# Patient Record
Sex: FEMALE | Race: WHITE | NOT HISPANIC OR LATINO | Employment: FULL TIME | URBAN - METROPOLITAN AREA
[De-identification: names, ages, dates, MRNs, and addresses within clinical notes are randomized per-mention and may not be internally consistent; named-entity substitution may affect disease eponyms.]

---

## 2017-07-25 ENCOUNTER — ALLSCRIPTS OFFICE VISIT (OUTPATIENT)
Dept: OTHER | Facility: OTHER | Age: 27
End: 2017-07-25

## 2017-11-02 ENCOUNTER — GENERIC CONVERSION - ENCOUNTER (OUTPATIENT)
Dept: OTHER | Facility: OTHER | Age: 27
End: 2017-11-02

## 2017-12-18 ENCOUNTER — GENERIC CONVERSION - ENCOUNTER (OUTPATIENT)
Dept: OTHER | Facility: OTHER | Age: 27
End: 2017-12-18

## 2018-01-06 LAB
CFTR MUT ANL BLD/T: NORMAL
CFTR MUT ANL BLD/T: NORMAL
EXTERNAL ABO GROUPING: NORMAL
EXTERNAL AFP: NORMAL
EXTERNAL CHLAMYDIA SCREEN: NORMAL
EXTERNAL GONORRHEA SCREEN: NORMAL
EXTERNAL HEPATITIS B SURFACE ANTIGEN: NORMAL
EXTERNAL HIV-1 ANTIBODY: NORMAL
EXTERNAL RH FACTOR: POSITIVE
EXTERNAL RUBELLA IGG QUANTITATION: NORMAL
EXTERNAL SYPHILIS RPR SCREEN: NORMAL

## 2018-01-13 VITALS
RESPIRATION RATE: 16 BRPM | BODY MASS INDEX: 28.46 KG/M2 | DIASTOLIC BLOOD PRESSURE: 78 MMHG | SYSTOLIC BLOOD PRESSURE: 112 MMHG | WEIGHT: 170.8 LBS | HEART RATE: 78 BPM | HEIGHT: 65 IN | TEMPERATURE: 99.1 F

## 2018-01-15 NOTE — MISCELLANEOUS
Message   Recorded as Task   Date: 10/11/2016 01:22 PM, Created By: Franchesca Brand   Task Name: Go to Result   Assigned To: Lucio Gomez   Regarding Patient: Richard Chang, Status: Active   Comment:    Franchesca Brand - 11 Oct 2016 1:22 PM     TASK CREATED  her labs are all normal, actually not consistent with PCO, would observe her cycles until her husbands next semen analysis is done   Pt informed  Active Problems    1  Amenorrhea (626 0) (N91 2)   2  History of self breast exam   3  Hyperlipidemia (272 4) (E78 5)   4  Menstruation, irregular (626 4) (N92 6)   5  Need for prophylactic vaccination and inoculation against influenza (V04 81) (Z23)   6  Nervousness (799 21) (R45 0)   7  History of Oral contraceptive prescribed (V25 01) (Z30 011)    Current Meds   1  Womens One Daily TABS; TAKE 1 TABLET DAILY; Therapy: (Recorded:28Apr2015) to Recorded    Allergies    1  No Known Drug Allergies    2  No Known Environmental Allergies   3   No Known Food Allergies    Signatures   Electronically signed by : Mike Wong, ; Oct 11 2016  2:29PM EST                       (Author)

## 2018-01-16 ENCOUNTER — ALLSCRIPTS OFFICE VISIT (OUTPATIENT)
Dept: OTHER | Facility: OTHER | Age: 28
End: 2018-01-16

## 2018-01-17 NOTE — MISCELLANEOUS
Message   Date: 23 Aug 2016 1:11 PM EST, Recorded By: Britany Castro For: Sandy Grammes: [place]cal[place]   Phone: [phone] (Home), [phone] (Work)   Reason: Medical Complaint   NP-- pt is having very irregular cycles    45 days in between    she went to her PCP and had neg preg tests    pt will come in for an appt           Active Problems    1  Amenorrhea (626 0) (N91 2)   2  Encounter for routine gynecological examination (V72 31) (Z01 419)   3  Hyperlipidemia (272 4) (E78 5)   4  Need for prophylactic vaccination and inoculation against influenza (V04 81) (Z23)   5  Nervousness (799 21) (R45 0)   6  Oral contraceptive prescribed (V25 01) (Z30 011)    Current Meds   1  Amoxicillin 875 MG Oral Tablet; TAKE 1 TABLET EVERY 12 HOURS DAILY; Therapy: 34ECP1838 to (Evaluate:19Jun2016)  Requested for: 19WEO5755; Last   Rx:09Jun2016 Ordered   2  Fluconazole 150 MG Oral Tablet (Diflucan); one pill stat then repeat in 3 days; Therapy: 21VDP2973 to (Last Rx:13Jun2016)  Requested for: 13Jun2016 Ordered   3  Womens One Daily TABS; TAKE 1 TABLET DAILY; Therapy: (Recorded:28Apr2015) to Recorded    Allergies    1  No Known Drug Allergies    2  No Known Environmental Allergies   3   No Known Food Allergies    Signatures   Electronically signed by : Mojgan Grier, ; Aug 23 2016  1:13PM EST                       (Author)

## 2018-01-18 NOTE — MISCELLANEOUS
Message   Recorded as Task   Date: 11/17/2016 10:57 AM, Created By: Robina Spaulding   Task Name: Care Coordination   Assigned To: Patricia Villalobos   Regarding Patient: David Marie, Status: Active   CommentJeffertorrie Spine - 17 Nov 2016 10:57 AM     TASK CREATED  Pt has been working with you about conceiving    her cycle has been 46 days the last 2 months    Whats the next step? Ferol Carlton Ferol Carlton Also I an sending you her husbands semen analysis    I have the one from April and from June  Emilie Irvin - 18 Nov 2016 11:07 AM     TASK REPLIED TO: Previously Assigned To Patricia Villalobos  I just reviewed both of the semen analyses  They are recommended seeing the infertility dr because his count is good but the % of normal sperm is low, that combined with her long cycles probably warrant a visit with KATTY  Not sure if she wanted to do this  Lamar Turk - 21 Nov 2016 11:42 AM     TASK REPLIED TO: Previously Assigned To Lamar Turk  referred to [place]    Active Problems    1  Amenorrhea (626 0) (N91 2)   2  History of self breast exam   3  Hyperlipidemia (272 4) (E78 5)   4  Menstruation, irregular (626 4) (N92 6)   5  Need for prophylactic vaccination and inoculation against influenza (V04 81) (Z23)   6  Nervousness (799 21) (R45 0)   7  History of Oral contraceptive prescribed (V25 01) (Z30 011)    Current Meds   1  Womens One Daily TABS; TAKE 1 TABLET DAILY; Therapy: (Recorded:28Apr2015) to Recorded    Allergies    1  No Known Drug Allergies    2  No Known Environmental Allergies   3   No Known Food Allergies    Signatures   Electronically signed by : Willie Valdez, ; Nov 21 2016 11:42AM EST                       (Author)

## 2018-01-20 LAB
ADEQUACY: (HISTORICAL): NORMAL
CHLAMYDIA DFA, NAA OR PCR (HISTORICAL): NEGATIVE
CLINICIAN PROVIDIED ICD 9 OR 10 (HISTORICAL): NORMAL
COMMENT (HISTORICAL): NORMAL
DIAGNOSIS (HISTORICAL): NORMAL
GC, DNA PROB (HISTORICAL): NEGATIVE
PERFORMED BY (HISTORICAL): NORMAL
QC REVIEWED BY (HISTORICAL): NORMAL
REFLEX (HISTORICAL): NORMAL
TEST INFORMATION (HISTORICAL): NORMAL

## 2018-01-22 VITALS — BODY MASS INDEX: 28.29 KG/M2 | HEIGHT: 65 IN | WEIGHT: 169.8 LBS

## 2018-01-23 NOTE — MISCELLANEOUS
Message   Date: 18 Dec 2017 8:49 AM EST, Recorded By: Jacqueline Breen For: Denisha Pires: Lemuel Thorne, [place]   Phone: [phone] (Home), [phone] (Work)   Reason: Other   New OB--pt is with [place] and she needs an apt for the the week of the Dec 15 when she will be 10 weeks    she will already have her U/S and bloodwork and those records will be released here    pt scheduled her appts           Active Problems    1  Amenorrhea (626 0) (N91 2)   2  History of self breast exam   3  Hyperlipidemia (272 4) (E78 5)   4  Impacted cerumen of right ear (380 4) (H61 21)   5  Menstruation, irregular (626 4) (N92 6)   6  Need for prophylactic vaccination and inoculation against influenza (V04 81) (Z23)   7  Need for prophylactic vaccination and inoculation against varicella (V05 4) (Z23)   8  Nervousness (799 21) (R45 0)   9  History of Oral contraceptive prescribed (V25 01) (Z30 011)   10  PCOS (polycystic ovarian syndrome) (256 4) (E28 2)    Current Meds   1  Co Q 10 CAPS; Therapy: (Recorded:05Mvc2917) to Recorded   2  Fluconazole 150 MG Oral Tablet (Diflucan); TAKE 1 TABLET ONCE AND REPEAT IN 1   WEEK as needed; Therapy: 16EHI1522 to (Last Rx:92Ahj5874)  Requested for: 61Glc4201 Ordered   3  MetFORMIN HCl - 500 MG Oral Tablet; TAKE 1 TABLET 3 TIMES DAILY WITH MEALS; Therapy: (Recorded:37Bxr8917) to Recorded   4  Neomycin-Polymyxin-HC 3 [id number] Otic Suspension; INSTILL 4 DROPS IN   AFFECTED EAR(S) 3 TIMES DAILY for 1 week; Therapy: 71EXH1695 to (Last Rx:02Jne7690)  Requested for: 38Iht1949 Ordered   5  Prenatal Vitamin TABS; Therapy: (Recorded:68Vih0983) to Recorded   6  Vitamin D 2000 UNIT Oral Capsule; Therapy: (Recorded:48Fif2206) to Recorded   7  Womens One Daily TABS; TAKE 1 TABLET DAILY; Therapy: (Recorded:28Apr2015) to Recorded    Allergies    1  No Known Drug Allergies    2  No Known Environmental Allergies   3   No Known Food Allergies    Signatures   Electronically signed by : Shannan Yeh, ; Dec 18 2017  8:53AM EST                       (Author)

## 2018-01-24 VITALS
WEIGHT: 166.38 LBS | HEART RATE: 76 BPM | DIASTOLIC BLOOD PRESSURE: 80 MMHG | SYSTOLIC BLOOD PRESSURE: 120 MMHG | BODY MASS INDEX: 27.72 KG/M2 | HEIGHT: 65 IN

## 2018-01-25 ENCOUNTER — ROUTINE PRENATAL (OUTPATIENT)
Dept: PERINATAL CARE | Facility: CLINIC | Age: 28
End: 2018-01-25
Payer: COMMERCIAL

## 2018-01-25 VITALS
DIASTOLIC BLOOD PRESSURE: 69 MMHG | SYSTOLIC BLOOD PRESSURE: 115 MMHG | HEART RATE: 81 BPM | WEIGHT: 171.4 LBS | BODY MASS INDEX: 28.56 KG/M2 | HEIGHT: 65 IN

## 2018-01-25 DIAGNOSIS — O09.811 PREGNANCY RESULTING FROM IN VITRO FERTILIZATION IN FIRST TRIMESTER: ICD-10-CM

## 2018-01-25 DIAGNOSIS — Z36.82 ENCOUNTER FOR NUCHAL TRANSLUCENCY TESTING: ICD-10-CM

## 2018-01-25 DIAGNOSIS — Z3A.12 12 WEEKS GESTATION OF PREGNANCY: Primary | ICD-10-CM

## 2018-01-25 PROBLEM — Z34.90 PREGNANCY: Status: ACTIVE | Noted: 2018-01-25

## 2018-01-25 PROCEDURE — 76801 OB US < 14 WKS SINGLE FETUS: CPT | Performed by: OBSTETRICS & GYNECOLOGY

## 2018-01-25 PROCEDURE — 99241 PR OFFICE CONSULTATION NEW/ESTAB PATIENT 15 MIN: CPT | Performed by: OBSTETRICS & GYNECOLOGY

## 2018-01-25 PROCEDURE — 76813 OB US NUCHAL MEAS 1 GEST: CPT | Performed by: OBSTETRICS & GYNECOLOGY

## 2018-01-25 RX ORDER — PNV NO.95/FERROUS FUM/FOLIC AC 28MG-0.8MG
1 TABLET ORAL DAILY
COMMUNITY
End: 2019-08-28 | Stop reason: ALTCHOICE

## 2018-01-25 NOTE — PROGRESS NOTES
Maternal-Fetal Medicine:  Ms Ivis Rao was seen today in the [id number] [place] [address] today for nuchal translucency ultrasound  Please see ultrasound report under "OB Procedures" tab in EPIC   Thank you for the referral and please don't hesitate to contact our office with any concerns or questions  Pregnancy resulting from in vitro fertilization in first trimester  Recommend fetal echocardiogram at 22-24 weeks    Encounter for nuchal translucency testing  Normal nuchal translucency screening today  Please note, in addition to the time spent discussing the results of the ultrasound, I spent approximately 15 minutes of face-to-face time with the patient, greater than 50% of which was spent in counseling and the coordination of care for this patient      Sincerely,    Ryan Arango MD  Attending Physician, Maternal-Fetal Medicine

## 2018-02-01 ENCOUNTER — TELEPHONE (OUTPATIENT)
Dept: PERINATAL CARE | Facility: CLINIC | Age: 28
End: 2018-02-01

## 2018-02-06 ENCOUNTER — TELEPHONE (OUTPATIENT)
Dept: PERINATAL CARE | Facility: CLINIC | Age: 28
End: 2018-02-06

## 2018-02-13 ENCOUNTER — ROUTINE PRENATAL (OUTPATIENT)
Dept: OBGYN CLINIC | Facility: CLINIC | Age: 28
End: 2018-02-13

## 2018-02-13 VITALS
HEART RATE: 90 BPM | WEIGHT: 173 LBS | DIASTOLIC BLOOD PRESSURE: 62 MMHG | SYSTOLIC BLOOD PRESSURE: 114 MMHG | HEIGHT: 65 IN | OXYGEN SATURATION: 99 % | BODY MASS INDEX: 28.82 KG/M2

## 2018-02-13 DIAGNOSIS — Z3A.14 14 WEEKS GESTATION OF PREGNANCY: ICD-10-CM

## 2018-02-13 DIAGNOSIS — Z34.90 PRENATAL CARE, ANTEPARTUM: Primary | ICD-10-CM

## 2018-02-13 DIAGNOSIS — O09.811 PREGNANCY RESULTING FROM IN VITRO FERTILIZATION IN FIRST TRIMESTER: ICD-10-CM

## 2018-02-13 DIAGNOSIS — Z36.82 ENCOUNTER FOR NUCHAL TRANSLUCENCY TESTING: ICD-10-CM

## 2018-02-13 PROCEDURE — PNV: Performed by: OBSTETRICS & GYNECOLOGY

## 2018-02-13 NOTE — PROGRESS NOTES
Patient is doing well  She denies any complaints  Her nausea has much improved  We reviewed old records trying to find her prenatal labs  Letter from Dr Piedad Newsome demonstrating normal labs for pregnancy was found in the chart  Urine culture was not done and it was ordered today  She will follow up in 4 weeks time for prenatal visit  She has laboratory sheet for the 2nd part of the sequential study  She will follow up with [facility] for ultrasound at 20 weeks  [facility] also recommended fetal echocardiogram given history of IVF pregnancy

## 2018-02-14 PROBLEM — H61.21 IMPACTED CERUMEN OF RIGHT EAR: Status: RESOLVED | Noted: 2017-07-25 | Resolved: 2018-02-14

## 2018-02-14 PROBLEM — Z31.9 INFERTILITY MANAGEMENT: Status: ACTIVE | Noted: 2018-02-14

## 2018-02-14 PROBLEM — O09.811 PREGNANCY RESULTING FROM IN VITRO FERTILIZATION IN FIRST TRIMESTER: Status: RESOLVED | Noted: 2018-01-25 | Resolved: 2018-02-14

## 2018-02-14 PROBLEM — E28.2 PCOS (POLYCYSTIC OVARIAN SYNDROME): Status: ACTIVE | Noted: 2017-07-25

## 2018-02-14 PROBLEM — H61.21 IMPACTED CERUMEN OF RIGHT EAR: Status: ACTIVE | Noted: 2017-07-25

## 2018-02-14 PROBLEM — Z92.29 HISTORY OF INFLUENZA VACCINATION: Status: ACTIVE | Noted: 2018-02-14

## 2018-02-15 ENCOUNTER — OFFICE VISIT (OUTPATIENT)
Dept: OBGYN CLINIC | Facility: CLINIC | Age: 28
End: 2018-02-15
Payer: COMMERCIAL

## 2018-02-15 ENCOUNTER — TELEPHONE (OUTPATIENT)
Dept: OBGYN CLINIC | Facility: CLINIC | Age: 28
End: 2018-02-15

## 2018-02-15 VITALS
SYSTOLIC BLOOD PRESSURE: 106 MMHG | BODY MASS INDEX: 28.76 KG/M2 | DIASTOLIC BLOOD PRESSURE: 62 MMHG | WEIGHT: 172.6 LBS | HEIGHT: 65 IN

## 2018-02-15 DIAGNOSIS — Z92.29 HISTORY OF INFLUENZA VACCINATION: ICD-10-CM

## 2018-02-15 DIAGNOSIS — Z36.82 ENCOUNTER FOR NUCHAL TRANSLUCENCY TESTING: ICD-10-CM

## 2018-02-15 DIAGNOSIS — N89.8 VAGINAL DISCHARGE: Primary | ICD-10-CM

## 2018-02-15 DIAGNOSIS — O26.859 SPOTTING IN PREGNANCY: Primary | ICD-10-CM

## 2018-02-15 LAB — SL AMB POCT WET MOUNT: 4

## 2018-02-15 PROCEDURE — PNV: Performed by: OBSTETRICS & GYNECOLOGY

## 2018-02-15 PROCEDURE — 87210 SMEAR WET MOUNT SALINE/INK: CPT | Performed by: OBSTETRICS & GYNECOLOGY

## 2018-02-15 NOTE — TELEPHONE ENCOUNTER
Patient called, 15 weeks OB, c/o spotting with wiping, every time,  x 2 days, slight cramping, not post coital   Wants appointment  OK per [facility], appointment given in QT today  Sent for [facility], CBC, Type and screen

## 2018-02-15 NOTE — PROGRESS NOTES
Patient seen today in follow-up  She had some cramping starting the evening 2 days ago  Yesterday, she had some brown discharge which continued until today  Exam today was notable for scant amount of white/tan discharge with no active bleeding  No cervical or vaginal lesions were noted  Wet prep was negative for any infection  Her cervix is closed and long  Threatened AB precautions were reviewed in detail  Patient is Rh positive on blood type  She will call with any heavy bleeding or severe cramps  Otherwise, she will follow up in 3-4 weeks for prenatal visit as scheduled

## 2018-02-15 NOTE — PATIENT INSTRUCTIONS
Follow-up 3-4 weeks for prenatal or as needed  Threatened AB precautions reviewed  Threatened Miscarriage   WHAT YOU NEED TO KNOW:   What is a threatened miscarriage? A threatened miscarriage occurs when you have vaginal bleeding within the first 20 weeks of pregnancy  It means that a miscarriage may happen  A threatened miscarriage may also be called a threatened   What causes bleeding or spotting during pregnancy? The cause of your bleeding or spotting may not be known  The following are possible causes of vaginal bleeding during pregnancy:  · Polyps, fibroids, or cysts in the uterus    · Sexual intercourse    · Infection    · Where or how the placenta is attached to your uterus (womb)    · A problem with your fetus (unborn baby)    · Drug or alcohol use    · Ectopic pregnancy (the fetus is growing outside of the uterus)  What are the signs and symptoms of a threatened miscarriage? · Vaginal spotting or bleeding    · Pain or cramping in your abdomen or lower back  How is a threatened miscarriage diagnosed? Tell your healthcare provider when your bleeding started  You may need any of the following:  · Blood tests  may show infection, check your level of pregnancy hormone, or give information about your overall health  · A pelvic exam  checks the size of your uterus  A pelvic exam also checks your cervix for dilation (opening)  · A pelvic ultrasound  shows pictures of the fetus and finds his heartbeat  A pelvic ultrasound also looks at your reproductive organs and monitors the amount of bleeding  How is a threatened miscarriage managed? The following may help you manage your symptoms and decrease your risk for a miscarriage:  · Do not put anything in your vagina  Do not have sex, douche, or use tampons  These actions may increase your risk for infection and miscarriage  · Rest as directed  Do not exercise or do strenuous activities   These activities may cause  labor or miscarriage  Ask your healthcare provider what activities are okay to do  When should I seek immediate care? · You feel weak or faint  · Your pain or cramping in your abdomen or back gets worse  · You have vaginal bleeding that soaks 1 or more pads in an hour  · You pass material that looks like tissue or large clots  When should I contact my healthcare provider? · You have a fever  · You have trouble urinating, burning when you urinate, or feel a need to urinate often  · You have new or worsening vaginal bleeding  · You have vaginal pain or itching, or vaginal discharge that is yellow, green, or foul-smelling  · You have questions or concerns about your condition or care  CARE AGREEMENT:   You have the right to help plan your care  Learn about your health condition and how it may be treated  Discuss treatment options with your caregivers to decide what care you want to receive  You always have the right to refuse treatment  The above information is an  only  It is not intended as medical advice for individual conditions or treatments  Talk to your doctor, nurse or pharmacist before following any medical regimen to see if it is safe and effective for you  © 2017 [address] [employer] Information is for End User's use only and may not be sold, redistributed or otherwise used for commercial purposes  All illustrations and images included in CareNotes® are the copyrighted property of A D A M [employer]  or Bernabe Sanderson    Follow-up 3 weeks prenatal

## 2018-02-16 LAB
ABO GROUP BLD: NORMAL
BLD GP AB SCN SERPL QL: NEGATIVE
ERYTHROCYTE [DISTWIDTH] IN BLOOD BY AUTOMATED COUNT: 13.8 % (ref 12.3–15.4)
HCG INTACT+B SERPL-ACNC: NORMAL MIU/ML
HCT VFR BLD AUTO: 37.5 % (ref 34–46.6)
HGB BLD-MCNC: 12.8 G/DL (ref 11.1–15.9)
MCH RBC QN AUTO: 32.1 PG (ref 26.6–33)
MCHC RBC AUTO-ENTMCNC: 34.1 G/DL (ref 31.5–35.7)
MCV RBC AUTO: 94 FL (ref 79–97)
PLATELET # BLD AUTO: 234 X10E3/UL (ref 150–379)
RBC # BLD AUTO: 3.99 X10E6/UL (ref 3.77–5.28)
RH BLD: POSITIVE
WBC # BLD AUTO: 9.8 X10E3/UL (ref 3.4–10.8)

## 2018-02-17 LAB
BACTERIA UR CULT: NORMAL
Lab: NO GROWTH

## 2018-02-19 ENCOUNTER — TELEPHONE (OUTPATIENT)
Dept: OBGYN CLINIC | Facility: CLINIC | Age: 28
End: 2018-02-19

## 2018-02-19 NOTE — TELEPHONE ENCOUNTER
----- Message from Kimber Carrillo MD sent at 2/16/2018  4:40 PM EST -----  Labs normal, Rh positive confirmed  Please inform pt

## 2018-02-27 ENCOUNTER — TRANSCRIBE ORDERS (OUTPATIENT)
Dept: LAB | Facility: HOSPITAL | Age: 28
End: 2018-02-27

## 2018-02-27 ENCOUNTER — APPOINTMENT (OUTPATIENT)
Dept: LAB | Facility: HOSPITAL | Age: 28
End: 2018-02-27
Attending: OBSTETRICS & GYNECOLOGY
Payer: COMMERCIAL

## 2018-02-27 DIAGNOSIS — Z34.90 PREGNANCY, UNSPECIFIED GESTATIONAL AGE: Primary | ICD-10-CM

## 2018-02-27 PROCEDURE — 36415 COLL VENOUS BLD VENIPUNCTURE: CPT

## 2018-02-28 ENCOUNTER — TELEPHONE (OUTPATIENT)
Dept: PERINATAL CARE | Facility: CLINIC | Age: 28
End: 2018-02-28

## 2018-02-28 LAB — SCAN RESULT: NORMAL

## 2018-02-28 NOTE — PROGRESS NOTES
I have been notified of these results but no result can be seen  Please follow-up and forward results as per office protocol

## 2018-03-13 ENCOUNTER — ROUTINE PRENATAL (OUTPATIENT)
Dept: OBGYN CLINIC | Facility: CLINIC | Age: 28
End: 2018-03-13

## 2018-03-13 VITALS
SYSTOLIC BLOOD PRESSURE: 102 MMHG | DIASTOLIC BLOOD PRESSURE: 70 MMHG | HEIGHT: 65 IN | WEIGHT: 180 LBS | HEART RATE: 69 BPM | BODY MASS INDEX: 29.99 KG/M2

## 2018-03-13 DIAGNOSIS — O09.812 HIGH RISK PREGNANCY DUE TO ASSISTED REPRODUCTIVE TECHNOLOGY IN SECOND TRIMESTER: ICD-10-CM

## 2018-03-13 DIAGNOSIS — Z92.29 HISTORY OF INFLUENZA VACCINATION: ICD-10-CM

## 2018-03-13 DIAGNOSIS — Z3A.18 18 WEEKS GESTATION OF PREGNANCY: Primary | ICD-10-CM

## 2018-03-13 PROBLEM — Z36.82 ENCOUNTER FOR NUCHAL TRANSLUCENCY TESTING: Status: RESOLVED | Noted: 2018-01-25 | Resolved: 2018-03-13

## 2018-03-13 PROCEDURE — PNV: Performed by: OBSTETRICS & GYNECOLOGY

## 2018-03-13 NOTE — PATIENT INSTRUCTIONS
Pregnancy at 23 to 22 [address]:   What changes are happening in my body? Now that you are in your second trimester, you have more energy  You may also be feeling hungrier than usual  You may be gaining about ½ to 1 pound a week, and your pregnancy is beginning to show  You may need to start wearing maternity clothes  As your baby gets larger, you may have other symptoms  These may include body aches or stretch marks on your abdomen, breasts, thighs, or buttocks  How do I care for myself at this stage of my pregnancy? · Eat a variety of healthy foods  Healthy foods include fruits, vegetables, whole-grain breads, low-fat dairy foods, beans, lean meats, and fish  Drink liquids as directed  Ask how much liquid to drink each day and which liquids are best for you  Limit caffeine to less than 200 milligrams each day  Limit your intake of fish to 2 servings each week  Choose fish low in mercury such as canned light tuna, shrimp, salmon, cod, or tilapia  Do not  eat fish high in mercury such as swordfish, tilefish, angel mackerel, and shark  · Take prenatal vitamins as directed  Your need for certain vitamins and minerals, such as folic acid, increases during pregnancy  Prenatal vitamins provide some of the extra vitamins and minerals you need  Prenatal vitamins may also help to decrease the risk of certain birth defects  · Talk to your healthcare provider about exercise  Moderate exercise can help you stay fit  Your healthcare provider will help you plan an exercise program that is safe for you during pregnancy  · Do not smoke  If you smoke, it is never too late to quit  Smoking increases your risk of a miscarriage and other health problems during your pregnancy  Smoking can cause your baby to be born too early or weigh less at birth  Ask your healthcare provider for information if you need help quitting  · Do not drink alcohol    Alcohol passes from your body to your baby through the placenta  It can affect your baby's brain development and cause fetal alcohol syndrome (FAS)  FAS is a group of conditions that causes mental, behavior, and growth problems  · Talk to your healthcare provider before you take any medicines  Many medicines may harm your baby if you take them when you are pregnant  Do not take any medicines, vitamins, herbs, or supplements without first talking to your healthcare provider  Never use illegal or street drugs (such as marijuana or cocaine) while you are pregnant  What are some safety tips during pregnancy? · Avoid hot tubs and saunas  Do not use a hot tub or sauna while you are pregnant, especially during your first trimester  Hot tubs and saunas may raise your baby's temperature and increase the risk of birth defects  · Avoid toxoplasmosis  This is an infection caused by eating raw meat or being around infected cat feces  It can cause birth defects, miscarriages, and other problems  Wash your hands after you touch raw meat  Make sure any meat is well-cooked before you eat it  Avoid raw eggs and unpasteurized milk  Use gloves or ask someone else to clean your cat's litter box while you are pregnant  What changes are happening with my baby? By 22 weeks, your baby is about 8 inches long from the top of the head to the rump (baby's bottom)  Your baby also weighs about 1 pound  Your baby is becoming much more active  You may be able to feel the baby move inside you now  The first movements may not be that noticeable  They may feel like a fluttering sensation  As time goes on, your baby's movements will become stronger and more noticeable  What do I need to know about prenatal care? During the first 28 weeks of your pregnancy, you will see your healthcare provider once a month  Your healthcare provider will check your blood pressure and weight  You may also need the following:  · A urine test  may also be done to check for sugar and protein   These can be signs of gestational diabetes or infection  Protein in your urine may also be a sign of preeclampsia  Preeclampsia is a condition that can develop during week 20 or later of your pregnancy  It causes high blood pressure, and it can cause problems with your kidneys and other organs  · Fundal height  is a measurement of your uterus to check your baby's growth  This number is usually the same as the number of weeks that you have been pregnant  · A fetal ultrasound  shows pictures of your baby inside your uterus  It shows your baby's development  The movement and position of your baby can also be seen  Your healthcare provider may be able to tell you what your baby's gender is during the ultrasound  · Your baby's heart rate  will be checked  When should I seek immediate care? · You develop a severe headache that does not go away  · You have new or increased vision changes, such as blurred or spotted vision  · You have new or increased swelling in your face or hands  · You have vaginal spotting or bleeding  · Your water broke or you feel warm water gushing or trickling from your vagina  When should I contact my healthcare provider? · You have abdominal cramps, pressure, or tightening  · You have a change in vaginal discharge  · You cannot keep food or drinks down, and you are losing weight  · You have chills or a fever  · You have vaginal itching, burning, or pain  · You have yellow, green, white, or foul-smelling vaginal discharge  · You have pain or burning when you urinate, less urine than usual, or pink or bloody urine  · You have questions or concerns about your condition or care  CARE AGREEMENT:   You have the right to help plan your care  Learn about your health condition and how it may be treated  Discuss treatment options with your caregivers to decide what care you want to receive  You always have the right to refuse treatment   The above information is an  only  It is not intended as medical advice for individual conditions or treatments  Talk to your doctor, nurse or pharmacist before following any medical regimen to see if it is safe and effective for you  © 2017 [facility] Information is for End User's use only and may not be sold, redistributed or otherwise used for commercial purposes  All illustrations and images included in CareNotes® are the copyrighted property of A D A M , Inc  or Bernabe Sanderson

## 2018-03-22 ENCOUNTER — ROUTINE PRENATAL (OUTPATIENT)
Dept: PERINATAL CARE | Facility: CLINIC | Age: 28
End: 2018-03-22
Payer: COMMERCIAL

## 2018-03-22 VITALS
BODY MASS INDEX: 29.99 KG/M2 | HEART RATE: 79 BPM | SYSTOLIC BLOOD PRESSURE: 131 MMHG | DIASTOLIC BLOOD PRESSURE: 67 MMHG | WEIGHT: 180 LBS | HEIGHT: 65 IN

## 2018-03-22 DIAGNOSIS — O09.812 PREGNANCY RESULTING FROM IN VITRO FERTILIZATION IN SECOND TRIMESTER: Primary | ICD-10-CM

## 2018-03-22 DIAGNOSIS — Z3A.20 20 WEEKS GESTATION OF PREGNANCY: ICD-10-CM

## 2018-03-22 DIAGNOSIS — Z36.86 ENCOUNTER FOR ANTENATAL SCREENING FOR CERVICAL LENGTH: ICD-10-CM

## 2018-03-22 PROCEDURE — 99212 OFFICE O/P EST SF 10 MIN: CPT | Performed by: OBSTETRICS & GYNECOLOGY

## 2018-03-22 PROCEDURE — 76811 OB US DETAILED SNGL FETUS: CPT | Performed by: OBSTETRICS & GYNECOLOGY

## 2018-03-22 PROCEDURE — 76817 TRANSVAGINAL US OBSTETRIC: CPT | Performed by: OBSTETRICS & GYNECOLOGY

## 2018-03-22 NOTE — LETTER
March 23, 2018     Shawn Gamboa MD  [address] [facility]    Patient: Rehana Hardin   YOB: 1990   Date of Visit: 3/22/2018       Dear Dr Niecy Lujan: Thank you for referring Halina Apley to me for evaluation  Below are my notes for this consultation  If you have questions, please do not hesitate to call me  I look forward to following your patient along with you  Sincerely,        Rosario Alberto MD        CC: No Recipients  Rosario Alberto MD  3/22/2018  7:25 PM  Sign at close encounter  Please refer to the [facility] ultrasound report in Ob Procedures for additional information regarding the visit to the [address] [place] [address]

## 2018-03-22 NOTE — PROGRESS NOTES
Please refer to the [facility] ultrasound report in Ob Procedures for additional information regarding the visit to the [address] [place] [address] today

## 2018-04-13 ENCOUNTER — ROUTINE PRENATAL (OUTPATIENT)
Dept: OBGYN CLINIC | Facility: CLINIC | Age: 28
End: 2018-04-13

## 2018-04-13 VITALS
BODY MASS INDEX: 31.02 KG/M2 | HEART RATE: 86 BPM | SYSTOLIC BLOOD PRESSURE: 124 MMHG | WEIGHT: 186.2 LBS | DIASTOLIC BLOOD PRESSURE: 58 MMHG | HEIGHT: 65 IN

## 2018-04-13 DIAGNOSIS — Z34.93 THIRD TRIMESTER PREGNANCY: Primary | ICD-10-CM

## 2018-04-13 DIAGNOSIS — Z92.29 HISTORY OF INFLUENZA VACCINATION: ICD-10-CM

## 2018-04-13 DIAGNOSIS — O09.812 HIGH RISK PREGNANCY DUE TO ASSISTED REPRODUCTIVE TECHNOLOGY IN SECOND TRIMESTER: ICD-10-CM

## 2018-04-13 PROCEDURE — PNV: Performed by: OBSTETRICS & GYNECOLOGY

## 2018-04-13 NOTE — PROGRESS NOTES
Patient has no complaints doing well today   Positive fetal movement more appreciated today than last visit  Fetal heart tones are present     ultrasound from 3/22  Reviewed atony appeared normal suboptimal views of the cardiac   The right outflow and aortic arch patient will have fetal echo for this along with being an IVF baby   cervix long closed no funneling  Sequential screening was normal     patient given information and requested do GCT CBC and RPR     all questions were answered follow-up monthly

## 2018-04-13 NOTE — PATIENT INSTRUCTIONS
Pregnancy at 23 to 26 [facility] [address]:   What changes are happening in my body? You are now close to or at the beginning of the third trimester  The third trimester starts at 24 weeks and ends with delivery  As your baby gets larger, you may develop certain symptoms  These may include pain in your back or down the sides of your abdomen  You may also have stretch marks on your abdomen, breasts, thighs, or buttocks  You may also have constipation  How do I care for myself at this stage of my pregnancy? · Eat a variety of healthy foods  Healthy foods include fruits, vegetables, whole-grain breads, low-fat dairy foods, beans, lean meats, and fish  Drink liquids as directed  Ask how much liquid to drink each day and which liquids are best for you  Limit caffeine to less than 200 milligrams each day  Limit your intake of fish to 2 servings each week  Choose fish low in mercury such as canned light tuna, shrimp, salmon, cod, or tilapia  Do not  eat fish high in mercury such as swordfish, tilefish, angel mackerel, and shark  · Manage back pain  Do not stand for long periods of time or lift heavy items  Use good posture while you stand, squat, or bend  Wear low-heeled shoes with good support  Rest may also help to relieve back pain  Ask your healthcare provider about exercises you can do to strengthen your back muscles  · Take prenatal vitamins as directed  Your need for certain vitamins and minerals, such as folic acid, increases during pregnancy  Prenatal vitamins provide some of the extra vitamins and minerals you need  Prenatal vitamins may also help to decrease the risk of certain birth defects  · Talk to your healthcare provider about exercise  Moderate exercise can help you stay fit  Your healthcare provider will help you plan an exercise program that is safe for you during pregnancy  · Do not smoke  If you smoke, it is never too late to quit   Smoking increases your risk of a miscarriage and other health problems during your pregnancy  Smoking can cause your baby to be born too early or weigh less at birth  Ask your healthcare provider for information if you need help quitting  · Do not drink alcohol  Alcohol passes from your body to your baby through the placenta  It can affect your baby's brain development and cause fetal alcohol syndrome (FAS)  FAS is a group of conditions that causes mental, behavior, and growth problems  · Talk to your healthcare provider before you take any medicines  Many medicines may harm your baby if you take them when you are pregnant  Do not take any medicines, vitamins, herbs, or supplements without first talking to your healthcare provider  Never use illegal or street drugs (such as marijuana or cocaine) while you are pregnant  What are some safety tips during pregnancy? · Avoid hot tubs and saunas  Do not use a hot tub or sauna while you are pregnant, especially during your first trimester  Hot tubs and saunas may raise your baby's temperature and increase the risk of birth defects  · Avoid toxoplasmosis  This is an infection caused by eating raw meat or being around infected cat feces  It can cause birth defects, miscarriages, and other problems  Wash your hands after you touch raw meat  Make sure any meat is well-cooked before you eat it  Avoid raw eggs and unpasteurized milk  Use gloves or ask someone else to clean your cat's litter box while you are pregnant  What changes are happening with my baby? By 26 weeks, your baby will weigh about 2 pounds  Your baby will be about 10 inches long from the top of the head to the rump (baby's bottom)  Your baby's movements are much stronger now  Your baby's eyes are almost completely formed and can partially open  Your baby also sleeps and wakes up  What do I need to know about prenatal care? Your healthcare provider will check your blood pressure and weight   You may also need the following:  · A urine test  may also be done to check for sugar and protein  These can be signs of gestational diabetes or infection  Protein in your urine may also be a sign of preeclampsia  Preeclampsia is a condition that can develop during week 20 or later of your pregnancy  It causes high blood pressure, and it can cause problems with your kidneys and other organs  · Fundal height  is a measurement of your uterus to check your baby's growth  This number is usually the same as the number of weeks that you have been pregnant  · Your baby's heart rate  will be checked  When should I seek immediate care? · You develop a severe headache that does not go away  · You have new or increased vision changes, such as blurred or spotted vision  · You have new or increased swelling in your face or hands  · You have vaginal spotting or bleeding  · Your water broke or you feel warm water gushing or trickling from your vagina  When should I contact my healthcare provider? · You have abdominal cramps, pressure, or tightening  · You have a change in vaginal discharge  · You have light bleeding  · You have chills or a fever  · You have vaginal itching, burning, or pain  · You have yellow, green, white, or foul-smelling vaginal discharge  · You have pain or burning when you urinate, less urine than usual, or pink or bloody urine  · You have questions or concerns about your condition or care  CARE AGREEMENT:   You have the right to help plan your care  Learn about your health condition and how it may be treated  Discuss treatment options with your caregivers to decide what care you want to receive  You always have the right to refuse treatment  The above information is an  only  It is not intended as medical advice for individual conditions or treatments   Talk to your doctor, nurse or pharmacist before following any medical regimen to see if it is safe and effective for you   © 2017 [facility] Information is for End User's use only and may not be sold, redistributed or otherwise used for commercial purposes  All illustrations and images included in CareNotes® are the copyrighted property of [employer]  or [employer]

## 2018-05-11 LAB
BASOPHILS # BLD AUTO: 32 CELLS/UL (ref 0–200)
BASOPHILS NFR BLD AUTO: 0.3 %
EOSINOPHIL # BLD AUTO: 74 CELLS/UL (ref 15–500)
EOSINOPHIL NFR BLD AUTO: 0.7 %
ERYTHROCYTE [DISTWIDTH] IN BLOOD BY AUTOMATED COUNT: 12.1 % (ref 11–15)
GLUCOSE 1H P 50 G GLC PO SERPL-MCNC: 101 MG/DL
HCT VFR BLD AUTO: 35.1 % (ref 35–45)
HGB BLD-MCNC: 12 G/DL (ref 11.7–15.5)
LYMPHOCYTES # BLD AUTO: 2184 CELLS/UL (ref 850–3900)
LYMPHOCYTES NFR BLD AUTO: 20.6 %
MCH RBC QN AUTO: 33.5 PG (ref 27–33)
MCHC RBC AUTO-ENTMCNC: 34.2 G/DL (ref 32–36)
MCV RBC AUTO: 98 FL (ref 80–100)
MONOCYTES # BLD AUTO: 859 CELLS/UL (ref 200–950)
MONOCYTES NFR BLD AUTO: 8.1 %
NEUTROPHILS # BLD AUTO: 7452 CELLS/UL (ref 1500–7800)
NEUTROPHILS NFR BLD AUTO: 70.3 %
PLATELET # BLD AUTO: 173 THOUSAND/UL (ref 140–400)
PMV BLD REES-ECKER: 10.7 FL (ref 7.5–12.5)
RBC # BLD AUTO: 3.58 MILLION/UL (ref 3.8–5.1)
RPR SER QL: NORMAL
WBC # BLD AUTO: 10.6 THOUSAND/UL (ref 3.8–10.8)

## 2018-05-17 ENCOUNTER — ROUTINE PRENATAL (OUTPATIENT)
Dept: OBGYN CLINIC | Facility: CLINIC | Age: 28
End: 2018-05-17
Payer: COMMERCIAL

## 2018-05-17 VITALS
WEIGHT: 188.4 LBS | BODY MASS INDEX: 31.39 KG/M2 | DIASTOLIC BLOOD PRESSURE: 62 MMHG | HEIGHT: 65 IN | SYSTOLIC BLOOD PRESSURE: 118 MMHG | HEART RATE: 68 BPM

## 2018-05-17 DIAGNOSIS — Z3A.28 28 WEEKS GESTATION OF PREGNANCY: ICD-10-CM

## 2018-05-17 DIAGNOSIS — Z23 NEED FOR DIPHTHERIA-TETANUS-PERTUSSIS (TDAP) VACCINE: Primary | ICD-10-CM

## 2018-05-17 PROCEDURE — 90715 TDAP VACCINE 7 YRS/> IM: CPT

## 2018-05-17 PROCEDURE — 90471 IMMUNIZATION ADMIN: CPT

## 2018-05-17 PROCEDURE — PNV: Performed by: OBSTETRICS & GYNECOLOGY

## 2018-05-17 NOTE — PATIENT INSTRUCTIONS
Pregnancy at 32 to 30 [address]:   What changes are happening in my body? You may notice new symptoms such as shortness of breath, heartburn, or swelling of your ankles and feet  You may also have trouble sleeping or contractions  How do I care for myself at this stage of my pregnancy? · Eat a variety of healthy foods  Healthy foods include fruits, vegetables, whole-grain breads, low-fat dairy foods, beans, lean meats, and fish  Drink liquids as directed  Ask how much liquid to drink each day and which liquids are best for you  Limit caffeine to less than 200 milligrams each day  Limit your intake of fish to 2 servings each week  Choose fish low in mercury such as canned light tuna, shrimp, salmon, cod, or tilapia  Do not  eat fish high in mercury such as swordfish, tilefish, angel mackerel, and shark  · Manage heartburn  by eating 4 or 5 small meals each day instead of large meals  Avoid spicy food  · Manage swelling  by lying down and putting your feet up  · Take prenatal vitamins as directed  Your need for certain vitamins and minerals, such as folic acid, increases during pregnancy  Prenatal vitamins provide some of the extra vitamins and minerals you need  Prenatal vitamins may also help to decrease the risk of certain birth defects  · Talk to your healthcare provider about exercise  Moderate exercise can help you stay fit  Your healthcare provider will help you plan an exercise program that is safe for you during pregnancy  · Do not smoke  If you smoke, it is never too late to quit  Smoking increases your risk of a miscarriage and other health problems during your pregnancy  Smoking can cause your baby to be born too early or weigh less at birth  Ask your healthcare provider for information if you need help quitting  · Do not drink alcohol  Alcohol passes from your body to your baby through the placenta   It can affect your baby's brain development and cause fetal alcohol syndrome (FAS)  FAS is a group of conditions that causes mental, behavior, and growth problems  · Talk to your healthcare provider before you take any medicines  Many medicines may harm your baby if you take them when you are pregnant  Do not take any medicines, vitamins, herbs, or supplements without first talking to your healthcare provider  Never use illegal or street drugs (such as marijuana or cocaine) while you are pregnant  What are some safety tips during pregnancy? · Avoid hot tubs and saunas  Do not use a hot tub or sauna while you are pregnant, especially during your first trimester  Hot tubs and saunas may raise your baby's temperature and increase the risk of birth defects  · Avoid toxoplasmosis  This is an infection caused by eating raw meat or being around infected cat feces  It can cause birth defects, miscarriages, and other problems  Wash your hands after you touch raw meat  Make sure any meat is well-cooked before you eat it  Avoid raw eggs and unpasteurized milk  Use gloves or ask someone else to clean your cat's litter box while you are pregnant  What changes are happening with my baby? By 30 weeks, your baby may weigh more than 3 pounds  Your baby may be about 11 inches long from the top of the head to the rump (baby's bottom)  Your baby's eyes open and close now  Your baby's kicks and movements are more forceful at this time  What do I need to know about prenatal care? Your healthcare provider will check your blood pressure and weight  You may also need the following:  · Blood tests  may be done to check for anemia or blood type  · A urine test  may also be done to check for sugar and protein  These can be signs of gestational diabetes or infection  Protein in your urine may also be a sign of preeclampsia  Preeclampsia is a condition that can develop during week 20 or later of your pregnancy   It causes high blood pressure, and it can cause problems with your kidneys and other organs  · A Tdap vaccine and flu vaccine  may be recommended by your healthcare provider  · A gestational diabetes screen  will be done using an oral glucose tolerance test (OGTT)  An OGTT starts with a blood sugar level check after you have not eaten for 8 hours  You are then given a glucose drink  Your blood sugar level is checked after 1 hour, 2 hours, and sometimes 3 hours  Healthcare providers look at how much your blood sugar level increases from the first check  · Fundal height  is a measurement of your uterus to check your baby's growth  This number is usually the same as the number of weeks that you have been pregnant  Your healthcare provider may also check your baby's position  · Your baby's heart rate  will be checked  When should I seek immediate care? · You develop a severe headache that does not go away  · You have new or increased vision changes, such as blurred or spotted vision  · You have new or increased swelling in your face or hands  · You have vaginal spotting or bleeding  · Your water broke or you feel warm water gushing or trickling from your vagina  When should I contact my healthcare provider? · You have more than 5 contractions in 1 hour  · You notice any changes in your baby's movements  · You have abdominal cramps, pressure, or tightening  · You have a change in vaginal discharge  · You have chills or a fever  · You have vaginal itching, burning, or pain  · You have yellow, green, white, or foul-smelling vaginal discharge  · You have pain or burning when you urinate, less urine than usual, or pink or bloody urine  · You have questions or concerns about your condition or care  CARE AGREEMENT:   You have the right to help plan your care  Learn about your health condition and how it may be treated  Discuss treatment options with your caregivers to decide what care you want to receive   You always have the right to refuse treatment  The above information is an  only  It is not intended as medical advice for individual conditions or treatments  Talk to your doctor, nurse or pharmacist before following any medical regimen to see if it is safe and effective for you  © 2017 [address] [employer] Information is for End User's use only and may not be sold, redistributed or otherwise used for commercial purposes  All illustrations and images included in CareNotes® are the copyrighted property of [employer]  or Bernabe Sanderson

## 2018-05-17 NOTE — PROGRESS NOTES
Patient is doing well  Good fetal movement noted  Fetal movement and  labor precautions were reviewed  1   Labs-Glucola, CBC, and RPR were negative/normal  2  Suboptimal views of the cardiac system-patient did not have fetal echocardiogram done due to cost greater than $2000  Her level 2 ultrasound was okay except for suboptimal views of the right outflow and aortic arch  She is aware of the potential risk of suboptimal diagnosis given IVF pregnancy, but she was counseled by M that no abnormalities were noted on 20 week scan and the overall risk is quite low  3   Other-Tdap was given today  She will follow-up in 3-4 weeks for prenatal or as with needed

## 2018-06-07 ENCOUNTER — ROUTINE PRENATAL (OUTPATIENT)
Dept: OBGYN CLINIC | Facility: CLINIC | Age: 28
End: 2018-06-07

## 2018-06-07 VITALS
HEART RATE: 96 BPM | SYSTOLIC BLOOD PRESSURE: 122 MMHG | DIASTOLIC BLOOD PRESSURE: 64 MMHG | HEIGHT: 65 IN | WEIGHT: 192.8 LBS | BODY MASS INDEX: 32.12 KG/M2

## 2018-06-07 DIAGNOSIS — Z3A.31 31 WEEKS GESTATION OF PREGNANCY: Primary | ICD-10-CM

## 2018-06-07 PROBLEM — Z92.29 HISTORY OF TETANUS, DIPHTHERIA, AND ACELLULAR PERTUSSIS BOOSTER VACCINATION (TDAP): Status: ACTIVE | Noted: 2018-06-07

## 2018-06-07 PROCEDURE — PNV: Performed by: OBSTETRICS & GYNECOLOGY

## 2018-06-07 NOTE — PATIENT INSTRUCTIONS
Pregnancy at [address]:   What changes are happening in my body? You may continue to have symptoms such as shortness of breath, heartburn, contractions, or swelling of your ankles and feet  You may be gaining about 1 pound a week now  How do I care for myself at this stage of my pregnancy? · Eat a variety of healthy foods  Healthy foods include fruits, vegetables, whole-grain breads, low-fat dairy foods, beans, lean meats, and fish  Drink liquids as directed  Ask how much liquid to drink each day and which liquids are best for you  Limit caffeine to less than 200 milligrams each day  Limit your intake of fish to 2 servings each week  Choose fish low in mercury such as canned light tuna, shrimp, salmon, cod, or tilapia  Do not  eat fish high in mercury such as swordfish, tilefish, angel mackerel, and shark  · Manage heartburn  by eating 4 or 5 small meals each day instead of large meals  Avoid spicy food  · Manage swelling  by lying down and putting your feet up  · Take prenatal vitamins as directed  Your need for certain vitamins and minerals, such as folic acid, increases during pregnancy  Prenatal vitamins provide some of the extra vitamins and minerals you need  Prenatal vitamins may also help to decrease the risk of certain birth defects  · Talk to your healthcare provider about exercise  Moderate exercise can help you stay fit  Your healthcare provider will help you plan an exercise program that is safe for you during pregnancy  · Do not smoke  If you smoke, it is never too late to quit  Smoking increases your risk of a miscarriage and other health problems during your pregnancy  Smoking can cause your baby to be born too early or weigh less at birth  Ask your healthcare provider for information if you need help quitting  · Do not drink alcohol  Alcohol passes from your body to your baby through the placenta   It can affect your baby's brain development and cause fetal alcohol syndrome (FAS)  FAS is a group of conditions that causes mental, behavior, and growth problems  · Talk to your healthcare provider before you take any medicines  Many medicines may harm your baby if you take them when you are pregnant  Do not take any medicines, vitamins, herbs, or supplements without first talking to your healthcare provider  Never use illegal or street drugs (such as marijuana or cocaine) while you are pregnant  What are some safety tips during pregnancy? · Avoid hot tubs and saunas  Do not use a hot tub or sauna while you are pregnant, especially during your first trimester  Hot tubs and saunas may raise your baby's temperature and increase the risk of birth defects  · Avoid toxoplasmosis  This is an infection caused by eating raw meat or being around infected cat feces  It can cause birth defects, miscarriages, and other problems  Wash your hands after you touch raw meat  Make sure any meat is well-cooked before you eat it  Avoid raw eggs and unpasteurized milk  Use gloves or ask someone else to clean your cat's litter box while you are pregnant  What changes are happening with my baby? By 34 weeks, your baby may weigh more than 5 pounds  Your baby will be about 12 ½ inches long from the top of the head to the rump (baby's bottom)  Your baby is gaining about ½ pound a week  Your baby's eyes open and close now  Your baby's kicks and movements are more forceful at this time  What do I need to know about prenatal care? Your healthcare provider will check your blood pressure and weight  You may also need the following:  · A urine test  may also be done to check for sugar and protein  These can be signs of gestational diabetes or infection  Protein in your urine may also be a sign of preeclampsia  Preeclampsia is a condition that can develop during week 20 or later of your pregnancy   It causes high blood pressure, and it can cause problems with your kidneys and other organs  · A Tdap vaccine  may be recommended by your healthcare provider  · Fundal height  is a measurement of your uterus to check your baby's growth  This number is usually the same as the number of weeks that you have been pregnant  Your healthcare provider may also check your baby's position  · Your baby's heart rate  will be checked  When should I seek immediate care? · You develop a severe headache that does not go away  · You have new or increased vision changes, such as blurred or spotted vision  · You have new or increased swelling in your face or hands  · You have vaginal spotting or bleeding  · Your water broke or you feel warm water gushing or trickling from your vagina  When should I contact my healthcare provider? · You have more than 5 contractions in 1 hour  · You notice any changes in your baby's movements  · You have abdominal cramps, pressure, or tightening  · You have a change in vaginal discharge  · You have chills or a fever  · You have vaginal itching, burning, or pain  · You have yellow, green, white, or foul-smelling vaginal discharge  · You have pain or burning when you urinate, less urine than usual, or pink or bloody urine  · You have questions or concerns about your condition or care  CARE AGREEMENT:   You have the right to help plan your care  Learn about your health condition and how it may be treated  Discuss treatment options with your caregivers to decide what care you want to receive  You always have the right to refuse treatment  The above information is an  only  It is not intended as medical advice for individual conditions or treatments  Talk to your doctor, nurse or pharmacist before following any medical regimen to see if it is safe and effective for you    © 2017 [employer] Information is for End User's use only and may not be sold, redistributed or otherwise used for commercial purposes  All illustrations and images included in CareNotes® are the copyrighted property of [employer]  or Bernabe Sanderson

## 2018-06-07 NOTE — PROGRESS NOTES
Patient is doing well  Good fetal movement noted  Fetal movement and  labor precautions were reviewed  Patient has no questions or concerns  Again, she declines fetal echocardiogram due to cost concerns  She will follow up in 2 weeks time for prenatal or as needed

## 2018-06-19 ENCOUNTER — ROUTINE PRENATAL (OUTPATIENT)
Dept: OBGYN CLINIC | Facility: CLINIC | Age: 28
End: 2018-06-19

## 2018-06-19 ENCOUNTER — TELEPHONE (OUTPATIENT)
Dept: OBGYN CLINIC | Facility: CLINIC | Age: 28
End: 2018-06-19

## 2018-06-19 VITALS
HEART RATE: 72 BPM | WEIGHT: 195.4 LBS | BODY MASS INDEX: 32.55 KG/M2 | SYSTOLIC BLOOD PRESSURE: 120 MMHG | HEIGHT: 65 IN | DIASTOLIC BLOOD PRESSURE: 70 MMHG

## 2018-06-19 DIAGNOSIS — Z3A.32 32 WEEKS GESTATION OF PREGNANCY: Primary | ICD-10-CM

## 2018-06-19 PROCEDURE — PNV: Performed by: OBSTETRICS & GYNECOLOGY

## 2018-06-19 NOTE — PATIENT INSTRUCTIONS
Pregnancy at [address]:   What changes are happening in my body? You may continue to have symptoms such as shortness of breath, heartburn, contractions, or swelling of your ankles and feet  You may be gaining about 1 pound a week now  How do I care for myself at this stage of my pregnancy? · Eat a variety of healthy foods  Healthy foods include fruits, vegetables, whole-grain breads, low-fat dairy foods, beans, lean meats, and fish  Drink liquids as directed  Ask how much liquid to drink each day and which liquids are best for you  Limit caffeine to less than 200 milligrams each day  Limit your intake of fish to 2 servings each week  Choose fish low in mercury such as canned light tuna, shrimp, salmon, cod, or tilapia  Do not  eat fish high in mercury such as swordfish, tilefish, angel mackerel, and shark  · Manage heartburn  by eating 4 or 5 small meals each day instead of large meals  Avoid spicy food  · Manage swelling  by lying down and putting your feet up  · Take prenatal vitamins as directed  Your need for certain vitamins and minerals, such as folic acid, increases during pregnancy  Prenatal vitamins provide some of the extra vitamins and minerals you need  Prenatal vitamins may also help to decrease the risk of certain birth defects  · Talk to your healthcare provider about exercise  Moderate exercise can help you stay fit  Your healthcare provider will help you plan an exercise program that is safe for you during pregnancy  · Do not smoke  If you smoke, it is never too late to quit  Smoking increases your risk of a miscarriage and other health problems during your pregnancy  Smoking can cause your baby to be born too early or weigh less at birth  Ask your healthcare provider for information if you need help quitting  · Do not drink alcohol  Alcohol passes from your body to your baby through the placenta   It can affect your baby's brain development and cause fetal alcohol syndrome (FAS)  FAS is a group of conditions that causes mental, behavior, and growth problems  · Talk to your healthcare provider before you take any medicines  Many medicines may harm your baby if you take them when you are pregnant  Do not take any medicines, vitamins, herbs, or supplements without first talking to your healthcare provider  Never use illegal or street drugs (such as marijuana or cocaine) while you are pregnant  What are some safety tips during pregnancy? · Avoid hot tubs and saunas  Do not use a hot tub or sauna while you are pregnant, especially during your first trimester  Hot tubs and saunas may raise your baby's temperature and increase the risk of birth defects  · Avoid toxoplasmosis  This is an infection caused by eating raw meat or being around infected cat feces  It can cause birth defects, miscarriages, and other problems  Wash your hands after you touch raw meat  Make sure any meat is well-cooked before you eat it  Avoid raw eggs and unpasteurized milk  Use gloves or ask someone else to clean your cat's litter box while you are pregnant  What changes are happening with my baby? By 34 weeks, your baby may weigh more than 5 pounds  Your baby will be about 12 ½ inches long from the top of the head to the rump (baby's bottom)  Your baby is gaining about ½ pound a week  Your baby's eyes open and close now  Your baby's kicks and movements are more forceful at this time  What do I need to know about prenatal care? Your healthcare provider will check your blood pressure and weight  You may also need the following:  · A urine test  may also be done to check for sugar and protein  These can be signs of gestational diabetes or infection  Protein in your urine may also be a sign of preeclampsia  Preeclampsia is a condition that can develop during week 20 or later of your pregnancy   It causes high blood pressure, and it can cause problems with your kidneys and other organs  · A Tdap vaccine  may be recommended by your healthcare provider  · Fundal height  is a measurement of your uterus to check your baby's growth  This number is usually the same as the number of weeks that you have been pregnant  Your healthcare provider may also check your baby's position  · Your baby's heart rate  will be checked  When should I seek immediate care? · You develop a severe headache that does not go away  · You have new or increased vision changes, such as blurred or spotted vision  · You have new or increased swelling in your face or hands  · You have vaginal spotting or bleeding  · Your water broke or you feel warm water gushing or trickling from your vagina  When should I contact my healthcare provider? · You have more than 5 contractions in 1 hour  · You notice any changes in your baby's movements  · You have abdominal cramps, pressure, or tightening  · You have a change in vaginal discharge  · You have chills or a fever  · You have vaginal itching, burning, or pain  · You have yellow, green, white, or foul-smelling vaginal discharge  · You have pain or burning when you urinate, less urine than usual, or pink or bloody urine  · You have questions or concerns about your condition or care  CARE AGREEMENT:   You have the right to help plan your care  Learn about your health condition and how it may be treated  Discuss treatment options with your caregivers to decide what care you want to receive  You always have the right to refuse treatment  The above information is an  only  It is not intended as medical advice for individual conditions or treatments  Talk to your doctor, nurse or pharmacist before following any medical regimen to see if it is safe and effective for you    © 2017 [facility] [place] Information is for End User's use only and may not be sold, redistributed or otherwise used for commercial purposes  All illustrations and images included in CareNotes® are the copyrighted property of [employer] M [employer]  or [employer]aderson

## 2018-06-19 NOTE — PROGRESS NOTES
Patient is doing well  Good fetal movement noted  Fetal movement and  labor precautions were reviewed  She inquired about a booklet that could have been given out at 28 weeks that was mentioned to her upon her pre birth visit at the hospital   We will check into that and get her a copy as soon as possible  Otherwise, she will follow up in 2 weeks for prenatal visit, possible GBS if greater than 35 weeks at that visit

## 2018-07-05 ENCOUNTER — ROUTINE PRENATAL (OUTPATIENT)
Dept: OBGYN CLINIC | Facility: CLINIC | Age: 28
End: 2018-07-05

## 2018-07-05 VITALS
WEIGHT: 196.4 LBS | BODY MASS INDEX: 32.72 KG/M2 | HEART RATE: 72 BPM | HEIGHT: 65 IN | DIASTOLIC BLOOD PRESSURE: 70 MMHG | SYSTOLIC BLOOD PRESSURE: 122 MMHG

## 2018-07-05 DIAGNOSIS — Z3A.35 35 WEEKS GESTATION OF PREGNANCY: ICD-10-CM

## 2018-07-05 DIAGNOSIS — O09.812 HIGH RISK PREGNANCY DUE TO ASSISTED REPRODUCTIVE TECHNOLOGY IN SECOND TRIMESTER: ICD-10-CM

## 2018-07-05 DIAGNOSIS — Z92.29 HISTORY OF INFLUENZA VACCINATION: ICD-10-CM

## 2018-07-05 DIAGNOSIS — Z34.93 PRENATAL CARE IN THIRD TRIMESTER: Primary | ICD-10-CM

## 2018-07-05 PROCEDURE — PNV: Performed by: OBSTETRICS & GYNECOLOGY

## 2018-07-05 PROCEDURE — 87653 STREP B DNA AMP PROBE: CPT | Performed by: OBSTETRICS & GYNECOLOGY

## 2018-07-05 NOTE — PROGRESS NOTES
Patient is doing well  Good fetal movement noted  Fetal movement and  labor precautions were reviewed  1   35+ weeks-GBS done today  Treatment based on findings  2   Other-patient never did receive [facility] booklet  She does have appointment at [facility] office next week and she will receive it then  She will follow-up in 1 week time for prenatal visit or as needed

## 2018-07-05 NOTE — PATIENT INSTRUCTIONS
Pregnancy at [address] to [address]:   What changes are happening in my body? You are considered full term at the beginning of 37 weeks  Your breathing may be easier if your baby has moved down into a head-down position  You may need to urinate more often because the baby may be pressing on your bladder  You may also feel more discomfort and get tired easily  How do I care for myself at this stage of my pregnancy? · Eat a variety of healthy foods  Healthy foods include fruits, vegetables, whole-grain breads, low-fat dairy foods, beans, lean meats, and fish  Drink liquids as directed  Ask how much liquid to drink each day and which liquids are best for you  Limit caffeine to less than 200 milligrams each day  Limit your intake of fish to 2 servings each week  Choose fish low in mercury such as canned light tuna, shrimp, salmon, cod, or tilapia  Do not  eat fish high in mercury such as swordfish, tilefish, angel mackerel, and shark  · Take prenatal vitamins as directed  Your need for certain vitamins and minerals, such as folic acid, increases during pregnancy  Prenatal vitamins provide some of the extra vitamins and minerals you need  Prenatal vitamins may also help to decrease the risk of certain birth defects  · Rest as needed  Put your feet up if you have swelling in your ankles and feet  · Do not smoke  If you smoke, it is never too late to quit  Smoking increases your risk of a miscarriage and other health problems during your pregnancy  Smoking can cause your baby to be born too early or weigh less at birth  Ask your healthcare provider for information if you need help quitting  · Do not drink alcohol  Alcohol passes from your body to your baby through the placenta  It can affect your baby's brain development and cause fetal alcohol syndrome (FAS)  FAS is a group of conditions that causes mental, behavior, and growth problems       · Talk to your healthcare provider before you take any medicines  Many medicines may harm your baby if you take them when you are pregnant  Do not take any medicines, vitamins, herbs, or supplements without first talking to your healthcare provider  Never use illegal or street drugs (such as marijuana or cocaine) while you are pregnant  · Talk to your healthcare provider before you travel  You may not be able to travel in an airplane after 36 weeks  He may also recommend that you avoid long road trips  What are some safety tips during pregnancy? · Avoid hot tubs and saunas  Do not use a hot tub or sauna while you are pregnant, especially during your first trimester  Hot tubs and saunas may raise your baby's temperature and increase the risk of birth defects  · Avoid toxoplasmosis  This is an infection caused by eating raw meat or being around infected cat feces  It can cause birth defects, miscarriages, and other problems  Wash your hands after you touch raw meat  Make sure any meat is well-cooked before you eat it  Avoid raw eggs and unpasteurized milk  Use gloves or ask someone else to clean your cat's litter box while you are pregnant  · Ask your healthcare provider about travel  The most comfortable time to travel is during the second trimester  Ask your healthcare provider if you can travel after 36 weeks  You may not be able to travel in an airplane after 36 weeks  He may also recommend that you avoid long road trips  What changes are happening with my baby? By 38 weeks, your baby may weigh between 6 and 9 pounds  Your baby may be about 14 inches long from the top of the head to the rump (baby's bottom)  Your baby hears well enough to know your voice  As your baby gets larger, you may feel fewer kicks and more stretching and rolling  Your baby may move into a head-down position  Your baby will also rest lower in your abdomen  What do I need to know about prenatal care?   Your healthcare provider will check your blood pressure and weight  You may also need the following:  · A urine test  may also be done to check for sugar and protein  These can be signs of gestational diabetes or infection  Protein in your urine may also be a sign of preeclampsia  Preeclampsia is a condition that can develop during week 20 or later of your pregnancy  It causes high blood pressure, and it can cause problems with your kidneys and other organs  · A blood test  may be done to check for anemia (low iron level)  · A Tdap vaccine  may be recommended by your healthcare provider  · A group B strep test  is a test that is done to check for group B strep infection  Group B strep is a type of bacteria that may be found in the vagina or rectum  It can be passed to your baby during delivery if you have it  Your healthcare provider will take swab your vagina or rectum and send the sample to the lab for tests  · Fundal height  is a measurement of your uterus to check your baby's growth  This number is usually the same as the number of weeks that you have been pregnant  Your healthcare provider may also check your baby's position  · Your baby's heart rate  will be checked  When should I seek immediate care? · You develop a severe headache that does not go away  · You have new or increased vision changes, such as blurred or spotted vision  · You have new or increased swelling in your face or hands  · You have vaginal spotting or bleeding  · Your water broke or you feel warm water gushing or trickling from your vagina  When should I contact my healthcare provider? · You have more than 5 contractions in 1 hour  · You notice any changes in your baby's movements  · You have abdominal cramps, pressure, or tightening  · You have a change in vaginal discharge  · You have chills or a fever  · You have vaginal itching, burning, or pain  · You have yellow, green, white, or foul-smelling vaginal discharge      · You have pain or burning when you urinate, less urine than usual, or pink or bloody urine  · You have questions or concerns about your condition or care  CARE AGREEMENT:   You have the right to help plan your care  Learn about your health condition and how it may be treated  Discuss treatment options with your caregivers to decide what care you want to receive  You always have the right to refuse treatment  The above information is an  only  It is not intended as medical advice for individual conditions or treatments  Talk to your doctor, nurse or pharmacist before following any medical regimen to see if it is safe and effective for you  © 2017 [address] Information is for End User's use only and may not be sold, redistributed or otherwise used for commercial purposes  All illustrations and images included in CareNotes® are the copyrighted property of [employer]  or Bernabe Sanderson

## 2018-07-09 LAB — GP B STREP DNA SPEC QL NAA+PROBE: NORMAL

## 2018-07-11 ENCOUNTER — ROUTINE PRENATAL (OUTPATIENT)
Dept: OBGYN CLINIC | Facility: CLINIC | Age: 28
End: 2018-07-11

## 2018-07-11 VITALS
BODY MASS INDEX: 32.98 KG/M2 | WEIGHT: 198.2 LBS | HEART RATE: 76 BPM | SYSTOLIC BLOOD PRESSURE: 130 MMHG | DIASTOLIC BLOOD PRESSURE: 70 MMHG

## 2018-07-11 DIAGNOSIS — Z3A.36 36 WEEKS GESTATION OF PREGNANCY: Primary | ICD-10-CM

## 2018-07-11 PROCEDURE — PNV: Performed by: OBSTETRICS & GYNECOLOGY

## 2018-07-11 NOTE — PROGRESS NOTES
Pt doing well  No complaints  + fetal movement    GBS was negative no treatment in labor needed    Follow up in 1 weeks  precautions given

## 2018-07-11 NOTE — PATIENT INSTRUCTIONS
Pregnancy at [address]:   What changes are happening in my body? You are considered full term at the beginning of 37 weeks  Your breathing may be easier if your baby has moved down into a head-down position  You may need to urinate more often because the baby may be pressing on your bladder  You may also feel more discomfort and get tired easily  How do I care for myself at this stage of my pregnancy? · Eat a variety of healthy foods  Healthy foods include fruits, vegetables, whole-grain breads, low-fat dairy foods, beans, lean meats, and fish  Drink liquids as directed  Ask how much liquid to drink each day and which liquids are best for you  Limit caffeine to less than 200 milligrams each day  Limit your intake of fish to 2 servings each week  Choose fish low in mercury such as canned light tuna, shrimp, salmon, cod, or tilapia  Do not  eat fish high in mercury such as swordfish, tilefish, angel mackerel, and shark  · Take prenatal vitamins as directed  Your need for certain vitamins and minerals, such as folic acid, increases during pregnancy  Prenatal vitamins provide some of the extra vitamins and minerals you need  Prenatal vitamins may also help to decrease the risk of certain birth defects  · Rest as needed  Put your feet up if you have swelling in your ankles and feet  · Do not smoke  If you smoke, it is never too late to quit  Smoking increases your risk of a miscarriage and other health problems during your pregnancy  Smoking can cause your baby to be born too early or weigh less at birth  Ask your healthcare provider for information if you need help quitting  · Do not drink alcohol  Alcohol passes from your body to your baby through the placenta  It can affect your baby's brain development and cause fetal alcohol syndrome (FAS)  FAS is a group of conditions that causes mental, behavior, and growth problems       · Talk to your healthcare provider before you take any medicines  Many medicines may harm your baby if you take them when you are pregnant  Do not take any medicines, vitamins, herbs, or supplements without first talking to your healthcare provider  Never use illegal or street drugs (such as marijuana or cocaine) while you are pregnant  · Talk to your healthcare provider before you travel  You may not be able to travel in an airplane after 36 weeks  He may also recommend that you avoid long road trips  What are some safety tips during pregnancy? · Avoid hot tubs and saunas  Do not use a hot tub or sauna while you are pregnant, especially during your first trimester  Hot tubs and saunas may raise your baby's temperature and increase the risk of birth defects  · Avoid toxoplasmosis  This is an infection caused by eating raw meat or being around infected cat feces  It can cause birth defects, miscarriages, and other problems  Wash your hands after you touch raw meat  Make sure any meat is well-cooked before you eat it  Avoid raw eggs and unpasteurized milk  Use gloves or ask someone else to clean your cat's litter box while you are pregnant  · Ask your healthcare provider about travel  The most comfortable time to travel is during the second trimester  Ask your healthcare provider if you can travel after 36 weeks  You may not be able to travel in an airplane after 36 weeks  He may also recommend that you avoid long road trips  What changes are happening with my baby? By 38 weeks, your baby may weigh between 6 and 9 pounds  Your baby may be about 14 inches long from the top of the head to the rump (baby's bottom)  Your baby hears well enough to know your voice  As your baby gets larger, you may feel fewer kicks and more stretching and rolling  Your baby may move into a head-down position  Your baby will also rest lower in your abdomen  What do I need to know about prenatal care?   Your healthcare provider will check your blood pressure and weight  You may also need the following:  · A urine test  may also be done to check for sugar and protein  These can be signs of gestational diabetes or infection  Protein in your urine may also be a sign of preeclampsia  Preeclampsia is a condition that can develop during week 20 or later of your pregnancy  It causes high blood pressure, and it can cause problems with your kidneys and other organs  · A blood test  may be done to check for anemia (low iron level)  · A Tdap vaccine  may be recommended by your healthcare provider  · A group B strep test  is a test that is done to check for group B strep infection  Group B strep is a type of bacteria that may be found in the vagina or rectum  It can be passed to your baby during delivery if you have it  Your healthcare provider will take swab your vagina or rectum and send the sample to the lab for tests  · Fundal height  is a measurement of your uterus to check your baby's growth  This number is usually the same as the number of weeks that you have been pregnant  Your healthcare provider may also check your baby's position  · Your baby's heart rate  will be checked  When should I seek immediate care? · You develop a severe headache that does not go away  · You have new or increased vision changes, such as blurred or spotted vision  · You have new or increased swelling in your face or hands  · You have vaginal spotting or bleeding  · Your water broke or you feel warm water gushing or trickling from your vagina  When should I contact my healthcare provider? · You have more than 5 contractions in 1 hour  · You notice any changes in your baby's movements  · You have abdominal cramps, pressure, or tightening  · You have a change in vaginal discharge  · You have chills or a fever  · You have vaginal itching, burning, or pain  · You have yellow, green, white, or foul-smelling vaginal discharge      · You have pain or burning when you urinate, less urine than usual, or pink or bloody urine  · You have questions or concerns about your condition or care  CARE AGREEMENT:   You have the right to help plan your care  Learn about your health condition and how it may be treated  Discuss treatment options with your caregivers to decide what care you want to receive  You always have the right to refuse treatment  The above information is an  only  It is not intended as medical advice for individual conditions or treatments  Talk to your doctor, nurse or pharmacist before following any medical regimen to see if it is safe and effective for you  © 2017 [address] Information is for End User's use only and may not be sold, redistributed or otherwise used for commercial purposes  All illustrations and images included in CareNotes® are the copyrighted property of [employer]  or Bernabe Sanderson

## 2018-07-19 ENCOUNTER — ROUTINE PRENATAL (OUTPATIENT)
Dept: OBGYN CLINIC | Facility: CLINIC | Age: 28
End: 2018-07-19

## 2018-07-19 VITALS
WEIGHT: 200 LBS | HEIGHT: 65 IN | HEART RATE: 73 BPM | DIASTOLIC BLOOD PRESSURE: 80 MMHG | BODY MASS INDEX: 33.32 KG/M2 | SYSTOLIC BLOOD PRESSURE: 120 MMHG

## 2018-07-19 DIAGNOSIS — Z3A.37 37 WEEKS GESTATION OF PREGNANCY: ICD-10-CM

## 2018-07-19 DIAGNOSIS — O09.812 HIGH RISK PREGNANCY DUE TO ASSISTED REPRODUCTIVE TECHNOLOGY IN SECOND TRIMESTER: ICD-10-CM

## 2018-07-19 DIAGNOSIS — Z92.29 HISTORY OF INFLUENZA VACCINATION: ICD-10-CM

## 2018-07-19 PROCEDURE — PNV: Performed by: OBSTETRICS & GYNECOLOGY

## 2018-07-19 NOTE — PROGRESS NOTES
Patient is doing well  Good fetal movement noted  Fetal movement and labor precautions were reviewed  1   37+ week pregnancy-patient doing well  She is aware of negative GBS status  She does note frequent urination, likely related to fetal engagement  She will follow up in 1 week for prenatal or as needed

## 2018-07-19 NOTE — PATIENT INSTRUCTIONS
Pregnancy at [address] to [address]:   What changes are happening in my body? You are considered full term at the beginning of 37 weeks  Your breathing may be easier if your baby has moved down into a head-down position  You may need to urinate more often because the baby may be pressing on your bladder  You may also feel more discomfort and get tired easily  How do I care for myself at this stage of my pregnancy? · Eat a variety of healthy foods  Healthy foods include fruits, vegetables, whole-grain breads, low-fat dairy foods, beans, lean meats, and fish  Drink liquids as directed  Ask how much liquid to drink each day and which liquids are best for you  Limit caffeine to less than 200 milligrams each day  Limit your intake of fish to 2 servings each week  Choose fish low in mercury such as canned light tuna, shrimp, salmon, cod, or tilapia  Do not  eat fish high in mercury such as swordfish, tilefish, angel mackerel, and shark  · Take prenatal vitamins as directed  Your need for certain vitamins and minerals, such as folic acid, increases during pregnancy  Prenatal vitamins provide some of the extra vitamins and minerals you need  Prenatal vitamins may also help to decrease the risk of certain birth defects  · Rest as needed  Put your feet up if you have swelling in your ankles and feet  · Do not smoke  If you smoke, it is never too late to quit  Smoking increases your risk of a miscarriage and other health problems during your pregnancy  Smoking can cause your baby to be born too early or weigh less at birth  Ask your healthcare provider for information if you need help quitting  · Do not drink alcohol  Alcohol passes from your body to your baby through the placenta  It can affect your baby's brain development and cause fetal alcohol syndrome (FAS)  FAS is a group of conditions that causes mental, behavior, and growth problems       · Talk to your healthcare provider before you take any medicines  Many medicines may harm your baby if you take them when you are pregnant  Do not take any medicines, vitamins, herbs, or supplements without first talking to your healthcare provider  Never use illegal or street drugs (such as marijuana or cocaine) while you are pregnant  · Talk to your healthcare provider before you travel  You may not be able to travel in an airplane after 36 weeks  He may also recommend that you avoid long road trips  What are some safety tips during pregnancy? · Avoid hot tubs and saunas  Do not use a hot tub or sauna while you are pregnant, especially during your first trimester  Hot tubs and saunas may raise your baby's temperature and increase the risk of birth defects  · Avoid toxoplasmosis  This is an infection caused by eating raw meat or being around infected cat feces  It can cause birth defects, miscarriages, and other problems  Wash your hands after you touch raw meat  Make sure any meat is well-cooked before you eat it  Avoid raw eggs and unpasteurized milk  Use gloves or ask someone else to clean your cat's litter box while you are pregnant  · Ask your healthcare provider about travel  The most comfortable time to travel is during the second trimester  Ask your healthcare provider if you can travel after 36 weeks  You may not be able to travel in an airplane after 36 weeks  He may also recommend that you avoid long road trips  What changes are happening with my baby? By 38 weeks, your baby may weigh between 6 and 9 pounds  Your baby may be about 14 inches long from the top of the head to the rump (baby's bottom)  Your baby hears well enough to know your voice  As your baby gets larger, you may feel fewer kicks and more stretching and rolling  Your baby may move into a head-down position  Your baby will also rest lower in your abdomen  What do I need to know about prenatal care?   Your healthcare provider will check your blood pressure and weight  You may also need the following:  · A urine test  may also be done to check for sugar and protein  These can be signs of gestational diabetes or infection  Protein in your urine may also be a sign of preeclampsia  Preeclampsia is a condition that can develop during week 20 or later of your pregnancy  It causes high blood pressure, and it can cause problems with your kidneys and other organs  · A blood test  may be done to check for anemia (low iron level)  · A Tdap vaccine  may be recommended by your healthcare provider  · A group B strep test  is a test that is done to check for group B strep infection  Group B strep is a type of bacteria that may be found in the vagina or rectum  It can be passed to your baby during delivery if you have it  Your healthcare provider will take swab your vagina or rectum and send the sample to the lab for tests  · Fundal height  is a measurement of your uterus to check your baby's growth  This number is usually the same as the number of weeks that you have been pregnant  Your healthcare provider may also check your baby's position  · Your baby's heart rate  will be checked  When should I seek immediate care? · You develop a severe headache that does not go away  · You have new or increased vision changes, such as blurred or spotted vision  · You have new or increased swelling in your face or hands  · You have vaginal spotting or bleeding  · Your water broke or you feel warm water gushing or trickling from your vagina  When should I contact my healthcare provider? · You have more than 5 contractions in 1 hour  · You notice any changes in your baby's movements  · You have abdominal cramps, pressure, or tightening  · You have a change in vaginal discharge  · You have chills or a fever  · You have vaginal itching, burning, or pain  · You have yellow, green, white, or foul-smelling vaginal discharge      · You have pain or burning when you urinate, less urine than usual, or pink or bloody urine  · You have questions or concerns about your condition or care  CARE AGREEMENT:   You have the right to help plan your care  Learn about your health condition and how it may be treated  Discuss treatment options with your caregivers to decide what care you want to receive  You always have the right to refuse treatment  The above information is an  only  It is not intended as medical advice for individual conditions or treatments  Talk to your doctor, nurse or pharmacist before following any medical regimen to see if it is safe and effective for you  © 2017 [address] [employer] Information is for End User's use only and may not be sold, redistributed or otherwise used for commercial purposes  All illustrations and images included in CareNotes® are the copyrighted property of [employer] M [employer]  or Bernabe Sanderson

## 2018-07-26 ENCOUNTER — ROUTINE PRENATAL (OUTPATIENT)
Dept: OBGYN CLINIC | Facility: CLINIC | Age: 28
End: 2018-07-26

## 2018-07-26 VITALS
HEIGHT: 65 IN | SYSTOLIC BLOOD PRESSURE: 112 MMHG | HEART RATE: 78 BPM | WEIGHT: 201.2 LBS | BODY MASS INDEX: 33.52 KG/M2 | DIASTOLIC BLOOD PRESSURE: 74 MMHG

## 2018-07-26 DIAGNOSIS — Z3A.38 38 WEEKS GESTATION OF PREGNANCY: Primary | ICD-10-CM

## 2018-07-26 DIAGNOSIS — Z92.29 HISTORY OF INFLUENZA VACCINATION: ICD-10-CM

## 2018-07-26 DIAGNOSIS — O09.813 HIGH RISK PREGNANCY DUE TO ASSISTED REPRODUCTIVE TECHNOLOGY IN THIRD TRIMESTER: ICD-10-CM

## 2018-07-26 PROCEDURE — PNV: Performed by: OBSTETRICS & GYNECOLOGY

## 2018-07-26 NOTE — PROGRESS NOTES
Patient doing well no complaints   positive fetal movement   patient is having occasional contractions but nothing   review of labs shows that she is GBS negative     precautions given to the patient and she will follow up in 1 week

## 2018-08-02 ENCOUNTER — ROUTINE PRENATAL (OUTPATIENT)
Dept: OBGYN CLINIC | Facility: CLINIC | Age: 28
End: 2018-08-02

## 2018-08-02 VITALS
DIASTOLIC BLOOD PRESSURE: 82 MMHG | HEIGHT: 65 IN | WEIGHT: 203.6 LBS | SYSTOLIC BLOOD PRESSURE: 124 MMHG | BODY MASS INDEX: 33.92 KG/M2 | HEART RATE: 72 BPM

## 2018-08-02 DIAGNOSIS — Z3A.39 39 WEEKS GESTATION OF PREGNANCY: ICD-10-CM

## 2018-08-02 DIAGNOSIS — Z92.29 HISTORY OF INFLUENZA VACCINATION: ICD-10-CM

## 2018-08-02 DIAGNOSIS — O09.812 HIGH RISK PREGNANCY DUE TO ASSISTED REPRODUCTIVE TECHNOLOGY IN SECOND TRIMESTER: ICD-10-CM

## 2018-08-02 PROCEDURE — PNV: Performed by: OBSTETRICS & GYNECOLOGY

## 2018-08-02 NOTE — PROGRESS NOTES
Patient is doing well  Good fetal movement noted  Fetal movement and labor precautions were reviewed  Plan-follow up in 1 week time for prenatal, NST, and EDE if undelivered  Plan induction by 41 weeks if undelivered

## 2018-08-02 NOTE — PATIENT INSTRUCTIONS
Pregnancy at 44 to 40 Weeks   [address] [id number] :   What changes are happening in my body? You are now getting close to your due date  Your due date is just an estimate of when your baby will be born  Your baby may be born before or after your due date  Your breathing may be easier if your baby has moved down into a head-down position  You may need to urinate more often because the baby may be pressing on your bladder  You may also feel more discomfort and tire easily  You may also be having trouble sleeping  How do I care for myself at this stage of my pregnancy? · Eat a variety of healthy foods  Healthy foods include fruits, vegetables, whole-grain breads, low-fat dairy foods, beans, lean meats, and fish  Drink liquids as directed  Ask how much liquid to drink each day and which liquids are best for you  Limit caffeine to less than 200 milligrams each day  Limit your intake of fish to 2 servings each week  Choose fish low in mercury such as canned light tuna, shrimp, crab, salmon, cod, or tilapia  Do not  eat fish high in mercury such as swordfish, tilefish, angel mackerel, and shark  · Take prenatal vitamins as directed  Your need for certain vitamins and minerals, such as folic acid, increases during pregnancy  Prenatal vitamins provide some of the extra vitamins and minerals you need  Prenatal vitamins may also help to decrease the risk of certain birth defects  · Rest as needed  Put your feet up if you have swelling in your ankles and feet  · Do not smoke  If you smoke, it is never too late to quit  Smoking increases your risk of a miscarriage and other health problems during your pregnancy  Smoking can cause your baby to be born too early or weigh less at birth  Ask your healthcare provider for information if you need help quitting  · Do not drink alcohol  Alcohol passes from your body to your baby through the placenta   It can affect your baby's brain development and cause fetal alcohol syndrome (FAS)  FAS is a group of conditions that causes mental, behavior, and growth problems  · Talk to your healthcare provider before you take any medicines  Many medicines may harm your baby if you take them when you are pregnant  Do not take any medicines, vitamins, herbs, or supplements without first talking to your healthcare provider  Never use illegal or street drugs (such as marijuana or cocaine) while you are pregnant  · Talk to your healthcare provider before you travel  You may not be able to travel in an airplane after 36 weeks  He may also recommend that you avoid long road trips  What are some safety tips during pregnancy? · Avoid hot tubs and saunas  Do not use a hot tub or sauna while you are pregnant, especially during your first trimester  Hot tubs and saunas may raise your baby's temperature and increase the risk of birth defects  · Avoid toxoplasmosis  This is an infection caused by eating raw meat or being around infected cat feces  It can cause birth defects, miscarriages, and other problems  Wash your hands after you touch raw meat  Make sure any meat is well-cooked before you eat it  Avoid raw eggs and unpasteurized milk  Use gloves or ask someone else to clean your cat's litter box while you are pregnant  · Ask your healthcare provider about travel  The most comfortable time to travel is during the second trimester  Ask your healthcare provider if you can travel after 36 weeks  You may not be able to travel in an airplane after 36 weeks  He may also recommend that you avoid long road trips  What changes are happening with my baby? Your baby is ready to be born  At birth, your baby may weigh about 6 to 9 pounds and be about 19 to 21 inches long  Your baby may be in a head-down position  Your baby will also rest lower in your abdomen  What do I need to know about prenatal care? Your healthcare provider will check your blood pressure and weight   You may also need the following:  · A urine test  may also be done to check for sugar and protein  These can be signs of gestational diabetes or infection  Protein in your urine may also be a sign of preeclampsia  Preeclampsia is a condition that can develop during week 20 or later of your pregnancy  It causes high blood pressure, and it can cause problems with your kidneys and other organs  · Your baby's heart rate  will be checked  When should I seek immediate care? · You develop a severe headache that does not go away  · You have new or increased vision changes, such as blurred or spotted vision  · You have new or increased swelling in your face or hands  · You have vaginal spotting or bleeding  · Your water broke or you feel warm water gushing or trickling from your vagina  When should I contact my healthcare provider? · You have more than 5 contractions in 1 hour  · You notice any changes in your baby's movements  · You have abdominal cramps, pressure, or tightening  · You have a change in vaginal discharge  · You have chills or a fever  · You have vaginal itching, burning, or pain  · You have yellow, green, white, or foul-smelling vaginal discharge  · You have pain or burning when you urinate, less urine than usual, or pink or bloody urine  · You have questions or concerns about your condition or care  CARE AGREEMENT:   You have the right to help plan your care  Learn about your health condition and how it may be treated  Discuss treatment options with your caregivers to decide what care you want to receive  You always have the right to refuse treatment  The above information is an  only  It is not intended as medical advice for individual conditions or treatments  Talk to your doctor, nurse or pharmacist before following any medical regimen to see if it is safe and effective for you    © 2017 [employer] Information is for End User's use only and may not be sold, redistributed or otherwise used for commercial purposes  All illustrations and images included in CareNotes® are the copyrighted property of [employer]  or Bernabe Sanderson

## 2018-08-08 ENCOUNTER — HOSPITAL ENCOUNTER (INPATIENT)
Facility: HOSPITAL | Age: 28
LOS: 4 days | Discharge: HOME | End: 2018-08-12
Attending: OBSTETRICS & GYNECOLOGY | Admitting: OBSTETRICS & GYNECOLOGY
Payer: COMMERCIAL

## 2018-08-08 ENCOUNTER — ROUTINE PRENATAL (OUTPATIENT)
Dept: OBGYN CLINIC | Facility: CLINIC | Age: 28
End: 2018-08-08
Payer: COMMERCIAL

## 2018-08-08 VITALS
WEIGHT: 203.4 LBS | SYSTOLIC BLOOD PRESSURE: 120 MMHG | DIASTOLIC BLOOD PRESSURE: 85 MMHG | BODY MASS INDEX: 33.89 KG/M2 | HEART RATE: 83 BPM | HEIGHT: 65 IN

## 2018-08-08 DIAGNOSIS — Z3A.40 40 WEEKS GESTATION OF PREGNANCY: Primary | ICD-10-CM

## 2018-08-08 DIAGNOSIS — O48.0 POST TERM PREGNANCY OVER 40 WEEKS: ICD-10-CM

## 2018-08-08 DIAGNOSIS — Z98.891 STATUS POST PRIMARY LOW TRANSVERSE CESAREAN SECTION: Primary | ICD-10-CM

## 2018-08-08 PROBLEM — E28.2 PCOS (POLYCYSTIC OVARIAN SYNDROME): Status: RESOLVED | Noted: 2017-07-25 | Resolved: 2018-08-08

## 2018-08-08 PROBLEM — Z31.9 INFERTILITY MANAGEMENT: Status: RESOLVED | Noted: 2018-02-14 | Resolved: 2018-08-08

## 2018-08-08 PROBLEM — O09.812 PREGNANCY RESULTING FROM IN VITRO FERTILIZATION IN SECOND TRIMESTER: Status: RESOLVED | Noted: 2018-03-22 | Resolved: 2018-08-08

## 2018-08-08 PROBLEM — Z92.29 HISTORY OF INFLUENZA VACCINATION: Status: RESOLVED | Noted: 2018-02-14 | Resolved: 2018-08-08

## 2018-08-08 PROBLEM — Z92.29 HISTORY OF TETANUS, DIPHTHERIA, AND ACELLULAR PERTUSSIS BOOSTER VACCINATION (TDAP): Status: RESOLVED | Noted: 2018-06-07 | Resolved: 2018-08-08

## 2018-08-08 PROBLEM — O09.812 HIGH RISK PREGNANCY DUE TO ASSISTED REPRODUCTIVE TECHNOLOGY IN SECOND TRIMESTER: Status: RESOLVED | Noted: 2018-03-13 | Resolved: 2018-08-08

## 2018-08-08 LAB
ABO GROUP BLD: NORMAL
BASOPHILS # BLD MANUAL: 0 THOUSAND/UL (ref 0–0.1)
BASOPHILS NFR MAR MANUAL: 0 % (ref 0–1)
BLD GP AB SCN SERPL QL: NEGATIVE
EOSINOPHIL # BLD MANUAL: 0 THOUSAND/UL (ref 0–0.4)
EOSINOPHIL NFR BLD MANUAL: 0 % (ref 0–6)
ERYTHROCYTE [DISTWIDTH] IN BLOOD BY AUTOMATED COUNT: 13.5 % (ref 11.6–15.1)
HCT VFR BLD AUTO: 38 % (ref 34.8–46.1)
HGB BLD-MCNC: 12.9 G/DL (ref 11.5–15.4)
LG PLATELETS BLD QL SMEAR: PRESENT
LYMPHOCYTES # BLD AUTO: 2.25 THOUSAND/UL (ref 0.6–4.47)
LYMPHOCYTES # BLD AUTO: 20 % (ref 14–44)
MCH RBC QN AUTO: 32.9 PG (ref 26.8–34.3)
MCHC RBC AUTO-ENTMCNC: 33.9 G/DL (ref 31.4–37.4)
MCV RBC AUTO: 97 FL (ref 82–98)
MONOCYTES # BLD AUTO: 0.79 THOUSAND/UL (ref 0–1.22)
MONOCYTES NFR BLD: 7 % (ref 4–12)
NEUTROPHILS # BLD MANUAL: 8.21 THOUSAND/UL (ref 1.85–7.62)
NEUTS BAND NFR BLD MANUAL: 4 % (ref 0–8)
NEUTS SEG NFR BLD AUTO: 69 % (ref 43–75)
NRBC BLD AUTO-RTO: 0 /100 WBCS
PLATELET # BLD AUTO: 166 THOUSANDS/UL (ref 149–390)
PLATELET BLD QL SMEAR: ADEQUATE
PMV BLD AUTO: 11.3 FL (ref 8.9–12.7)
RBC # BLD AUTO: 3.92 MILLION/UL (ref 3.81–5.12)
RBC MORPH BLD: NORMAL
RH BLD: POSITIVE
SPECIMEN EXPIRATION DATE: NORMAL
TOTAL CELLS COUNTED SPEC: 100
WBC # BLD AUTO: 11.25 THOUSAND/UL (ref 4.31–10.16)

## 2018-08-08 PROCEDURE — PNV: Performed by: OBSTETRICS & GYNECOLOGY

## 2018-08-08 PROCEDURE — 86901 BLOOD TYPING SEROLOGIC RH(D): CPT | Performed by: OBSTETRICS & GYNECOLOGY

## 2018-08-08 PROCEDURE — 85027 COMPLETE CBC AUTOMATED: CPT | Performed by: OBSTETRICS & GYNECOLOGY

## 2018-08-08 PROCEDURE — 59025 FETAL NON-STRESS TEST: CPT | Performed by: OBSTETRICS & GYNECOLOGY

## 2018-08-08 PROCEDURE — 76815 OB US LIMITED FETUS(S): CPT | Performed by: OBSTETRICS & GYNECOLOGY

## 2018-08-08 PROCEDURE — 86850 RBC ANTIBODY SCREEN: CPT | Performed by: OBSTETRICS & GYNECOLOGY

## 2018-08-08 PROCEDURE — 86592 SYPHILIS TEST NON-TREP QUAL: CPT | Performed by: OBSTETRICS & GYNECOLOGY

## 2018-08-08 PROCEDURE — 86900 BLOOD TYPING SEROLOGIC ABO: CPT | Performed by: OBSTETRICS & GYNECOLOGY

## 2018-08-08 PROCEDURE — 85007 BL SMEAR W/DIFF WBC COUNT: CPT | Performed by: OBSTETRICS & GYNECOLOGY

## 2018-08-08 RX ORDER — OXYTOCIN/RINGER'S LACTATE 30/500 ML
2 PLASTIC BAG, INJECTION (ML) INTRAVENOUS CONTINUOUS
Status: DISCONTINUED | OUTPATIENT
Start: 2018-08-08 | End: 2018-08-08

## 2018-08-08 RX ORDER — SODIUM CHLORIDE, SODIUM LACTATE, POTASSIUM CHLORIDE, CALCIUM CHLORIDE 600; 310; 30; 20 MG/100ML; MG/100ML; MG/100ML; MG/100ML
125 INJECTION, SOLUTION INTRAVENOUS CONTINUOUS
Status: DISCONTINUED | OUTPATIENT
Start: 2018-08-08 | End: 2018-08-09

## 2018-08-08 RX ORDER — OXYTOCIN/RINGER'S LACTATE 30/500 ML
1-30 PLASTIC BAG, INJECTION (ML) INTRAVENOUS
Status: DISCONTINUED | OUTPATIENT
Start: 2018-08-08 | End: 2018-08-09

## 2018-08-08 RX ADMIN — SODIUM CHLORIDE, SODIUM LACTATE, POTASSIUM CHLORIDE, AND CALCIUM CHLORIDE 125 ML/HR: .6; .31; .03; .02 INJECTION, SOLUTION INTRAVENOUS at 09:51

## 2018-08-08 RX ADMIN — Medication 4 MILLI-UNITS/MIN: at 21:22

## 2018-08-08 RX ADMIN — Medication 2 MILLI-UNITS/MIN: at 15:10

## 2018-08-08 RX ADMIN — SODIUM CHLORIDE, SODIUM LACTATE, POTASSIUM CHLORIDE, AND CALCIUM CHLORIDE 125 ML/HR: .6; .31; .03; .02 INJECTION, SOLUTION INTRAVENOUS at 18:05

## 2018-08-08 NOTE — PROGRESS NOTES
FHR tracing category 1  Discussed with pt IOL and consent signed  To start low dose oxytocin for cervical ripening

## 2018-08-08 NOTE — OB LABOR/OXYTOCIN SAFETY PROGRESS
Oxytocin Safety Progress Check Note - Dot Isaac 28 y o  female MRN: [id number]    Unit/Bed#: L&D 323-01 Encounter: [id number]    Obstetric History       T0      L0     SAB0   TAB0   Ectopic0   Multiple0   Live Births0      Gestational Age: 40w0d  Dose (trice-units/min) Oxytocin: 2 trice-units/min  Contraction Frequency (minutes): irregular  Contraction Quality: Mild  Tachysystole: No   Dilation: 1        Effacement (%): 50  Station: -1  Baseline Rate: 125 bpm  Fetal Heart Rate: 120 BPM  FHR Category: Category I          Notes/comments:            Mechanical Cervical ripening was performed using Galaviz Balloon  Procedure:  Graves speculum was used to visualize the cervix  The cervix was swabbed with betadine  The 16F 30 cc balloon Galaviz was advanced through the cervix to the internal os under direct visualization  The balloon was inflated with 60 ml of NSS  There was no bleeding or SROM  The catheter was placed under traction with a 1L IV bag and taped to mother's leg        Eitan Camacho MD 2018 5:48 PM

## 2018-08-08 NOTE — OB LABOR/OXYTOCIN SAFETY PROGRESS
Oxytocin Safety Progress Check Note - Makenzie Miramontes 28 y o  female MRN: [id number]    Unit/Bed#: L&D 323-01 Encounter: [id number]    Obstetric History       T0      L0     SAB0   TAB0   Ectopic0   Multiple0   Live Births0      Gestational Age: 37w0d  Dose (trice-units/min) Oxytocin: 2 trice-units/min  Contraction Frequency (minutes): Irregular;irritability  Contraction Quality: Not applicable  Tachysystole: No   Dilation: 1        Effacement (%): 50  Station: -2  Baseline Rate: 140 bpm     FHR Category: Category I          Notes/comments:   Patient tolerating low dose pitocin for cervical ripening   /mod variability/accels, no decels       Darlene Dunham MD 2018 4:40 PM

## 2018-08-08 NOTE — PROGRESS NOTES
Patient eating prior to low dose pitocin    FHT category 1   Baseline 120/mod variability/accels, no Aliosn Shah MD

## 2018-08-08 NOTE — PROGRESS NOTES
Patient is here for NST and EDE for postdates  Patient does endorse decreased fetal movement  She denies contractions or leakage of fluid or vaginal bleeding  On the nonstress test, there was note of a late deceleration for about 2 and 0 5 minutes from baseline of 125 down to 100  There are accelerations present and good variability is noted  EDE is 14 63  I asked patient to proceed to Labor and delivery for induction of labor and delivery today  If persistent category 2 tracing in the latent phase of labor, she will need delivery via  section

## 2018-08-08 NOTE — H&P
H&P Exam - Obstetrics   Claribel Claire 28 y o  female MRN: [id number]  Unit/Bed#: L&D 323-01 Encounter: [id number]    Assessment/Plan     Assessment:   36 weeks gestational age   nonreassuring antepartum fetal testing    Plan:   induction of labor if fetus remains category 1 tracing   discussed with patient possibility of need for  delivery if persistent category 2 tracing in  latent labor  Routine admission labs    History of Present Illness   Chief Complaint: Induction of labor    HPI:  Claribel Claire is a 28 y o   female with an BILL of 2018, by Last Menstrual Period at 40w0d weeks gestation who is being admitted for Induction  Her current obstetrical history is significant for h/o infertility  Contractions: None  Leakage of fluid: None  Bleeding: None  Fetal movement: decreased   Patient was seen in the office today for nonstress test and EDE and was noted to have a late deceleration  She was sent to Labor and delivery for extended monitoring and  Delivery  Pregnancy complications: none  Review of Systems   Constitutional: Negative  HENT: Negative  Eyes: Negative  Respiratory: Negative  Cardiovascular: Negative  Gastrointestinal: Negative  Endocrine: Negative  Genitourinary:        As noted in HPI   Musculoskeletal: Negative  Skin: Negative  Allergic/Immunologic: Negative  Neurological: Negative  Hematological: Negative  Psychiatric/Behavioral: Negative          Historical Information   OB History    Para Term  AB Living   1 0 0 0 0 0   SAB TAB Ectopic Multiple Live Births                  # Outcome Date GA Lbr Eduardo/2nd Weight Sex Delivery Anes PTL Lv   1 Current                 Baby complications/comments: none  Past Medical History:   Diagnosis Date    Amenorrhea     last assessed: 2016    Female infertility     seen by [facility] - seen for 1 year, 5 cycles IUI acheived pregnancy via IVF    Fibromyalgia     Irregular menstrual cycle     last assessed: 09/26/2016    Osteoarthritis of right acromioclavicular joint     last assessment: 01/28/2013    Separation of right acromioclavicular joint     Last assessed: 01/25/2013; this is more consistent with ostiolysis of the distal clavicle vs  acromioclavicular joint arthritis, not acute shoulder seperation     Past Surgical History:   Procedure Laterality Date    DENTAL SURGERY      wisdom teeth    SHOULDER SURGERY       Social History   History   Alcohol Use    0 6 oz/week    1 Cans of beer per week     History   Drug Use No     History   Smoking Status    Never Smoker   Smokeless Tobacco    Never Used     Family History: non-contributory    Meds/Allergies   all medications and allergies reviewed  No Known Allergies    Objective   Vitals: Temperature 97 9 °F (36 6 °C), temperature source Oral, resp  rate 16, last menstrual period 11/01/2017, currently breastfeeding  There is no height or weight on file to calculate BMI  Invasive Devices          No matching active lines, drains, or airways          Physical Exam   Constitutional: She is oriented to person, place, and time  She appears well-developed and well-nourished  HENT:   Head: Normocephalic and atraumatic  Neck: Normal range of motion  No thyromegaly present  Cardiovascular: Normal rate, regular rhythm and normal heart sounds  Pulmonary/Chest: Effort normal and breath sounds normal  Right breast exhibits no mass, no nipple discharge and no tenderness  Left breast exhibits no mass, no nipple discharge, no skin change and no tenderness  Abdominal: Soft  She exhibits no distension and no mass  There is no tenderness  There is no rebound and no guarding  Genitourinary: Vagina normal  No breast swelling, tenderness or discharge  There is no rash or lesion on the right labia  There is no rash or lesion on the left labia  Cervix exhibits no motion tenderness and no discharge   Right adnexum displays no mass, no tenderness and no fullness  Left adnexum displays no mass, no tenderness and no fullness  No erythema or bleeding in the vagina  No vaginal discharge found  Musculoskeletal: She exhibits no edema or tenderness  Lymphadenopathy:     She has no cervical adenopathy  Neurological: She is alert and oriented to person, place, and time  Skin: Skin is warm and dry  Psychiatric: She has a normal mood and affect   Her behavior is normal        Cervix is 1/50/2 moderate and mid   estimated fetal weight 8 lb     NST is reactive fetal heart rate category 1 currently with no decelerations and moderate variability and accelerations present    Prenatal Labs:   Blood Type:   Lab Results   Component Value Date/Time    ABO Grouping O 02/15/2018 12:57 PM    ABO Grouping O 01/06/2018     , D (Rh type):   Lab Results   Component Value Date/Time    Rh Type Positive 02/15/2018 12:57 PM     , Antibody Screen:   Lab Results   Component Value Date/Time    Antibody Screen Negative 02/15/2018 12:57 PM    , HCT/HGB:   Lab Results   Component Value Date/Time    Hematocrit 35 1 05/10/2018 11:03 AM    Hematocrit 42 3 04/11/2016 09:27 AM    Hemoglobin 12 0 05/10/2018 11:03 AM    Hemoglobin 13 6 04/11/2016 09:27 AM      , MCV:   Lab Results   Component Value Date/Time    MCV 98 0 05/10/2018 11:03 AM    MCV 95 0 04/11/2016 09:27 AM      , Platelets:   Lab Results   Component Value Date/Time    Platelets 604 55/38/1630 09:27 AM    Platelet Count 145 40/78/8228 11:03 AM      , 1 hour Glucola:   Lab Results   Component Value Date/Time    Glucose 101 05/10/2018 11:03 AM   , Rubella: No results found for: [id number]     , VDRL/RPR:   Lab Results   Component Value Date/Time    RPR NON-REACTIVE 05/10/2018 11:03 AM      , Hep B:   Lab Results   Component Value Date/Time    External Hepatitis B Surface Ag neg 01/06/2018     , HIV:   Lab Results   Component Value Date/Time    External HIV-1 Antibody neg 01/06/2018     , Chlamydia:   Lab Results   Component Value Date/Time    External Chlamydia Screen neg 01/06/2018     , Gonorrhea:   Lab Results   Component Value Date/Time    External Gonorrhea Screen neg 01/06/2018     , Group B Strep:    Lab Results   Component Value Date/Time    Strep Grp B PCR Negative for Beta Hemolytic Strep Grp B by PCR 07/05/2018 03:50 PM          Imaging, EKG, Pathology, and Other Studies: I have personally reviewed pertinent reports

## 2018-08-09 ENCOUNTER — ANESTHESIA EVENT (INPATIENT)
Dept: LABOR AND DELIVERY | Facility: HOSPITAL | Age: 28
End: 2018-08-09
Payer: COMMERCIAL

## 2018-08-09 ENCOUNTER — ANESTHESIA (INPATIENT)
Dept: LABOR AND DELIVERY | Facility: HOSPITAL | Age: 28
End: 2018-08-09
Payer: COMMERCIAL

## 2018-08-09 PROBLEM — Z98.891 STATUS POST PRIMARY LOW TRANSVERSE CESAREAN SECTION: Status: ACTIVE | Noted: 2018-08-09

## 2018-08-09 LAB
BASE EXCESS BLDCOA CALC-SCNC: -4 MMOL/L (ref 3–11)
BASE EXCESS BLDCOV CALC-SCNC: -3.1 MMOL/L (ref 1–9)
HCO3 BLDCOA-SCNC: 24.1 MMOL/L (ref 17.3–27.3)
HCO3 BLDCOV-SCNC: 21.5 MMOL/L (ref 12.2–28.6)
O2 CT VFR BLDCOA CALC: 6.3 ML/DL
OXYHGB MFR BLDCOA: 30.4 %
OXYHGB MFR BLDCOV: 70.8 %
PCO2 BLDCOA: 56.2 MM[HG] (ref 30–60)
PCO2 BLDCOV: 37.1 MM HG (ref 27–43)
PH BLDCOA: 7.25 [PH] (ref 7.23–7.43)
PH BLDCOV: 7.38 [PH] (ref 7.19–7.49)
PO2 BLDCOA: 17.4 MM HG (ref 5–25)
PO2 BLDCOV: 31.6 MM HG (ref 15–45)
RPR SER QL: NORMAL
SAO2 % BLDCOV: 13.9 ML/DL

## 2018-08-09 PROCEDURE — 10907ZC DRAINAGE OF AMNIOTIC FLUID, THERAPEUTIC FROM PRODUCTS OF CONCEPTION, VIA NATURAL OR ARTIFICIAL OPENING: ICD-10-PCS | Performed by: OBSTETRICS & GYNECOLOGY

## 2018-08-09 PROCEDURE — 59510 CESAREAN DELIVERY: CPT | Performed by: OBSTETRICS & GYNECOLOGY

## 2018-08-09 PROCEDURE — 94762 N-INVAS EAR/PLS OXIMTRY CONT: CPT

## 2018-08-09 PROCEDURE — 82805 BLOOD GASES W/O2 SATURATION: CPT | Performed by: OBSTETRICS & GYNECOLOGY

## 2018-08-09 RX ORDER — ONDANSETRON 2 MG/ML
4 INJECTION INTRAMUSCULAR; INTRAVENOUS EVERY 8 HOURS PRN
Status: DISCONTINUED | OUTPATIENT
Start: 2018-08-09 | End: 2018-08-12 | Stop reason: HOSPADM

## 2018-08-09 RX ORDER — KETOROLAC TROMETHAMINE 30 MG/ML
30 INJECTION, SOLUTION INTRAMUSCULAR; INTRAVENOUS EVERY 6 HOURS PRN
Status: DISPENSED | OUTPATIENT
Start: 2018-08-09 | End: 2018-08-10

## 2018-08-09 RX ORDER — KETOROLAC TROMETHAMINE 30 MG/ML
INJECTION, SOLUTION INTRAMUSCULAR; INTRAVENOUS AS NEEDED
Status: DISCONTINUED | OUTPATIENT
Start: 2018-08-09 | End: 2018-08-09 | Stop reason: SURG

## 2018-08-09 RX ORDER — NALOXONE HYDROCHLORIDE 0.4 MG/ML
0.1 INJECTION, SOLUTION INTRAMUSCULAR; INTRAVENOUS; SUBCUTANEOUS
Status: ACTIVE | OUTPATIENT
Start: 2018-08-09 | End: 2018-08-10

## 2018-08-09 RX ORDER — SIMETHICONE 80 MG
80 TABLET,CHEWABLE ORAL 4 TIMES DAILY PRN
Status: DISCONTINUED | OUTPATIENT
Start: 2018-08-09 | End: 2018-08-12 | Stop reason: HOSPADM

## 2018-08-09 RX ORDER — ROPIVACAINE HYDROCHLORIDE 2 MG/ML
INJECTION, SOLUTION EPIDURAL; INFILTRATION; PERINEURAL AS NEEDED
Status: DISCONTINUED | OUTPATIENT
Start: 2018-08-09 | End: 2018-08-09 | Stop reason: SURG

## 2018-08-09 RX ORDER — OXYCODONE HYDROCHLORIDE AND ACETAMINOPHEN 5; 325 MG/1; MG/1
1 TABLET ORAL EVERY 4 HOURS PRN
Status: DISCONTINUED | OUTPATIENT
Start: 2018-08-10 | End: 2018-08-12 | Stop reason: HOSPADM

## 2018-08-09 RX ORDER — MAGNESIUM HYDROXIDE/ALUMINUM HYDROXICE/SIMETHICONE 120; 1200; 1200 MG/30ML; MG/30ML; MG/30ML
15 SUSPENSION ORAL EVERY 6 HOURS PRN
Status: DISCONTINUED | OUTPATIENT
Start: 2018-08-09 | End: 2018-08-12 | Stop reason: HOSPADM

## 2018-08-09 RX ORDER — METOCLOPRAMIDE HYDROCHLORIDE 5 MG/ML
5 INJECTION INTRAMUSCULAR; INTRAVENOUS EVERY 6 HOURS PRN
Status: ACTIVE | OUTPATIENT
Start: 2018-08-09 | End: 2018-08-10

## 2018-08-09 RX ORDER — KETOROLAC TROMETHAMINE 30 MG/ML
15 INJECTION, SOLUTION INTRAMUSCULAR; INTRAVENOUS EVERY 6 HOURS SCHEDULED
Status: DISCONTINUED | OUTPATIENT
Start: 2018-08-09 | End: 2018-08-10

## 2018-08-09 RX ORDER — DIPHENHYDRAMINE HCL 25 MG
25 TABLET ORAL EVERY 6 HOURS PRN
Status: DISCONTINUED | OUTPATIENT
Start: 2018-08-09 | End: 2018-08-12 | Stop reason: HOSPADM

## 2018-08-09 RX ORDER — ROPIVACAINE HYDROCHLORIDE 2 MG/ML
INJECTION, SOLUTION EPIDURAL; INFILTRATION; PERINEURAL
Status: COMPLETED
Start: 2018-08-09 | End: 2018-08-09

## 2018-08-09 RX ORDER — FENTANYL CITRATE/PF 50 MCG/ML
50 SYRINGE (ML) INJECTION
Status: DISCONTINUED | OUTPATIENT
Start: 2018-08-09 | End: 2018-08-12 | Stop reason: HOSPADM

## 2018-08-09 RX ORDER — OXYCODONE HYDROCHLORIDE AND ACETAMINOPHEN 5; 325 MG/1; MG/1
2 TABLET ORAL EVERY 4 HOURS PRN
Status: DISCONTINUED | OUTPATIENT
Start: 2018-08-10 | End: 2018-08-12 | Stop reason: HOSPADM

## 2018-08-09 RX ORDER — DEXAMETHASONE SODIUM PHOSPHATE 10 MG/ML
8 INJECTION, SOLUTION INTRAMUSCULAR; INTRAVENOUS ONCE AS NEEDED
Status: ACTIVE | OUTPATIENT
Start: 2018-08-09 | End: 2018-08-10

## 2018-08-09 RX ORDER — ONDANSETRON 2 MG/ML
4 INJECTION INTRAMUSCULAR; INTRAVENOUS EVERY 4 HOURS PRN
Status: ACTIVE | OUTPATIENT
Start: 2018-08-09 | End: 2018-08-10

## 2018-08-09 RX ORDER — IBUPROFEN 600 MG/1
600 TABLET ORAL EVERY 4 HOURS PRN
Status: DISCONTINUED | OUTPATIENT
Start: 2018-08-09 | End: 2018-08-12 | Stop reason: HOSPADM

## 2018-08-09 RX ORDER — CALCIUM CARBONATE 200(500)MG
1000 TABLET,CHEWABLE ORAL DAILY PRN
Status: DISCONTINUED | OUTPATIENT
Start: 2018-08-09 | End: 2018-08-12 | Stop reason: HOSPADM

## 2018-08-09 RX ORDER — DIPHENHYDRAMINE HYDROCHLORIDE 50 MG/ML
25 INJECTION INTRAMUSCULAR; INTRAVENOUS EVERY 6 HOURS PRN
Status: ACTIVE | OUTPATIENT
Start: 2018-08-09 | End: 2018-08-10

## 2018-08-09 RX ORDER — DOCUSATE SODIUM 100 MG/1
100 CAPSULE, LIQUID FILLED ORAL 2 TIMES DAILY
Status: DISCONTINUED | OUTPATIENT
Start: 2018-08-09 | End: 2018-08-12 | Stop reason: HOSPADM

## 2018-08-09 RX ORDER — LIDOCAINE HYDROCHLORIDE AND EPINEPHRINE 20; 5 MG/ML; UG/ML
INJECTION, SOLUTION EPIDURAL; INFILTRATION; INTRACAUDAL; PERINEURAL AS NEEDED
Status: DISCONTINUED | OUTPATIENT
Start: 2018-08-09 | End: 2018-08-09 | Stop reason: SURG

## 2018-08-09 RX ORDER — NALBUPHINE HCL 10 MG/ML
5 AMPUL (ML) INJECTION
Status: ACTIVE | OUTPATIENT
Start: 2018-08-09 | End: 2018-08-10

## 2018-08-09 RX ORDER — MORPHINE SULFATE 1 MG/ML
INJECTION, SOLUTION EPIDURAL; INTRATHECAL; INTRAVENOUS AS NEEDED
Status: DISCONTINUED | OUTPATIENT
Start: 2018-08-09 | End: 2018-08-09 | Stop reason: SURG

## 2018-08-09 RX ORDER — OXYCODONE HYDROCHLORIDE AND ACETAMINOPHEN 5; 325 MG/1; MG/1
1 TABLET ORAL EVERY 4 HOURS PRN
Status: DISPENSED | OUTPATIENT
Start: 2018-08-09 | End: 2018-08-10

## 2018-08-09 RX ORDER — ONDANSETRON 2 MG/ML
INJECTION INTRAMUSCULAR; INTRAVENOUS AS NEEDED
Status: DISCONTINUED | OUTPATIENT
Start: 2018-08-09 | End: 2018-08-09 | Stop reason: SURG

## 2018-08-09 RX ORDER — CEFAZOLIN SODIUM 1 G/3ML
INJECTION, POWDER, FOR SOLUTION INTRAMUSCULAR; INTRAVENOUS AS NEEDED
Status: DISCONTINUED | OUTPATIENT
Start: 2018-08-09 | End: 2018-08-09 | Stop reason: SURG

## 2018-08-09 RX ORDER — MORPHINE SULFATE 0.5 MG/ML
INJECTION, SOLUTION EPIDURAL; INTRATHECAL; INTRAVENOUS
Status: DISPENSED
Start: 2018-08-09 | End: 2018-08-10

## 2018-08-09 RX ORDER — OXYTOCIN/RINGER'S LACTATE 30/500 ML
62.5 PLASTIC BAG, INJECTION (ML) INTRAVENOUS CONTINUOUS
Status: DISPENSED | OUTPATIENT
Start: 2018-08-09 | End: 2018-08-10

## 2018-08-09 RX ORDER — ACETAMINOPHEN 325 MG/1
650 TABLET ORAL EVERY 4 HOURS PRN
Status: DISCONTINUED | OUTPATIENT
Start: 2018-08-09 | End: 2018-08-12 | Stop reason: HOSPADM

## 2018-08-09 RX ORDER — ONDANSETRON 2 MG/ML
4 INJECTION INTRAMUSCULAR; INTRAVENOUS ONCE AS NEEDED
Status: DISCONTINUED | OUTPATIENT
Start: 2018-08-09 | End: 2018-08-12 | Stop reason: HOSPADM

## 2018-08-09 RX ADMIN — SODIUM CHLORIDE, SODIUM LACTATE, POTASSIUM CHLORIDE, AND CALCIUM CHLORIDE 125 ML/HR: .6; .31; .03; .02 INJECTION, SOLUTION INTRAVENOUS at 20:19

## 2018-08-09 RX ADMIN — ROPIVACAINE HYDROCHLORIDE: 2 INJECTION, SOLUTION EPIDURAL; INFILTRATION at 12:11

## 2018-08-09 RX ADMIN — ONDANSETRON HYDROCHLORIDE 4 MG: 2 INJECTION, SOLUTION INTRAVENOUS at 18:30

## 2018-08-09 RX ADMIN — KETOROLAC TROMETHAMINE 30 MG: 30 INJECTION, SOLUTION INTRAMUSCULAR at 19:18

## 2018-08-09 RX ADMIN — HYDROMORPHONE HYDROCHLORIDE 0.5 MG: 1 INJECTION, SOLUTION INTRAMUSCULAR; INTRAVENOUS; SUBCUTANEOUS at 20:35

## 2018-08-09 RX ADMIN — AZITHROMYCIN 500 MG: 500 INJECTION, POWDER, LYOPHILIZED, FOR SOLUTION INTRAVENOUS at 18:30

## 2018-08-09 RX ADMIN — ROPIVACAINE HYDROCHLORIDE 5 ML: 2 INJECTION, SOLUTION EPIDURAL; INFILTRATION at 12:09

## 2018-08-09 RX ADMIN — SODIUM CHLORIDE, SODIUM LACTATE, POTASSIUM CHLORIDE, AND CALCIUM CHLORIDE 125 ML/HR: .6; .31; .03; .02 INJECTION, SOLUTION INTRAVENOUS at 11:24

## 2018-08-09 RX ADMIN — ROPIVACAINE HYDROCHLORIDE 5 ML: 2 INJECTION, SOLUTION EPIDURAL; INFILTRATION at 12:06

## 2018-08-09 RX ADMIN — OXYCODONE HYDROCHLORIDE AND ACETAMINOPHEN 1 TABLET: 5; 325 TABLET ORAL at 22:42

## 2018-08-09 RX ADMIN — CEFAZOLIN 2000 MG: 1 INJECTION, POWDER, FOR SOLUTION INTRAVENOUS at 18:30

## 2018-08-09 RX ADMIN — MORPHINE SULFATE 4 MG: 1 INJECTION, SOLUTION EPIDURAL; INTRATHECAL; INTRAVENOUS at 19:05

## 2018-08-09 RX ADMIN — Medication 250 MILLI-UNITS/MIN: at 18:56

## 2018-08-09 RX ADMIN — Medication 250 MILLI-UNITS/MIN: at 19:22

## 2018-08-09 RX ADMIN — LIDOCAINE HYDROCHLORIDE AND EPINEPHRINE 20 ML: 20; 5 INJECTION, SOLUTION EPIDURAL; INFILTRATION; INTRACAUDAL; PERINEURAL at 18:25

## 2018-08-09 NOTE — ANESTHESIA PREPROCEDURE EVALUATION
Review of Systems/Medical History  Patient summary reviewed  Chart reviewed      Cardiovascular  Negative cardio ROS    Pulmonary  Negative pulmonary ROS        GI/Hepatic  Negative GI/hepatic ROS          Negative  ROS        Endo/Other  Negative endo/other ROS      GYN  Negative gynecology ROS          Hematology  Negative hematology ROS      Musculoskeletal    Comment: Fibromyalgia        Neurology  Negative neurology ROS      Psychology   Negative psychology ROS              Physical Exam    Airway    Mallampati score: II  TM Distance: >3 FB  Neck ROM: full     Dental       Cardiovascular  Comment: Negative ROS, Rhythm: regular, Rate: normal, Cardiovascular exam normal    Pulmonary  Pulmonary exam normal Breath sounds clear to auscultation,     Other Findings        Anesthesia Plan  ASA Score- 2     Anesthesia Type- epidural with ASA Monitors  Additional Monitors:   Airway Plan:         Plan Factors-    Induction-     Postoperative Plan-     Informed Consent- Anesthetic plan and risks discussed with patient

## 2018-08-09 NOTE — OB LABOR/OXYTOCIN SAFETY PROGRESS
Oxytocin Safety Progress Check Note - Makenzie Miramontes 28 y o  female MRN: [id number]    Unit/Bed#: L&D 323-01 Encounter: [id number]    Obstetric History       T0      L0     SAB0   TAB0   Ectopic0   Multiple0   Live Births0      Gestational Age: 40w1d  Dose (trice-units/min) Oxytocin: 26 trice-units/min  Contraction Frequency (minutes): 2-4  Contraction Quality: Moderate  Tachysystole: No   Dilation: 4-5        Effacement (%): 70  Station: -2  Baseline Rate: 130 bpm  Fetal Heart Rate: 115 BPM  FHR Category: Category I     Oxytocin Safety Progress Check: Safety check completed    Notes/comments:         Patient continues without any cervical change  She has been  4-5 cm since 6:10 a m  She has been on oxytocin with ruptured membranes since  6:56 a m  Jose Rodriguez Discussed with patient diagnosis of failed induction of labor if she remains unchanged  Around 6:00 p m  Jose Rodriguez Discussed with patient to another cervical  Exam around this time and if she remains unchanged, that she will need to be delivered via  delivery for failed induction  L and D staff, Dr Cassie Renee aware  We will continue oxytocin for now  Fetal heart rate tracing is reassuring      Daren Carlos MD 2018 4:38 PM

## 2018-08-09 NOTE — PROGRESS NOTES
Patient still in latent phase of labor  Patient still under 6 cm in dilation  Discussed with patient that failed induction of labor is diagnosed if she has been on oxytocin for at least 12-18 hours after membrane rupture and remains in the latent phase of labor  We will examine patient again around 6:00 p m   And if cervix is still unchanged, we discussed the need for delivery by primary  section for failed induction of labor

## 2018-08-09 NOTE — OB LABOR/OXYTOCIN SAFETY PROGRESS
Oxytocin Safety Progress Check Note - Erby Been 28 y o  female MRN: [id number]    Unit/Bed#: L&D 323-01 Encounter: [id number]    Obstetric History       T0      L0     SAB0   TAB0   Ectopic0   Multiple0   Live Births0      Gestational Age: 40w1d  Dose (trice-units/min) Oxytocin: 24 trice-units/min  Contraction Frequency (minutes): 2-4  Contraction Quality: Moderate  Tachysystole: No   Dilation: 5        Effacement (%): 70  Station: -2  Baseline Rate: 130 bpm  Fetal Heart Rate: 120 BPM  FHR Category: Category I     Oxytocin Safety Progress Check: Safety check completed    Notes/comments:   Patient is resting comfortably with epidural   FHT has been category 1 and reactive  SVE has made minimal change  MVUs are 150s  Will recheck in two hours or earlier as needed    D/W Dr Emelia Boateng MD 2018 12:59 PM

## 2018-08-09 NOTE — PROGRESS NOTES
Patient now with note of FHR decelerations, category 2 tracing and remote from delivery  Discontinued oxytocin and FHR deceleration resolved  Will move forward with  delivery, non-urgent since now FHR Category 1

## 2018-08-09 NOTE — ANESTHESIA PROCEDURE NOTES
Epidural Block    Patient location during procedure: OB  Start time: 8/9/2018 12:06 PM  Staffing  Anesthesiologist: Lou Forte  Performed: anesthesiologist   Preanesthetic Checklist  Completed: patient identified, surgical consent, pre-op evaluation, timeout performed, IV checked, risks and benefits discussed and monitors and equipment checked  Epidural  Patient position: sitting  Prep: Betadine  Patient monitoring: frequent blood pressure checks  Approach: midline  Location: lumbar (1-5)  Injection technique: IVETH saline  Needle  Needle type: Tuohy   Needle gauge: 18 G  Catheter type: side hole  Catheter size: 20 G  Catheter at skin depth: 9 5 cm  Test dose: negative and lidocaine 1 5% with epinephrine 1-to-200,000negative aspiration for CSF, negative aspiration for heme and no paresthesia on injection  patient tolerated the procedure well with no immediate complications

## 2018-08-09 NOTE — OB LABOR/OXYTOCIN SAFETY PROGRESS
Oxytocin Safety Progress Check Note - Enrike Carbajal 28 y o  female MRN: [id number]    Unit/Bed#: L&D 323-01 Encounter: [id number]    Obstetric History       T0      L0     SAB0   TAB0   Ectopic0   Multiple0   Live Births0      Gestational Age: 40w1d  Dose (trice-units/min) Oxytocin: 24 trice-units/min  Contraction Frequency (minutes): 2-4  Contraction Quality: Moderate  Tachysystole: No   Dilation: 4-5        Effacement (%): 70  Station: -2  Baseline Rate: 130 bpm  Fetal Heart Rate: 120 BPM  FHR Category: Category I     Oxytocin Safety Progress Check: Safety check completed    Notes/comments:              Patient comfortable still, declines epidural  IUPC inserted    Continue to titrate pitocin as necessary      Michelet Wilson MD 2018 9:45 AM

## 2018-08-09 NOTE — OB LABOR/OXYTOCIN SAFETY PROGRESS
Oxytocin Safety Progress Check Note - Makenzie Miramontes 28 y o  female MRN: [id number]    Unit/Bed#: L&D 323-01 Encounter: [id number]    Obstetric History       T0      L0     SAB0   TAB0   Ectopic0   Multiple0   Live Births0      Gestational Age: 37w0d  Dose (trice-units/min) Oxytocin: 8 trice-units/min  Contraction Frequency (minutes): irregular  Contraction Quality: Mild  Tachysystole: No   Dilation:  (defer )        Effacement (%): 50  Station: -1  Baseline Rate: 130 bpm  FHR Category: Category I     Oxytocin Safety Progress Check: Safety check completed    Notes/comments:   Patient feeling more uncomfortable with irregular contractions but does not want anything for pain management at this time  Cervical check deffered    FHT cat I  Continue pitocin titration per protocol  Will reassess in 2 hours or sooner if uncomfortable    D/w Dr Margarie Kanner, MD 2018 11:18 PM

## 2018-08-09 NOTE — OB LABOR/OXYTOCIN SAFETY PROGRESS
Oxytocin Safety Progress Check Note - Jesse Mater 28 y o  female MRN: [id number]    Unit/Bed#: L&D 323-01 Encounter: [id number]    Obstetric History       T0      L0     SAB0   TAB0   Ectopic0   Multiple0   Live Births0      Gestational Age: 40w1d  Dose (trice-units/min) Oxytocin: 22 trice-units/min  Contraction Frequency (minutes): 2-5  Contraction Quality: Moderate  Tachysystole: No   Dilation: 4-5        Effacement (%): 50  Station: -2  Baseline Rate: 125 bpm  Fetal Heart Rate: 120 BPM  FHR Category: Category I     Oxytocin Safety Progress Check: Safety check completed    Notes/comments:              Patient still comfortable, feeling contractions  Amniotomy performed, clear fluid  Continue oxytocin      Mariama Santos MD 2018 6:57 AM

## 2018-08-09 NOTE — OB LABOR/OXYTOCIN SAFETY PROGRESS
Oxytocin Safety Progress Check Note - Kirk Lujan 28 y o  female MRN: [id number]    Unit/Bed#: L&D 323-01 Encounter: [id number]    Obstetric History       T0      L0     SAB0   TAB0   Ectopic0   Multiple0   Live Births0      Gestational Age: 37w0d  Dose (trice-units/min) Oxytocin: 2 trice-units/min  Contraction Frequency (minutes): irregular  Contraction Quality: Mild  Tachysystole: No   Dilation: 4        Effacement (%): 50  Station: -1  Baseline Rate: 125 bpm  FHR Category: Category I          Notes/comments:   Galaviz bulb fell out approximately   Good cervical change on /-  Fetal heart tracing remained category 1  Will begin Pitocin titration 2 x 2 milliunits per minute Q 30 minutes  Will reassess in 2 hours or sooner if uncomfortable and consider AROM when more effaced      D/w Dr Susan Grossman MD 2018 8:46 PM

## 2018-08-09 NOTE — OB LABOR/OXYTOCIN SAFETY PROGRESS
Oxytocin Safety Progress Check Note - Leeanna Pierson 28 y o  female MRN: [id number]    Unit/Bed#: L&D 323-01 Encounter: [id number]    Obstetric History       T0      L0     SAB0   TAB0   Ectopic0   Multiple0   Live Births0      Gestational Age: 40w1d  Dose (trice-units/min) Oxytocin: 20 trice-units/min  Contraction Frequency (minutes): 4-5  Contraction Quality: Mild  Tachysystole: No   Dilation: 4-5        Effacement (%): 50  Station: -2  Baseline Rate: 130 bpm  FHR Category: Category II     Oxytocin Safety Progress Check: Safety check completed    Notes/comments:   Patient comfortable not feeling her contractions  Minimal cervical change now 4-5/50/-2  FHT cat II for an unspecified deceleration noted when patient was being placed on monitor while lying flat on her back- this improved with positional change  Will continue pitocin titration and recommend AROM with more effacement      To be discussed with Dr Karl Rich MD 2018 6:15 AM

## 2018-08-09 NOTE — OB LABOR/OXYTOCIN SAFETY PROGRESS
Oxytocin Safety Progress Check Note - Deniz Lance 28 y o  female MRN: [id number]    Unit/Bed#: L&D 323-01 Encounter: [id number]    Obstetric History       T0      L0     SAB0   TAB0   Ectopic0   Multiple0   Live Births0      Gestational Age: 40w1d  Dose (trice-units/min) Oxytocin: 18 trice-units/min  Contraction Frequency (minutes): 2-3 (currently not being picked up well on monitor)  Contraction Quality: Mild  Tachysystole: No   Dilation:  (defer)        Effacement (%): 50  Station: -1  Baseline Rate: 120 bpm  Fetal Heart Rate: 120 BPM  FHR Category: Category II     Oxytocin Safety Progress Check: Safety check completed    Notes/comments:   Patient resting comfortably per nursing  FHT cat II secondary to a periodic unspecified deceleration with return to baseline spontaneously  Contractions not being picked up well on tocometer at this time as patient sleeping on side    Will defer cervical check for now and check at 6 AM     D/w Dr Boone Saenz MD 2018 4:17 AM

## 2018-08-10 LAB
ERYTHROCYTE [DISTWIDTH] IN BLOOD BY AUTOMATED COUNT: 13.5 % (ref 11.6–15.1)
HCT VFR BLD AUTO: 27.8 % (ref 34.8–46.1)
HGB BLD-MCNC: 9.4 G/DL (ref 11.5–15.4)
MCH RBC QN AUTO: 32.9 PG (ref 26.8–34.3)
MCHC RBC AUTO-ENTMCNC: 33.8 G/DL (ref 31.4–37.4)
MCV RBC AUTO: 97 FL (ref 82–98)
PLATELET # BLD AUTO: 116 THOUSANDS/UL (ref 149–390)
PMV BLD AUTO: 11 FL (ref 8.9–12.7)
RBC # BLD AUTO: 2.86 MILLION/UL (ref 3.81–5.12)
WBC # BLD AUTO: 13.53 THOUSAND/UL (ref 4.31–10.16)

## 2018-08-10 PROCEDURE — 99024 POSTOP FOLLOW-UP VISIT: CPT | Performed by: OBSTETRICS & GYNECOLOGY

## 2018-08-10 PROCEDURE — 85027 COMPLETE CBC AUTOMATED: CPT | Performed by: STUDENT IN AN ORGANIZED HEALTH CARE EDUCATION/TRAINING PROGRAM

## 2018-08-10 RX ORDER — FERROUS SULFATE 325(65) MG
325 TABLET ORAL
Status: DISCONTINUED | OUTPATIENT
Start: 2018-08-10 | End: 2018-08-12 | Stop reason: HOSPADM

## 2018-08-10 RX ADMIN — IBUPROFEN 600 MG: 600 TABLET ORAL at 22:18

## 2018-08-10 RX ADMIN — ENOXAPARIN SODIUM 40 MG: 40 INJECTION SUBCUTANEOUS at 09:06

## 2018-08-10 RX ADMIN — Medication 1 TABLET: at 09:06

## 2018-08-10 RX ADMIN — DOCUSATE SODIUM 100 MG: 100 CAPSULE, LIQUID FILLED ORAL at 09:06

## 2018-08-10 RX ADMIN — OXYCODONE HYDROCHLORIDE AND ACETAMINOPHEN 1 TABLET: 5; 325 TABLET ORAL at 15:25

## 2018-08-10 RX ADMIN — FERROUS SULFATE TAB 325 MG (65 MG ELEMENTAL FE) 325 MG: 325 (65 FE) TAB at 09:18

## 2018-08-10 RX ADMIN — KETOROLAC TROMETHAMINE 30 MG: 30 INJECTION, SOLUTION INTRAMUSCULAR at 09:09

## 2018-08-10 RX ADMIN — Medication 62.5 MILLI-UNITS/MIN: at 00:30

## 2018-08-10 RX ADMIN — DOCUSATE SODIUM 100 MG: 100 CAPSULE, LIQUID FILLED ORAL at 22:15

## 2018-08-10 RX ADMIN — KETOROLAC TROMETHAMINE 30 MG: 30 INJECTION, SOLUTION INTRAMUSCULAR at 18:49

## 2018-08-10 RX ADMIN — IBUPROFEN 600 MG: 600 TABLET ORAL at 22:15

## 2018-08-10 RX ADMIN — KETOROLAC TROMETHAMINE 30 MG: 30 INJECTION, SOLUTION INTRAMUSCULAR at 01:55

## 2018-08-10 RX ADMIN — OXYCODONE HYDROCHLORIDE AND ACETAMINOPHEN 1 TABLET: 5; 325 TABLET ORAL at 04:37

## 2018-08-10 NOTE — PROGRESS NOTES
Progress Note - OB/GYN   John Razo 28 y o  female MRN: [id number]  Unit/Bed#: L&D 309-01 Encounter: [id number]    Assessment:  Post operativeDay #1 s/p1LTCS, stable, baby in room    Plan:  1) S/p 1LTCS   Lovenox to start this AM  2) Continue routine post partum care   Encourage ambulation   Encourage breastfeeding   Anticipate discharge Saturday     Subjective/Objective   Chief Complaint:     Post delivery  Patient is doing well  Lochia WNL  Pain well controlled  Postoperative hemoglobin 9 4  Patient is asymptomatic  Subjective:     Pain: yes, cramping, improved with meds  Tolerating PO: yes  Voiding: Galaviz removed this AM  Flatus: no  BM: no  Ambulating: yes  Breastfeeding:  yes  Chest pain: no  Shortness of breath: no  Leg pain: no  Lochia: minimal    Objective:     Vitals: /68 (BP Location: Right arm)   Pulse 62   Temp 98 2 °F (36 8 °C) (Oral)   Resp 18   Ht 5' 5" (1 651 m)   Wt 92 1 kg (203 lb)   LMP 11/01/2017   SpO2 98%   Breastfeeding? Yes   BMI 33 78 kg/m²       Intake/Output Summary (Last 24 hours) at 08/10/18 0732  Last data filed at 08/10/18 3785   Gross per 24 hour   Intake          1756 25 ml   Output             3025 ml   Net         -1268 75 ml       Lab Results   Component Value Date    WBC 13 53 (H) 08/10/2018    HGB 9 4 (L) 08/10/2018    HCT 27 8 (L) 08/10/2018    MCV 97 08/10/2018     (L) 08/10/2018       Physical Exam:     Gen: AAOx3, NAD  CV: RRR  Lungs: CTA b/l  Abd: Soft, non-tender, non-distended, no rebound or guarding  Incision dry and intact  Blood stained telfa covering the incision, however no drainage or active bleeding appreciated fromt he incision  Edges well approximated with steri-strips in place, no signs of infection  Uterine fundus firm and non-tender, at the umbilicus     Ext: Non tender    Ailin Yuan MD  8/10/2018  7:32 AM

## 2018-08-10 NOTE — LACTATION NOTE
This note was copied from a baby's chart  Met with mother  Provided mother with Ready, Set, Baby booklet  Discussed Skin to Skin contact an benefits to mom and baby  Talked about the delay of the first bath until baby has adjusted  Spoke about the benefits of rooming in  Feeding on cue and what that means for recognizing infant's hunger  Avoidance of pacifiers for the first month discussed  Talked about exclusive breastfeeding for the first 6 months  Positioning and latch reviewed as well as showing images of other feeding positions  Discussed the properties of a good latch in any position  Reviewed hand/manual expression  Discussed s/s that baby is getting enough milk and some s/s that breastfeeding dyad may need further help  Gave information on common concerns, what to expect the first few weeks after delivery, preparing for other caregivers, and how partners can help  Resources for support also provided  Went over feeding log since birth for the first week  Emphasized 8 or more (12) feedings in a 24 hour period, what to expect for the number of diapers per day of life and the progression of properties of the  stooling pattern  Discussed s/s that breastfeeding is going well after day 4 and when to get help from a pediatrician or lactation support person after day 4  Booklet included Breast Pumping Instructions, When You Go Back to Work or School, and Breastfeeding Resources for after discharge including access to the number for the [employer]  Mother verbalized breastfeeding is going well  Enc to call for assistance as needed,phone # given

## 2018-08-10 NOTE — OP NOTE
OPERATIVE REPORT  PATIENT NAME: Megan Chau    :  1990  MRN: [id number]  Pt Location: [facility] L&[facility] OR ROOM 01    SURGERY DATE: 2018    Surgeon(s) and Role:     * Prosper Mcmanus MD - Primary     * Tiffanie Mejía MD - Assisting    Preop Diagnosis:  1  Pregnancy at 40 weeks gestation  2  Single IUP  3  IOL for NRFHT  4  IVF pregnancy  5  PCOS  6  Obesity    Procedure(s) (LRB):   SECTION () (N/A)    Specimen(s):  ID Type Source Tests Collected by Time Destination   A :  Tissue (Placenta on Hold) OB Only Placenta PLACENTA IN STORAGE Prosper Mcmanus MD 2018 1857        Estimated Blood Loss:   500 mL    Drains:  Urethral Catheter Latex (Active)   Site Assessment Clean;Skin intact; Patent 2018  8:25 PM   Collection Container Standard drainage bag 2018  8:25 PM   Securement Method Securing device (Describe) 2018  8:25 PM   Output (mL) 1800 mL 2018  6:01 PM   Number of days: 0     Anesthesia Type:   Epidural    Operative Indications:  Persistent cat II FHT in setting of arrest of dilitation    Operative Findings:  1  Viable male  delivered at 1855, Weight **lb **oz, APGARS ** and ** at 1 minute and 5 minutes respectively  2  Arterial blood gas pH 7 250, BE -4 0; Venous blood gas 7 381, BE -3 1  3  Intact placenta with 3 vessel centrally inserted cord delivered at 1857  4  Clear amniotic fluid  5  Small portion of subinvolution on posterior fundus at placental insertion site  6  Ovaries appear with small powder burn most likely secondary to egg retrieval during IVF  7  Grossly normal appearing fallopian tubes    Complications:   None    Procedure and Technique:  The patient was taken to the operating room where correct patient procedure were confirmed  Epidural anesthesia was adequately established an 2gm Ancef  And 500 mg of azithromycin IV were given for preoperative prophylaxis  The patient was then placed in a supine position with a left lateral tilt  Galaviz catheter was placed in sterile fashion  Fetal heart tones were noted to be normal  The patient was then prepped vaginally with Betadineand abdominally with chlorhexidine and draped in the usual sterile fashion for a  Pfannenstiel skin incision  An incision was made in the skin with a surgical scalpel  Bovie electrocautery was used to reach the level of the rectus fascia  The fascia was incised transversely at the midline and the fascial incision was extended bilaterally using Bovie electrocautery  The superior edge of the fascial incision was grasped with Kocher clamps, tented up and the underlying rectus muscles were dissected off bluntly and sharply using  bovie electrocautery  The rectus muscles were then divided at the midline  The peritoneum was identified, tented up at its upper margin taking care to avoid the bladder, and then entered with blunt dissection  The peritoneal incision and rectus muscle were extended bilaterally bluntly with gentle traction  A bladder flap was formed  The bladder blade was inserted  Then, a transverse incision was made in the lower uterine segment using a new surgical blade  The uterine incision was extended cephalad and caudal using blunt dissection  The amniotic sac was entered bluntly and the amniotic fluid was noted to be Clear  The surgeon's hand was placed into the uterine cavity  The fetus was noted to be in cephalic presentation, and the presenting part was grasped and delivered through the uterine incision with the assistance of fundal pressure  A nuchal cord was identified and was easily reduced  The infant's oral and nasal passages were bulb suctioned  After delivery the cord was doubly clamped and cut  The infant was then passed off the table to the awaiting  staff  Venous and arterial blood gas, cord blood, and portion of cord was obtained for analysis and routine blood testing  The placenta was then delivered  Via manual extraction  The placenta was noted to be intact with a centrally inserted three-vessel cord  Oxytocin was administered by IV infusion to enhance uterine contraction  Two moist lap sponges were placed in the paracolic gutters  The uterus was exteriorized and cleared of all clots and debris by blunt curretage with a moist lap sponge  A sub involution was noted at the posterior fundus near the placental insertion site which noted to contract well with Pitocin  The uterine incision was reapproximated using a  1-0 Vicryl in a running locked fashion  A second vertical imbricating stitch with  1-0 Vicryl was applied  The uterine incision was examined and noted to have bleeding on the left corner of the incision  A figure-of-eight stitch was placed using 2 0 plain and hemostasis was obtained  The posterior cul-de-sac was   Irrigated and suctioned clear of all clots  The uterus was replaced into the abdomen and the pericolic gutters were cleared of all clots  The uterine incision was once again reexamined and noted to be hemostatic  The lap sponges were removed from the paracolic gutters  The peritoneum was reapproximated using 2 0 plain suture in a running nonlocked fashion  The fascia was re approximated using  1 0 Vicrylin a running nonlocked fashion  The subcutaneous tissue was irrigated and cleared of all clots and debris  Good hemostasis  was achieved with Bovie electrocautery  The skin incision was closed with 4-0 monocryl  Good hemostasis was noted  Steri-Strips with overlying Telfa and Tegaderm were placed sterilely placed on top of the skin incision as a surgical dressing  All needle, sponge, and instrument counts were noted to be correct x 2 at the end of the procedure  The patient was then cleansed and transferred to the recovery room  Overall, the patient tolerated the procedure well and is currently in stable condition in the PACU with her           Dr Scott Sanchez was present for the entire procedure        Patient Disposition:  PACU    SIGNATURE: Tam Mason MD  DATE: August 9, 2018  TIME: 9:05 PM

## 2018-08-10 NOTE — DISCHARGE INSTRUCTIONS
Discharge instructions: See after visit summary for complete information  Do not place anything (no partner, tampons or douche) in your vagina for 6 weeks  You may walk for exercise for the first 6 weeks then gradually return to your usual activities     Please do not drive for 1 week if you have no stitches and for 2 weeks if you have stitches or underwent a  delivery     You may take baths or shower per your preference     Please look at your breasts in the mirror daily and call for redness or tenderness or increased warmth     If you have had a  please look at your incision daily as well and call us for increasing redness or steady drainage from the incision     Please call us for temperature > 100 4*F or 38* C, worsening pain or a foul discharge   Section   WHAT YOU SHOULD KNOW:   A  delivery, or , is abdominal surgery to deliver your baby  There are many reasons you may need a   · A  may be scheduled before labor if you had a  with your last baby  It may be scheduled if your baby is not positioned normally, or you are pregnant with more than 1 baby  · Your caregiver may perform an emergency  during labor to prevent life-threatening complications for you or your baby  A  may be done if your cervix does not dilate after several hours of active labor  · Other reasons for a  include maternal infections and problems with the placenta  AFTER YOU LEAVE:   Medicines:   · Prescription pain medicine  may be given  Ask how to take this medicine safely  · Acetaminophen  decreases pain and fever  It is available without a doctor's order  Ask how much to take and how often to take it  Follow directions  Acetaminophen can cause liver damage if not taken correctly  · NSAIDs  help decrease swelling and pain or fever  This medicine is available with or without a doctor's order   NSAIDs can cause stomach bleeding or kidney problems in certain people  If you take blood thinner medicine, always ask your obstetrician if NSAIDs are safe for you  Always read the medicine label and follow directions  · Take your medicine as directed  Contact your obstetrician (OB) if you think your medicine is not helping or if you have side effects  Tell him if you are allergic to any medicine  Keep a list of the medicines, vitamins, and herbs you take  Include the amounts, and when and why you take them  Bring the list or the pill bottles to follow-up visits  Carry your medicine list with you in case of an emergency  Follow up with your OB as directed: You may need to return to have your stitches or staples removed  Write down your questions so you remember to ask them during your visits  Wound care:  Carefully wash your wound with soap and water every day  Keep your wound clean and dry  Wear loose, comfortable clothes that do not rub against your wound  Ask your OB about bathing and showering  Drink plenty of liquids: You can lower your risk for a blood clot if you drink plenty of liquids  Ask how much liquid to drink each day and which liquids are best for you  Limit activity until you have fully recovered from surgery:   · Ask when it is safe for you to drive, walk up stairs, lift heavy objects, and have sex  · Ask when it is okay to exercise, and what types of exercise to do  Start slowly and do more as you get stronger  Contact your OB if:   · You have heavy vaginal bleeding that fills 1 or more sanitary pads in 1 hour  · You have a fever  · Your incision is swollen, red, or draining pus  · You have questions or concerns about yourself or your baby  Seek care immediately or call 911 if:   · Blood soaks through your bandage  · Your stitches come apart  · You feel lightheaded, short of breath, and have chest pain  · You cough up blood  · Your arm or leg feels warm, tender, and painful   It may look swollen and red  © 2014 [address] is for End User's use only and may not be sold, redistributed or otherwise used for commercial purposes  All illustrations and images included in CareNotes® are the copyrighted property of [employer]  or Bernabe Sanderson  The above information is an  only  It is not intended as medical advice for individual conditions or treatments  Talk to your doctor, nurse or pharmacist before following any medical regimen to see if it is safe and effective for you

## 2018-08-10 NOTE — DISCHARGE SUMMARY
Discharge Summary - Kirk Lujan 28 y o  female MRN: [id number]    Unit/Bed#: L&D 390-94 Encounter: [id number]    Admission Date: 2018     Discharge Date: 2018    Delivering Attending: Genny Jules MD  Discharge Attending:Dr Keaton Barajas    Admitting Diagnosis:   1  Pregnancy at 40 weeks gestation  2  Single IUP  3  IOL for NRFHT  4  IVF pregnancy  5  PCOS  6  Obesity    Discharge Diagnosis:   Same, delivered    Procedures: Primary low transverse  section    Complications: none apparent    Hospital Course:     Kirk Lujan is a 28 y o   at 40w1d wks who was initially admitted for  Induction of labor for nonreassuring fetal heart tracing noted in office  Patient was cervically ripened with kelsey bulb and low-dose Pitocin and induction proceeded with Pitocin titration  Minimal cervical change noted following kelsey bulb expulsion  Given this and persisting category 2 fetal heart tracing decision was made to move forward with  section  She delivered a viable male  Laurel Resendiz) on 18 at [address] [facility]  Weight 8lbs 0oz via primary  section, low transverse incision  Apgars were 8 (1 min) and 9 (5 min)   was transferred to  nursery  Patient tolerated the procedure well and was transferred to recovery in stable condition  Patient's post- operative/ partum course was uncomplicated  Pre-operative Hg was 12 9 and 9 1 post-operatively  Condition at discharge: stable     On day of discharge pain was well controlled, patient was tolerating PO with appropriate bowel function  She was discharged on postpartum day #3 with standard post-operative/ partum instructions to follow up with her physician in 3-6 weeks for a postpartum appointment and to call with any signs of infection or bleeding  Discharge instructions: See after visit summary for complete information  Do not place anything (no partner, tampons or douche) in your vagina for 6 weeks    You may walk for exercise for the first 6 weeks then gradually return to your usual activities     Please do not drive for 1 week if you have no stitches and for 2 weeks if you have stitches or underwent a  delivery     You may take baths or shower per your preference     Please look at your breasts in the mirror daily and call for redness or tenderness or increased warmth     If you have had a  please look at your incision daily as well and call us for increasing redness or steady drainage from the incision     Please call us for temperature > 100 4*F or 38* C, worsening pain or a foul discharge  Provisions for Follow-Up Care:  See after visit summary for information related to follow-up care and any pertinent home health orders  Disposition: See After Visit Summary for discharge disposition information  Planned Readmission: No    Discharge Medications:   Prenatal vitamin daily for 6 months or the duration of nursing whichever is longer    Percocet 1 tablet every 4 hours as needed for moderate- severe pain  Motrin 600 mg orally every 6 hours as needed for pain  Tylenol (over the counter) per bottle directions as needed for pain  Hydrocortisone cream 1% (over the counter) applied 1-2x daily to hemorrhoids as needed  Witch hazel pads for hemorrhoidal discomfort as needed

## 2018-08-10 NOTE — ANESTHESIA POSTPROCEDURE EVALUATION
Post-Op Assessment Note      CV Status:  Stable    Mental Status:  Alert and awake    Hydration Status:  Euvolemic    PONV Controlled:  Controlled    Airway Patency:  Patent    Post Op Vitals Reviewed: Yes        Post-op block assessment: catheter intact and no complications        BP      Temp      Pulse     Resp      SpO2

## 2018-08-10 NOTE — PLAN OF CARE
Problem: Knowledge Deficit  Goal: Patient/family/caregiver demonstrates understanding of disease process, treatment plan, medications, and discharge instructions  Complete learning assessment and assess knowledge base    Interventions:  - Provide teaching at level of understanding  - Provide teaching via preferred learning methods   Outcome: Progressing      Problem: POSTPARTUM  Goal: Experiences normal postpartum course  INTERVENTIONS:  - Monitor maternal vital signs  - Assess uterine involution and lochia   Outcome: Progressing    Goal: Appropriate maternal -  bonding  INTERVENTIONS:  - Identify family support  - Assess for appropriate maternal/infant bonding   -Encourage maternal/infant bonding opportunities  - Referral to  or  as needed   Outcome: Progressing    Goal: Establishment of infant feeding pattern  INTERVENTIONS:  - Assess breast/bottle feeding  - Refer to lactation as needed   Outcome: Progressing    Goal: Incision(s), wounds(s) or drain site(s) healing without S/S of infection  INTERVENTIONS  - Assess and document risk factors for skin impairment   - Assess and document dressing, incision, wound bed, drain sites and surrounding tissue  - Initiate Nutrition services consult and/or wound management as needed   Outcome: Progressing      Problem: PAIN - ADULT  Goal: Verbalizes/displays adequate comfort level or baseline comfort level  Interventions:  - Encourage patient to monitor pain and request assistance  - Assess pain using appropriate pain scale  - Administer analgesics based on type and severity of pain and evaluate response  - Implement non-pharmacological measures as appropriate and evaluate response  - Consider cultural and social influences on pain and pain management  - Notify physician/advanced practitioner if interventions unsuccessful or patient reports new pain   Outcome: Progressing      Problem: INFECTION - ADULT  Goal: Absence or prevention of progression during hospitalization  INTERVENTIONS:  - Assess and monitor for signs and symptoms of infection  - Monitor lab/diagnostic results  - Monitor all insertion sites, i e  indwelling lines, tubes, and drains  - Monitor endotracheal (as able) and nasal secretions for changes in amount and color  - [place] appropriate cooling/warming therapies per order  - Administer medications as ordered  - Instruct and encourage patient and family to use good hand hygiene technique  - Identify and instruct in appropriate isolation precautions for identified infection/condition   Outcome: Progressing      Problem: SAFETY ADULT  Goal: Patient will remain free of falls  INTERVENTIONS:  - Assess patient frequently for physical needs  -  Identify cognitive and physical deficits and behaviors that affect risk of falls    -  [place] fall precautions as indicated by assessment   - Educate patient/family on patient safety including physical limitations  - Instruct patient to call for assistance with activity based on assessment  - Modify environment to reduce risk of injury  - Consider OT/PT consult to assist with strengthening/mobility   Outcome: Progressing    Goal: Maintain or return to baseline ADL function  INTERVENTIONS:  -  Assess patient's ability to carry out ADLs; assess patient's baseline for ADL function and identify physical deficits which impact ability to perform ADLs (bathing, care of mouth/teeth, toileting, grooming, dressing, etc )  - Assess/evaluate cause of self-care deficits   - Assess range of motion  - Assess patient's mobility; develop plan if impaired  - Assess patient's need for assistive devices and provide as appropriate  - Encourage maximum independence but intervene and supervise when necessary  ¯ Involve family in performance of ADLs  ¯ Assess for home care needs following discharge   ¯ Request OT consult to assist with ADL evaluation and planning for discharge  ¯ Provide patient education as appropriate Outcome: Progressing    Goal: Maintain or return mobility status to optimal level  INTERVENTIONS:  - Assess patient's baseline mobility status (ambulation, transfers, stairs, etc )    - Identify cognitive and physical deficits and behaviors that affect mobility  - Identify mobility aids required to assist with transfers and/or ambulation (gait belt, sit-to-stand, lift, walker, cane, etc )  - [place] fall precautions as indicated by assessment  - Record patient progress and toleration of activity level on Mobility SBAR; progress patient to next Phase/Stage  - Instruct patient to call for assistance with activity based on assessment  - Request Rehabilitation consult to assist with strengthening/weightbearing, etc    Outcome: Progressing      Problem: DISCHARGE PLANNING  Goal: Discharge to home or other facility with appropriate resources  INTERVENTIONS:  - Identify barriers to discharge w/patient and caregiver  - Arrange for needed discharge resources and transportation as appropriate  - Identify discharge learning needs (meds, wound care, etc )  - Arrange for interpretive services to assist at discharge as needed  - Refer to Case Management Department for coordinating discharge planning if the patient needs post-hospital services based on physician/advanced practitioner order or complex needs related to functional status, cognitive ability, or social support system   Outcome: Progressing

## 2018-08-10 NOTE — PROGRESS NOTES
Post-op Note - OB/GYN   Leeanna Pierson 28 y o  female MRN: [id number]  Unit/Bed#: L&D 309-01 Encounter: [id number]    Assessment:  28 y o  [employer] POD#0  Status post primary low transverse  section for persisting category 2 fetal heart tracing  Will continue to monitor incision as blood noted on dressing has been marked  Vital signs stable  Adequate urine output at 0 54  Cc/ kg/ hour  Patient doing well postoperatively  Plan:   Continue routine postoperative care   Pain control PRN   Follow-up a m  CBC   Galaviz to be removed in a m  followed by voiding trial    Encourage incentive spirometry   Encourage ambulation   Encourage breast feeding    Subjective/Objective     Pain: no  Tolerating PO: yes  Voiding: Galaviz in place  Flatus: no  BM: no  Ambulating: no  Chest pain: no  Shortness of breath: no  Leg pain: no    Objective:     Vitals: Blood pressure 108/64, pulse 70, temperature 98 2 °F (36 8 °C), temperature source Oral, resp  rate 16, height 5' 5" (1 651 m), weight 92 1 kg (203 lb), last menstrual period 2017, SpO2 97 %, currently breastfeeding  Physical Exam:     Physical Exam   Constitutional: She appears well-developed and well-nourished  No distress  Cardiovascular: Normal rate, regular rhythm and normal heart sounds  Exam reveals no gallop and no friction rub  No murmur heard  Pulmonary/Chest: Effort normal and breath sounds normal  No respiratory distress  She has no wheezes  She has no rales  Abdominal: Soft  She exhibits no distension  There is no rebound and no guarding  Appropriate tenderness to palpation  Uterus firm a 2cm below umbilicus  Dressing intact- blood noted on dressing and marked  Will continue to monitor   Genitourinary:   Genitourinary Comments: Scant vaginal bleeding noted  Galaviz in place     Musculoskeletal: She exhibits no edema or tenderness  SCDs on   Neurological: She is alert  Skin: Skin is warm and dry  She is not diaphoretic     Psychiatric: She has a normal mood and affect  Her behavior is normal    Vitals reviewed  Lab, Imaging and other studies: I have personally reviewed pertinent reports        Lab Results   Component Value Date    WBC 11 25 (H) 08/08/2018    HGB 12 9 08/08/2018    HCT 38 0 08/08/2018    MCV 97 08/08/2018     08/08/2018     Tracey Keenan MD  08/10/18

## 2018-08-11 LAB
BASOPHILS # BLD AUTO: 0.01 THOUSANDS/ΜL (ref 0–0.1)
BASOPHILS NFR BLD AUTO: 0 % (ref 0–1)
EOSINOPHIL # BLD AUTO: 0.06 THOUSAND/ΜL (ref 0–0.61)
EOSINOPHIL NFR BLD AUTO: 1 % (ref 0–6)
ERYTHROCYTE [DISTWIDTH] IN BLOOD BY AUTOMATED COUNT: 13.9 % (ref 11.6–15.1)
HCT VFR BLD AUTO: 28 % (ref 34.8–46.1)
HGB BLD-MCNC: 9.1 G/DL (ref 11.5–15.4)
LYMPHOCYTES # BLD AUTO: 2.19 THOUSANDS/ΜL (ref 0.6–4.47)
LYMPHOCYTES NFR BLD AUTO: 22 % (ref 14–44)
MCH RBC QN AUTO: 32.2 PG (ref 26.8–34.3)
MCHC RBC AUTO-ENTMCNC: 32.5 G/DL (ref 31.4–37.4)
MCV RBC AUTO: 99 FL (ref 82–98)
MONOCYTES # BLD AUTO: 0.77 THOUSAND/ΜL (ref 0.17–1.22)
MONOCYTES NFR BLD AUTO: 8 % (ref 4–12)
NEUTROPHILS # BLD AUTO: 6.82 THOUSANDS/ΜL (ref 1.85–7.62)
NEUTS SEG NFR BLD AUTO: 69 % (ref 43–75)
NRBC BLD AUTO-RTO: 0 /100 WBCS
PLATELET # BLD AUTO: 144 THOUSANDS/UL (ref 149–390)
PMV BLD AUTO: 10.7 FL (ref 8.9–12.7)
RBC # BLD AUTO: 2.83 MILLION/UL (ref 3.81–5.12)
WBC # BLD AUTO: 9.85 THOUSAND/UL (ref 4.31–10.16)

## 2018-08-11 PROCEDURE — 85025 COMPLETE CBC W/AUTO DIFF WBC: CPT | Performed by: OBSTETRICS & GYNECOLOGY

## 2018-08-11 PROCEDURE — 99024 POSTOP FOLLOW-UP VISIT: CPT | Performed by: OBSTETRICS & GYNECOLOGY

## 2018-08-11 RX ADMIN — IBUPROFEN 600 MG: 600 TABLET ORAL at 20:07

## 2018-08-11 RX ADMIN — OXYCODONE HYDROCHLORIDE AND ACETAMINOPHEN 1 TABLET: 5; 325 TABLET ORAL at 13:55

## 2018-08-11 RX ADMIN — DOCUSATE SODIUM 100 MG: 100 CAPSULE, LIQUID FILLED ORAL at 17:54

## 2018-08-11 RX ADMIN — IBUPROFEN 600 MG: 600 TABLET ORAL at 09:02

## 2018-08-11 RX ADMIN — OXYCODONE HYDROCHLORIDE AND ACETAMINOPHEN 1 TABLET: 5; 325 TABLET ORAL at 05:34

## 2018-08-11 RX ADMIN — OXYCODONE HYDROCHLORIDE AND ACETAMINOPHEN 1 TABLET: 5; 325 TABLET ORAL at 17:55

## 2018-08-11 RX ADMIN — FERROUS SULFATE TAB 325 MG (65 MG ELEMENTAL FE) 325 MG: 325 (65 FE) TAB at 09:02

## 2018-08-11 RX ADMIN — ENOXAPARIN SODIUM 40 MG: 40 INJECTION SUBCUTANEOUS at 09:02

## 2018-08-11 RX ADMIN — OXYCODONE HYDROCHLORIDE AND ACETAMINOPHEN 1 TABLET: 5; 325 TABLET ORAL at 09:35

## 2018-08-11 RX ADMIN — IBUPROFEN 600 MG: 600 TABLET ORAL at 15:14

## 2018-08-11 RX ADMIN — DOCUSATE SODIUM 100 MG: 100 CAPSULE, LIQUID FILLED ORAL at 09:02

## 2018-08-11 RX ADMIN — Medication 1 TABLET: at 09:02

## 2018-08-11 RX ADMIN — OXYCODONE HYDROCHLORIDE AND ACETAMINOPHEN 1 TABLET: 5; 325 TABLET ORAL at 22:52

## 2018-08-11 NOTE — PLAN OF CARE
DISCHARGE PLANNING     Discharge to home or other facility with appropriate resources Progressing        INFECTION - ADULT     Absence or prevention of progression during hospitalization Progressing        Knowledge Deficit     Patient/family/caregiver demonstrates understanding of disease process, treatment plan, medications, and discharge instructions Progressing        PAIN - ADULT     Verbalizes/displays adequate comfort level or baseline comfort level Progressing        POSTPARTUM     Experiences normal postpartum course Progressing     Appropriate maternal -  bonding Progressing     Establishment of infant feeding pattern Progressing     Incision healing without S/S of infection Progressing        SAFETY ADULT     Patient will remain free of falls Progressing     Maintain or return to baseline ADL function Progressing     Maintain or return mobility status to optimal level Progressing

## 2018-08-11 NOTE — PLAN OF CARE
Problem: Knowledge Deficit  Goal: Patient/family/caregiver demonstrates understanding of disease process, treatment plan, medications, and discharge instructions  Complete learning assessment and assess knowledge base    Interventions:  - Provide teaching at level of understanding  - Provide teaching via preferred learning methods   Outcome: Progressing      Problem: POSTPARTUM  Goal: Experiences normal postpartum course  INTERVENTIONS:  - Monitor maternal vital signs  - Assess uterine involution and lochia   Outcome: Progressing    Goal: Appropriate maternal -  bonding  INTERVENTIONS:  - Identify family support  - Assess for appropriate maternal/infant bonding   -Encourage maternal/infant bonding opportunities  - Referral to  or  as needed   Outcome: Progressing    Goal: Establishment of infant feeding pattern  INTERVENTIONS:  - Assess breast/bottle feeding  - Refer to lactation as needed   Outcome: Progressing    Goal: Incision(s), wounds(s) or drain site(s) healing without S/S of infection  INTERVENTIONS  - Assess and document risk factors for skin impairment   - Assess and document dressing, incision, wound bed, drain sites and surrounding tissue  - Initiate Nutrition services consult and/or wound management as needed   Outcome: Progressing      Problem: PAIN - ADULT  Goal: Verbalizes/displays adequate comfort level or baseline comfort level  Interventions:  - Encourage patient to monitor pain and request assistance  - Assess pain using appropriate pain scale  - Administer analgesics based on type and severity of pain and evaluate response  - Implement non-pharmacological measures as appropriate and evaluate response  - Consider cultural and social influences on pain and pain management  - Notify physician/advanced practitioner if interventions unsuccessful or patient reports new pain   Outcome: Progressing      Problem: INFECTION - ADULT  Goal: Absence or prevention of progression during hospitalization  INTERVENTIONS:  - Assess and monitor for signs and symptoms of infection  - Monitor lab/diagnostic results  - Monitor all insertion sites, i e  indwelling lines, tubes, and drains  - Monitor endotracheal (as able) and nasal secretions for changes in amount and color  - [place] appropriate cooling/warming therapies per order  - Administer medications as ordered  - Instruct and encourage patient and family to use good hand hygiene technique  - Identify and instruct in appropriate isolation precautions for identified infection/condition   Outcome: Progressing      Problem: SAFETY ADULT  Goal: Patient will remain free of falls  INTERVENTIONS:  - Assess patient frequently for physical needs  -  Identify cognitive and physical deficits and behaviors that affect risk of falls    -  [place] fall precautions as indicated by assessment   - Educate patient/family on patient safety including physical limitations  - Instruct patient to call for assistance with activity based on assessment  - Modify environment to reduce risk of injury  - Consider OT/PT consult to assist with strengthening/mobility   Outcome: Progressing    Goal: Maintain or return to baseline ADL function  INTERVENTIONS:  -  Assess patient's ability to carry out ADLs; assess patient's baseline for ADL function and identify physical deficits which impact ability to perform ADLs (bathing, care of mouth/teeth, toileting, grooming, dressing, etc )  - Assess/evaluate cause of self-care deficits   - Assess range of motion  - Assess patient's mobility; develop plan if impaired  - Assess patient's need for assistive devices and provide as appropriate  - Encourage maximum independence but intervene and supervise when necessary  ¯ Involve family in performance of ADLs  ¯ Assess for home care needs following discharge   ¯ Request OT consult to assist with ADL evaluation and planning for discharge  ¯ Provide patient education as appropriate Outcome: Progressing    Goal: Maintain or return mobility status to optimal level  INTERVENTIONS:  - Assess patient's baseline mobility status (ambulation, transfers, stairs, etc )    - Identify cognitive and physical deficits and behaviors that affect mobility  - Identify mobility aids required to assist with transfers and/or ambulation (gait belt, sit-to-stand, lift, walker, cane, etc )  - [place] fall precautions as indicated by assessment  - Record patient progress and toleration of activity level on Mobility SBAR; progress patient to next Phase/Stage  - Instruct patient to call for assistance with activity based on assessment  - Request Rehabilitation consult to assist with strengthening/weightbearing, etc    Outcome: Progressing      Problem: DISCHARGE PLANNING  Goal: Discharge to home or other facility with appropriate resources  INTERVENTIONS:  - Identify barriers to discharge w/patient and caregiver  - Arrange for needed discharge resources and transportation as appropriate  - Identify discharge learning needs (meds, wound care, etc )  - Arrange for interpretive services to assist at discharge as needed  - Refer to Case Management Department for coordinating discharge planning if the patient needs post-hospital services based on physician/advanced practitioner order or complex needs related to functional status, cognitive ability, or social support system   Outcome: Progressing

## 2018-08-11 NOTE — PROGRESS NOTES
Postpartum Progress Note - OB/GYN   Jessica Bird 28 y o  female MRN: [id number]  Unit/Bed#: L&D 309-01 Encounter: [id number]    Postpartum Day: POD#2    Procedure: Primary low transverse  section    Subjective ROS  Patient tearful at time of evaluation this morning, reports incisional pain when she got up to the use the bathroom this morning as well as "feeling tired"  We discussed that she is due for pain medication and it will be given now by RN  I encouraged rest, and offered to take her baby to the nursery when she desires so that she is able to rest, patient agreeable       Pain: yes, incisional as mentioned above  Tolerating Oral Intake: yes   Voiding: yes  Flatus: yes  Bowel Movement: no  Ambulating: yes  Breastfeeding: yes  Chest Pain: no  Shortness of Breath: no  Leg Pain/Discomfort: no  Lochia: minimal    Vitals: /72 (BP Location: Right arm)   Pulse 78   Temp 98 °F (36 7 °C) (Oral)   Resp 16   Ht 5' 5" (1 651 m)   Wt 92 1 kg (203 lb)   LMP 2017       Intake/Output Summary (Last 24 hours) at 18 0549  Last data filed at 08/10/18 1820   Gross per 24 hour   Intake                0 ml   Output             2675 ml   Net            -2675 ml       Physical Exam  General: appears well, NAD, alert and oriented x 3, tearful   Cardiovascular: Regular, Rate and Rhythm, no murmur, gallop or rub   Lungs: Clear to Auscultation Bilaterally, no wheezing, rhonchi or rales   Abdomen: Soft, non-distended, non-tender, no rebound or guarding  Fundus: Firm & Non-Tender   Fundal Location:   1cm below the umbilicus  Incision: dressing clean/dry/intact, Steri strips blood stained but dry, no active bleeding  Lower Extremities: Symmetric, nontender    Labs:   Admission on 2018   Component Date Value    WBC 2018 11 25*    RBC 2018 3 92     Hemoglobin 2018 12 9     Hematocrit 2018 38 0     MCV 2018 97     MCH 2018 32 9     MCHC 2018 33 9     RDW 2018 13 5     MPV 2018 11 3     Platelets  166     nRBC 2018 0     RPR 2018 Non-Reactive     ABO Grouping 2018 O     Rh Factor 2018 Positive     Antibody Screen 2018 Negative     Specimen Expiration Date 2018 [id number]     Segmented % 2018 69     Bands % 2018 4     Lymphocytes % 2018 20     Monocytes % 2018 7     Eosinophils % 2018 0     Basophils % 2018 0     Absolute Neutrophils 2018 8 21*    Lymphocytes Absolute 2018 2 25     Monocytes Absolute 2018 0 79     Eosinophils Absolute 2018 0 00     Basophils Absolute 2018 0 00     Total Counted 2018 100     RBC Morphology 2018 Normal     Platelet Estimate  Adequate     Large Platelet  Present     pH, Cord Art 2018 7 250     pCO2, Cord Art 2018 56 2     pO2, Cord Art 2018 17 4     HCO3, Cord Art 2018 24 1     Base Exc, Cord Art 2018 -4 0*    O2 Content, Cord Art 2018 6 3     O2 Hgb, Arterial Cord 2018 30 4     pH, Cord Liu 2018 7  381     pCO2, Cord Liu 2018 37 1     pO2, Cord Liu 2018 31 6     HCO3, Cord Liu 2018 21 5    [facility] Exc, Cord Liu 2018 -3 1*    O2 Cont, Cord Liu 2018 13 9     O2 HGB,VENOUS CORD 2018 70 8     WBC 08/10/2018 13 53*    RBC 08/10/2018 2 86*    Hemoglobin 08/10/2018 9 4*    Hematocrit 08/10/2018 27 8*    MCV 08/10/2018 97     MCH 08/10/2018 32 9     MCHC 08/10/2018 33 8     RDW 08/10/2018 13 5     Platelets  116*    MPV 08/10/2018 11 0        Assessment / Plan:  28 y o   s/p 1LTCS for persistent category II FHT in the setting of arrest of dilation, POD#2, doing well postoperatively      #1 Postpartum / Postoperative Care  Continue routine care  Pain medication as needed  Encourage breastfeeding  Encourage ambulation  Encourage PO intake    #2 Anemia  Hb 12 9 --> 9 4, asymptomatic  Follow up AM CBC    #3 DVT ppx  Lovenox for DVT ppx while inpatient    #4 Disposition  Anticipate discharge POD#3    Yordy William MD  8/11/2018  5:55 AM

## 2018-08-12 VITALS
TEMPERATURE: 98.2 F | SYSTOLIC BLOOD PRESSURE: 107 MMHG | OXYGEN SATURATION: 99 % | DIASTOLIC BLOOD PRESSURE: 62 MMHG | HEIGHT: 65 IN | WEIGHT: 203 LBS | BODY MASS INDEX: 33.82 KG/M2 | RESPIRATION RATE: 18 BRPM | HEART RATE: 78 BPM

## 2018-08-12 PROCEDURE — 99024 POSTOP FOLLOW-UP VISIT: CPT | Performed by: OBSTETRICS & GYNECOLOGY

## 2018-08-12 RX ORDER — FERROUS SULFATE 325(65) MG
325 TABLET ORAL
Qty: 60 TABLET | Refills: 0 | Status: SHIPPED | OUTPATIENT
Start: 2018-08-12 | End: 2019-08-16 | Stop reason: ALTCHOICE

## 2018-08-12 RX ORDER — OXYCODONE HYDROCHLORIDE AND ACETAMINOPHEN 5; 325 MG/1; MG/1
1 TABLET ORAL EVERY 4 HOURS PRN
Qty: 20 TABLET | Refills: 0 | Status: SHIPPED | OUTPATIENT
Start: 2018-08-12 | End: 2018-08-22

## 2018-08-12 RX ORDER — DOCUSATE SODIUM 100 MG/1
100 CAPSULE, LIQUID FILLED ORAL 2 TIMES DAILY
Qty: 60 CAPSULE | Refills: 0 | Status: SHIPPED | OUTPATIENT
Start: 2018-08-12 | End: 2018-09-24

## 2018-08-12 RX ORDER — IBUPROFEN 200 MG
600 TABLET ORAL EVERY 6 HOURS PRN
Status: SHIPPED | OUTPATIENT
Start: 2018-08-12 | End: 2018-08-12

## 2018-08-12 RX ORDER — IBUPROFEN 600 MG/1
600 TABLET ORAL EVERY 6 HOURS PRN
Qty: 30 TABLET | Refills: 3 | Status: SHIPPED | OUTPATIENT
Start: 2018-08-12 | End: 2018-09-24

## 2018-08-12 RX ORDER — DOCUSATE SODIUM 100 MG/1
100 CAPSULE, LIQUID FILLED ORAL 2 TIMES DAILY
Qty: 10 CAPSULE | Refills: 0 | Status: SHIPPED | OUTPATIENT
Start: 2018-08-12 | End: 2018-08-12

## 2018-08-12 RX ORDER — OXYCODONE HYDROCHLORIDE AND ACETAMINOPHEN 5; 325 MG/1; MG/1
1 TABLET ORAL EVERY 4 HOURS PRN
Refills: 0 | Status: SHIPPED | OUTPATIENT
Start: 2018-08-12 | End: 2018-08-12

## 2018-08-12 RX ORDER — FERROUS SULFATE 325(65) MG
325 TABLET ORAL
Refills: 0 | Status: SHIPPED | OUTPATIENT
Start: 2018-08-12 | End: 2018-08-12

## 2018-08-12 RX ORDER — IBUPROFEN 200 MG
600 TABLET ORAL EVERY 6 HOURS PRN
Qty: 30 TABLET | Refills: 0 | Status: SHIPPED | OUTPATIENT
Start: 2018-08-12 | End: 2018-08-12

## 2018-08-12 RX ORDER — ACETAMINOPHEN 325 MG/1
TABLET ORAL
Qty: 30 TABLET | Refills: 0 | Status: SHIPPED | OUTPATIENT
Start: 2018-08-12 | End: 2018-08-12 | Stop reason: HOSPADM

## 2018-08-12 RX ADMIN — FERROUS SULFATE TAB 325 MG (65 MG ELEMENTAL FE) 325 MG: 325 (65 FE) TAB at 08:13

## 2018-08-12 RX ADMIN — OXYCODONE HYDROCHLORIDE AND ACETAMINOPHEN 1 TABLET: 5; 325 TABLET ORAL at 04:11

## 2018-08-12 RX ADMIN — DOCUSATE SODIUM 100 MG: 100 CAPSULE, LIQUID FILLED ORAL at 08:13

## 2018-08-12 RX ADMIN — ENOXAPARIN SODIUM 40 MG: 40 INJECTION SUBCUTANEOUS at 08:13

## 2018-08-12 RX ADMIN — IBUPROFEN 600 MG: 600 TABLET ORAL at 06:53

## 2018-08-12 RX ADMIN — Medication 1 TABLET: at 08:13

## 2018-08-12 NOTE — PROGRESS NOTES
Postpartum Progress Note - OB/GYN   Donna Shaver 28 y o  female MRN: [id number]  Unit/Bed#: L&D 309-01 Encounter: [id number]    Postpartum Day: POD # 3    Procedure: Primary low transverse  section    Subjective ROS  Patient doing well this morning without complaints  Baby in nursery, patient resting comfortably      Pain: mild incisional, improved with medications  Tolerating Oral Intake: yes   Voiding: yes  Flatus: yes  Bowel Movement: yes  Ambulating: yes  Breastfeeding: yes  Chest Pain: no  Shortness of Breath: no  Leg Pain/Discomfort: no  Lochia: minimal    Vitals: /65 (BP Location: Left arm)   Pulse 76   Temp 97 9 °F (36 6 °C) (Oral)   Resp 18   Ht 5' 5" (1 651 m)   Wt 92 1 kg (203 lb)   LMP 2017     No intake or output data in the 24 hours ending 18 0714    Physical Exam  Gen: NAD, AAOx3, Pleasant & Cooperative  Cv: RRR, No M/R/G  Resp: CTAB, No W/R/R  Abdomen:Soft, appropriately tender, no rebound/guarding  Uterus firm at the umbilicus  Incision clean/dry/intact w/ Steri strips  Ext: Symmetric, non-tender      Labs:   Admission on 2018   Component Date Value    WBC 2018 11 25*    RBC 2018 3 92     Hemoglobin 2018 12 9     Hematocrit 2018 38 0     MCV 2018 97     MCH 2018 32 9     MCHC 2018 33 9     RDW 2018 13 5     MPV 2018 11 3     Platelets 09/10/8056 166     nRBC 2018 0     RPR 2018 Non-Reactive     ABO Grouping 2018 O     Rh Factor 2018 Positive     Antibody Screen 2018 Negative     Specimen Expiration Date 2018 [id number]     Segmented % 2018 69     Bands % 2018 4     Lymphocytes % 2018 20     Monocytes % 2018 7     Eosinophils % 2018 0     Basophils % 2018 0     Absolute Neutrophils 2018 8 21*    Lymphocytes Absolute 2018 2 25     Monocytes Absolute 2018 0 79     Eosinophils Absolute 2018 0 00     Basophils Absolute 2018 0 00     Total Counted 2018 100     RBC Morphology 2018 Normal     Platelet Estimate  Adequate     Large Platelet  Present     pH, Cord Art 2018 7 250     pCO2, Cord Art 2018 56 2     pO2, Cord Art 2018 17 4     HCO3, Cord Art 2018 24 1     Base Exc, Cord Art 2018 -4 0*    O2 Content, Cord Art 2018 6 3     O2 Hgb, Arterial Cord 2018 30 4     pH, Cord Liu 2018 7  381     pCO2, Cord Liu 2018 37 1     pO2, Cord Liu 2018 31 6     HCO3, Cord Liu 2018 21 5     Base Exc, Cord Liu 2018 -3 1*    O2 Cont, Cord Liu 2018 13 9     O2 HGB,VENOUS CORD 2018 70 8     WBC 08/10/2018 13 53*    RBC 08/10/2018 2 86*    Hemoglobin 08/10/2018 9 4*    Hematocrit 08/10/2018 27 8*    MCV 08/10/2018 97     MCH 08/10/2018 32 9     MCHC 08/10/2018 33 8     RDW 08/10/2018 13 5     Platelets  116*    MPV 08/10/2018 11 0     WBC 2018 9 85     RBC 2018 2 83*    Hemoglobin 2018 9 1*    Hematocrit 2018 28 0*    MCV 2018 99*    MCH 2018 32 2     MCHC 2018 32 5     RDW 2018 13 9     MPV 2018 10 7     Platelets  144*    nRBC 2018 0     Neutrophils Relative 2018 69     Lymphocytes Relative 2018 22     Monocytes Relative 2018 8     Eosinophils Relative 2018 1     Basophils Relative 2018 0     Neutrophils Absolute 2018 6 82     Lymphocytes Absolute 2018 2 19     Monocytes Absolute 2018 0 77     Eosinophils Absolute 2018 0 06     Basophils Absolute 2018 0 01        Assessment / Plan:  28 y o   s/p 1LTCS, POD#3, doing well postoperatively      #1 Postpartum / Postoperative Care  Continue routine care  Pain medication as needed  Encourage breastfeeding  Encourage ambulation  Encourage PO intake    #2 Anemia  Hb 12 9 --> 9 4 -->9 1, asymptomatic    #3 DVT ppx  Lovenox for DVT ppx while inpatient    #4 Disposition  Anticipate discharge today    Ivanna Del Castillo MD  8/12/2018  7:12 AM

## 2018-08-13 ENCOUNTER — TRANSITIONAL CARE MANAGEMENT (OUTPATIENT)
Dept: FAMILY MEDICINE CLINIC | Facility: CLINIC | Age: 28
End: 2018-08-13

## 2018-08-13 ENCOUNTER — DOCUMENTATION (OUTPATIENT)
Dept: FAMILY MEDICINE CLINIC | Facility: CLINIC | Age: 28
End: 2018-08-13

## 2018-08-14 ENCOUNTER — TELEPHONE (OUTPATIENT)
Dept: OBGYN CLINIC | Facility: CLINIC | Age: 28
End: 2018-08-14

## 2018-08-15 ENCOUNTER — OFFICE VISIT (OUTPATIENT)
Dept: POSTPARTUM | Facility: CLINIC | Age: 28
End: 2018-08-15
Payer: COMMERCIAL

## 2018-08-15 VITALS — SYSTOLIC BLOOD PRESSURE: 120 MMHG | DIASTOLIC BLOOD PRESSURE: 80 MMHG

## 2018-08-15 DIAGNOSIS — Z71.89 ENCOUNTER FOR BREAST FEEDING COUNSELING: Primary | ICD-10-CM

## 2018-08-15 DIAGNOSIS — O92.13 CRACKED NIPPLE ASSOCIATED WITH LACTATION: ICD-10-CM

## 2018-08-15 PROCEDURE — 99404 PREV MED CNSL INDIV APPRX 60: CPT | Performed by: PEDIATRICS

## 2018-08-15 NOTE — PROGRESS NOTES
INITIAL BREAST FEEDING EVALUATION    Informant/Relationship: Je Lopez    Discussion of General Lactation Issues: Je Chelan has been struggling with breastfeeding since the beginning  Malvin Guerra has lost 15% of his birth weight  He is not content after feedings at the breast   Supplementation with formula or expressed milk began yesterday  Infant is 10days old today   History:  Fertility Problem:yes - this pregnancy acheived with IVF  Five rounds of IUI prior to IVF  Breast changes:yes - Breasts got nova    Areola got larger and darker  : no  due to FTP  Full term:yes - 40 1/7 weeks   labor:no  First nursing/attempt < 1 hour after birth:no  Skin to skin following delivery:yes - very briefly in the OR  Breast changes after delivery:yes - milk came in on DOL #4-5  Rooming in (infant in room with mother with exception of procedures, eg  Circumcision: yes - only left for very brief periods  Blood sugar issues:no  NICU stay:No  Jaundice:no  Phototherapy:no  Supplement given: (list supplement and method used as well as reason(s):no    Past Medical History:   Diagnosis Date    Amenorrhea     last assessed: 2016    Female infertility     seen by [facility] - seen for 1 year, 5 cycles IUI acheived pregnancy via IVF    Fibromyalgia     Irregular menstrual cycle     last assessed: 2016    Osteoarthritis of right acromioclavicular joint     last assessment: 2013    Separation of right acromioclavicular joint     Last assessed: 2013; this is more consistent with ostiolysis of the distal clavicle vs  acromioclavicular joint arthritis, not acute shoulder seperation         Current Outpatient Prescriptions:     docusate sodium (COLACE) 100 mg capsule, Take 1 capsule (100 mg total) by mouth 2 (two) times a day, Disp: 60 capsule, Rfl: 0    ferrous sulfate 325 (65 Fe) mg tablet, Take 1 tablet (325 mg total) by mouth daily with breakfast, Disp: 60 tablet, Rfl: 0    ibuprofen (MOTRIN) 600 mg tablet, Take 1 tablet (600 mg total) by mouth every 6 (six) hours as needed for moderate pain, Disp: 30 tablet, Rfl: 3    oxyCODONE-acetaminophen (PERCOCET) 5-325 mg per tablet, Take 1 tablet by mouth every 4 (four) hours as needed for moderate pain for up to 10 days Max Daily Amount: 6 tablets, Disp: 20 tablet, Rfl: 0    Prenatal Vit-Fe Fumarate-FA (PRENATAL VITAMIN) 27-0 8 MG TABS, Take 1 tablet by mouth daily , Disp: , Rfl:     No Known Allergies    History   Drug Use No       Social History Never a smoker    Interval Breastfeeding History:    Frequency of breast feeding: No attempts in almost two days  Does mother feel breastfeeding is effective: No  Does infant appear satisfied after nursing:No  Stooling pattern normal: No   No stool in 2 days  Urinating frequently:Yes  Using shield or shells: No    Alternative/Artificial Feedings:   Bottle: Yes, for every feeding currently  Cup: No  Syringe/Finger: No           Formula Type: Similac Advance                      Amount: 1-2 ounces            Breast Milk:                      Amount: 1-2 ounces            Frequency Q 3-4 Hr between feedings  Elimination Problems: Yes      Equipment:  Nipple Shield             Type: none             Size: n/a             Frequency of Use: n/a  Pump            Type: Ameda Finesse            Frequency of Use: 4 times a day  Expressing about 2-3 ounces each session  Shells            Type: none            Frequency of use: n/a    Equipment Problems: no    Mom:  Breast: Medium sized symmetrical breasts  Full but not engorged  Nipple Assessment in General: Normal: elongated/eraser, no discoloration  L nipple with a crack at the base from 12-3 o'clock  Mother's Awareness of Feeding Cues                 Recognizes:  Yes                  Verbalizes: Yes  Support System: FOB, extended family  History of Breastfeeding: none  Changes/Stressors/Violence: Concerns about Woodrow's weight  Concerns/Goals: Silvio Rivera would prefer to EBF and have Woodrow gain weight well  Problems with Mom: Pain with pumping/    Physical Exam   Constitutional: She is oriented to person, place, and time  She appears well-developed and well-nourished  HENT:   Head: Normocephalic and atraumatic  Neck: Normal range of motion  Cardiovascular: Normal rate, regular rhythm, normal heart sounds and intact distal pulses  Pulmonary/Chest: Effort normal and breath sounds normal    Musculoskeletal: Normal range of motion  She exhibits edema  Neurological: She is alert and oriented to person, place, and time  Skin: Skin is warm and dry  Psychiatric: She has a normal mood and affect  Her behavior is normal  Judgment and thought content normal        Infant:  Behaviors: Alert  Color: Pink  Birth weight: 3630gram  Current weight: 3335gram    Problems with infant: Won't latch to nurse  Weight loss      General Appearance:  Alert, active, no distress                             Head:  Normocephalic, AFOF, sutures opposed                             Eyes:  Conjunctiva clear, no drainage                              Ears:  Normally placed, no anomolies                             Nose:   no drainage or erythema                           Mouth:  No lesions  Tongue extends to lower gum line  Body of tongue lateralizes but not tip  Suck easily broken when finger withdrawn from his mouth  Peristalsis originates posterior to tip  Neck:  Supple, symmetrical, trachea midline                 Respiratory:  No grunting, flaring, retractions, breath sounds clear and equal            Cardiovascular:  Regular rate and rhythm  No murmur  Adequate perfusion/capillary refill  Femoral pulse present                    Abdomen:   Soft, non-tender, no masses, bowel sounds present, no HSM             Genitourinary:  Normal male, testes descended, no discharge, swelling, or pain, anus patent  Circumcision healing well  Spine:   No abnormalities noted        Musculoskeletal:  Full range of motion          Skin/Hair/Nails:   Skin warm, dry, and intact, no rashes or abnormal dyspigmentation or lesions                Neurologic:   No abnormal movement, tone appropriate for gestational age     Latch:  Efficiency:              Lips Flanged: Yes              Depth of latch: excellent              Audible Swallow: Yes              Visible Milk: Yes              Wide Open/ Asymmetrical: Yes              Suck Swallow Cycle: Breathing: unlabored, Coordinated: yes  Nipple Assessment after latch: Normal: elongated/eraser, no discoloration and no damage noted  Latch Problems: None  After discussion and demonstration of the elements of positioning and latch, Woodrow latch well to the first breast and nursed well  His suckling improved as the feeding progressed  Popeye Srinivasan was then independently able to latch him for feeding on the second breast   Drew Sawyer nursed well again until he was content and came off the breast himself  Position:  Infant's Ergonomics/Body               Body Alignment: Yes               Head Supported: Yes               Close to Mom's body/ Lifted/ Supported: Yes               Mom's Ergonomics/Body: Yes                           Supported: Yes                           Sitting Back: Yes                           Brings Baby to her breast: Yes  Positioning Problems: None      Handouts:   Latch check list    Education:  Reviewed Latch: Demonstrated how to gently compress the breast and align the baby so that his nose is just above the nipple with his lower lip and chin touching the breast to encourage the deepest, widest, off-center latch  Reviewed Positioning for Dyad: Demonstrated correct alignment of baby's ear, shoulder and hip and "belly to belly" position with mom  Discussed importance of mother's comfort and full support as well    Reviewed Frequency/Supply & Demand: Discussed importance of frequently and effectively emptying breasts by nursing or pumping in order to establish a healthy milk supply  Reviewed Infant:Cues and varied States of Awareness  Reviewed Infant Elimination: Discussed that frequent wet and soiled diapers will indicate that your baby is eating well  Reviewed Alternative/Artificial Feedings: Discussed and demonstrated paced bottle feeding  Reviewed Mom/Breast care: Encouraged moist wound healing for sore nipples    Plan for breastfeeding    Reassurance and support given  Reviewed early signs of hunger, including tensing of hands and shoulders - no need to wait for open eyes  Crying is a later hunger cue  If Erich Owen is crying, soothe him prior to attempting to latch  Reviewed normal sucking patterns: transition from stimulation to nutritive to release or non-nutritive  Watch for sustained periods of active suckling followed by swallowing  Reviewed normal nursing pattern: infant should nurse for at least 5 minutes or until releases on own  Discussed difference in sensation of non-nutritive v nutritive sucking  Expresses breastmilk for comfort only  If Erich Owen does not nurse, pump to empty your breasts  If Erich Owen is nursing well but your breasts still feel uncomfortably full, pump just enough until you feel better  Supplementation recommended (document method-education if necessary)  Expressed breast milk via paced bottle feeding as needed  If Erich Owen is nursing well and content after feeds, no need to supplement  Continue to monitor wets and stools closely  When feeding at the breast,  Aim your nipple to your baby's nose and when his mouth opens wide, move him onto the breast, making sure his chin touches your breast first   Compress your breast to make it narrow in the same direction as your baby's mouth before latching  This helps him get a good mouthful of breast, not just nipple  If the latch hurts, stop and try again      To help your nipples heal, in addition to paying close attention to latch, apply protective ointment after feeding or pumping and cover with an occlusive dressing like wax paper  Do this until your nipples have completely healed  Follow up next week and call with any questions or concerns  I have spent 75 minutes with Patient and family today in which greater than 50% of this time was spent in counseling/coordination of care regarding Patient and family education

## 2018-08-15 NOTE — PATIENT INSTRUCTIONS
Plan for breastfeeding    Reassurance and support given  Reviewed early signs of hunger, including tensing of hands and shoulders - no need to wait for open eyes  Crying is a later hunger cue  If Keneth Paget is crying, soothe him prior to attempting to latch  Reviewed normal sucking patterns: transition from stimulation to nutritive to release or non-nutritive  Watch for sustained periods of active suckling followed by swallowing  Reviewed normal nursing pattern: infant should nurse for at least 5 minutes or until releases on own  Discussed difference in sensation of non-nutritive v nutritive sucking  Expresses breastmilk for comfort only  If Keneth Paget does not nurse, pump to empty your breasts  If Keneth Paget is nursing well but your breasts still feel uncomfortably full, pump just enough until you feel better  Supplementation recommended (document method-education if necessary)  Expressed breast milk via paced bottle feeding as needed  If Keneth Paget is nursing well and content after feeds, no need to supplement  Continue to monitor wets and stools closely  When feeding at the breast,  Aim your nipple to your baby's nose and when his mouth opens wide, move him onto the breast, making sure his chin touches your breast first   Compress your breast to make it narrow in the same direction as your baby's mouth before latching  This helps him get a good mouthful of breast, not just nipple  If the latch hurts, stop and try again  To help your nipples heal, in addition to paying close attention to latch, apply protective ointment after feeding or pumping and cover with an occlusive dressing like wax paper  Do this until your nipples have completely healed  Follow up next week and call with any questions or concerns

## 2018-08-22 ENCOUNTER — OFFICE VISIT (OUTPATIENT)
Dept: POSTPARTUM | Facility: CLINIC | Age: 28
End: 2018-08-22
Payer: COMMERCIAL

## 2018-08-22 DIAGNOSIS — Z71.89 ENCOUNTER FOR BREAST FEEDING COUNSELING: Primary | ICD-10-CM

## 2018-08-22 PROCEDURE — 99404 PREV MED CNSL INDIV APPRX 60: CPT | Performed by: PEDIATRICS

## 2018-08-22 NOTE — PROGRESS NOTES
BREAST FEEDING FOLLOW UP VISIT    Informant/Relationship: Gonzales Perez and her     Discussion of General Lactation Issues: Rafael Abernathy will now latch and nurse from both breasts  However he is often still fussy after feedings and wants to eat hourly at times  Infant is 15days old today  Interval Breastfeeding History:    Frequency of breast feeding: Every 2-4 hours on demand and occasionally hourly  Does mother feel breastfeeding is effective: If no, explain: baby fusses after feedings at times  Does infant appear satisfied after nursing:If no, explain: not always  Stooling pattern normal:Yes  Urinating frequently:Yes  Using shield or shells:No    Alternative/Artificial Feedings:   Bottle: Yes, occasionally  Cup: No  Syringe/Finger: No           Formula Type: Similac Advance                      Amount: 1 ounce today            Breast Milk:                      Amount: none            Frequency Q 2-4 Hr between feedings  Elimination Problems: No      Equipment:  Nipple Shield             Type: none             Size: n/a             Frequency of Use: n/a  Pump            Type: Ameda Finesse and Medela Pump in Style            Frequency of Use: occasionally  Shells            Type: none            Frequency of use: n/a    Equipment Problems: yes Unable to express much milk      Mom:  Breast: Medium sized symmetrical breasts  Full but not engorged  Nipple Assessment in General: Normal: elongated/eraser, no discoloration and no damage noted  Mother's Awareness of Feeding Cues                 Recognizes: Yes                  Verbalizes: Yes  Support System: FOB, extended family  History of Breastfeeding: none  Changes/Stressors/Violence: Concerns about Woodrow's weight  Concerns/Goals: Gonzales Perez would like to breast feed long term    Problems with Mom: None    Physical Exam   Constitutional: She is oriented to person, place, and time  She appears well-developed and well-nourished  HENT:   Head: Normocephalic  Neck: Normal range of motion  Pulmonary/Chest: Effort normal    Musculoskeletal: Normal range of motion  Neurological: She is alert and oriented to person, place, and time  Skin: Skin is warm and dry  Psychiatric: She has a normal mood and affect  Her behavior is normal  Judgment and thought content normal        Infant:  Behaviors: Alert  Color: Pink  Birth weight: 3630gram  Current weight: 3585gram    Problems with infant: Slow weight gain  General Appearance:  Alert, active, no distress                             Head:  Normocephalic, AFOF, sutures opposed                             Eyes:  Conjunctiva clear, no drainage                              Ears:  Normally placed, no anomolies                             Nose:  no drainage or erythema                           Mouth:  No lesions  Tongue extends to lower gum line  Body of tongue lateralizes but not tip  Suck easily broken when finger withdrawn from his mouth  Peristalsis originates posterior to tip  Neck:  Supple, symmetrical, trachea midline                 Respiratory:  No grunting, flaring, retractions, breath sounds clear and equal            Cardiovascular:  Regular rate and rhythm  No murmur  Adequate perfusion/capillary refill  Femoral pulse present                    Abdomen:   Soft, non-tender, no masses, bowel sounds present, no HSM             Genitourinary:  Normal male, testes descended, no discharge, swelling, or pain, anus patent                          Spine:   No abnormalities noted        Musculoskeletal:  Full range of motion          Skin/Hair/Nails:   Skin warm, dry, and intact, no rashes or abnormal dyspigmentation or lesions                Neurologic:   No abnormal movement, tone appropriate for gestational age    [place] Latch:  Efficiency:               Lips Flanged: Yes              Depth of latch: fair,  Improved with repositioning              Audible Swallow:  Yes              Visible Milk: Yes              Wide Open/ Asymmetrical: Yes              Suck Swallow Cycle: Breathing: unlabored, Coordinated: yes  Nipple Assessment after latch: Normal: elongated/eraser, no discoloration and no damage noted  Latch Problems: Initial latch was less than optimal due to positioning  After repositioning Woodrow latched fairly well  During the feeding his suckling often resembled biting motions more than suckling  He did transfer 90grams of milk during the feeding  Position:  Infant's Ergonomics/Body               Body Alignment: Yes               Head Supported: Yes               Close to Mom's body/ Lifted/ Supported: Yes               Mom's Ergonomics/Body: Yes                           Supported: Yes                           Sitting Back: Yes                           Brings Baby to her breast: Yes  Positioning Problems: Initially Carlos Owens had one hand on Woodrow's head and his nose was pushed into her breast   Once that hand was removed and gentle pressure placed on his shoulders, he was able to nurse comfortably  Handouts:   Hands on pumping and Hand expression    Education:  Reviewed Positioning for Dyad: Reminded Carlos Owens to keep her hand on Woodrow's shoulders during feeding, not on his head  Reviewed Equipment: Discussed use and features of the Medela Pump in Style and the elements of hands on pumping  Plan for breastfeeding    Reassurance and support given  Continue to offer the breast on demand paying close attention to position and latch  Call if you have recurring nipple pain or Illene Pilling is not growing as well as his doctor would like  Remember that babies will cluster feed at times and this is normal   They will also be fussy at times and just need to be held or reassured  Continue to practice with your pump to get comfortable with it's use and features  If you are struggling, call and schedule a pumping session  Please call with any additional questions or concerns         I have spent 45 minutes with Patient and family today in which greater than 50% of this time was spent in counseling/coordination of care regarding Patient and family education

## 2018-08-22 NOTE — PATIENT INSTRUCTIONS
Plan for breastfeeding    Reassurance and support given  Continue to offer the breast on demand paying close attention to position and latch  Call if you have recurring nipple pain or [place] Fregoso is not growing as well as his doctor would like  Remember that babies will cluster feed at times and this is normal   They will also be fussy at times and just need to be held or reassured  Continue to practice with your pump to get comfortable with it's use and features  If you are struggling, call and schedule a pumping session  Please call with any additional questions or concerns

## 2018-08-23 ENCOUNTER — ROUTINE PRENATAL (OUTPATIENT)
Dept: OBGYN CLINIC | Facility: CLINIC | Age: 28
End: 2018-08-23
Payer: COMMERCIAL

## 2018-08-23 VITALS
DIASTOLIC BLOOD PRESSURE: 78 MMHG | SYSTOLIC BLOOD PRESSURE: 102 MMHG | HEIGHT: 65 IN | BODY MASS INDEX: 30.17 KG/M2 | WEIGHT: 181.1 LBS

## 2018-08-23 DIAGNOSIS — Z98.891 STATUS POST PRIMARY LOW TRANSVERSE CESAREAN SECTION: Primary | ICD-10-CM

## 2018-08-23 PROCEDURE — 99212 OFFICE O/P EST SF 10 MIN: CPT | Performed by: OBSTETRICS & GYNECOLOGY

## 2018-08-23 NOTE — PATIENT INSTRUCTIONS
WHAT YOU NEED TO KNOW:   What do I need to know about a ? A , or  section, is abdominal surgery to deliver your baby  How do I prepare for a ? Your healthcare provider will talk to you about how to prepare for surgery  He may tell you not to eat or drink anything after midnight on the day of your surgery  He will tell you what medicines to take or not take on the day of your surgery  You may be given an antibiotic through your IV to help prevent a bacterial infection  What will happen during a ? You will usually be given spinal anesthesia to numb you from the surgery area down  You may still feel pressure or pushing during the , but you should not feel any pain  Your healthcare provider will usually make an incision across your lower abdomen  He will gently pull your baby out  Your incision will be closed with stitches or staples and covered with a bandage  What will happen after a ? You will be taken to a room to rest for about an hour after you deliver  You will have a Galaviz catheter to drain your urine and an IV  Do not get out of bed until your healthcare provider says it is okay  What are the risks of a ? You may bleed more than expected or develop an infection  Your bladder or intestines may be injured during the procedure  You may get a blood clot in your leg  This may become life-threatening  CARE AGREEMENT:   You have the right to help plan your care  Learn about your health condition and how it may be treated  Discuss treatment options with your caregivers to decide what care you want to receive  You always have the right to refuse treatment  The above information is an  only  It is not intended as medical advice for individual conditions or treatments  Talk to your doctor, nurse or pharmacist before following any medical regimen to see if it is safe and effective for you    © 2017 [employer] [address] [place] [address] is for End User's use only and may not be sold, redistributed or otherwise used for commercial purposes  All illustrations and images included in CareNotes® are the copyrighted property of A D A M , Inc  or Bernabe Sanderson

## 2018-08-23 NOTE — PROGRESS NOTES
Wound check     Pt seen and doing well  concerned about feeding and not getting enough  Discussed switching to formula if that is needed      Pt is anxious about feeding     Wound clean and dry   Follow up in 1 week

## 2018-09-03 NOTE — PROGRESS NOTES
I have reviewed the notes, assessments, and/or procedures performed by Kaur Brooks RN, IBCLC, I concur with her/his documentation of Ziyad Barksdale

## 2018-09-05 LAB — PLACENTA IN STORAGE: NORMAL

## 2018-09-05 NOTE — PROGRESS NOTES
I have reviewed the notes, assessments, and/or procedures performed by Orman Simmonds, RN, IBCLC, I concur with her/his documentation of Bolivar Duran

## 2018-09-20 ENCOUNTER — CLINICAL SUPPORT (OUTPATIENT)
Dept: FAMILY MEDICINE CLINIC | Facility: CLINIC | Age: 28
End: 2018-09-20
Payer: COMMERCIAL

## 2018-09-20 DIAGNOSIS — Z23 NEED FOR PROPHYLACTIC VACCINATION AND INOCULATION AGAINST INFLUENZA: Primary | ICD-10-CM

## 2018-09-20 PROCEDURE — 90682 RIV4 VACC RECOMBINANT DNA IM: CPT

## 2018-09-20 PROCEDURE — 90471 IMMUNIZATION ADMIN: CPT

## 2018-09-24 ENCOUNTER — POSTPARTUM VISIT (OUTPATIENT)
Dept: OBGYN CLINIC | Facility: CLINIC | Age: 28
End: 2018-09-24
Payer: COMMERCIAL

## 2018-09-24 VITALS
DIASTOLIC BLOOD PRESSURE: 74 MMHG | HEIGHT: 65 IN | SYSTOLIC BLOOD PRESSURE: 128 MMHG | BODY MASS INDEX: 30.89 KG/M2 | WEIGHT: 185.4 LBS

## 2018-09-24 DIAGNOSIS — F41.9 ANXIETY: ICD-10-CM

## 2018-09-24 LAB — SL AMB POCT HGB: 12

## 2018-09-24 PROCEDURE — 85018 HEMOGLOBIN: CPT | Performed by: OBSTETRICS & GYNECOLOGY

## 2018-09-24 PROCEDURE — 99213 OFFICE O/P EST LOW 20 MIN: CPT | Performed by: OBSTETRICS & GYNECOLOGY

## 2018-09-24 RX ORDER — SERTRALINE HYDROCHLORIDE 25 MG/1
25 TABLET, FILM COATED ORAL DAILY
Qty: 30 TABLET | Refills: 1 | Status: SHIPPED | OUTPATIENT
Start: 2018-09-24 | End: 2018-11-27 | Stop reason: SDUPTHER

## 2018-09-25 NOTE — PATIENT INSTRUCTIONS
Anxiety   WHAT YOU NEED TO KNOW:   What do I need to know about anxiety? Anxiety is a condition that causes you to feel extremely worried or nervous  The feelings are so strong that they can cause problems with your daily activities or sleep  Anxiety may be triggered by something you fear, or it may happen without a cause  Family or work stress, smoking, caffeine, and alcohol can increase your risk for anxiety  Certain medicines or health conditions can also increase your risk  Anxiety can become a long-term condition if it is not managed or treated  What other common signs and symptoms may occur with anxiety? · Fatigue or muscle tightness     · Shaking, restlessness, or irritability     · Problems focusing     · Trouble sleeping     · Feeling jumpy, easily startled, or dizzy     · Rapid heartbeat or shortness of breath  What do I need to tell my healthcare provider about my anxiety? Tell your healthcare provider when your symptoms began and what triggers them  Tell your provider if anxiety affects your daily activities  Your provider will also ask about your medical history and if you have family members with a similar condition  Tell your provider about your past and present alcohol, nicotine, or drug use  What can I do to manage anxiety? You may get medicines to help you feel calm and relaxed, and to decrease your symptoms  Medicines are usually given together with therapy or other treatments  The following can help you manage anxiety:  · Talk to someone about your anxiety  Your healthcare provider may suggest counseling  Cognitive behavioral therapy can help you understand and change how you react to events that trigger your symptoms  You might feel more comfortable talking with a friend or family member about your anxiety  Choose someone you know will be supportive and encouraging  · Find ways to relax  Activities such as exercise, meditation, or listening to music can help you relax   Spend time with friends, or do things you enjoy  · Practice deep breathing  Deep breathing can help you relax when you feel anxious  Focus on taking slow, deep breaths several times a day, or during an anxiety attack  Breathe in through your nose and out through your mouth  · Create a regular sleep routine  Regular sleep can help you feel calmer during the day  Go to sleep and wake up at the same times every day  Do not watch television or use the computer right before bed  Your room should be comfortable, dark, and quiet  · Eat a variety of healthy foods  Healthy foods include fruits, vegetables, low-fat dairy products, lean meats, fish, whole-grain breads, and cooked beans  Healthy foods can help you feel less anxious and have more energy  · Exercise regularly  Exercise can increase your energy level  Exercise may also lift your mood and help you sleep better  Your healthcare provider can help you create an exercise plan  · Do not smoke  Nicotine and other chemicals in cigarettes and cigars can increase anxiety  Ask your healthcare provider for information if you currently smoke and need help to quit  E-cigarettes or smokeless tobacco still contain nicotine  Talk to your healthcare provider before you use these products  · Do not have caffeine  Caffeine can make your symptoms worse  Do not have foods or drinks that are meant to increase your energy level  · Limit or do not drink alcohol  Ask your healthcare provider if alcohol is safe for you  You may not be able to drink alcohol if you take certain anxiety or depression medicines  Limit alcohol to 1 drink per day if you are a woman  Limit alcohol to 2 drinks per day if you are a man  A drink of alcohol is 12 ounces of beer, 5 ounces of wine, or 1½ ounces of liquor  · Do not use drugs  Drugs can make your anxiety worse  It can also make anxiety hard to manage  Talk to your healthcare provider if you use drugs and want help to quit    Call 911 if:   · You have chest pain, tightness, or heaviness that may spread to your shoulders, arms, jaw, neck, or back  · You feel like hurting yourself or someone else  When should I contact my healthcare provider? · Your symptoms get worse or do not get better with treatment  · Your anxiety keeps you from doing your regular daily activities  · You have new symptoms since your last visit  · You have questions or concerns about your condition or care  CARE AGREEMENT:   You have the right to help plan your care  Learn about your health condition and how it may be treated  Discuss treatment options with your caregivers to decide what care you want to receive  You always have the right to refuse treatment  The above information is an  only  It is not intended as medical advice for individual conditions or treatments  Talk to your doctor, nurse or pharmacist before following any medical regimen to see if it is safe and effective for you  © 2017 [address] [employer] Information is for End User's use only and may not be sold, redistributed or otherwise used for commercial purposes  All illustrations and images included in CareNotes® are the copyrighted property of A D A M , Inc  or Bernabe Sanderson

## 2018-10-17 DIAGNOSIS — E28.2 PCOS (POLYCYSTIC OVARIAN SYNDROME): Primary | ICD-10-CM

## 2018-11-27 ENCOUNTER — TELEPHONE (OUTPATIENT)
Dept: OBGYN CLINIC | Facility: CLINIC | Age: 28
End: 2018-11-27

## 2018-11-27 DIAGNOSIS — F41.9 ANXIETY: ICD-10-CM

## 2018-11-27 RX ORDER — SERTRALINE HYDROCHLORIDE 25 MG/1
25 TABLET, FILM COATED ORAL DAILY
Qty: 90 TABLET | Refills: 3 | Status: SHIPPED | OUTPATIENT
Start: 2018-11-27 | End: 2019-08-28 | Stop reason: ALTCHOICE

## 2018-11-27 NOTE — TELEPHONE ENCOUNTER
Patient called c/o increasing anxiety after trying to wean off Zoloft 25 mg that she started taking post partum in September  She would like to go back to daily use and wants a refill sent to [facility] pharmacy

## 2019-02-10 DIAGNOSIS — E28.2 PCOS (POLYCYSTIC OVARIAN SYNDROME): ICD-10-CM

## 2019-05-06 ENCOUNTER — TELEPHONE (OUTPATIENT)
Dept: FAMILY MEDICINE CLINIC | Facility: CLINIC | Age: 29
End: 2019-05-06

## 2019-05-06 DIAGNOSIS — Z01.84 IMMUNITY STATUS TESTING: Primary | ICD-10-CM

## 2019-05-10 ENCOUNTER — TELEPHONE (OUTPATIENT)
Dept: FAMILY MEDICINE CLINIC | Facility: CLINIC | Age: 29
End: 2019-05-10

## 2019-05-10 LAB
MEV IGG SER IA-ACNC: 171 AU/ML
MUV IGG SER IA-ACNC: 258 AU/ML
RUBV IGG SERPL IA-ACNC: 2.72 INDEX

## 2019-08-16 ENCOUNTER — OFFICE VISIT (OUTPATIENT)
Dept: FAMILY MEDICINE CLINIC | Facility: CLINIC | Age: 29
End: 2019-08-16
Payer: COMMERCIAL

## 2019-08-16 VITALS
WEIGHT: 161 LBS | RESPIRATION RATE: 12 BRPM | HEIGHT: 65 IN | SYSTOLIC BLOOD PRESSURE: 120 MMHG | DIASTOLIC BLOOD PRESSURE: 74 MMHG | TEMPERATURE: 97.6 F | BODY MASS INDEX: 26.82 KG/M2 | HEART RATE: 80 BPM

## 2019-08-16 DIAGNOSIS — J02.9 SORE THROAT: Primary | ICD-10-CM

## 2019-08-16 LAB — S PYO AG THROAT QL: NEGATIVE

## 2019-08-16 PROCEDURE — 87880 STREP A ASSAY W/OPTIC: CPT | Performed by: NURSE PRACTITIONER

## 2019-08-16 PROCEDURE — 99213 OFFICE O/P EST LOW 20 MIN: CPT | Performed by: NURSE PRACTITIONER

## 2019-08-16 NOTE — PROGRESS NOTES
Assessment/Plan:    1  Sore throat  The rapid strep was neg / please use warm salt water gargles and rest  Call if you are not feeling better and we will call if the throat culture is positive  - Throat culture  - POCT rapid strepA        Subjective:      Patient ID: Lorri Lemus is a 33 y o  female  Here today with complaint of a sore throat that started yesterday   Had a fever on and off  Pain radiated to her ears /   Fever to 100 7  No other respiratory symptoms/ is taking advil       BMI Counseling: Body mass index is 26 79 kg/m²  The BMI is above normal  Nutrition recommendations include reducing portion sizes, decreasing overall calorie intake and 3-5 servings of fruits/vegetables daily  Exercise recommendations include moderate aerobic physical activity for 150 minutes/week  OBJECTIVE  Past Medical History:   Diagnosis Date    Amenorrhea     last assessed: 08/22/2016    Female infertility     seen by [facility] - seen for 1 year, 5 cycles IUI acheived pregnancy via IVF    Fibromyalgia     Irregular menstrual cycle     last assessed: 09/26/2016    Osteoarthritis of right acromioclavicular joint     last assessment: 01/28/2013    Separation of right acromioclavicular joint     Last assessed: 01/25/2013; this is more consistent with ostiolysis of the distal clavicle vs  acromioclavicular joint arthritis, not acute shoulder seperation     temporarily  Wt Readings from Last 3 Encounters:   08/16/19 73 kg (161 lb)   09/24/18 84 1 kg (185 lb 6 4 oz)   08/23/18 82 1 kg (181 lb 1 6 oz)     BP Readings from Last 3 Encounters:   08/16/19 120/74   09/24/18 128/74   08/23/18 102/78     Pulse Readings from Last 3 Encounters:   08/16/19 80   08/12/18 78   08/08/18 83     BMI Readings from Last 3 Encounters:   08/16/19 26 79 kg/m²   09/24/18 30 85 kg/m²   08/23/18 30 14 kg/m²        Physical Exam   Constitutional: She appears well-developed and well-nourished     HENT:   Tm's clear turbs boggy with serous discharge   pharynx mild erythema  Neck: Normal range of motion  Neck supple  Cardiovascular: Normal rate  Pulmonary/Chest: Effort normal and breath sounds normal    Psychiatric: She has a normal mood and affect   Her behavior is normal  Judgment and thought content normal

## 2019-08-28 ENCOUNTER — OFFICE VISIT (OUTPATIENT)
Dept: FAMILY MEDICINE CLINIC | Facility: CLINIC | Age: 29
End: 2019-08-28
Payer: COMMERCIAL

## 2019-08-28 VITALS
SYSTOLIC BLOOD PRESSURE: 120 MMHG | RESPIRATION RATE: 16 BRPM | DIASTOLIC BLOOD PRESSURE: 80 MMHG | BODY MASS INDEX: 27.39 KG/M2 | TEMPERATURE: 97.7 F | HEIGHT: 64 IN | HEART RATE: 74 BPM | WEIGHT: 160.4 LBS

## 2019-08-28 DIAGNOSIS — R09.81 SINUS CONGESTION: ICD-10-CM

## 2019-08-28 DIAGNOSIS — R05.9 COUGH: Primary | ICD-10-CM

## 2019-08-28 LAB
EXTERNAL CHLAMYDIA RESULT: NOT DETECTED
EXTERNAL HIV SCREEN: NORMAL
HCV AB SER-ACNC: NORMAL
N GONORRHOEA RRNA SPEC QL PROBE: NOT DETECTED

## 2019-08-28 PROCEDURE — 99213 OFFICE O/P EST LOW 20 MIN: CPT | Performed by: FAMILY MEDICINE

## 2019-08-28 PROCEDURE — 3008F BODY MASS INDEX DOCD: CPT | Performed by: FAMILY MEDICINE

## 2019-08-28 PROCEDURE — 1036F TOBACCO NON-USER: CPT | Performed by: FAMILY MEDICINE

## 2019-08-28 RX ORDER — PREDNISONE 10 MG/1
TABLET ORAL
Qty: 20 TABLET | Refills: 0 | Status: SHIPPED | OUTPATIENT
Start: 2019-08-28 | End: 2020-04-07

## 2019-08-28 RX ORDER — PROMETHAZINE HYDROCHLORIDE AND CODEINE PHOSPHATE 6.25; 1 MG/5ML; MG/5ML
5 SYRUP ORAL
Qty: 120 ML | Refills: 0 | Status: SHIPPED | OUTPATIENT
Start: 2019-08-28 | End: 2020-04-07

## 2019-08-28 RX ORDER — DIPHENOXYLATE HYDROCHLORIDE AND ATROPINE SULFATE 2.5; .025 MG/1; MG/1
1 TABLET ORAL DAILY
COMMUNITY
End: 2020-07-01 | Stop reason: ALTCHOICE

## 2019-08-28 RX ORDER — DESOGESTREL AND ETHINYL ESTRADIOL 0.15-0.03
1 KIT ORAL DAILY
COMMUNITY
End: 2020-06-29

## 2019-08-28 NOTE — PROGRESS NOTES
Assessment/Plan:    1  Cough/ Sinus congestion  - start prednisone taper, take Mucinex 1 tablet twice a day and take Phenergan/ Codeine cough syrup at bedtime, also advised to use saline nasal spray twice a day  - Peak flow  - predniSONE 10 mg tablet; Take 4 tablets daily with food for 2 days, 3 tablets daily for 2 days, 2 tablets daily for 2 days, 1 tablet daily for 2 days  Dispense: 20 tablet; Refill: 0  - promethazine-codeine (PHENERGAN WITH CODEINE) 6 25-10 mg/5 mL syrup; Take 5 mL by mouth daily at bedtime as needed for cough  Dispense: 120 mL; Refill: 0    Follow up if symptoms persist or worsen  Possible side effects of new medications were reviewed with the patient today  The treatment plan was reviewed with the patient  The patient understands and agrees with the treatment plan        Subjective:   Chief Complaint   Patient presents with    Cough     Times 12 days       Patient ID: Tam Mccray is a 33 y o  female who presents today with complaining of cough and sinus congestion for about 2 weeks, she initially had a low grade fever and sore throat and was seen in the office on 08/16/19, her throat culture was obtained and was negative, her throat is feeling better now, but she has been coughing a lot, especially when she is lying down at night  Patient is also having tightness in her chest with coughing spells or with activity  No fever or night sweats, no pleuritic chest pain, no purulent mucus production           The following portions of the patient's history were reviewed and updated as appropriate: allergies, current medications, past family history, past medical history, past social history, past surgical history and problem list     Past Medical History:   Diagnosis Date    Amenorrhea     last assessed: 08/22/2016    Female infertility     seen by [facility] - seen for 1 year, 5 cycles IUI acheived pregnancy via IVF    Fibromyalgia     Irregular menstrual cycle     last assessed: 2016    Osteoarthritis of right acromioclavicular joint     last assessment: 2013    Separation of right acromioclavicular joint     Last assessed: 2013; this is more consistent with ostiolysis of the distal clavicle vs  acromioclavicular joint arthritis, not acute shoulder seperation     Past Surgical History:   Procedure Laterality Date    DENTAL SURGERY      wisdom teeth    SD  DELIVERY ONLY N/A 2018    Procedure:  SECTION (); Surgeon: Kayy Gloria MD;  [facility]: [facility];  Service: Obstetrics    SHOULDER SURGERY       Family History   Problem Relation Age of Onset    Fibromyalgia Mother     Hyperlipidemia Father     Hypertension Father     Diabetes Maternal Grandmother     Diabetes Paternal Grandfather     Cancer Maternal Grandfather      Social History     Socioeconomic History    Marital status: /Civil Union     Spouse name: Not on file    Number of children: Not on file    Years of education: Not on file    Highest education level: Not on file   Occupational History    Not on file   Social Needs    Financial resource strain: Not on file    Food insecurity:     Worry: Not on file     Inability: Not on file    Transportation needs:     Medical: Not on file     Non-medical: Not on file   Tobacco Use    Smoking status: Never Smoker    Smokeless tobacco: Never Used   Substance and Sexual Activity    Alcohol use:  Yes     Alcohol/week: 1 0 standard drinks     Types: 1 Cans of beer per week    Drug use: No    Sexual activity: Not Currently     Partners: Male   Lifestyle    Physical activity:     Days per week: Not on file     Minutes per session: Not on file    Stress: Not on file   Relationships    Social connections:     Talks on phone: Not on file     Gets together: Not on file     Attends [employer] service: Not on file     Active member of club or organization: Not on file     Attends meetings of clubs or organizations: Not on file     Relationship status: Not on file    Intimate partner violence:     Fear of current or ex partner: Not on file     Emotionally abused: Not on file     Physically abused: Not on file     Forced sexual activity: Not on file   Other Topics Concern    Not on file   Social History Narrative    History of  0    Caffeine use-1 cup of coffee/day    Has smoke detectors     Denied history of sun protection    Uses safety equipment- protective head gear       Current Outpatient Medications:     desogestrel-ethinyl estradiol (APRI) 0 15-30 MG-MCG per tablet, Take 1 tablet by mouth daily, Disp: , Rfl:     metFORMIN (GLUCOPHAGE) 500 mg tablet, TAKE ONE TABLET BY MOUTH THREE TIMES A DAY, Disp: 90 tablet, Rfl: 2    multivitamin (THERAGRAN) TABS, Take 1 tablet by mouth daily, Disp: , Rfl:     predniSONE 10 mg tablet, Take 4 tablets daily with food for 2 days, 3 tablets daily for 2 days, 2 tablets daily for 2 days, 1 tablet daily for 2 days, Disp: 20 tablet, Rfl: 0    promethazine-codeine (PHENERGAN WITH CODEINE) 6 25-10 mg/5 mL syrup, Take 5 mL by mouth daily at bedtime as needed for cough, Disp: 120 mL, Rfl: 0    Review of Systems   Constitutional: Positive for fatigue  Negative for chills and fever  HENT: Positive for congestion, postnasal drip and sinus pressure  Negative for ear pain, sore throat, trouble swallowing and voice change  Respiratory: Positive for cough and chest tightness  Negative for wheezing  Cardiovascular: Negative for chest pain and palpitations  Gastrointestinal: Negative for abdominal pain, diarrhea, nausea and vomiting  Neurological: Negative for dizziness and headaches  Hematological: Negative for adenopathy           Objective:    Vitals:    19 1433   BP: 120/80   BP Location: Left arm   Patient Position: Sitting   Cuff Size: Standard   Pulse: 74   Resp: 16   Temp: 97 7 °F (36 5 °C)   Weight: 72 8 kg (160 lb 6 4 oz)   Height: 5' 4 25" (1 632 m)        Physical Exam   Constitutional: She is oriented to person, place, and time  She appears well-developed and well-nourished  No distress  HENT:   Head: Normocephalic and atraumatic  Right Ear: Tympanic membrane and ear canal normal    Left Ear: Tympanic membrane and ear canal normal    Nose: Mucosal edema present  Mouth/Throat: No oropharyngeal exudate or posterior oropharyngeal erythema  Neck: Neck supple  Cardiovascular: Normal rate, regular rhythm and normal heart sounds  No murmur heard  Pulmonary/Chest: Effort normal and breath sounds normal  No respiratory distress  She has no wheezes  She has no rhonchi  She has no rales  Lymphadenopathy:     She has no cervical adenopathy  Neurological: She is alert and oriented to person, place, and time         Office Visit on 08/28/2019   Component Date Value Ref Range Status    OTHER 08/28/2019    Final    [phone]

## 2019-09-15 DIAGNOSIS — E28.2 PCOS (POLYCYSTIC OVARIAN SYNDROME): ICD-10-CM

## 2019-09-17 DIAGNOSIS — E28.2 PCOS (POLYCYSTIC OVARIAN SYNDROME): ICD-10-CM

## 2020-01-30 DIAGNOSIS — F41.9 ANXIETY: Primary | ICD-10-CM

## 2020-01-30 RX ORDER — ALPRAZOLAM 0.5 MG/1
0.5 TABLET ORAL
Qty: 1 TABLET | Refills: 0 | Status: SHIPPED | OUTPATIENT
Start: 2020-01-30 | End: 2020-04-07

## 2020-03-19 DIAGNOSIS — Z36.9 ANTENATAL SCREENING ENCOUNTER: ICD-10-CM

## 2020-03-19 DIAGNOSIS — O30.031 MONOCHORIONIC DIAMNIOTIC TWIN GESTATION IN FIRST TRIMESTER: Primary | ICD-10-CM

## 2020-03-31 ENCOUNTER — INITIAL PRENATAL (OUTPATIENT)
Dept: OBGYN CLINIC | Facility: CLINIC | Age: 30
End: 2020-03-31
Payer: COMMERCIAL

## 2020-03-31 ENCOUNTER — ULTRASOUND (OUTPATIENT)
Dept: OBGYN CLINIC | Facility: CLINIC | Age: 30
End: 2020-03-31
Payer: COMMERCIAL

## 2020-03-31 VITALS
SYSTOLIC BLOOD PRESSURE: 128 MMHG | HEIGHT: 64 IN | BODY MASS INDEX: 30.46 KG/M2 | DIASTOLIC BLOOD PRESSURE: 78 MMHG | WEIGHT: 178.4 LBS

## 2020-03-31 DIAGNOSIS — Z34.00 INITIAL OBSTETRIC VISIT, ANTEPARTUM: ICD-10-CM

## 2020-03-31 DIAGNOSIS — Z34.81 PRENATAL CARE, SUBSEQUENT PREGNANCY, FIRST TRIMESTER: Primary | ICD-10-CM

## 2020-03-31 DIAGNOSIS — Z36.9 ANTENATAL SCREENING ENCOUNTER: ICD-10-CM

## 2020-03-31 DIAGNOSIS — Z11.51 SCREENING FOR HPV (HUMAN PAPILLOMAVIRUS): ICD-10-CM

## 2020-03-31 DIAGNOSIS — Z11.3 SCREENING FOR STDS (SEXUALLY TRANSMITTED DISEASES): Primary | ICD-10-CM

## 2020-03-31 DIAGNOSIS — Z36.9 ANTENATAL SCREENING ENCOUNTER: Primary | ICD-10-CM

## 2020-03-31 DIAGNOSIS — Z12.4 SCREENING FOR CERVICAL CANCER: ICD-10-CM

## 2020-03-31 PROBLEM — Z3A.40 40 WEEKS GESTATION OF PREGNANCY: Status: RESOLVED | Noted: 2018-05-17 | Resolved: 2020-03-31

## 2020-03-31 PROBLEM — Z31.83 IN VITRO FERTILIZATION: Status: ACTIVE | Noted: 2020-03-31

## 2020-03-31 PROBLEM — O30.039 MONOCHORIONIC DIAMNIOTIC TWIN GESTATION: Status: ACTIVE | Noted: 2020-03-31

## 2020-03-31 LAB
BASOPHILS # BLD AUTO: 0.02 THOUSANDS/ΜL (ref 0–0.1)
BASOPHILS NFR BLD AUTO: 0 % (ref 0–1)
BILIRUB UR QL STRIP: NEGATIVE
CLARITY UR: NORMAL
COLOR UR: YELLOW
EOSINOPHIL # BLD AUTO: 0.08 THOUSAND/ΜL (ref 0–0.61)
EOSINOPHIL NFR BLD AUTO: 1 % (ref 0–6)
ERYTHROCYTE [DISTWIDTH] IN BLOOD BY AUTOMATED COUNT: 14.1 % (ref 11.6–15.1)
EST. AVERAGE GLUCOSE BLD GHB EST-MCNC: 97 MG/DL
GLUCOSE UR STRIP-MCNC: NEGATIVE MG/DL
HBA1C MFR BLD: 5 %
HBV SURFACE AG SER QL: NORMAL
HCT VFR BLD AUTO: 35.7 % (ref 34.8–46.1)
HGB BLD-MCNC: 12 G/DL (ref 11.5–15.4)
HGB UR QL STRIP.AUTO: NEGATIVE
IMM GRANULOCYTES # BLD AUTO: 0.04 THOUSAND/UL (ref 0–0.2)
IMM GRANULOCYTES NFR BLD AUTO: 0 % (ref 0–2)
KETONES UR STRIP-MCNC: NEGATIVE MG/DL
LEUKOCYTE ESTERASE UR QL STRIP: NEGATIVE
LYMPHOCYTES # BLD AUTO: 2.46 THOUSANDS/ΜL (ref 0.6–4.47)
LYMPHOCYTES NFR BLD AUTO: 27 % (ref 14–44)
MCH RBC QN AUTO: 31 PG (ref 26.8–34.3)
MCHC RBC AUTO-ENTMCNC: 33.6 G/DL (ref 31.4–37.4)
MCV RBC AUTO: 92 FL (ref 82–98)
MONOCYTES # BLD AUTO: 0.73 THOUSAND/ΜL (ref 0.17–1.22)
MONOCYTES NFR BLD AUTO: 8 % (ref 4–12)
NEUTROPHILS # BLD AUTO: 5.91 THOUSANDS/ΜL (ref 1.85–7.62)
NEUTS SEG NFR BLD AUTO: 64 % (ref 43–75)
NITRITE UR QL STRIP: NEGATIVE
NRBC BLD AUTO-RTO: 0 /100 WBCS
PH UR STRIP.AUTO: 6.5 [PH]
PLATELET # BLD AUTO: 234 THOUSANDS/UL (ref 149–390)
PMV BLD AUTO: 10.8 FL (ref 8.9–12.7)
PROT UR STRIP-MCNC: NEGATIVE MG/DL
RBC # BLD AUTO: 3.87 MILLION/UL (ref 3.81–5.12)
RUBV IGG SERPL IA-ACNC: 61.9 IU/ML
SL AMB  POCT GLUCOSE, UA: NORMAL
SL AMB POCT URINE PROTEIN: NORMAL
SP GR UR STRIP.AUTO: 1.01 (ref 1–1.03)
UROBILINOGEN UR QL STRIP.AUTO: 0.2 E.U./DL
WBC # BLD AUTO: 9.24 THOUSAND/UL (ref 4.31–10.16)

## 2020-03-31 PROCEDURE — 87624 HPV HI-RISK TYP POOLED RSLT: CPT | Performed by: OBSTETRICS & GYNECOLOGY

## 2020-03-31 PROCEDURE — 81002 URINALYSIS NONAUTO W/O SCOPE: CPT | Performed by: OBSTETRICS & GYNECOLOGY

## 2020-03-31 PROCEDURE — PNV: Performed by: OBSTETRICS & GYNECOLOGY

## 2020-03-31 PROCEDURE — 87086 URINE CULTURE/COLONY COUNT: CPT | Performed by: OBSTETRICS & GYNECOLOGY

## 2020-03-31 PROCEDURE — 76817 TRANSVAGINAL US OBSTETRIC: CPT | Performed by: OBSTETRICS & GYNECOLOGY

## 2020-03-31 PROCEDURE — 81003 URINALYSIS AUTO W/O SCOPE: CPT | Performed by: OBSTETRICS & GYNECOLOGY

## 2020-03-31 PROCEDURE — 80081 OBSTETRIC PANEL INC HIV TSTG: CPT | Performed by: OBSTETRICS & GYNECOLOGY

## 2020-03-31 PROCEDURE — 83036 HEMOGLOBIN GLYCOSYLATED A1C: CPT | Performed by: OBSTETRICS & GYNECOLOGY

## 2020-03-31 PROCEDURE — 87491 CHLMYD TRACH DNA AMP PROBE: CPT | Performed by: OBSTETRICS & GYNECOLOGY

## 2020-03-31 PROCEDURE — G0145 SCR C/V CYTO,THINLAYER,RESCR: HCPCS | Performed by: OBSTETRICS & GYNECOLOGY

## 2020-03-31 PROCEDURE — 36415 COLL VENOUS BLD VENIPUNCTURE: CPT | Performed by: OBSTETRICS & GYNECOLOGY

## 2020-03-31 PROCEDURE — OBC: Performed by: OBSTETRICS & GYNECOLOGY

## 2020-03-31 PROCEDURE — 87591 N.GONORRHOEAE DNA AMP PROB: CPT | Performed by: OBSTETRICS & GYNECOLOGY

## 2020-03-31 RX ORDER — FOLIC ACID 1 MG/1
1000 TABLET ORAL DAILY
COMMUNITY
Start: 2020-02-24 | End: 2020-09-19 | Stop reason: HOSPADM

## 2020-03-31 NOTE — PATIENT INSTRUCTIONS
Pregnancy at [address]:   What changes are happening in my body? You are now at the end of your first trimester and entering your second trimester  Morning sickness usually goes away by this time  You may have other symptoms such as fatigue, frequent urination, and headaches  You may have gained between 2 to 4 pounds by now  How do I care for myself at this stage of my pregnancy? · Get plenty of rest   You may feel more tired than normal  You may need to take naps or go to bed earlier  · Manage nausea and vomiting  Avoid fatty and spicy foods  Eat small meals throughout the day instead of large meals  Isabela may help to decrease nausea  Ask your healthcare provider about other ways of decreasing nausea and vomiting  · Eat a variety of healthy foods  Healthy foods include fruits, vegetables, whole-grain breads, low-fat dairy foods, beans, lean meats, and fish  Drink liquids as directed  Ask how much liquid to drink each day and which liquids are best for you  Limit caffeine to less than 200 milligrams each day  Limit your intake of fish to 2 servings each week  Choose fish low in mercury such as canned light tuna, shrimp, salmon, cod, or tilapia  Do not  eat fish high in mercury such as swordfish, tilefish, angel mackerel, and shark  · Take prenatal vitamins as directed  Your need for certain vitamins and minerals, such as folic acid, increases during pregnancy  Prenatal vitamins provide some of the extra vitamins and minerals you need  Prenatal vitamins may also help to decrease the risk of certain birth defects  · Do not smoke  If you smoke, it is never too late to quit  Smoking increases your risk of a miscarriage and other health problems during your pregnancy  Smoking can cause your baby to be born too early or weigh less at birth  Ask your healthcare provider for information if you need help quitting  · Do not drink alcohol    Alcohol passes from your body to your baby through the placenta  It can affect your baby's brain development and cause fetal alcohol syndrome (FAS)  FAS is a group of conditions that causes mental, behavior, and growth problems  · Talk to your healthcare provider before you take any medicines  Many medicines may harm your baby if you take them when you are pregnant  Do not take any medicines, vitamins, herbs, or supplements without first talking to your healthcare provider  Never use illegal or street drugs (such as marijuana or cocaine) while you are pregnant  What are some safety tips during pregnancy? · Avoid hot tubs and saunas  Do not use a hot tub or sauna while you are pregnant, especially during your first trimester  Hot tubs and saunas may raise your baby's temperature and increase the risk of birth defects  · Avoid toxoplasmosis  This is an infection caused by eating raw meat or being around infected cat feces  It can cause birth defects, miscarriages, and other problems  Wash your hands after you touch raw meat  Make sure any meat is well-cooked before you eat it  Avoid raw eggs and unpasteurized milk  Use gloves or ask someone else to clean your cat's litter box while you are pregnant  What changes are happening with my baby? Your baby has fully formed fingernails and toenails  Your baby's heartbeat can now be heard  Ask your healthcare provider if you can listen to your baby's heartbeat  By week 14, your baby is over 4 inches long from the top of the head to the rump (baby's bottom)  Your baby weighs over 3 ounces  What do I need to know about prenatal care? During the first 28 weeks of your pregnancy, you will see your healthcare provider once a month  Prenatal care can help prevent problems during pregnancy and childbirth  Your healthcare provider will check your blood pressure and weight  You may also need any of the following:  · A urine test  may also be done to check for sugar and protein   These can be signs of gestational diabetes or infection  · Genetic disorders screening tests  may be offered to you  This screening test checks your baby's risk of genetic disorders such as Down syndrome  The screening test includes a blood test and ultrasound  · Your baby's heart rate  will be checked  When should I seek immediate care? · You have pain or cramping in your abdomen or low back  · You have heavy vaginal bleeding or clotting  · You pass material that looks like tissue or large clots  Collect the material and bring it with you  When should I contact my healthcare provider? · You cannot keep food or drinks down, and you are losing weight  · You have light bleeding  · You have chills or a fever  · You have vaginal itching, burning, or pain  · You have yellow, green, white, or foul-smelling vaginal discharge  · You have pain or burning when you urinate, less urine than usual, or pink or bloody urine  · You have questions or concerns about your condition or care  CARE AGREEMENT:   You have the right to help plan your care  Learn about your health condition and how it may be treated  Discuss treatment options with your caregivers to decide what care you want to receive  You always have the right to refuse treatment  The above information is an  only  It is not intended as medical advice for individual conditions or treatments  Talk to your doctor, nurse or pharmacist before following any medical regimen to see if it is safe and effective for you  © 2017 [address] [employer] Information is for End User's use only and may not be sold, redistributed or otherwise used for commercial purposes  All illustrations and images included in CareNotes® are the copyrighted property of [employer]  or Bernabe Sanderson

## 2020-03-31 NOTE — PROGRESS NOTES
Ultrasound performed for viability  Mono/di twin pregnancy resulted from single embryo transfer  MFM packet given  MFM appointment scheduled

## 2020-03-31 NOTE — PROGRESS NOTES
OB INTAKE INTERVIEW  * Pt presents for OB intake  * Accompanied by: Self  *  *Pt had embryo transfer on 2020   11weeks 2days  *Estimated date of delivery: 10/18/2020   * confirmed by embryonic transfer    *Signs/Symptoms of Pregnancy   *No constipation    *No headaches   *No cramping/spotting    *No PICA cravings   *Diabetes  If any of the following answers are  yes, please order 1 hour GTT, 50g   *No hx of GDM    *No BMI >35    *No first degree relative with type 2 diabetes    *Yes hx of PCOS   *No current metformin use    *No prior hx of LGA/macrosomia    *No AMA with other risk factors   *Hypertension- If any of the following answers are yes, please order preeclampsia labs including 24 hour urine protein   *No Hx of chronic HTN    *No Hx of gestational HTN   *No hx of preeclampsia, eclampsia, or HELLP syndrome   *Infection Screening   *No Does the pt have a hx of MRSA? *if yes- follow MRSA protocol and obtain a nasal swab for MRSA culture   *No history of herpes?    *Immunizations:   *Yes influenza vaccine given 2019   *Yes discussed Tdap vaccine  *Previous Delivery Complications:   *No  delivery   *Yes Previous     *Interview education   *Handouts given:    *Baby and Me phone christina guide    *Baby and Me support center    *Baby and Me Christina    *New Babies and [address] sign up instructions    *Lab Locations    *[facility]    *Ajay Carm[facility] Childbirth and Parenting Classes    *Pre-Birth Registration form completed    *Schedule for prenatal visits    *Schedule for ultrasounds    *Pregnancy Warning Signs reviewed    *Safe Medications During Pregnancy    *[facility]    *Advanced Pregnancy Guidelines    *Perineal Massage        *[facility]   *Yes discussed genetic testing- pt interested    *Yes appointment at [facility] made        Patient has been informed of basic prenatal advice such as avoiding alcohol, drugs, and smoking  She should remain hydrated and take daily prenatal vitamins  Patient should avoid caffeine, raw sprouts, high mercury fish, undercooked fish, raw eggs, organ meat, unwashed produce, and unpasteurized cheeses, milk, and fruit juice  She has been informed about toxoplasmosis and to avoid cat feces and undercooked meats  *I have these concerns about this prenatal patient: previous   *Details that I feel the provider should be aware of: Mono/di twins    PN1 visit scheduled  The patient was oriented to our practice and all questions were answered      Interviewed by:  Malachi Estimable RDMS

## 2020-04-01 LAB
ABO GROUP BLD: NORMAL
BACTERIA UR CULT: NORMAL
BLD GP AB SCN SERPL QL: NEGATIVE
HIV 1+2 AB+HIV1 P24 AG SERPL QL IA: NORMAL
HPV HR 12 DNA CVX QL NAA+PROBE: NEGATIVE
HPV16 DNA CVX QL NAA+PROBE: NEGATIVE
HPV18 DNA CVX QL NAA+PROBE: NEGATIVE
RH BLD: POSITIVE
RPR SER QL: NORMAL
SPECIMEN EXPIRATION DATE: NORMAL

## 2020-04-02 LAB
LAB AP GYN PRIMARY INTERPRETATION: NORMAL
Lab: NORMAL

## 2020-04-03 LAB
C TRACH DNA SPEC QL NAA+PROBE: NEGATIVE
N GONORRHOEA DNA SPEC QL NAA+PROBE: NEGATIVE

## 2020-04-06 ENCOUNTER — TELEPHONE (OUTPATIENT)
Dept: PERINATAL CARE | Facility: CLINIC | Age: 30
End: 2020-04-06

## 2020-04-07 ENCOUNTER — ROUTINE PRENATAL (OUTPATIENT)
Dept: PERINATAL CARE | Facility: OTHER | Age: 30
End: 2020-04-07
Payer: COMMERCIAL

## 2020-04-07 VITALS
HEIGHT: 64 IN | HEART RATE: 92 BPM | SYSTOLIC BLOOD PRESSURE: 121 MMHG | DIASTOLIC BLOOD PRESSURE: 79 MMHG | WEIGHT: 179.2 LBS | BODY MASS INDEX: 30.59 KG/M2

## 2020-04-07 DIAGNOSIS — Z3A.12 12 WEEKS GESTATION OF PREGNANCY: ICD-10-CM

## 2020-04-07 DIAGNOSIS — Z36.82 ENCOUNTER FOR NUCHAL TRANSLUCENCY TESTING: ICD-10-CM

## 2020-04-07 DIAGNOSIS — O30.031 MONOCHORIONIC DIAMNIOTIC TWIN GESTATION IN FIRST TRIMESTER: Primary | ICD-10-CM

## 2020-04-07 DIAGNOSIS — O09.811 PREGNANCY RESULTING FROM IN VITRO FERTILIZATION IN FIRST TRIMESTER: ICD-10-CM

## 2020-04-07 PROCEDURE — 76802 OB US < 14 WKS ADDL FETUS: CPT | Performed by: OBSTETRICS & GYNECOLOGY

## 2020-04-07 PROCEDURE — 76813 OB US NUCHAL MEAS 1 GEST: CPT | Performed by: OBSTETRICS & GYNECOLOGY

## 2020-04-07 PROCEDURE — 76814 OB US NUCHAL MEAS ADD-ON: CPT | Performed by: OBSTETRICS & GYNECOLOGY

## 2020-04-07 PROCEDURE — 76801 OB US < 14 WKS SINGLE FETUS: CPT | Performed by: OBSTETRICS & GYNECOLOGY

## 2020-04-07 PROCEDURE — 99242 OFF/OP CONSLTJ NEW/EST SF 20: CPT | Performed by: OBSTETRICS & GYNECOLOGY

## 2020-04-07 RX ORDER — FERROUS SULFATE TAB EC 324 MG (65 MG FE EQUIVALENT) 324 (65 FE) MG
324 TABLET DELAYED RESPONSE ORAL
Qty: 60 TABLET | Refills: 3 | Status: SHIPPED | OUTPATIENT
Start: 2020-04-07 | End: 2020-09-19 | Stop reason: HOSPADM

## 2020-04-07 RX ORDER — UREA 10 %
1 LOTION (ML) TOPICAL 2 TIMES DAILY
Qty: 180 TABLET | Refills: 2 | Status: SHIPPED | OUTPATIENT
Start: 2020-04-07 | End: 2020-04-15 | Stop reason: SDUPTHER

## 2020-04-07 RX ORDER — ASPIRIN 81 MG/1
162 TABLET ORAL DAILY
Qty: 180 TABLET | Refills: 3 | Status: SHIPPED | OUTPATIENT
Start: 2020-04-07 | End: 2020-09-19 | Stop reason: HOSPADM

## 2020-04-11 LAB
# FETUSES US: 2
AGE: NORMAL
B-HCG ADJ MOM SERPL: 3.09
B-HCG SERPL-ACNC: 228.1 IU/ML
COLLECT DATE: NORMAL
CURRENT SMOKER: NORMAL
DONATED EGG PATIENT QL: NO
FET CRL US.MEAS: 65 MM
FET CRL US.MEAS: 67 MM
FET NUCHAL FOLD MOM THICKNESS US.MEAS: 0.95
FET NUCHAL FOLD THICKNESS US.MEAS: 1.4 MM
FET NUCHAL FOLD THICKNESS US.MEAS: 1.4 MM
FET TS 21 RISK FROM MAT AGE: NORMAL
GA CLIN EST: 12.7 WK
GA METHOD: NORMAL
HX OF NTD NARR: NO
HX OF TRISOMY 21 NARR: NO
IDDM PATIENT QL: NO
INTEGRATED SCN PATIENT-IMP: NORMAL
PAPP-A MOM SERPL: 3.2
PAPP-A SERPL-MCNC: 2409.7 NG/ML
PHYSICIAN NPI: NORMAL
SL AMB NASAL BONE: PRESENT
SL AMB NTQR LOCATION ID#: NORMAL
SL AMB NTQR READING PHYS ID#: NORMAL
SL AMB REFERRING PHYSICIAN NAME: NORMAL
SL AMB REFERRING PHYSICIAN PHONE: NORMAL
SL AMB REPEAT SPECIMEN: NO
SL AMB TWIN B NASAL BONE: PRESENT
SONOGRAPHER NAME: NORMAL
SONOGRAPHER: NORMAL
SONOGRAPHER: NORMAL
TS 18 RISK FETUS: NORMAL
TS 21 RISK FETUS: NORMAL
US DATE: NORMAL
US FETUSES STUDY [IMP]: NORMAL

## 2020-04-15 ENCOUNTER — TELEPHONE (OUTPATIENT)
Dept: PERINATAL CARE | Facility: CLINIC | Age: 30
End: 2020-04-15

## 2020-04-15 DIAGNOSIS — O30.031 MONOCHORIONIC DIAMNIOTIC TWIN GESTATION IN FIRST TRIMESTER: ICD-10-CM

## 2020-04-20 RX ORDER — UREA 10 %
1 LOTION (ML) TOPICAL 2 TIMES DAILY
Qty: 180 TABLET | Refills: 0 | Status: SHIPPED | OUTPATIENT
Start: 2020-04-20 | End: 2020-11-04

## 2020-05-05 LAB
# FETUSES US: 2
AFP ADJ MOM SERPL: 2.22
AFP SERPL-MCNC: 66.6 NG/ML
B-HCG ADJ MOM SERPL: 3.29
B-HCG SERPL-ACNC: 95.7 IU/ML
COLLECT DATE: NORMAL
CURRENT SMOKER: NORMAL
FET CRL US.MEAS: 65 MM
FET NUCHAL FOLD MOM THICKNESS US.MEAS: 0.95
FET NUCHAL FOLD THICKNESS US.MEAS: 1.4 MM
FET TS 21 RISK FROM MAT AGE: NORMAL
GA CLIN EST: 16.3 WK
HX OF NTD NARR: NO
IDDM PATIENT QL: NO
INHIBIN A ADJ MOM SERPL: 1.04
INHIBIN A SERPL-MCNC: 161 PG/ML
INTEGRATED SCN PATIENT-IMP: NORMAL
NEURAL TUBE DEFECT RISK FETUS: NORMAL %
PAPP-A MOM SERPL: 3.23
PAPP-A SERPL-MCNC: 2409.7 NG/ML
PHYSICIAN NPI: NORMAL
SL AMB NASAL BONE: PRESENT
SL AMB REFERRING PHYSICIAN NAME: NORMAL
SL AMB REFERRING PHYSICIAN PHONE: NORMAL
SL AMB REPEAT SPECIMEN: NO
SL AMB SPECIMEN # FROM PART 1: NORMAL
SL AMB TWIN B NASAL BONE: PRESENT
TS 18 RISK FETUS: NORMAL
TS 21 RISK FETUS: NORMAL
U ESTRIOL ADJ MOM SERPL: 2.15
U ESTRIOL SERPL-MCNC: 1.67 NG/ML
US DATE: NORMAL

## 2020-05-08 ENCOUNTER — TELEPHONE (OUTPATIENT)
Dept: PERINATAL CARE | Facility: CLINIC | Age: 30
End: 2020-05-08

## 2020-05-11 ENCOUNTER — TELEMEDICINE (OUTPATIENT)
Dept: OBGYN CLINIC | Facility: CLINIC | Age: 30
End: 2020-05-11

## 2020-05-11 ENCOUNTER — ROUTINE PRENATAL (OUTPATIENT)
Dept: PERINATAL CARE | Facility: OTHER | Age: 30
End: 2020-05-11
Payer: COMMERCIAL

## 2020-05-11 VITALS
HEIGHT: 64 IN | DIASTOLIC BLOOD PRESSURE: 71 MMHG | SYSTOLIC BLOOD PRESSURE: 110 MMHG | HEART RATE: 103 BPM | BODY MASS INDEX: 31.96 KG/M2 | TEMPERATURE: 98.9 F | WEIGHT: 187.2 LBS

## 2020-05-11 DIAGNOSIS — Z3A.17 17 WEEKS GESTATION OF PREGNANCY: ICD-10-CM

## 2020-05-11 DIAGNOSIS — O30.032 MONOCHORIONIC DIAMNIOTIC TWIN GESTATION IN SECOND TRIMESTER: Primary | ICD-10-CM

## 2020-05-11 PROCEDURE — 99212 OFFICE O/P EST SF 10 MIN: CPT | Performed by: OBSTETRICS & GYNECOLOGY

## 2020-05-11 PROCEDURE — 76816 OB US FOLLOW-UP PER FETUS: CPT | Performed by: OBSTETRICS & GYNECOLOGY

## 2020-05-11 PROCEDURE — PNV: Performed by: OBSTETRICS & GYNECOLOGY

## 2020-05-11 PROCEDURE — 1036F TOBACCO NON-USER: CPT | Performed by: OBSTETRICS & GYNECOLOGY

## 2020-05-22 ENCOUNTER — TELEPHONE (OUTPATIENT)
Dept: PERINATAL CARE | Facility: CLINIC | Age: 30
End: 2020-05-22

## 2020-05-26 ENCOUNTER — ULTRASOUND (OUTPATIENT)
Dept: PERINATAL CARE | Facility: OTHER | Age: 30
End: 2020-05-26
Payer: COMMERCIAL

## 2020-05-26 VITALS
BODY MASS INDEX: 32.71 KG/M2 | DIASTOLIC BLOOD PRESSURE: 83 MMHG | HEART RATE: 96 BPM | SYSTOLIC BLOOD PRESSURE: 124 MMHG | TEMPERATURE: 98.6 F | HEIGHT: 64 IN | WEIGHT: 191.6 LBS

## 2020-05-26 DIAGNOSIS — Z36.86 ENCOUNTER FOR ANTENATAL SCREENING FOR CERVICAL LENGTH: ICD-10-CM

## 2020-05-26 DIAGNOSIS — O30.032 MONOCHORIONIC DIAMNIOTIC TWIN GESTATION IN SECOND TRIMESTER: Primary | ICD-10-CM

## 2020-05-26 DIAGNOSIS — O34.211 MATERNAL CARE DUE TO LOW TRANSVERSE UTERINE SCAR FROM PREVIOUS CESAREAN DELIVERY: ICD-10-CM

## 2020-05-26 DIAGNOSIS — O09.812 PREGNANCY RESULTING FROM IN VITRO FERTILIZATION IN SECOND TRIMESTER: ICD-10-CM

## 2020-05-26 DIAGNOSIS — Z3A.19 19 WEEKS GESTATION OF PREGNANCY: ICD-10-CM

## 2020-05-26 PROBLEM — Z98.891 STATUS POST PRIMARY LOW TRANSVERSE CESAREAN SECTION: Status: RESOLVED | Noted: 2018-08-09 | Resolved: 2020-05-26

## 2020-05-26 PROBLEM — Z31.83 IN VITRO FERTILIZATION: Status: RESOLVED | Noted: 2020-03-31 | Resolved: 2020-05-26

## 2020-05-26 PROCEDURE — 76817 TRANSVAGINAL US OBSTETRIC: CPT | Performed by: OBSTETRICS & GYNECOLOGY

## 2020-05-26 PROCEDURE — 76821 MIDDLE CEREBRAL ARTERY ECHO: CPT | Performed by: OBSTETRICS & GYNECOLOGY

## 2020-05-26 PROCEDURE — 76812 OB US DETAILED ADDL FETUS: CPT | Performed by: OBSTETRICS & GYNECOLOGY

## 2020-05-26 PROCEDURE — 76811 OB US DETAILED SNGL FETUS: CPT | Performed by: OBSTETRICS & GYNECOLOGY

## 2020-05-26 PROCEDURE — 1036F TOBACCO NON-USER: CPT | Performed by: OBSTETRICS & GYNECOLOGY

## 2020-05-26 PROCEDURE — 99212 OFFICE O/P EST SF 10 MIN: CPT | Performed by: OBSTETRICS & GYNECOLOGY

## 2020-06-01 ENCOUNTER — TELEPHONE (OUTPATIENT)
Dept: PERINATAL CARE | Facility: OTHER | Age: 30
End: 2020-06-01

## 2020-06-01 ENCOUNTER — DOCUMENTATION (OUTPATIENT)
Dept: OBGYN CLINIC | Facility: CLINIC | Age: 30
End: 2020-06-01

## 2020-06-01 NOTE — TELEPHONE ENCOUNTER
Left patient a message that her MFM appointment had to be rescheduled  The new time, date and location were provided  The patient has been instructed to please call us back at [phone] with any questions or concerns

## 2020-06-03 ENCOUNTER — ROUTINE PRENATAL (OUTPATIENT)
Dept: OBGYN CLINIC | Facility: CLINIC | Age: 30
End: 2020-06-03
Payer: COMMERCIAL

## 2020-06-03 VITALS
HEIGHT: 64 IN | BODY MASS INDEX: 32.95 KG/M2 | WEIGHT: 193 LBS | DIASTOLIC BLOOD PRESSURE: 64 MMHG | SYSTOLIC BLOOD PRESSURE: 118 MMHG

## 2020-06-03 DIAGNOSIS — Z3A.20 20 WEEKS GESTATION OF PREGNANCY: Primary | ICD-10-CM

## 2020-06-03 DIAGNOSIS — Z3A.19 19 WEEKS GESTATION OF PREGNANCY: ICD-10-CM

## 2020-06-03 DIAGNOSIS — O09.812 PREGNANCY RESULTING FROM IN VITRO FERTILIZATION IN SECOND TRIMESTER: ICD-10-CM

## 2020-06-03 LAB
SL AMB  POCT GLUCOSE, UA: NEGATIVE
SL AMB POCT URINE PROTEIN: NEGATIVE

## 2020-06-03 PROCEDURE — PNV: Performed by: OBSTETRICS & GYNECOLOGY

## 2020-06-03 PROCEDURE — 81002 URINALYSIS NONAUTO W/O SCOPE: CPT | Performed by: OBSTETRICS & GYNECOLOGY

## 2020-06-08 ENCOUNTER — TELEPHONE (OUTPATIENT)
Dept: OBGYN CLINIC | Facility: CLINIC | Age: 30
End: 2020-06-08

## 2020-06-11 ENCOUNTER — TELEPHONE (OUTPATIENT)
Dept: PERINATAL CARE | Facility: CLINIC | Age: 30
End: 2020-06-11

## 2020-06-12 ENCOUNTER — ROUTINE PRENATAL (OUTPATIENT)
Dept: PERINATAL CARE | Facility: CLINIC | Age: 30
End: 2020-06-12
Payer: COMMERCIAL

## 2020-06-12 VITALS
DIASTOLIC BLOOD PRESSURE: 56 MMHG | TEMPERATURE: 97.9 F | HEIGHT: 65 IN | BODY MASS INDEX: 32.36 KG/M2 | WEIGHT: 194.2 LBS | HEART RATE: 94 BPM | SYSTOLIC BLOOD PRESSURE: 104 MMHG

## 2020-06-12 DIAGNOSIS — O30.032 MONOCHORIONIC DIAMNIOTIC TWIN GESTATION IN SECOND TRIMESTER: Primary | ICD-10-CM

## 2020-06-12 DIAGNOSIS — Z3A.21 21 WEEKS GESTATION OF PREGNANCY: ICD-10-CM

## 2020-06-12 PROCEDURE — 76816 OB US FOLLOW-UP PER FETUS: CPT | Performed by: OBSTETRICS & GYNECOLOGY

## 2020-06-12 PROCEDURE — 93325 DOPPLER ECHO COLOR FLOW MAPG: CPT | Performed by: OBSTETRICS & GYNECOLOGY

## 2020-06-12 PROCEDURE — 99212 OFFICE O/P EST SF 10 MIN: CPT | Performed by: OBSTETRICS & GYNECOLOGY

## 2020-06-12 PROCEDURE — 76821 MIDDLE CEREBRAL ARTERY ECHO: CPT | Performed by: OBSTETRICS & GYNECOLOGY

## 2020-06-12 PROCEDURE — 1036F TOBACCO NON-USER: CPT | Performed by: OBSTETRICS & GYNECOLOGY

## 2020-06-12 PROCEDURE — 76825 ECHO EXAM OF FETAL HEART: CPT | Performed by: OBSTETRICS & GYNECOLOGY

## 2020-06-12 PROCEDURE — 76827 ECHO EXAM OF FETAL HEART: CPT | Performed by: OBSTETRICS & GYNECOLOGY

## 2020-06-29 ENCOUNTER — ROUTINE PRENATAL (OUTPATIENT)
Dept: OBGYN CLINIC | Facility: CLINIC | Age: 30
End: 2020-06-29

## 2020-06-29 VITALS
SYSTOLIC BLOOD PRESSURE: 122 MMHG | WEIGHT: 195.2 LBS | BODY MASS INDEX: 32.52 KG/M2 | DIASTOLIC BLOOD PRESSURE: 70 MMHG | HEIGHT: 65 IN

## 2020-06-29 DIAGNOSIS — O30.032 MONOCHORIONIC DIAMNIOTIC TWIN GESTATION IN SECOND TRIMESTER: Primary | ICD-10-CM

## 2020-06-29 PROCEDURE — PNV: Performed by: OBSTETRICS & GYNECOLOGY

## 2020-06-30 ENCOUNTER — TELEPHONE (OUTPATIENT)
Dept: PERINATAL CARE | Facility: CLINIC | Age: 30
End: 2020-06-30

## 2020-07-01 ENCOUNTER — TELEPHONE (OUTPATIENT)
Dept: PERINATAL CARE | Facility: CLINIC | Age: 30
End: 2020-07-01

## 2020-07-01 ENCOUNTER — ROUTINE PRENATAL (OUTPATIENT)
Dept: PERINATAL CARE | Facility: CLINIC | Age: 30
End: 2020-07-01
Payer: COMMERCIAL

## 2020-07-01 VITALS
HEART RATE: 90 BPM | WEIGHT: 199 LBS | SYSTOLIC BLOOD PRESSURE: 112 MMHG | TEMPERATURE: 98.4 F | BODY MASS INDEX: 33.15 KG/M2 | DIASTOLIC BLOOD PRESSURE: 62 MMHG | HEIGHT: 65 IN

## 2020-07-01 DIAGNOSIS — O30.032 MONOCHORIONIC DIAMNIOTIC TWIN GESTATION IN SECOND TRIMESTER: ICD-10-CM

## 2020-07-01 DIAGNOSIS — Z36.89 ENCOUNTER FOR ULTRASOUND TO ASSESS FETAL GROWTH: Primary | ICD-10-CM

## 2020-07-01 DIAGNOSIS — Z3A.24 24 WEEKS GESTATION OF PREGNANCY: ICD-10-CM

## 2020-07-01 PROCEDURE — 1036F TOBACCO NON-USER: CPT | Performed by: OBSTETRICS & GYNECOLOGY

## 2020-07-01 PROCEDURE — 76821 MIDDLE CEREBRAL ARTERY ECHO: CPT | Performed by: OBSTETRICS & GYNECOLOGY

## 2020-07-01 PROCEDURE — 76816 OB US FOLLOW-UP PER FETUS: CPT | Performed by: OBSTETRICS & GYNECOLOGY

## 2020-07-01 PROCEDURE — 99212 OFFICE O/P EST SF 10 MIN: CPT | Performed by: OBSTETRICS & GYNECOLOGY

## 2020-07-01 NOTE — TELEPHONE ENCOUNTER
Spoke to Idamay to cancel the 7/10/2020 ultrasound to 7/15/2020 as it was too close to her scan today 7/1/2020  She verbalized understanding of time date and location at the [facility] office

## 2020-07-01 NOTE — PROGRESS NOTES
Toño Perez: Ms Patito Brennan was seen today at 24w3d for TTTS check and growth ultrasound  See ultrasound report under "OB Procedures" tab    Please don't hesitate to contact our office with any concerns or questions   -George Isaac MD

## 2020-07-13 ENCOUNTER — ROUTINE PRENATAL (OUTPATIENT)
Dept: OBGYN CLINIC | Facility: MEDICAL CENTER | Age: 30
End: 2020-07-13

## 2020-07-13 VITALS — SYSTOLIC BLOOD PRESSURE: 112 MMHG | WEIGHT: 202.3 LBS | DIASTOLIC BLOOD PRESSURE: 60 MMHG | BODY MASS INDEX: 33.66 KG/M2

## 2020-07-13 DIAGNOSIS — Z3A.26 26 WEEKS GESTATION OF PREGNANCY: Primary | ICD-10-CM

## 2020-07-13 PROCEDURE — PNV: Performed by: OBSTETRICS & GYNECOLOGY

## 2020-07-14 ENCOUNTER — TELEPHONE (OUTPATIENT)
Dept: PERINATAL CARE | Facility: CLINIC | Age: 30
End: 2020-07-14

## 2020-07-14 LAB
BASOPHILS # BLD AUTO: 50 CELLS/UL (ref 0–200)
BASOPHILS NFR BLD AUTO: 0.5 %
EOSINOPHIL # BLD AUTO: 99 CELLS/UL (ref 15–500)
EOSINOPHIL NFR BLD AUTO: 1 %
ERYTHROCYTE [DISTWIDTH] IN BLOOD BY AUTOMATED COUNT: 13.1 % (ref 11–15)
GLUCOSE 1H P 50 G GLC PO SERPL-MCNC: 142 MG/DL
HCT VFR BLD AUTO: 30.5 % (ref 35–45)
HGB BLD-MCNC: 10.6 G/DL (ref 11.7–15.5)
LYMPHOCYTES # BLD AUTO: 2336 CELLS/UL (ref 850–3900)
LYMPHOCYTES NFR BLD AUTO: 23.6 %
MCH RBC QN AUTO: 34.5 PG (ref 27–33)
MCHC RBC AUTO-ENTMCNC: 34.8 G/DL (ref 32–36)
MCV RBC AUTO: 99.3 FL (ref 80–100)
MONOCYTES # BLD AUTO: 673 CELLS/UL (ref 200–950)
MONOCYTES NFR BLD AUTO: 6.8 %
NEUTROPHILS # BLD AUTO: 6742 CELLS/UL (ref 1500–7800)
NEUTROPHILS NFR BLD AUTO: 68.1 %
PLATELET # BLD AUTO: 190 THOUSAND/UL (ref 140–400)
PMV BLD REES-ECKER: 10.5 FL (ref 7.5–12.5)
RBC # BLD AUTO: 3.07 MILLION/UL (ref 3.8–5.1)
SERVICE CMNT-IMP: ABNORMAL
WBC # BLD AUTO: 9.9 THOUSAND/UL (ref 3.8–10.8)

## 2020-07-14 NOTE — TELEPHONE ENCOUNTER
----    NO COVID SCREEN 7/14  --  Unable to reach pt by phone, voicemail full    ----    Attempted to reach patient by phone and left voicemail to confirm appointment for MFM ultrasound  1 support person ( must be over the age of 15) may accompany you for your appointment  If you or your support person have traveled outside the state in the past 2 weeks, please call our office today #[phone]  You and your support person must wear a mask ,covering nose and mouth,during your entire visit  You and your support person will be screened upon arrival   IF not feeling well- cough, fever, shortness of breath or any flu like symptoms contact your primary care physician or [facility]  Please call our office prior to entering the building  Check in and rooming questions will be done via phone  Inside office # provided:  [facility] line: [phone]  [facility] line:  [phone]  Yana Nguyễn line:  [address] line:  [phone]  Sharon Galarza line:  [phone]  [place] line:  [phone]    [facility] does not allow cell phone use, recording device or streaming during ultrasound     Any questions with these instructions please call Maternal Fetal Medicine nurse line today @ # [phone]

## 2020-07-14 NOTE — PROGRESS NOTES
Pt is doing well  She has no complaints  Feels the baby move  Is getting regular sonograms with MFM for mono/di twins      Pt for a repeat c section at 36 weeks if not indicated earlier

## 2020-07-15 ENCOUNTER — ULTRASOUND (OUTPATIENT)
Dept: PERINATAL CARE | Facility: CLINIC | Age: 30
End: 2020-07-15
Payer: COMMERCIAL

## 2020-07-15 VITALS
HEIGHT: 65 IN | TEMPERATURE: 99 F | HEART RATE: 92 BPM | BODY MASS INDEX: 33.59 KG/M2 | WEIGHT: 201.6 LBS | SYSTOLIC BLOOD PRESSURE: 114 MMHG | DIASTOLIC BLOOD PRESSURE: 68 MMHG

## 2020-07-15 DIAGNOSIS — Z3A.26 26 WEEKS GESTATION OF PREGNANCY: ICD-10-CM

## 2020-07-15 DIAGNOSIS — R73.09 ELEVATED GLUCOSE TOLERANCE TEST: Primary | ICD-10-CM

## 2020-07-15 DIAGNOSIS — O30.032 MONOCHORIONIC DIAMNIOTIC TWIN GESTATION IN SECOND TRIMESTER: Primary | ICD-10-CM

## 2020-07-15 PROCEDURE — 99212 OFFICE O/P EST SF 10 MIN: CPT | Performed by: OBSTETRICS & GYNECOLOGY

## 2020-07-15 PROCEDURE — 76816 OB US FOLLOW-UP PER FETUS: CPT | Performed by: OBSTETRICS & GYNECOLOGY

## 2020-07-15 PROCEDURE — 1036F TOBACCO NON-USER: CPT | Performed by: OBSTETRICS & GYNECOLOGY

## 2020-07-15 PROCEDURE — 76821 MIDDLE CEREBRAL ARTERY ECHO: CPT | Performed by: OBSTETRICS & GYNECOLOGY

## 2020-07-15 NOTE — PROGRESS NOTES
Toño Perez: Ms Pyle Age was seen today at 26w3d for ultrasound for TTTS surveillance  See ultrasound report under "OB Procedures" tab  Please don't hesitate to contact our office with any concerns or questions    Miguel Sánchez MD

## 2020-07-18 LAB
GLUCOSE P FAST SERPL-MCNC: 84 MG/DL (ref 65–99)
GLUCOSE SP1 P CHAL SERPL-MCNC: 148 MG/DL
GLUCOSE SP2 P CHAL SERPL-MCNC: 131 MG/DL
GLUCOSE SP3 P CHAL SERPL-MCNC: 70 MG/DL
SPECIMEN DRAWN SERPL: NORMAL

## 2020-07-21 ENCOUNTER — DOCUMENTATION (OUTPATIENT)
Dept: OBGYN CLINIC | Facility: MEDICAL CENTER | Age: 30
End: 2020-07-21

## 2020-07-23 ENCOUNTER — TELEPHONE (OUTPATIENT)
Dept: OBGYN CLINIC | Facility: MEDICAL CENTER | Age: 30
End: 2020-07-23

## 2020-07-23 NOTE — TELEPHONE ENCOUNTER
----- Message from Sonny Peaches sent at 7/23/2020  2:09 PM EDT -----  Spoke with Perla Murrell at L&D   Pt is scheduled for 9/22/20 at 0730 with Dr Cruz Castro   ----- Message -----  From: Arianne Owusu MD  Sent: 7/13/2020   5:50 PM EDT  To: Sonny Servin    Please schedule her for repeat c section and BTL on 9/22 for me please  Mono di twins     Thanks rio

## 2020-07-28 ENCOUNTER — TELEPHONE (OUTPATIENT)
Dept: PERINATAL CARE | Facility: CLINIC | Age: 30
End: 2020-07-28

## 2020-07-28 NOTE — TELEPHONE ENCOUNTER
Spoke with patient and confirmed appointment with MFM  1 support person ( must be over age of 15) may accompany patient  Will you and your support person be able to wear a mask ,without a valve , during entire appointment? yes   To minimize your exposure in our waiting area,check in and rooming questions will be done via phone  When you arrive in the parking lot please call the following inside line # prior to entering office:    Eulalia Vazquez [phone] line: [facility] line:  [address] line: [phone]  [place] line:  [phone]  [place] line: [phone]    Have you or your support person traveled outside the state in the last 2 weeks?no   If yes, what state did you travel to? no     Do you or your support person have:  Fever or flu- like symptoms?no  Symptoms of upper respiratory infection like runny nose, sore throat or cough? no  Do you have new headache that you have not had in the past?no  Have you experienced any new shortness of breath recently?no  Do you have any new loss of taste or smell?no  Do you have any new diarrhea, nausea or vomiting?no  Have you recently been in contact with anyone who has been sick or diagnosed with COVID-19 infection?no  Have you been recommended to quarantine because of an exposure to a confirmed positive COVID19 person?no  You and your support person will have temperature screening upon arrival   Patient verbalized understanding of all instructions

## 2020-07-29 ENCOUNTER — ROUTINE PRENATAL (OUTPATIENT)
Dept: OBGYN CLINIC | Facility: CLINIC | Age: 30
End: 2020-07-29
Payer: COMMERCIAL

## 2020-07-29 ENCOUNTER — ULTRASOUND (OUTPATIENT)
Dept: PERINATAL CARE | Facility: CLINIC | Age: 30
End: 2020-07-29
Payer: COMMERCIAL

## 2020-07-29 VITALS
BODY MASS INDEX: 34.18 KG/M2 | WEIGHT: 205.4 LBS | DIASTOLIC BLOOD PRESSURE: 72 MMHG | SYSTOLIC BLOOD PRESSURE: 120 MMHG | TEMPERATURE: 98.3 F

## 2020-07-29 VITALS
BODY MASS INDEX: 33.89 KG/M2 | DIASTOLIC BLOOD PRESSURE: 66 MMHG | SYSTOLIC BLOOD PRESSURE: 110 MMHG | TEMPERATURE: 97.8 F | HEART RATE: 93 BPM | HEIGHT: 65 IN | WEIGHT: 203.4 LBS

## 2020-07-29 DIAGNOSIS — O09.813 PREGNANCY RESULTING FROM IN VITRO FERTILIZATION IN THIRD TRIMESTER: ICD-10-CM

## 2020-07-29 DIAGNOSIS — Z23 NEED FOR TDAP VACCINATION: Primary | ICD-10-CM

## 2020-07-29 DIAGNOSIS — O30.033 MONOCHORIONIC DIAMNIOTIC TWIN GESTATION IN THIRD TRIMESTER: Primary | ICD-10-CM

## 2020-07-29 DIAGNOSIS — Z3A.28 28 WEEKS GESTATION OF PREGNANCY: ICD-10-CM

## 2020-07-29 DIAGNOSIS — O30.033 MONOCHORIONIC DIAMNIOTIC TWIN GESTATION IN THIRD TRIMESTER: ICD-10-CM

## 2020-07-29 PROCEDURE — 76821 MIDDLE CEREBRAL ARTERY ECHO: CPT | Performed by: OBSTETRICS & GYNECOLOGY

## 2020-07-29 PROCEDURE — 90471 IMMUNIZATION ADMIN: CPT

## 2020-07-29 PROCEDURE — PNV: Performed by: OBSTETRICS & GYNECOLOGY

## 2020-07-29 PROCEDURE — 1036F TOBACCO NON-USER: CPT | Performed by: OBSTETRICS & GYNECOLOGY

## 2020-07-29 PROCEDURE — 99212 OFFICE O/P EST SF 10 MIN: CPT | Performed by: OBSTETRICS & GYNECOLOGY

## 2020-07-29 PROCEDURE — 90715 TDAP VACCINE 7 YRS/> IM: CPT

## 2020-07-29 PROCEDURE — 76816 OB US FOLLOW-UP PER FETUS: CPT | Performed by: OBSTETRICS & GYNECOLOGY

## 2020-07-29 NOTE — PROGRESS NOTES
Please refer to the [facility] ultrasound report in Ob Procedures for additional information regarding today's visit

## 2020-07-29 NOTE — LETTER
July 31, 2020     Katelynn Davenport MD  [facility]    Patient: Ronnald Ahumada   YOB: 1990   Date of Visit: 7/29/2020       Dear Dr Migdalia Block: Thank you for referring Aaron Mitchell to me for evaluation  Below are my notes for this consultation  If you have questions, please do not hesitate to call me  I look forward to following your patient along with you  Sincerely,        Sussy Saxena MD        CC: No Recipients  Sussy Saxena MD  7/29/2020  9:37 AM  Sign at close encounter  Please refer to the [facility] ultrasound report in Ob Procedures for additional information regarding today's visit

## 2020-07-31 NOTE — PROGRESS NOTES
Twin IUP at 28 weeks  The patient has no complaints doing well  Positive movements with both babies    Get a regular scans and MFM secondary to being mono chorionic diamniotic    Plan for repeat section at 36 weeks already scheduled  Will be doing a tubal ligation at that time  Patient has 8 embryos and does not want takes chance that she office spontaneous pregnancy so time her tubes with the aid embryos is not a concern    T dap given  Failed 1 hour past 3 hour GTT

## 2020-08-11 ENCOUNTER — TELEPHONE (OUTPATIENT)
Dept: PERINATAL CARE | Facility: CLINIC | Age: 30
End: 2020-08-11

## 2020-08-12 ENCOUNTER — ROUTINE PRENATAL (OUTPATIENT)
Dept: PERINATAL CARE | Facility: CLINIC | Age: 30
End: 2020-08-12
Payer: COMMERCIAL

## 2020-08-12 VITALS
BODY MASS INDEX: 34.18 KG/M2 | HEIGHT: 65 IN | DIASTOLIC BLOOD PRESSURE: 60 MMHG | HEART RATE: 101 BPM | SYSTOLIC BLOOD PRESSURE: 113 MMHG

## 2020-08-12 DIAGNOSIS — Z3A.30 30 WEEKS GESTATION OF PREGNANCY: ICD-10-CM

## 2020-08-12 DIAGNOSIS — O30.033 MONOCHORIONIC DIAMNIOTIC TWIN GESTATION IN THIRD TRIMESTER: Primary | ICD-10-CM

## 2020-08-12 PROCEDURE — 76821 MIDDLE CEREBRAL ARTERY ECHO: CPT | Performed by: OBSTETRICS & GYNECOLOGY

## 2020-08-12 PROCEDURE — 76815 OB US LIMITED FETUS(S): CPT | Performed by: OBSTETRICS & GYNECOLOGY

## 2020-08-12 NOTE — PROGRESS NOTES
[address]: Ms Migel Ferrell was seen today at 30w3d for TTTS check  See ultrasound report under "OB Procedures" tab    Please don't hesitate to contact our office with any concerns or questions   -Rika Bullard MD

## 2020-08-17 ENCOUNTER — ROUTINE PRENATAL (OUTPATIENT)
Dept: OBGYN CLINIC | Facility: MEDICAL CENTER | Age: 30
End: 2020-08-17

## 2020-08-17 VITALS — BODY MASS INDEX: 35.11 KG/M2 | SYSTOLIC BLOOD PRESSURE: 120 MMHG | WEIGHT: 211 LBS | DIASTOLIC BLOOD PRESSURE: 70 MMHG

## 2020-08-17 DIAGNOSIS — O30.033 MONOCHORIONIC DIAMNIOTIC TWIN GESTATION IN THIRD TRIMESTER: Primary | ICD-10-CM

## 2020-08-17 DIAGNOSIS — Z31.83 IN VITRO FERTILIZATION: ICD-10-CM

## 2020-08-17 PROCEDURE — PNV: Performed by: OBSTETRICS & GYNECOLOGY

## 2020-08-17 NOTE — PROGRESS NOTES
Baljit Ceja is a 26y o  year old  at 31w1d for routine prenatal visit    + FM, no vaginal bleeding, contractions, or LOF  Complaints: No   Most recent ultrasound and labs reviewed    Mono-di twins - scheduled C/S on  with Dr Lucas Downs   Being monitored at [facility] 120 every 2 weeks , soon will start APFS   All questions answered  [facility] reviewed

## 2020-08-24 ENCOUNTER — TELEPHONE (OUTPATIENT)
Dept: PERINATAL CARE | Facility: OTHER | Age: 30
End: 2020-08-24

## 2020-08-24 NOTE — PATIENT INSTRUCTIONS
Thank you for choosing us for your  care today  If you have any questions about your ultrasound or care, please do not hesitate to contact us or your primary obstetrician  Please go to Labor and Delivery now for evaluation  Some general instructions for your pregnancy are:     Exercise: Aim for 22 minutes per day (150 minutes per week!) of regular exercise  This is obviously hard to do during a pandemic but walking is great   Nutrition: aim for calcium-rich and iron-rich foods as well as healthy sources of protein  This will help you gain a healthy amount of weight   Protect against the flu: get yourself and your entire household vaccinated against influenza   Protect against coronavirus: practice social distancing, wear a mask in public, and wash your hands often  This virus can be spread easily by people without symptoms  Notify your primary care doctor if you have any symptoms including cough, shortness of breath or difficulty breathing, fever, chills, muscle pain, sore throat, or new loss of taste or smell   Learn about Preeclampsia: preeclampsia is a common, serious complication in pregnancy  A blood pressure of 140mmHg (top number or systolic) OR 48ETPC (bottom number or diastolic) is not normal and needs evaluation by your doctor   If you smoke, try to reduce how many cigarettes you smoke or quit completely  Do not vape   Other warning signs to watch out for in pregnancy or postpartum: chest pain, obstructed breathing or shortness of breath, seizures, thoughts of hurting yourself or your baby, bleeding, a painful or swollen leg, fever, or headache ([facility] POST-BIRTH Warning Signs campaign)  If these happen call 911  Itching is also not normal in pregnancy and if you experience this, especially over your hands and feet, potentially worse at night, notify your doctors

## 2020-08-24 NOTE — TELEPHONE ENCOUNTER
-------------------------------------------------------------    Attempted to reach patient by phone and left voicemail to confirm appointment for MFM ultrasound  1 support person ( must be over the age of 15) may accompany you for your appointment  If you or your support person have traveled outside the state in the past 2 weeks, please call and notify our office today #[phone]  You and your support person must wear a mask ,covering nose and mouth,during your entire visit  You and your support person will have temperature screened upon arrival     To minimize your exposure in our waiting room, please call our office prior to entering the building  Check in and rooming questions will be done via phone  We will give you directions when to enter for your appointment  Inside office # provided:  [place] line:  [phone]    IF you are not feeling well- cough, fever, shortness of breath or any flu like symptoms, contact your primary care physician or 1-866[facility]    Any questions with these instructions please call Maternal Fetal Medicine nurse line today @ # [phone]

## 2020-08-25 ENCOUNTER — ULTRASOUND (OUTPATIENT)
Dept: PERINATAL CARE | Facility: CLINIC | Age: 30
End: 2020-08-25
Payer: COMMERCIAL

## 2020-08-25 ENCOUNTER — ROUTINE PRENATAL (OUTPATIENT)
Dept: PERINATAL CARE | Facility: CLINIC | Age: 30
End: 2020-08-25
Payer: COMMERCIAL

## 2020-08-25 ENCOUNTER — HOSPITAL ENCOUNTER (OUTPATIENT)
Facility: HOSPITAL | Age: 30
Discharge: HOME | End: 2020-08-25
Attending: OBSTETRICS & GYNECOLOGY | Admitting: OBSTETRICS & GYNECOLOGY
Payer: COMMERCIAL

## 2020-08-25 VITALS — BODY MASS INDEX: 35.49 KG/M2 | WEIGHT: 213 LBS | HEIGHT: 65 IN | TEMPERATURE: 98.2 F

## 2020-08-25 VITALS
WEIGHT: 212.6 LBS | DIASTOLIC BLOOD PRESSURE: 73 MMHG | TEMPERATURE: 97.6 F | BODY MASS INDEX: 35.42 KG/M2 | HEART RATE: 102 BPM | HEIGHT: 65 IN | SYSTOLIC BLOOD PRESSURE: 112 MMHG

## 2020-08-25 VITALS — BODY MASS INDEX: 35.11 KG/M2 | HEIGHT: 65 IN

## 2020-08-25 DIAGNOSIS — Z33.1 PREGNANT STATE, INCIDENTAL: Primary | ICD-10-CM

## 2020-08-25 DIAGNOSIS — Z36.89 ENCOUNTER FOR ULTRASOUND TO CHECK FETAL GROWTH: ICD-10-CM

## 2020-08-25 DIAGNOSIS — O30.033 MONOCHORIONIC DIAMNIOTIC TWIN GESTATION IN THIRD TRIMESTER: Primary | ICD-10-CM

## 2020-08-25 DIAGNOSIS — O09.813 PREGNANCY RESULTING FROM IN VITRO FERTILIZATION IN THIRD TRIMESTER: ICD-10-CM

## 2020-08-25 LAB
BILIRUB UR QL STRIP: NEGATIVE
CLARITY UR: CLEAR
COLOR UR: YELLOW
GLUCOSE UR STRIP-MCNC: NEGATIVE MG/DL
HGB UR QL STRIP.AUTO: NEGATIVE
KETONES UR STRIP-MCNC: NEGATIVE MG/DL
LEUKOCYTE ESTERASE UR QL STRIP: ABNORMAL
NITRITE UR QL STRIP: NEGATIVE
PH UR STRIP.AUTO: 7 [PH] (ref 4.5–8)
PROT UR STRIP-MCNC: NEGATIVE MG/DL
SP GR UR STRIP.AUTO: 1.01 (ref 1–1.03)
UROBILINOGEN UR QL STRIP.AUTO: 0.2 E.U./DL

## 2020-08-25 PROCEDURE — NC001 PR NO CHARGE

## 2020-08-25 PROCEDURE — 76821 MIDDLE CEREBRAL ARTERY ECHO: CPT | Performed by: OBSTETRICS & GYNECOLOGY

## 2020-08-25 PROCEDURE — 59025 FETAL NON-STRESS TEST: CPT | Performed by: OBSTETRICS & GYNECOLOGY

## 2020-08-25 PROCEDURE — 99214 OFFICE O/P EST MOD 30 MIN: CPT | Performed by: OBSTETRICS & GYNECOLOGY

## 2020-08-25 PROCEDURE — 76816 OB US FOLLOW-UP PER FETUS: CPT | Performed by: OBSTETRICS & GYNECOLOGY

## 2020-08-25 PROCEDURE — 99203 OFFICE O/P NEW LOW 30 MIN: CPT

## 2020-08-25 PROCEDURE — 76815 OB US LIMITED FETUS(S): CPT | Performed by: OBSTETRICS & GYNECOLOGY

## 2020-08-25 PROCEDURE — 1036F TOBACCO NON-USER: CPT | Performed by: OBSTETRICS & GYNECOLOGY

## 2020-08-25 PROCEDURE — 99212 OFFICE O/P EST SF 10 MIN: CPT | Performed by: OBSTETRICS & GYNECOLOGY

## 2020-08-25 PROCEDURE — NC001 PR NO CHARGE: Performed by: OBSTETRICS & GYNECOLOGY

## 2020-08-25 NOTE — DISCHARGE INSTRUCTIONS
Labor   WHAT YOU NEED TO KNOW:   What is  labor?  (premature) labor occurs when the uterus contracts and your cervix opens earlier than normal  The cervix is the opening of your uterus  In  labor, contractions are strong enough and occur often enough to allow the cervix to open for delivery of your baby   labor happens after the 20th week of pregnancy but before the 37th week of pregnancy  An early labor could cause you to have your baby before he is ready to be born  What causes  labor? The cause is sometimes unknown  The following may cause early labor:  · A large uterus or a short cervix  can cause you to go into labor early  · A chronic illness , such as high blood pressure, diabetes, or obesity, can cause early labor  · An infection , such as a urinary tract infection or vaginal infection, can weaken the membranes (linings) of the amniotic sac around your baby  This could lead to premature rupture of the membranes and  labor  · Problems with the placenta , such as placenta previa or placental separation, may cause  labor  · Injury to your abdomen or uterus  may also cause some cases of  labor  What increases my risk for  labor? · You are pregnant with 2 or more babies  · You are under 16 or over 28years of age  · You get pregnant again less than 6 months after delivery  · You had a  labor or  birth in the past     · You did not have prenatal care  · You smoke, drink alcohol, or use street drugs while pregnant  · You are underweight  Too little weight gain during pregnancy may also increase your risk for early labor  What are the signs and symptoms of  labor? You may not know that you are having  labor  It is common to have  contractions (tightening and relaxing of the uterus) and not notice them   The following are signs and symptoms that suggest a  labor:  · Contractions that get stronger and closer together    · Changes in vaginal discharge, such as more discharge or discharge that is watery or bloody     · Low back pain     · Pressure in the lower abdomen     · Vaginal spotting or bleeding  How is  labor diagnosed? You may have one or more of the following tests to check for  labor:  · A pelvic exam  is also called an internal or vaginal exam  During a pelvic exam, your healthcare provider will gently put a warmed speculum into your vagina  A speculum is a tool that opens your vagina  This lets your healthcare provider see if your cervix is opening  · A vaginal ultrasound  uses sound waves to show pictures of your cervix and your baby inside your uterus  During this test, a small tube is placed into your vagina  This test will help your healthcare provider see if your cervix is opening  · A fetal ultrasound  uses sound waves to show pictures of your baby inside your uterus  The movement, heart rate, and position of your baby can also be seen  · A fetal fibronectin test  checks for a protein called fetal fibronectin in the cervix or vagina  Normally, there is no protein in cervical and vaginal secretions until the 20th week of pregnancy up to the end of pregnancy  · Blood and urine tests  may be done to look for signs of infection  How is  labor treated? Early treatment may delay delivery  You may need any of the following:  · Bed rest  may be recommended  You may need to lie on your left side, which improves circulation to your uterus and baby  Your healthcare provider will tell you when it is okay to get out of bed  · Medicine  may be given to stop contractions if your baby is not ready to be born  You may also need certain medicines if your  labor cannot be stopped and your healthcare provider thinks you will have your baby early  These medicines help your baby's lungs, brain, and digestive organs mature  They also help decrease your baby's risk of being born with cerebral palsy  Antibiotics may be given to treat a bacterial infection, if needed  Call 911 for any of the following:   · You see or feel like there is something in your vagina  When should I seek immediate care? · You have bright red, painless vaginal bleeding  · Your symptoms do not get better or they get worse  · Your water broke or you feel warm water gushing or trickling from your vagina  · You have contractions that get stronger and closer together for more than 1 hour  When should I contact my healthcare provider? · You notice a decrease in your baby's movement  · You have abdominal cramps, pressure, or tightening  · You have a change in vaginal discharge  · You have a fever  · You have burning when you urinate or you are urinating less than is normal for you  · You have questions or concerns about your condition or care  CARE AGREEMENT:   You have the right to help plan your care  Learn about your health condition and how it may be treated  Discuss treatment options with your caregivers to decide what care you want to receive  You always have the right to refuse treatment  The above information is an  only  It is not intended as medical advice for individual conditions or treatments  Talk to your doctor, nurse or pharmacist before following any medical regimen to see if it is safe and effective for you  © 2017 [address] Information is for End User's use only and may not be sold, redistributed or otherwise used for commercial purposes  All illustrations and images included in CareNotes® are the copyrighted property of [employer]  or Bernabe Sanderson        Early Labor Signs   WHAT YOU SHOULD KNOW:   Early labor signs are changes in your body that allow your baby to pass through your birth canal   AFTER YOU LEAVE:   Signs and symptoms of early labor:   · Lightening  occurs when your baby drops inside your pelvis  You may feel increased pressure in your pelvis  This may happen a few weeks to a few hours before your labor begins  · Contractions  are cramps and tightening that occur in your uterus to help move the baby through your birth canal  Contractions occur regularly and more often each time  Each one lasts about 30 to 70 seconds, and gets stronger and more painful until you deliver your baby  Contractions do not go away with movement  They start in your lower back and move to the front in your abdomen  · Effacement  occurs when your cervix softens and thins, so it can easily open for the baby  Your primary healthcare provider [facility] or obstetrician will examine your cervix for effacement  · Dilation  is widening of your cervix, also for the baby's passage  Your PHP or obstetrician will examine your cervix for dilation  Your cervix will be fully opened and ready for delivery when it is dilated to 10 centimeters  · Increased discharge  from your vagina may occur  It may be pink, clear, or slightly bloody  This discharge may also be called bloody show  Bloody show is a mucus plug that forms and blocks your cervix during pregnancy  · Rupture of membranes  is a sudden release of clear fluid from your vagina  It is also known as when your water breaks  Your PHP or obstetrician may need to break your water if it does not break on its own  False labor: You may have false labor signs, which are also called [place] contractions  False labor is common and may happen several weeks or days before your actual labor  The contractions are not regular, and do not get closer together  The pain is usually mild, does not worsen, and is felt only in front  [place] contractions may happen later in the day, and stop after you change position, walk, or rest   Contact your PHP or obstetrician if:   · You have pain in your lower back or abdomen      · You have bloody mucus or show     · You have questions or concerns about your condition or care  Seek care immediately or call 911 if:   · You have regular, painful contractions that are less than 5 minutes apart, and last 30 to 70 seconds each  · You have heavy vaginal bleeding  · You have a constant trickle or sudden gush of clear fluid from your vagina  · You notice a sudden decrease in your baby's movement  © 2014 [address] is for End User's use only and may not be sold, redistributed or otherwise used for commercial purposes  All illustrations and images included in CareNotes® are the copyrighted property of A D A M , Inc  or Bernabe Sanderson  The above information is an  only  It is not intended as medical advice for individual conditions or treatments  Talk to your doctor, nurse or pharmacist before following any medical regimen to see if it is safe and effective for you

## 2020-08-25 NOTE — PROGRESS NOTES
[address]: Ms Benito Rater was seen today at [place] [address] for NST (found under the pregnancy episode) which I reviewed the RN assessment and agree, and fetal growth ultrasound (see ultrasound report under OB procedures tab)  Sent to L&D for rule out rupture  Please don't hesitate to contact our office with any concerns or questions    Swetha Knight MD

## 2020-08-25 NOTE — PROGRESS NOTES
Triage Note - OB  [facility] 26 y o  female MRN: [id number]  Unit/Bed#: L&D 329 Encounter: [id number]    OB TRIAGE NOTE  [facility]  [id number]  2020  11:18 AM  L&D 329/L&D     ASSESS:  26 y o  Marilia Joseph who been directed from  Center for rule out rupture after patient disclosed some leakage of fluid     This is a monochorionic diamniotic twin pregnancy as result of IVF  She has been getting serial ultrasounds for a TTS surveillance, last ultrasound this morning estimated fetal weight of fetus A 2048 g 62 percentile, estimated fetal weight of fetus B 2259 g 73 percentile, MVP fetus A 5 1 versus 4 7 cm  Fetus B  Umbilical artery Dopplers were normal limits, fetus A is cephalic  Plan at for delivery 2020 scheduled repeat  section  Patient have a history of one  section and maternal obesity with BMI 35  Patient report 2 days of leakage of fluid, with normal MVP evaluation this morning in the  center  She denies contractions, vaginal bleeding or decrease fetal movement  In addition she states increase in pelvic pressure in  The last 48 hours  She denies urinary irritative symptoms        PLAN    Intacta membranes   - Urine dip negative for protein or nitrates, density 1015, small amount of leukocytes  - speculum examination: close/think/high, no evidence of pooling, nitrazine negative  - KOH/wet mount: negative   - NST reactive, no uterine activity on toco  - negative Nitrazine and ferning  - SVE C/T/H  - MVP baby A 4 9, baby B 4 0  - Patient was asymptomatic at the moment of the examination  Symptoms with low probability to be related with labor or rupture  We discharged her with instructions of call if experiencing worsening contractions, vaginal bleeding, loss of fluid or decreased fetal movement    - Will follow up with OBGYN on 20    Chevy Sanderson MD    ______________    SUBJECTIVE    BILL: Estimated Date of Delivery: 10/18/20    HPI Chronology:  26 y o  Clyde Sin of monochorionic diamniotic twin pregnancy  presents after evaluation in  center after patient states 2 days of leakage of fluid, normal fluid volume measurement in ultrasound this am      Contractions: no  Leakage: yes  Bleeding: no  Fetal Movement: yes      Vitals:   Temp 98 2 °F (36 8 °C) (Oral)   Ht 5' 5" (1 651 m)   Wt 96 6 kg (213 lb)   BMI 35 45 kg/m²   Body mass index is 35 45 kg/m²  Review of Systems   Constitutional: Negative  HENT: Negative  Eyes: Negative  Respiratory: Negative  Cardiovascular: Negative  Gastrointestinal: Negative  Endocrine: Negative  Genitourinary: Negative  Musculoskeletal: Negative  Allergic/Immunologic: Negative  Neurological: Negative  Hematological: Negative  Psychiatric/Behavioral: Negative  Physical Exam  Constitutional:       Appearance: She is well-developed  HENT:      Head: Normocephalic and atraumatic  Eyes:      Conjunctiva/sclera: Conjunctivae normal       Pupils: Pupils are equal, round, and reactive to light  Neck:      Musculoskeletal: Normal range of motion and neck supple  Cardiovascular:      Rate and Rhythm: Normal rate and regular rhythm  Heart sounds: Normal heart sounds  Pulmonary:      Effort: Pulmonary effort is normal       Breath sounds: Normal breath sounds  Abdominal:      General: Bowel sounds are normal       Palpations: Abdomen is soft  Genitourinary:     Vagina: Normal    Musculoskeletal: Normal range of motion  Skin:     General: Skin is warm  Neurological:      Mental Status: She is alert and oriented to person, place, and time           FHT: baby A  140 , baby B 150,  Positive acelerations , no decelerations, modertae variability      TOCO: no uterine activity on toco        Wet mount/KOH: positive for clue cells but without evidence of ph change or amines test negative  Ferning: negative  Nitrazine: negative  Pooling: negative  Urine Dip: no suggestive of UTI       TAUS  MVP baby A 4 9, Baby B 4 0    Speculum examination : close cervix, no evidence of membranes at level of external OS, no pooling, evidence of mild vaginal leukorrhea  No bad odor  Labs:   Recent Results (from the past 24 hour(s))   Urine Macroscopic, POC    Collection Time: 08/25/20 11:12 AM   Result Value Ref Range    Color, UA Yellow     Clarity, UA Clear     pH, UA 7 0 4 5 - 8 0    Leukocytes, UA Small (A) Negative    Nitrite, UA Negative Negative    Protein, UA Negative Negative mg/dl    Glucose, UA Negative Negative mg/dl    Ketones, UA Negative Negative mg/dl    Urobilinogen, UA 0 2 0 2, 1 0 E U /dl E U /dl    Bilirubin, UA Negative Negative    Blood, UA Negative Negative    Specific Gravity, UA 1 015 1 003 - 1 030       Lab, Imaging and other studies: I have personally reviewed pertinent reports      Regino Sanchez MD    8/25/2020  11:18 AM

## 2020-08-25 NOTE — PROCEDURES
Sondra Fitch, a [id number] at [place] [address] with an BILL of 10/18/2020, by Embryo Transfer, was seen at [facility] for the following procedure(s): $Procedure Type: EDE]          BABY A (MVP A 4 9)     BABY B (MVP A 4 0)         Greg Sánchez MD

## 2020-08-28 ENCOUNTER — ROUTINE PRENATAL (OUTPATIENT)
Dept: OBGYN CLINIC | Facility: MEDICAL CENTER | Age: 30
End: 2020-08-28
Payer: COMMERCIAL

## 2020-08-28 ENCOUNTER — HOSPITAL ENCOUNTER (OUTPATIENT)
Facility: HOSPITAL | Age: 30
Discharge: HOME | End: 2020-08-28
Attending: OBSTETRICS & GYNECOLOGY | Admitting: OBSTETRICS & GYNECOLOGY
Payer: COMMERCIAL

## 2020-08-28 VITALS
HEART RATE: 100 BPM | WEIGHT: 213 LBS | BODY MASS INDEX: 35.49 KG/M2 | DIASTOLIC BLOOD PRESSURE: 70 MMHG | TEMPERATURE: 98.6 F | HEIGHT: 65 IN | RESPIRATION RATE: 16 BRPM | SYSTOLIC BLOOD PRESSURE: 109 MMHG

## 2020-08-28 VITALS
SYSTOLIC BLOOD PRESSURE: 110 MMHG | DIASTOLIC BLOOD PRESSURE: 70 MMHG | BODY MASS INDEX: 35.45 KG/M2 | WEIGHT: 213 LBS | TEMPERATURE: 97.5 F

## 2020-08-28 DIAGNOSIS — O30.033 MONOCHORIONIC DIAMNIOTIC TWIN GESTATION IN THIRD TRIMESTER: ICD-10-CM

## 2020-08-28 DIAGNOSIS — Z3A.32 32 WEEKS GESTATION OF PREGNANCY: Primary | ICD-10-CM

## 2020-08-28 PROCEDURE — PNV: Performed by: NURSE PRACTITIONER

## 2020-08-28 PROCEDURE — 99213 OFFICE O/P EST LOW 20 MIN: CPT | Performed by: OBSTETRICS & GYNECOLOGY

## 2020-08-28 PROCEDURE — 99212 OFFICE O/P EST SF 10 MIN: CPT

## 2020-08-28 PROCEDURE — 59025 FETAL NON-STRESS TEST: CPT | Performed by: OBSTETRICS & GYNECOLOGY

## 2020-08-28 PROCEDURE — 59025 FETAL NON-STRESS TEST: CPT | Performed by: NURSE PRACTITIONER

## 2020-08-28 PROCEDURE — 76815 OB US LIMITED FETUS(S): CPT | Performed by: OBSTETRICS & GYNECOLOGY

## 2020-08-28 NOTE — DISCHARGE INSTRUCTIONS
Kick Counts in Pregnancy   WHAT YOU NEED TO KNOW:   Kick counts measure how much your baby is moving in your womb  A kick from your baby can be felt as a twist, turn, swish, roll, or jab  It is common to feel your baby kicking at 26 to 28 weeks of pregnancy  You may feel your baby kick as early as 20 weeks of pregnancy  DISCHARGE INSTRUCTIONS:   Seek care immediately if:   · You feel your baby kick less as the day goes on      · You do not feel any kicks in a day  Contact your healthcare provider if:   · You feel a change in the number of kicks or movements of your baby  · You feel fewer than 10 kicks within 2 hours after counting twice  · You have questions or concerns about your baby's movements  Why measure kick counts:  Your baby's movement may provide information about your baby's health  He may move less, or not at all, if there are problems  He may move less if he does not have enough room to grow in your uterus (womb)  He may also move less if he is not getting enough oxygen or nutrition from the placenta  Tell your healthcare provider as soon as you feel a change in your baby's movements  Problems that are found earlier are easier to treat  When to measure kick counts:   · Measure kick counts at the same time every day  · Measure kick counts when your baby is awake and most active  Your baby may be most active in the evening  · Measure kick counts after a meal or snack  Your baby may be more active after you eat  Wait 2 hours after you drink liquids that contain caffeine  Caffeine can make your baby more active than usual     · You should not smoke while you are pregnant  Smoking increases the risk of health problems for you and for your baby during your pregnancy  If you do smoke, wait 2 hours to measure kick counts  Nicotine can make your baby more active than usual   How to measure kick counts:  Check that your baby is awake before you measure kick counts   You can wake up your baby by lightly pushing on your belly, walking, or drinking something cold  Your healthcare provider may tell you different ways to measure kick counts  He may tell you to do the following:  · Use a chart or clock to keep track of the time you start and finish counting  · Sit in a chair or lie on your left side  · Place your hands on the largest part of your belly  · Count until you reach 10 kicks  Write down how much time it takes to count 10 kicks  · It may take 30 minutes to 2 hours to count 10 kicks  It should not take more than 2 hours to count 10 kicks  · If you do not feel 10 kicks within 2 hours, wait 1 hour and count again  Your baby can sleep for up to 40 minutes at one time  Follow up with your healthcare provider as directed:  Write down your questions so you remember to ask them during your visits  © 2017 [employer] Information is for End User's use only and may not be sold, redistributed or otherwise used for commercial purposes  All illustrations and images included in CareNotes® are the copyrighted property of A D A M , Inc  or Bernabe Sanderson  The above information is an  only  It is not intended as medical advice for individual conditions or treatments  Talk to your doctor, nurse or pharmacist before following any medical regimen to see if it is safe and effective for you

## 2020-08-28 NOTE — PROGRESS NOTES
Triage Note - OB  Nolberto Jones 26 y o  female MRN: [id number]  Unit/Bed#: L&D 327- Encounter: [id number]    OB TRIAGE NOTE  Nolberto Jones  [id number]  2020  11:33 AM  L&D /L&D     ASSESS:  26 y o   32w5d with monochorionic diamniotic twin pregnancy who has been directed from office after no reactive NST, patient is getting serial NSTs in addition to serial ultrasounds for a TTS surveillance  This pregnancy is complicated by  IVF, history of one  section and maternal obesity with BMI 35  Last ultrasound 20 estimated fetal weight of fetus A 2048 g 62 percentile, estimated fetal weight of fetus B 2259 g 73 percentile, MVP fetus A 5 1 versus 4 7 cm  Fetus B  Umbilical artery Dopplers were normal limits, fetus A is cephalic  Plan at for delivery 2020 scheduled repeat  section  In addition patient endorses today that last night her babies were not active as usual, and today she had felt the baby twice  She denies another OB complains as leakage of fluid, vaginal bleeding or contractions  PLAN    Reassuring fetal monitory   - NST for 60 minutes with baseline of 140 Baby A, 145 for baby B , moderate variability, acceleration 15 x 15, without evidence of decelerations  - No uterine activity on toco  - MVP baby A 2 4, baby B 2 6  - Patient instructed to call if experiencing contractions, vaginal bleeding, loss of fluid or decreased fetal movement  - Fetal kick counts was reviewed with patient  - Will follow up with  center 20  D/w Dr Fara Montano    ______________    SUBJECTIVE    BILL: Estimated Date of Delivery: 10/18/20    HPI Chronology:  26 y o  Luzma Held with monochorionic diamniotic twin pregnancy,who has been directed from office after no reactive NST, patient is getting serial NSTs in addition to serial ultrasounds for a TTS surveillance    Patient endorses today that last night her babies were not active as usual, and today she had felt the baby twice  She denies another OB complains as leakage of fluid, vaginal bleeding or contractions  Contractions: NO  Leakage: NO  Bleeding: NO  Fetal Movement: YES , decreased  Pelvic pain: no    Vitals:   /70 (BP Location: Left arm)   Pulse 100   Temp 98 6 °F (37 °C) (Oral)   Resp 16   Ht 5' 5" (1 651 m)   Wt 96 6 kg (213 lb)   BMI 35 45 kg/m²   Body mass index is 35 45 kg/m²  Review of Systems   Constitutional: Negative  HENT: Negative  Eyes: Negative  Respiratory: Negative  Cardiovascular: Negative  Gastrointestinal: Negative  Endocrine: Negative  Genitourinary: Negative  Musculoskeletal: Negative  Allergic/Immunologic: Negative  Neurological: Negative  Hematological: Negative  Psychiatric/Behavioral: Negative  Physical Exam  Constitutional:       Appearance: She is well-developed  HENT:      Head: Normocephalic and atraumatic  Eyes:      Conjunctiva/sclera: Conjunctivae normal       Pupils: Pupils are equal, round, and reactive to light  Neck:      Musculoskeletal: Normal range of motion and neck supple  Cardiovascular:      Rate and Rhythm: Normal rate and regular rhythm  Heart sounds: Normal heart sounds  Pulmonary:      Effort: Pulmonary effort is normal       Breath sounds: Normal breath sounds  Abdominal:      General: Bowel sounds are normal       Palpations: Abdomen is soft  Genitourinary:     Vagina: Normal    Musculoskeletal: Normal range of motion  Skin:     General: Skin is warm  Neurological:      Mental Status: She is alert and oriented to person, place, and time  FHT: 140 baby A, 145 Baby B      TOCO: no uterine activity     TRANSABDOMINAL  ULTRASOUND  MVP baby A 2 4,  MVP baby B 2 6, fetal activity and respiratory movements visualized in both babies    Labs: No results found for this or any previous visit (from the past 24 hour(s))      Lab, Imaging and other studies: I have personally reviewed pertinent reports      Amanuel Wu MD    8/28/2020  11:33 AM

## 2020-08-28 NOTE — PROGRESS NOTES
Ron Blackman is a 26y o  year old  at [facility] for routine prenatal visit    + FM, no vaginal bleeding, contractions, or LOF  Complaints: Yes c/o decreased fetal movement today  Most recent ultrasound and labs reviewed    nst difficult to read strip, sent to L+ D for evaluation

## 2020-08-31 ENCOUNTER — TELEPHONE (OUTPATIENT)
Dept: PERINATAL CARE | Facility: OTHER | Age: 30
End: 2020-08-31

## 2020-08-31 NOTE — TELEPHONE ENCOUNTER
Attempted to reach patient by phone and left voicemail to confirm appointment for MFM ultrasound  1 support person ( must be over the age of 15) may accompany you for your appointment  If you or your support person have traveled outside the state in the past 2 weeks, please call and notify our office today #[phone]  You and your support person must wear a mask ,covering nose and mouth,during your entire visit  You and your support person will have temperature screened upon arrival     To minimize your exposure in our waiting room, please call our office prior to entering the building  Check in and rooming questions will be done via phone  We will give you directions when to enter for your appointment  Inside office # provided:  [facility] line: [phone]  [place] line:  [phone]  [facility] line:  [address] line:  [phone]  [place] line:  [phone]  [place] line:  [phone]    IF you are not feeling well- cough, fever, shortness of breath or any flu like symptoms, contact your primary care physician or [phone]a Casillas    Any questions with these instructions please call Maternal Fetal Medicine nurse line today @ # [phone]

## 2020-09-01 ENCOUNTER — ULTRASOUND (OUTPATIENT)
Dept: PERINATAL CARE | Facility: CLINIC | Age: 30
End: 2020-09-01
Payer: COMMERCIAL

## 2020-09-01 ENCOUNTER — TELEPHONE (OUTPATIENT)
Dept: PERINATAL CARE | Facility: CLINIC | Age: 30
End: 2020-09-01

## 2020-09-01 VITALS
HEIGHT: 65 IN | HEART RATE: 93 BPM | SYSTOLIC BLOOD PRESSURE: 101 MMHG | DIASTOLIC BLOOD PRESSURE: 66 MMHG | WEIGHT: 215.8 LBS | BODY MASS INDEX: 35.96 KG/M2 | TEMPERATURE: 98 F

## 2020-09-01 DIAGNOSIS — O09.813 PREGNANCY RESULTING FROM IN VITRO FERTILIZATION IN THIRD TRIMESTER: ICD-10-CM

## 2020-09-01 DIAGNOSIS — O30.033 MONOCHORIONIC DIAMNIOTIC TWIN GESTATION IN THIRD TRIMESTER: ICD-10-CM

## 2020-09-01 DIAGNOSIS — Z3A.33 33 WEEKS GESTATION OF PREGNANCY: Primary | ICD-10-CM

## 2020-09-01 PROCEDURE — 76820 UMBILICAL ARTERY ECHO: CPT | Performed by: OBSTETRICS & GYNECOLOGY

## 2020-09-01 PROCEDURE — 76815 OB US LIMITED FETUS(S): CPT | Performed by: OBSTETRICS & GYNECOLOGY

## 2020-09-01 PROCEDURE — 59025 FETAL NON-STRESS TEST: CPT | Performed by: OBSTETRICS & GYNECOLOGY

## 2020-09-01 PROCEDURE — 76818 FETAL BIOPHYS PROFILE W/NST: CPT | Performed by: OBSTETRICS & GYNECOLOGY

## 2020-09-01 NOTE — TELEPHONE ENCOUNTER
Spoke to patient to change nst/gerardo to 9/8/20 in B as per recommendation from Dr Cal Valverde  She is still on the schedule for non stress test only in B on 9/11/20

## 2020-09-01 NOTE — PROGRESS NOTES
Ob ultrasound    Fetal ultrasound examination for evaluation of EDE  NST and MCA Doppler at  33 2/7 weeks gestation  Hx of Monochorionic diamniotic twin pregnancy  She is asymptomatic  See Ob procedures in EPIC  1  EDE  wnl 3 5 Fetus A  and 2 4 fetus B      2  NST Reactive for both fetuses  3  BPP 8/8 in fetus B which was done as there was an elevation in baseline in  otherwise reactive NST  4  MCA Doppler was not measured in fetus B due to technical limitations  It was normal in Twin A with PSV = 1 35 MoMs  It is considered normal if  not > 1 5 MoMs  Recommendations; 1  Twice a week NSTs, once a week EDE and MCA Doppler      2  Daily fetal movement counts  Thank you for referring your patient to our offices  If you have any further questions do not hesitate to contact us as [phone]      Ben Kinsey MD

## 2020-09-01 NOTE — PATIENT INSTRUCTIONS

## 2020-09-03 NOTE — PROGRESS NOTES
NST today - both babies are reactive no decelerations and no contraction   Cont the m surveillance with 2 x week NST and ttts monitoring     Scheduled for c section on  - 20    Last US - - reviewed by M and all good to cont the same surveillance    Last week decreased fetal movement - today normal movement    Pt for steroids on  for scheduled  delivery on       Next week will get NST 2 x at [facility]

## 2020-09-04 ENCOUNTER — TELEPHONE (OUTPATIENT)
Dept: PERINATAL CARE | Facility: CLINIC | Age: 30
End: 2020-09-04

## 2020-09-04 ENCOUNTER — ROUTINE PRENATAL (OUTPATIENT)
Dept: OBGYN CLINIC | Facility: MEDICAL CENTER | Age: 30
End: 2020-09-04
Payer: COMMERCIAL

## 2020-09-04 VITALS — DIASTOLIC BLOOD PRESSURE: 80 MMHG | BODY MASS INDEX: 35.94 KG/M2 | WEIGHT: 216 LBS | SYSTOLIC BLOOD PRESSURE: 124 MMHG

## 2020-09-04 DIAGNOSIS — O30.033 MONOCHORIONIC DIAMNIOTIC TWIN GESTATION IN THIRD TRIMESTER: Primary | ICD-10-CM

## 2020-09-04 PROCEDURE — 59025 FETAL NON-STRESS TEST: CPT | Performed by: OBSTETRICS & GYNECOLOGY

## 2020-09-04 PROCEDURE — PNV: Performed by: OBSTETRICS & GYNECOLOGY

## 2020-09-04 NOTE — TELEPHONE ENCOUNTER
Spoke with patient and confirmed appointment with MFM  1 support person ( must be over age of 15) may accompany patient  Will you and your support person be able to wear a mask ,without a valve , during entire appointment? YES   To minimize your exposure in our waiting area,check in and rooming questions will be done via phone  When you arrive in the parking lot please call the following inside line # prior to entering office:    [facility] [phone] line: [facility] line:  [phone] line: [phone]  [facility] line:  [phone]  [place] line: [phone]    Have you or your support person traveled outside the state in the last 2 weeks? NO   If yes, what state did you travel to? NO     Do you or your support person have:  Fever or flu- like symptoms? NO  Symptoms of upper respiratory infection like runny nose, sore throat or cough? NO  Do you have new headache that you have not had in the past?NO  Have you experienced any new shortness of breath recently? NO  Do you have any new loss of taste or smell? NO  Do you have any new diarrhea, nausea or vomiting? NO  Have you recently been in contact with anyone who has been sick or diagnosed with COVID-19 infection? NO  Have you been recommended to quarantine because of an exposure to a confirmed positive COVID19 person? NO  You and your support person will have temperature screening upon arrival   Patient verbalized understanding of all instructions   -------------------------------------------------------------        IF you are not feeling well- cough, fever, shortness of breath or any flu like symptoms, contact your primary care physician or 1-036[facility]    Any questions with these instructions please call Maternal Fetal Medicine nurse line today @ # [phone]

## 2020-09-08 ENCOUNTER — TELEPHONE (OUTPATIENT)
Dept: PERINATAL CARE | Facility: CLINIC | Age: 30
End: 2020-09-08

## 2020-09-08 ENCOUNTER — ULTRASOUND (OUTPATIENT)
Dept: PERINATAL CARE | Facility: CLINIC | Age: 30
End: 2020-09-08
Payer: COMMERCIAL

## 2020-09-08 VITALS
BODY MASS INDEX: 36.35 KG/M2 | HEIGHT: 65 IN | SYSTOLIC BLOOD PRESSURE: 110 MMHG | WEIGHT: 218.2 LBS | DIASTOLIC BLOOD PRESSURE: 73 MMHG | HEART RATE: 93 BPM | TEMPERATURE: 98.3 F

## 2020-09-08 DIAGNOSIS — O43.023 TWIN TO TWIN TRANSFUSION IN THIRD TRIMESTER: ICD-10-CM

## 2020-09-08 DIAGNOSIS — Z3A.34 34 WEEKS GESTATION OF PREGNANCY: ICD-10-CM

## 2020-09-08 DIAGNOSIS — O30.033 MONOCHORIONIC DIAMNIOTIC TWIN GESTATION IN THIRD TRIMESTER: Primary | ICD-10-CM

## 2020-09-08 PROCEDURE — 76821 MIDDLE CEREBRAL ARTERY ECHO: CPT | Performed by: OBSTETRICS & GYNECOLOGY

## 2020-09-08 PROCEDURE — 99212 OFFICE O/P EST SF 10 MIN: CPT | Performed by: OBSTETRICS & GYNECOLOGY

## 2020-09-08 PROCEDURE — 76815 OB US LIMITED FETUS(S): CPT | Performed by: OBSTETRICS & GYNECOLOGY

## 2020-09-08 PROCEDURE — 59025 FETAL NON-STRESS TEST: CPT | Performed by: OBSTETRICS & GYNECOLOGY

## 2020-09-08 RX ORDER — BETAMETHASONE SODIUM PHOSPHATE AND BETAMETHASONE ACETATE 3; 3 MG/ML; MG/ML
12 INJECTION, SUSPENSION INTRA-ARTICULAR; INTRALESIONAL; INTRAMUSCULAR; SOFT TISSUE EVERY 24 HOURS
Status: COMPLETED | OUTPATIENT
Start: 2020-09-08 | End: 2020-09-09

## 2020-09-08 RX ADMIN — BETAMETHASONE ACETATE AND BETAMETHASONE SODIUM PHOSPHATE 12 MG: 3; 3 INJECTION, SUSPENSION INTRA-ARTICULAR; INTRALESIONAL; INTRAMUSCULAR; SOFT TISSUE at 11:14

## 2020-09-08 NOTE — LETTER
2020     Genia Patel MD  [facility]    Patient: Radha Hernandez   YOB: 1990   Date of Visit: 2020       Dear Dr Rohit Choudhury: Thank you for referring Trung Harrison to me for evaluation  Below are my notes for this consultation  If you have questions, please do not hesitate to call me  I look forward to following your patient along with you  Sincerely,        Luis Alberto Curry MD        CC: No Recipients  Luis Alberto Curry MD  2020  6:58 PM  Sign when Signing Visit  Radha Hernandez has no complaints today  She reports regular fetal movements and denies problems related to hypertension, gestational diabetes,  labor, or vaginal bleeding  Her prenatal course has apparently been unremarkable in the interim since her most recent visit here  Problem list:  1  Monochorionic diamniotic twin pregnancy    NST:  Is reactive and reassuring for both babies  Ultrasound findings: There is no sign of hydrops on either baby  Baby B's peak systolic velocity 24-46YQ/CMW seems more elevated than baby A's 52 cm/sec and at times seems to suggest there may be developing some signs of fetal anemia  74 cm/sec is at the 1 51 MOM hich can be a sign of fetal anemia  Baby B though has persistent breathing during the monitoring of the MCA Dopplers which can cause a false positive elevation of the peak systolic velocity  Pregnancy ultrasound has limitations and is unable to detect all forms of fetal congenital abnormalities  The inaccuracy in the EFW can be off by 1 lb either way in the third trimester  Follow up: Will planned steroids today and tomorrow and will repeat her Dopplers and NST tomorrow  The majority of time (greater then 50%) was spent on counseling and coordination of care of this patient and /or family member  Approximate face to face time was 10 minutes            Luis Alberto Curry MD

## 2020-09-08 NOTE — TELEPHONE ENCOUNTER
Spoke with patient and confirmed appointment with MFM  1 support person ( must be over age of 15) may accompany patient  Will you and your support person be able to wear a mask ,without a valve , during entire appointment? yes   To minimize your exposure in our waiting area,check in and rooming questions will be done via phone  When you arrive in the parking lot please call the following inside line # prior to entering office:    [facility] [phone] line: 280 [facility] line:  [phone] line: [phone]  [facility] line:  [phone]  [place] line: [phone]    Have you or your support person traveled outside the state in the last 2 weeks?no   If yes, what state did you travel to? no     Do you or your support person have:  Fever or flu- like symptoms?no  Symptoms of upper respiratory infection like runny nose, sore throat or cough? no  Do you have new headache that you have not had in the past?no  Have you experienced any new shortness of breath recently?no  Do you have any new loss of taste or smell?no  Do you have any new diarrhea, nausea or vomiting?no  Have you recently been in contact with anyone who has been sick or diagnosed with COVID-19 infection?no  Have you been recommended to quarantine because of an exposure to a confirmed positive COVID19 person?no  You and your support person will have temperature screening upon arrival   Patient verbalized understanding of all instructions

## 2020-09-08 NOTE — PROGRESS NOTES
Magda Neves has no complaints today  She reports regular fetal movements and denies problems related to hypertension, gestational diabetes,  labor, or vaginal bleeding  Her prenatal course has apparently been unremarkable in the interim since her most recent visit here  Problem list:  1  Monochorionic diamniotic twin pregnancy    NST:  Is reactive and reassuring for both babies  Ultrasound findings: There is no sign of hydrops on either baby  Baby B's peak systolic velocity 06-17LY/UYL seems more elevated than baby A's 52 cm/sec and at times seems to suggest there may be developing some signs of fetal anemia  74 cm/sec is at the 1 51 MOM hich can be a sign of fetal anemia  Baby B though has persistent breathing during the monitoring of the MCA Dopplers which can cause a false positive elevation of the peak systolic velocity  Pregnancy ultrasound has limitations and is unable to detect all forms of fetal congenital abnormalities  The inaccuracy in the EFW can be off by 1 lb either way in the third trimester  Follow up: Will planned steroids today and tomorrow and will repeat her Dopplers and NST tomorrow  The majority of time (greater then 50%) was spent on counseling and coordination of care of this patient and /or family member  Approximate face to face time was 10 minutes            Jane Cantrell MD

## 2020-09-09 ENCOUNTER — ULTRASOUND (OUTPATIENT)
Dept: PERINATAL CARE | Facility: CLINIC | Age: 30
End: 2020-09-09
Payer: COMMERCIAL

## 2020-09-09 ENCOUNTER — TELEPHONE (OUTPATIENT)
Dept: PERINATAL CARE | Facility: OTHER | Age: 30
End: 2020-09-09

## 2020-09-09 VITALS
SYSTOLIC BLOOD PRESSURE: 112 MMHG | DIASTOLIC BLOOD PRESSURE: 64 MMHG | TEMPERATURE: 97.1 F | HEART RATE: 97 BPM | HEIGHT: 65 IN | BODY MASS INDEX: 36.12 KG/M2 | WEIGHT: 216.8 LBS

## 2020-09-09 DIAGNOSIS — O30.033 MONOCHORIONIC DIAMNIOTIC TWIN GESTATION IN THIRD TRIMESTER: Primary | ICD-10-CM

## 2020-09-09 DIAGNOSIS — Z3A.34 34 WEEKS GESTATION OF PREGNANCY: ICD-10-CM

## 2020-09-09 PROCEDURE — 76815 OB US LIMITED FETUS(S): CPT | Performed by: OBSTETRICS & GYNECOLOGY

## 2020-09-09 PROCEDURE — 59025 FETAL NON-STRESS TEST: CPT | Performed by: OBSTETRICS & GYNECOLOGY

## 2020-09-09 PROCEDURE — 99212 OFFICE O/P EST SF 10 MIN: CPT | Performed by: OBSTETRICS & GYNECOLOGY

## 2020-09-09 RX ADMIN — BETAMETHASONE ACETATE AND BETAMETHASONE SODIUM PHOSPHATE 12 MG: 3; 3 INJECTION, SUSPENSION INTRA-ARTICULAR; INTRALESIONAL; INTRAMUSCULAR; SOFT TISSUE at 10:53

## 2020-09-09 NOTE — TELEPHONE ENCOUNTER
Attempted to reach patient by phone and left voicemail to confirm appointment for MFM ultrasound  1 support person ( must be over the age of 15) may accompany you for your appointment  If you or your support person have traveled outside the state in the past 2 weeks, please call and notify our office today #[phone]  You and your support person must wear a mask ,covering nose and mouth,during your entire visit  You and your support person will have temperature screened upon arrival     To minimize your exposure in our waiting room, please call our office prior to entering the building  Check in and rooming questions will be done via phone  We will give you directions when to enter for your appointment  Inside office # provided:  [facility] line: [phone]      IF you are not feeling well- cough, fever, shortness of breath or any flu like symptoms, contact your primary care physician or [address] Tami Schneider    Any questions with these instructions please call Maternal Fetal Medicine nurse line today @ # [phone]

## 2020-09-09 NOTE — LETTER
NST sleeve cover sheet    Patient name: Ruby Layton  : 1990  MRN: [id number]    BILL: Estimated Date of Delivery: 10/18/20    Obstetrician: [facility]__    Reason(s) for testing:  _______Mono/Di Twins_____________      Testing frequency:    ___ 2x/wk  ___ 1x/wk  ___ Dopplers  ___ BPP?       Last growth scan: __________________________________________

## 2020-09-10 ENCOUNTER — TELEPHONE (OUTPATIENT)
Dept: PERINATAL CARE | Facility: CLINIC | Age: 30
End: 2020-09-10

## 2020-09-10 NOTE — TELEPHONE ENCOUNTER
Spoke with patient and confirmed appointment with MFM  1 support person ( must be over age of 25) may accompany patient  Will you and your support person be able to wear a mask ,without a valve , during entire appointment? yes   To minimize your exposure in our waiting area,check in and rooming questions will be done via phone  When you arrive in the parking lot please call the following inside line # prior to entering office:    Lydia Castillo [phone] line: 280 [facility] line:  [address] line: [phone]  Isha Mccartney line:  [phone]  [place] line: [phone]    Have you or your support person traveled outside the state in the last 2 weeks?no   If yes, what state did you travel to? no     Do you or your support person have:  Fever or flu- like symptoms?no  Symptoms of upper respiratory infection like runny nose, sore throat or cough? no  Do you have new headache that you have not had in the past?no  Have you experienced any new shortness of breath recently?no  Do you have any new loss of taste or smell?no  Do you have any new diarrhea, nausea or vomiting?no  Have you recently been in contact with anyone who has been sick or diagnosed with COVID-19 infection?no  Have you been recommended to quarantine because of an exposure to a confirmed positive COVID19 person?no  You and your support person will have temperature screening upon arrival   Patient verbalized understanding of all instructions

## 2020-09-11 ENCOUNTER — ROUTINE PRENATAL (OUTPATIENT)
Dept: PERINATAL CARE | Facility: CLINIC | Age: 30
End: 2020-09-11
Payer: COMMERCIAL

## 2020-09-11 ENCOUNTER — TELEPHONE (OUTPATIENT)
Dept: PERINATAL CARE | Facility: CLINIC | Age: 30
End: 2020-09-11

## 2020-09-11 VITALS
WEIGHT: 219.6 LBS | HEIGHT: 65 IN | DIASTOLIC BLOOD PRESSURE: 58 MMHG | SYSTOLIC BLOOD PRESSURE: 110 MMHG | HEART RATE: 88 BPM | TEMPERATURE: 98.6 F | BODY MASS INDEX: 36.59 KG/M2

## 2020-09-11 DIAGNOSIS — O30.033 MONOCHORIONIC DIAMNIOTIC TWIN GESTATION IN THIRD TRIMESTER: ICD-10-CM

## 2020-09-11 DIAGNOSIS — Z3A.34 34 WEEKS GESTATION OF PREGNANCY: Primary | ICD-10-CM

## 2020-09-11 PROCEDURE — 59025 FETAL NON-STRESS TEST: CPT | Performed by: OBSTETRICS & GYNECOLOGY

## 2020-09-11 NOTE — TELEPHONE ENCOUNTER
Spoke with patient and confirmed appointment with MFM  1 support person ( must be over age of 15) may accompany patient  Will you and your support person be able to wear a mask ,without a valve , during entire appointment? yes   To minimize your exposure in our waiting area,check in and rooming questions will be done via phone  When you arrive in the parking lot please call the following inside line # prior to entering office:    Eulalia Vazquez [phone] line: 396 [facility] line:  [address] line: [phone]  [place] line:  [phone]  [place] line: [phone]    Have you or your support person traveled outside the state in the last 2 weeks?no   If yes, what state did you travel to? no     Do you or your support person have:  Fever or flu- like symptoms?no  Symptoms of upper respiratory infection like runny nose, sore throat or cough? no  Do you have new headache that you have not had in the past?no  Have you experienced any new shortness of breath recently?no  Do you have any new loss of taste or smell?no  Do you have any new diarrhea, nausea or vomiting?no  Have you recently been in contact with anyone who has been sick or diagnosed with COVID-19 infection?no  Have you been recommended to quarantine because of an exposure to a confirmed positive COVID19 person?no  You and your support person will have temperature screening upon arrival   Patient verbalized understanding of all instructions

## 2020-09-11 NOTE — PROGRESS NOTES
Please refer to the [facility] ultrasound report in Ob Procedures for additional information regarding today's visit

## 2020-09-14 ENCOUNTER — HOSPITAL ENCOUNTER (OUTPATIENT)
Facility: HOSPITAL | Age: 30
Discharge: HOME | End: 2020-09-14
Attending: OBSTETRICS & GYNECOLOGY | Admitting: OBSTETRICS & GYNECOLOGY
Payer: COMMERCIAL

## 2020-09-14 ENCOUNTER — ULTRASOUND (OUTPATIENT)
Dept: PERINATAL CARE | Facility: CLINIC | Age: 30
End: 2020-09-14
Payer: COMMERCIAL

## 2020-09-14 VITALS — WEIGHT: 219.2 LBS | HEIGHT: 65 IN | TEMPERATURE: 97.5 F | BODY MASS INDEX: 36.52 KG/M2

## 2020-09-14 VITALS
TEMPERATURE: 98.5 F | WEIGHT: 217 LBS | DIASTOLIC BLOOD PRESSURE: 61 MMHG | HEIGHT: 65 IN | SYSTOLIC BLOOD PRESSURE: 117 MMHG | RESPIRATION RATE: 16 BRPM | HEART RATE: 103 BPM | BODY MASS INDEX: 36.15 KG/M2

## 2020-09-14 DIAGNOSIS — R51.9 PREGNANCY HEADACHE IN THIRD TRIMESTER: Primary | ICD-10-CM

## 2020-09-14 DIAGNOSIS — Z3A.35 35 WEEKS GESTATION OF PREGNANCY: Primary | ICD-10-CM

## 2020-09-14 DIAGNOSIS — O30.033 MONOCHORIONIC DIAMNIOTIC TWIN GESTATION IN THIRD TRIMESTER: ICD-10-CM

## 2020-09-14 DIAGNOSIS — O26.893 PREGNANCY HEADACHE IN THIRD TRIMESTER: Primary | ICD-10-CM

## 2020-09-14 LAB
ALBUMIN SERPL BCP-MCNC: 2.2 G/DL (ref 3.5–5)
ALP SERPL-CCNC: 139 U/L (ref 46–116)
ALT SERPL W P-5'-P-CCNC: 17 U/L (ref 12–78)
ANION GAP SERPL CALCULATED.3IONS-SCNC: 8 MMOL/L (ref 4–13)
AST SERPL W P-5'-P-CCNC: 24 U/L (ref 5–45)
BACTERIA UR QL AUTO: ABNORMAL /HPF
BASOPHILS # BLD AUTO: 0.02 THOUSANDS/ΜL (ref 0–0.1)
BASOPHILS NFR BLD AUTO: 0 % (ref 0–1)
BILIRUB SERPL-MCNC: 0.25 MG/DL (ref 0.2–1)
BILIRUB UR QL STRIP: NEGATIVE
BUN SERPL-MCNC: 8 MG/DL (ref 5–25)
CALCIUM SERPL-MCNC: 8.2 MG/DL (ref 8.3–10.1)
CHLORIDE SERPL-SCNC: 103 MMOL/L (ref 100–108)
CLARITY UR: ABNORMAL
CO2 SERPL-SCNC: 24 MMOL/L (ref 21–32)
COLOR UR: YELLOW
CREAT SERPL-MCNC: 0.52 MG/DL (ref 0.6–1.3)
CREAT UR-MCNC: 118.4 MG/DL
EOSINOPHIL # BLD AUTO: 0.12 THOUSAND/ΜL (ref 0–0.61)
EOSINOPHIL NFR BLD AUTO: 2 % (ref 0–6)
ERYTHROCYTE [DISTWIDTH] IN BLOOD BY AUTOMATED COUNT: 15 % (ref 11.6–15.1)
GFR SERPL CREATININE-BSD FRML MDRD: 129 ML/MIN/1.73SQ M
GLUCOSE SERPL-MCNC: 121 MG/DL (ref 65–140)
GLUCOSE UR STRIP-MCNC: NEGATIVE MG/DL
HCT VFR BLD AUTO: 30.6 % (ref 34.8–46.1)
HGB BLD-MCNC: 9.8 G/DL (ref 11.5–15.4)
HGB UR QL STRIP.AUTO: NEGATIVE
IMM GRANULOCYTES # BLD AUTO: 0.16 THOUSAND/UL (ref 0–0.2)
IMM GRANULOCYTES NFR BLD AUTO: 2 % (ref 0–2)
KETONES UR STRIP-MCNC: NEGATIVE MG/DL
LEUKOCYTE ESTERASE UR QL STRIP: NEGATIVE
LYMPHOCYTES # BLD AUTO: 1.95 THOUSANDS/ΜL (ref 0.6–4.47)
LYMPHOCYTES NFR BLD AUTO: 25 % (ref 14–44)
MCH RBC QN AUTO: 33.4 PG (ref 26.8–34.3)
MCHC RBC AUTO-ENTMCNC: 32 G/DL (ref 31.4–37.4)
MCV RBC AUTO: 104 FL (ref 82–98)
MONOCYTES # BLD AUTO: 0.59 THOUSAND/ΜL (ref 0.17–1.22)
MONOCYTES NFR BLD AUTO: 8 % (ref 4–12)
NEUTROPHILS # BLD AUTO: 4.89 THOUSANDS/ΜL (ref 1.85–7.62)
NEUTS SEG NFR BLD AUTO: 63 % (ref 43–75)
NITRITE UR QL STRIP: NEGATIVE
NON-SQ EPI CELLS URNS QL MICRO: ABNORMAL /HPF
NRBC BLD AUTO-RTO: 0 /100 WBCS
PH UR STRIP.AUTO: 6.5 [PH]
PLATELET # BLD AUTO: 148 THOUSANDS/UL (ref 149–390)
PMV BLD AUTO: 10.1 FL (ref 8.9–12.7)
POTASSIUM SERPL-SCNC: 3.3 MMOL/L (ref 3.5–5.3)
PROT SERPL-MCNC: 5.7 G/DL (ref 6.4–8.2)
PROT UR STRIP-MCNC: NEGATIVE MG/DL
PROT UR-MCNC: 20 MG/DL
PROT/CREAT UR: 0.17 MG/G{CREAT} (ref 0–0.1)
RBC # BLD AUTO: 2.93 MILLION/UL (ref 3.81–5.12)
RBC #/AREA URNS AUTO: ABNORMAL /HPF
SODIUM SERPL-SCNC: 135 MMOL/L (ref 136–145)
SP GR UR STRIP.AUTO: 1.02 (ref 1–1.03)
UROBILINOGEN UR QL STRIP.AUTO: 0.2 E.U./DL
WBC # BLD AUTO: 7.73 THOUSAND/UL (ref 4.31–10.16)
WBC #/AREA URNS AUTO: ABNORMAL /HPF

## 2020-09-14 PROCEDURE — 81001 URINALYSIS AUTO W/SCOPE: CPT | Performed by: OBSTETRICS & GYNECOLOGY

## 2020-09-14 PROCEDURE — 84156 ASSAY OF PROTEIN URINE: CPT | Performed by: OBSTETRICS & GYNECOLOGY

## 2020-09-14 PROCEDURE — 82570 ASSAY OF URINE CREATININE: CPT | Performed by: OBSTETRICS & GYNECOLOGY

## 2020-09-14 PROCEDURE — 76815 OB US LIMITED FETUS(S): CPT | Performed by: OBSTETRICS & GYNECOLOGY

## 2020-09-14 PROCEDURE — 59025 FETAL NON-STRESS TEST: CPT | Performed by: OBSTETRICS & GYNECOLOGY

## 2020-09-14 PROCEDURE — NC001 PR NO CHARGE: Performed by: OBSTETRICS & GYNECOLOGY

## 2020-09-14 PROCEDURE — 80053 COMPREHEN METABOLIC PANEL: CPT | Performed by: OBSTETRICS & GYNECOLOGY

## 2020-09-14 PROCEDURE — 85025 COMPLETE CBC W/AUTO DIFF WBC: CPT | Performed by: OBSTETRICS & GYNECOLOGY

## 2020-09-14 PROCEDURE — 99213 OFFICE O/P EST LOW 20 MIN: CPT

## 2020-09-14 RX ORDER — BUTALBITAL, ACETAMINOPHEN AND CAFFEINE 50; 325; 40 MG/1; MG/1; MG/1
2 TABLET ORAL ONCE
Status: COMPLETED | OUTPATIENT
Start: 2020-09-14 | End: 2020-09-14

## 2020-09-14 RX ORDER — CYPROHEPTADINE HYDROCHLORIDE 4 MG/1
4 TABLET ORAL
Qty: 30 TABLET | Refills: 0 | Status: SHIPPED | OUTPATIENT
Start: 2020-09-14 | End: 2020-11-04

## 2020-09-14 RX ORDER — POTASSIUM CHLORIDE 20 MEQ/1
40 TABLET, EXTENDED RELEASE ORAL ONCE
Status: COMPLETED | OUTPATIENT
Start: 2020-09-14 | End: 2020-09-14

## 2020-09-14 RX ADMIN — BUTALBITAL, ACETAMINOPHEN AND CAFFEINE 2 TABLET: 50; 325; 40 TABLET ORAL at 12:50

## 2020-09-14 RX ADMIN — POTASSIUM CHLORIDE 40 MEQ: 1500 TABLET, EXTENDED RELEASE ORAL at 14:36

## 2020-09-14 NOTE — PROGRESS NOTES
L&D Triage Note - OB/GYN  Alexa Heading 26 y o  female MRN: [id number]  Unit/Bed#: L&D 328-01 Encounter: [id number]      Assessment:  26 y o   at 35w1d with headache    Plan:  1  Rule out preeclampsia  - /61 with normal blood pressures throughout pregnancy  - P/Cr 0 17, CBC, UA WNL  - CMP notable for hypokalemia (3 3)   - K-Dur 40 meq PO  - Patient does not meet criteria for preeclampsia or gestational hypertension at this time  - NST reactive for both babies  2  Headache  - Fioricet -40mg x2  - patient reported mild improvement headache following Fioricet  Plan to discharge home with p o  headache regimen x7 days sent to pharmacy:   - Coenzyme Q10 200mg po qd   - Riboflavin 400mg po qd   - Magnesium oxide 400mg po qd   - Cyproheptadine 4mg qHs  - plan for patient to follow-up in office as scheduled on Thursday with Dr Cruz Castro  - review labor precautions with patient    Discussed with Dr Yinka Blood    ______________________________________________________________________      Chief Compliant:  Headache    TIME: 12:19 PM  Subjective:  26 y o  Avelino Right at 35w1d with mono-di twins presents from  center for evaluation of headache  Patient states she has had a headache for 3 days with no relief from Tylenol  She also reports spotting in her left eye when she wakes up in the morning uptake 45 minutes to resolve  Patient describes headache as left-sided and dull continuous pain  She has no obstetric complaints including contractions, loss of fluid, vaginal bleeding  She reports good fetal movement and had a reactive NST at  center earlier today  Her blood pressures throughout this pregnancy have been normal and she has never met criteria for gestational hypertension or chronic hypertension  She has no history of migraines        Objective:  Vitals:    20 1139   BP: 117/61   Pulse: 103   Resp: 16   Temp: 98 5 °F (36 9 °C)       SVE: deferred  Baby A: 145/moderate variability/ accelerations, no decelerations  Baby B: 150/moderate variability/ accelerations, no decelerations  [place]: [id number]    Physical Exam  Constitutional:       General: She is not in acute distress  Appearance: She is well-developed  HENT:      Head: Normocephalic and atraumatic  Cardiovascular:      Rate and Rhythm: Normal rate and regular rhythm  Heart sounds: Normal heart sounds  Pulmonary:      Effort: Pulmonary effort is normal  No respiratory distress  Breath sounds: Normal breath sounds  Abdominal:      Palpations: Abdomen is soft  Tenderness: There is no abdominal tenderness  Neurological:      Mental Status: She is alert and oriented to person, place, and time  Skin:     General: Skin is warm and dry  Psychiatric:         Behavior: Behavior normal    Vitals signs reviewed         Lab Results   Component Value Date    WBC 7 73 09/14/2020    HGB 9 8 (L) 09/14/2020    HCT 30 6 (L) 09/14/2020     (H) 09/14/2020     (L) 09/14/2020     Lab Results   Component Value Date     04/11/2016    SODIUM 135 (L) 09/14/2020    K 3 3 (L) 09/14/2020     09/14/2020    CO2 24 09/14/2020    ANIONGAP 7 08/29/2013    AGAP 8 09/14/2020    BUN 8 09/14/2020    CREATININE 0 52 (L) 09/14/2020    GLUC 121 09/14/2020    GLUF 84 07/17/2020    CALCIUM 8 2 (L) 09/14/2020    AST 24 09/14/2020    ALT 17 09/14/2020    ALKPHOS 139 (H) 09/14/2020    PROT 7 0 04/11/2016    TP 5 7 (L) 09/14/2020    BILITOT 0 5 04/11/2016    TBILI 0 25 09/14/2020    EGFR 129 09/14/2020     Protein/Creatinine ratio: 0 17      Cordelia Krabbe, MD   OB/GYN PGY-1   9/14/2020 12:19 PM

## 2020-09-14 NOTE — PROGRESS NOTES
Please refer to the [facility] ultrasound report in Ob Procedures for additional information regarding today's visit

## 2020-09-17 ENCOUNTER — HOSPITAL ENCOUNTER (INPATIENT)
Facility: HOSPITAL | Age: 30
LOS: 2 days | Discharge: HOME | End: 2020-09-19
Attending: OBSTETRICS & GYNECOLOGY | Admitting: OBSTETRICS & GYNECOLOGY
Payer: COMMERCIAL

## 2020-09-17 ENCOUNTER — ANESTHESIA (INPATIENT)
Dept: LABOR AND DELIVERY | Facility: HOSPITAL | Age: 30
End: 2020-09-17
Payer: COMMERCIAL

## 2020-09-17 ENCOUNTER — ANESTHESIA EVENT (INPATIENT)
Dept: LABOR AND DELIVERY | Facility: HOSPITAL | Age: 30
End: 2020-09-17
Payer: COMMERCIAL

## 2020-09-17 VITALS — HEART RATE: 68 BPM

## 2020-09-17 DIAGNOSIS — Z3A.35 35 WEEKS GESTATION OF PREGNANCY: ICD-10-CM

## 2020-09-17 DIAGNOSIS — O30.033 MONOCHORIONIC DIAMNIOTIC TWIN GESTATION IN THIRD TRIMESTER: ICD-10-CM

## 2020-09-17 DIAGNOSIS — Z98.891 STATUS POST PRIMARY LOW TRANSVERSE CESAREAN SECTION: Primary | ICD-10-CM

## 2020-09-17 PROBLEM — K21.9 GASTROESOPHAGEAL REFLUX DISEASE: Status: ACTIVE | Noted: 2020-09-17

## 2020-09-17 LAB
ABO GROUP BLD: NORMAL
BASE EXCESS BLDCOA CALC-SCNC: -0.9 MMOL/L (ref 3–11)
BASE EXCESS BLDCOA CALC-SCNC: -1.1 MMOL/L (ref 3–11)
BASE EXCESS BLDCOV CALC-SCNC: -2.9 MMOL/L (ref 1–9)
BASE EXCESS BLDCOV CALC-SCNC: 2.3 MMOL/L (ref 1–9)
BASOPHILS # BLD AUTO: 0.02 THOUSANDS/ΜL (ref 0–0.1)
BASOPHILS NFR BLD AUTO: 0 % (ref 0–1)
BLD GP AB SCN SERPL QL: NEGATIVE
EOSINOPHIL # BLD AUTO: 0.12 THOUSAND/ΜL (ref 0–0.61)
EOSINOPHIL NFR BLD AUTO: 1 % (ref 0–6)
ERYTHROCYTE [DISTWIDTH] IN BLOOD BY AUTOMATED COUNT: 14.6 % (ref 11.6–15.1)
HCO3 BLDCOA-SCNC: 26.1 MMOL/L (ref 17.3–27.3)
HCO3 BLDCOA-SCNC: 26.3 MMOL/L (ref 17.3–27.3)
HCO3 BLDCOV-SCNC: 23.6 MMOL/L (ref 12.2–28.6)
HCO3 BLDCOV-SCNC: 29.3 MMOL/L (ref 12.2–28.6)
HCT VFR BLD AUTO: 34.1 % (ref 34.8–46.1)
HGB BLD-MCNC: 11.4 G/DL (ref 11.5–15.4)
IMM GRANULOCYTES # BLD AUTO: 0.1 THOUSAND/UL (ref 0–0.2)
IMM GRANULOCYTES NFR BLD AUTO: 1 % (ref 0–2)
LYMPHOCYTES # BLD AUTO: 2.1 THOUSANDS/ΜL (ref 0.6–4.47)
LYMPHOCYTES NFR BLD AUTO: 25 % (ref 14–44)
MCH RBC QN AUTO: 34 PG (ref 26.8–34.3)
MCHC RBC AUTO-ENTMCNC: 33.4 G/DL (ref 31.4–37.4)
MCV RBC AUTO: 102 FL (ref 82–98)
MONOCYTES # BLD AUTO: 0.68 THOUSAND/ΜL (ref 0.17–1.22)
MONOCYTES NFR BLD AUTO: 8 % (ref 4–12)
NEUTROPHILS # BLD AUTO: 5.49 THOUSANDS/ΜL (ref 1.85–7.62)
NEUTS SEG NFR BLD AUTO: 65 % (ref 43–75)
NRBC BLD AUTO-RTO: 0 /100 WBCS
OXYHGB MFR BLDCOA: 13.1 %
OXYHGB MFR BLDCOA: 16.4 %
OXYHGB MFR BLDCOV: 31.9 %
OXYHGB MFR BLDCOV: 32.6 %
PCO2 BLDCOA: 53.9 MM[HG] (ref 30–60)
PCO2 BLDCOA: 57.1 MM[HG] (ref 30–60)
PCO2 BLDCOV: 47.8 MM HG (ref 27–43)
PCO2 BLDCOV: 56.6 MM HG (ref 27–43)
PH BLDCOA: 7.28 [PH] (ref 7.23–7.43)
PH BLDCOA: 7.3 [PH] (ref 7.23–7.43)
PH BLDCOV: 7.31 [PH] (ref 7.19–7.49)
PH BLDCOV: 7.33 [PH] (ref 7.19–7.49)
PLATELET # BLD AUTO: 167 THOUSANDS/UL (ref 149–390)
PMV BLD AUTO: 10.7 FL (ref 8.9–12.7)
PO2 BLDCOA: <10 MM HG (ref 5–25)
PO2 BLDCOA: <10 MM HG (ref 5–25)
PO2 BLDCOV: 13.5 MM HG (ref 15–45)
PO2 BLDCOV: 16 MM HG (ref 15–45)
RBC # BLD AUTO: 3.35 MILLION/UL (ref 3.81–5.12)
RH BLD: POSITIVE
SAO2 % BLDCOV: 5.5 ML/DL
SAO2 % BLDCOV: 5.9 ML/DL
SPECIMEN EXPIRATION DATE: NORMAL
WBC # BLD AUTO: 8.51 THOUSAND/UL (ref 4.31–10.16)

## 2020-09-17 PROCEDURE — 86592 SYPHILIS TEST NON-TREP QUAL: CPT | Performed by: OBSTETRICS & GYNECOLOGY

## 2020-09-17 PROCEDURE — 59510 CESAREAN DELIVERY: CPT | Performed by: OBSTETRICS & GYNECOLOGY

## 2020-09-17 PROCEDURE — 99212 OFFICE O/P EST SF 10 MIN: CPT

## 2020-09-17 PROCEDURE — 86900 BLOOD TYPING SEROLOGIC ABO: CPT | Performed by: OBSTETRICS & GYNECOLOGY

## 2020-09-17 PROCEDURE — 58611 LIGATE OVIDUCT(S) ADD-ON: CPT | Performed by: OBSTETRICS & GYNECOLOGY

## 2020-09-17 PROCEDURE — 0UB70ZZ EXCISION OF BILATERAL FALLOPIAN TUBES, OPEN APPROACH: ICD-10-PCS | Performed by: OBSTETRICS & GYNECOLOGY

## 2020-09-17 PROCEDURE — 82805 BLOOD GASES W/O2 SATURATION: CPT | Performed by: OBSTETRICS & GYNECOLOGY

## 2020-09-17 PROCEDURE — 88307 TISSUE EXAM BY PATHOLOGIST: CPT | Performed by: PATHOLOGY

## 2020-09-17 PROCEDURE — 4A1HXCZ MONITORING OF PRODUCTS OF CONCEPTION, CARDIAC RATE, EXTERNAL APPROACH: ICD-10-PCS | Performed by: OBSTETRICS & GYNECOLOGY

## 2020-09-17 PROCEDURE — 86850 RBC ANTIBODY SCREEN: CPT | Performed by: OBSTETRICS & GYNECOLOGY

## 2020-09-17 PROCEDURE — 88302 TISSUE EXAM BY PATHOLOGIST: CPT | Performed by: PATHOLOGY

## 2020-09-17 PROCEDURE — 86901 BLOOD TYPING SEROLOGIC RH(D): CPT | Performed by: OBSTETRICS & GYNECOLOGY

## 2020-09-17 PROCEDURE — 85025 COMPLETE CBC W/AUTO DIFF WBC: CPT | Performed by: OBSTETRICS & GYNECOLOGY

## 2020-09-17 RX ORDER — OXYTOCIN/RINGER'S LACTATE 30/500 ML
62.5 PLASTIC BAG, INJECTION (ML) INTRAVENOUS CONTINUOUS
Status: DISPENSED | OUTPATIENT
Start: 2020-09-17 | End: 2020-09-17

## 2020-09-17 RX ORDER — OXYTOCIN/RINGER'S LACTATE 30/500 ML
PLASTIC BAG, INJECTION (ML) INTRAVENOUS CONTINUOUS PRN
Status: DISCONTINUED | OUTPATIENT
Start: 2020-09-17 | End: 2020-09-17

## 2020-09-17 RX ORDER — HYDROMORPHONE HCL/PF 1 MG/ML
1 SYRINGE (ML) INJECTION EVERY 2 HOUR PRN
Status: DISCONTINUED | OUTPATIENT
Start: 2020-09-18 | End: 2020-09-17

## 2020-09-17 RX ORDER — IBUPROFEN 600 MG/1
600 TABLET ORAL EVERY 6 HOURS PRN
Status: DISCONTINUED | OUTPATIENT
Start: 2020-09-17 | End: 2020-09-17

## 2020-09-17 RX ORDER — OXYCODONE HYDROCHLORIDE AND ACETAMINOPHEN 5; 325 MG/1; MG/1
1 TABLET ORAL EVERY 4 HOURS PRN
Status: DISPENSED | OUTPATIENT
Start: 2020-09-17 | End: 2020-09-18

## 2020-09-17 RX ORDER — ACETAMINOPHEN 325 MG/1
650 TABLET ORAL EVERY 4 HOURS PRN
Status: DISCONTINUED | OUTPATIENT
Start: 2020-09-17 | End: 2020-09-17

## 2020-09-17 RX ORDER — DOCUSATE SODIUM 100 MG/1
100 CAPSULE, LIQUID FILLED ORAL 2 TIMES DAILY
Status: DISCONTINUED | OUTPATIENT
Start: 2020-09-17 | End: 2020-09-17

## 2020-09-17 RX ORDER — OXYCODONE HYDROCHLORIDE 10 MG/1
10 TABLET ORAL EVERY 4 HOURS PRN
Status: DISCONTINUED | OUTPATIENT
Start: 2020-09-18 | End: 2020-09-19 | Stop reason: HOSPADM

## 2020-09-17 RX ORDER — MORPHINE SULFATE 0.5 MG/ML
INJECTION, SOLUTION EPIDURAL; INTRATHECAL; INTRAVENOUS
Status: COMPLETED
Start: 2020-09-17 | End: 2020-09-17

## 2020-09-17 RX ORDER — DIAPER,BRIEF,INFANT-TODD,DISP
1 EACH MISCELLANEOUS DAILY PRN
Status: DISCONTINUED | OUTPATIENT
Start: 2020-09-17 | End: 2020-09-17

## 2020-09-17 RX ORDER — FENTANYL CITRATE/PF 50 MCG/ML
25 SYRINGE (ML) INJECTION
Status: DISCONTINUED | OUTPATIENT
Start: 2020-09-17 | End: 2020-09-17

## 2020-09-17 RX ORDER — HYDROMORPHONE HCL/PF 1 MG/ML
0.5 SYRINGE (ML) INJECTION
Status: DISPENSED | OUTPATIENT
Start: 2020-09-17 | End: 2020-09-18

## 2020-09-17 RX ORDER — SODIUM CHLORIDE, SODIUM LACTATE, POTASSIUM CHLORIDE, CALCIUM CHLORIDE 600; 310; 30; 20 MG/100ML; MG/100ML; MG/100ML; MG/100ML
125 INJECTION, SOLUTION INTRAVENOUS CONTINUOUS
Status: DISCONTINUED | OUTPATIENT
Start: 2020-09-17 | End: 2020-09-19 | Stop reason: HOSPADM

## 2020-09-17 RX ORDER — CEFAZOLIN SODIUM 2 G/50ML
2000 SOLUTION INTRAVENOUS ONCE
Status: COMPLETED | OUTPATIENT
Start: 2020-09-17 | End: 2020-09-17

## 2020-09-17 RX ORDER — ONDANSETRON 2 MG/ML
4 INJECTION INTRAMUSCULAR; INTRAVENOUS EVERY 4 HOURS PRN
Status: ACTIVE | OUTPATIENT
Start: 2020-09-17 | End: 2020-09-18

## 2020-09-17 RX ORDER — KETOROLAC TROMETHAMINE 30 MG/ML
INJECTION, SOLUTION INTRAMUSCULAR; INTRAVENOUS AS NEEDED
Status: DISCONTINUED | OUTPATIENT
Start: 2020-09-17 | End: 2020-09-17

## 2020-09-17 RX ORDER — ONDANSETRON 2 MG/ML
4 INJECTION INTRAMUSCULAR; INTRAVENOUS ONCE AS NEEDED
Status: DISCONTINUED | OUTPATIENT
Start: 2020-09-17 | End: 2020-09-17

## 2020-09-17 RX ORDER — CALCIUM CARBONATE 200(500)MG
1000 TABLET,CHEWABLE ORAL DAILY PRN
Status: DISCONTINUED | OUTPATIENT
Start: 2020-09-17 | End: 2020-09-17

## 2020-09-17 RX ORDER — DIPHENHYDRAMINE HCL 25 MG
25 TABLET ORAL EVERY 6 HOURS PRN
Status: DISCONTINUED | OUTPATIENT
Start: 2020-09-18 | End: 2020-09-17

## 2020-09-17 RX ORDER — ONDANSETRON 2 MG/ML
INJECTION INTRAMUSCULAR; INTRAVENOUS AS NEEDED
Status: DISCONTINUED | OUTPATIENT
Start: 2020-09-17 | End: 2020-09-17

## 2020-09-17 RX ORDER — BUPIVACAINE HYDROCHLORIDE 7.5 MG/ML
INJECTION, SOLUTION INTRASPINAL AS NEEDED
Status: DISCONTINUED | OUTPATIENT
Start: 2020-09-17 | End: 2020-09-17

## 2020-09-17 RX ORDER — DIPHENHYDRAMINE HYDROCHLORIDE 50 MG/ML
25 INJECTION INTRAMUSCULAR; INTRAVENOUS EVERY 6 HOURS PRN
Status: DISPENSED | OUTPATIENT
Start: 2020-09-17 | End: 2020-09-18

## 2020-09-17 RX ORDER — ONDANSETRON 2 MG/ML
4 INJECTION INTRAMUSCULAR; INTRAVENOUS EVERY 8 HOURS PRN
Status: DISCONTINUED | OUTPATIENT
Start: 2020-09-17 | End: 2020-09-17

## 2020-09-17 RX ORDER — NALOXONE HYDROCHLORIDE 0.4 MG/ML
0.1 INJECTION, SOLUTION INTRAMUSCULAR; INTRAVENOUS; SUBCUTANEOUS
Status: ACTIVE | OUTPATIENT
Start: 2020-09-17 | End: 2020-09-18

## 2020-09-17 RX ORDER — OXYCODONE HYDROCHLORIDE 5 MG/1
5 TABLET ORAL EVERY 4 HOURS PRN
Status: DISCONTINUED | OUTPATIENT
Start: 2020-09-18 | End: 2020-09-19 | Stop reason: HOSPADM

## 2020-09-17 RX ORDER — KETOROLAC TROMETHAMINE 30 MG/ML
30 INJECTION, SOLUTION INTRAMUSCULAR; INTRAVENOUS EVERY 6 HOURS PRN
Status: DISPENSED | OUTPATIENT
Start: 2020-09-17 | End: 2020-09-18

## 2020-09-17 RX ORDER — MORPHINE SULFATE 0.5 MG/ML
INJECTION, SOLUTION EPIDURAL; INTRATHECAL; INTRAVENOUS AS NEEDED
Status: DISCONTINUED | OUTPATIENT
Start: 2020-09-17 | End: 2020-09-17

## 2020-09-17 RX ORDER — ONDANSETRON 2 MG/ML
4 INJECTION INTRAMUSCULAR; INTRAVENOUS EVERY 8 HOURS PRN
Status: DISCONTINUED | OUTPATIENT
Start: 2020-09-18 | End: 2020-09-17

## 2020-09-17 RX ADMIN — BUPIVACAINE HYDROCHLORIDE IN DEXTROSE 1.6 ML: 7.5 INJECTION, SOLUTION SUBARACHNOID at 13:26

## 2020-09-17 RX ADMIN — PHENYLEPHRINE HYDROCHLORIDE 100 MCG: 10 INJECTION INTRAVENOUS at 13:35

## 2020-09-17 RX ADMIN — DIPHENHYDRAMINE HYDROCHLORIDE 25 MG: 50 INJECTION, SOLUTION INTRAMUSCULAR; INTRAVENOUS at 15:44

## 2020-09-17 RX ADMIN — OXYCODONE HYDROCHLORIDE AND ACETAMINOPHEN 1 TABLET: 5; 325 TABLET ORAL at 17:40

## 2020-09-17 RX ADMIN — SODIUM CHLORIDE, SODIUM LACTATE, POTASSIUM CHLORIDE, AND CALCIUM CHLORIDE 125 ML/HR: .6; .31; .03; .02 INJECTION, SOLUTION INTRAVENOUS at 19:20

## 2020-09-17 RX ADMIN — SODIUM CHLORIDE, SODIUM LACTATE, POTASSIUM CHLORIDE, AND CALCIUM CHLORIDE 125 ML/HR: .6; .31; .03; .02 INJECTION, SOLUTION INTRAVENOUS at 11:59

## 2020-09-17 RX ADMIN — SODIUM CHLORIDE, SODIUM LACTATE, POTASSIUM CHLORIDE, AND CALCIUM CHLORIDE: .6; .31; .03; .02 INJECTION, SOLUTION INTRAVENOUS at 14:30

## 2020-09-17 RX ADMIN — CEFAZOLIN SODIUM 2000 MG: 2 SOLUTION INTRAVENOUS at 13:19

## 2020-09-17 RX ADMIN — ONDANSETRON 4 MG: 2 INJECTION INTRAMUSCULAR; INTRAVENOUS at 13:47

## 2020-09-17 RX ADMIN — MORPHINE SULFATE 0.15 MG: 0.5 INJECTION, SOLUTION EPIDURAL; INTRATHECAL; INTRAVENOUS at 13:26

## 2020-09-17 RX ADMIN — PHENYLEPHRINE HYDROCHLORIDE 100 MCG: 10 INJECTION INTRAVENOUS at 13:28

## 2020-09-17 RX ADMIN — PHENYLEPHRINE HYDROCHLORIDE 100 MCG: 10 INJECTION INTRAVENOUS at 13:32

## 2020-09-17 RX ADMIN — KETOROLAC TROMETHAMINE 30 MG: 30 INJECTION, SOLUTION INTRAMUSCULAR at 22:08

## 2020-09-17 RX ADMIN — Medication: at 14:25

## 2020-09-17 RX ADMIN — KETOROLAC TROMETHAMINE 30 MG: 30 INJECTION, SOLUTION INTRAMUSCULAR at 14:20

## 2020-09-17 RX ADMIN — Medication 250 MILLI-UNITS/MIN: at 13:42

## 2020-09-17 RX ADMIN — HYDROMORPHONE HYDROCHLORIDE 0.5 MG: 1 INJECTION, SOLUTION INTRAMUSCULAR; INTRAVENOUS; SUBCUTANEOUS at 19:38

## 2020-09-17 RX ADMIN — PHENYLEPHRINE HYDROCHLORIDE 100 MCG: 10 INJECTION INTRAVENOUS at 13:38

## 2020-09-17 NOTE — DISCHARGE INSTRUCTIONS
WHAT YOU NEED TO KNOW:   A , or  section, is abdominal surgery to deliver your baby  DISCHARGE INSTRUCTIONS:   Call 911 for any of the following:   · You feel lightheaded, short of breath, and have chest pain  · You cough up blood  Seek care immediately if:   · Blood soaks through your bandage  · Your stitches come apart  · Your arm or leg feels warm, tender, and painful  It may look swollen and red  Contact your OB if:   · You have heavy vaginal bleeding that fills 1 or more sanitary pads in 1 hour  · You have a fever  · Your incision is swollen, red, or draining pus  · You have questions or concerns about yourself or your baby  Medicines: You may  need any of the following:  · Prescription pain medicine  may be given  Ask how to take this medicine safely  · Acetaminophen  decreases pain and fever  It is available without a doctor's order  Ask how much to take and how often to take it  Follow directions  Acetaminophen can cause liver damage if not taken correctly  · NSAIDs , such as ibuprofen, help decrease swelling, pain, and fever  NSAIDs can cause stomach bleeding or kidney problems in certain people  If you take blood thinner medicine, always ask your healthcare provider if NSAIDs are safe for you  Always read the medicine label and follow directions  · Take your medicine as directed  Contact your healthcare provider if you think your medicine is not helping or if you have side effects  Tell him or her if you are allergic to any medicine  Keep a list of the medicines, vitamins, and herbs you take  Include the amounts, and when and why you take them  Bring the list or the pill bottles to follow-up visits  Carry your medicine list with you in case of an emergency  Wound care:  Carefully wash your wound with soap and water every day  Keep your wound clean and dry  Wear loose, comfortable clothes that do not rub against your wound   Ask your OB about bathing and showering  Limit activity as directed:   · Ask when it is safe for you to drive, walk up stairs, lift heavy objects, and have sex  · Ask when it is okay to exercise, and what types of exercise to do  Start slowly and do more as you get stronger  Drink liquids as directed:  Liquids help keep you hydrated after your procedure and decrease your risk for a blood clot  Ask how much liquid to drink each day and which liquids are best for you  Follow up with your OB as directed: You may need to return to have your stitches or staples removed  Write down your questions so you remember to ask them during your visits  © 2017 [employer] Information is for End User's use only and may not be sold, redistributed or otherwise used for commercial purposes  All illustrations and images included in CareNotes® are the copyrighted property of [employer]  or Bernabe Sanderson  The above information is an  only  It is not intended as medical advice for individual conditions or treatments  Talk to your doctor, nurse or pharmacist before following any medical regimen to see if it is safe and effective for you

## 2020-09-17 NOTE — OP NOTE
Operative Report - OB/GYN   Taylor President 26 y o  female MRN: [id number]  Unit/Bed#: L&D 304-01 Encounter: [id number]    Indications: Hx Low Transverse  Section, Desire for Repeat Low Transverse  Section,     Pre-operative Diagnosis:   1  35 week 4 day pregnancy  2  Monochorionic diamniotic twin gestation  1  IVF pregnancy    Post-operative Diagnosis: same, delivered    Surgeon: Cyrus Hines MD    Assistant(s): DO Isha    Findings:  1a  Delivery of viable male on 20 at 1340, weight 6 lbs 9 5 oz;  Apgar scores of 8 at one minute and 9 at five minutes  Umbilical artery pH 8 687 (base excess -0 9)  1b  Delivery of viable male on 20 at 1341, weight 6 lbs 4 oz;  Apgar scores of 8 at one minute and 9 at five minutes  Umbilical artery pH 5 689 (base excess -1 1)  2  Normal appearing placenta with centrally-inserted 3 vessel cord  3  Clear amniotic fluid  4  Grossly normal uterus, tubes, and ovaries    Specimens:   1  Arterial and venous cord gases  2  Cord blood  3  Segment of umbilical cord  4  Placenta to pathology    Quantified blood loss (mL): 590    Drains: Galaviz catheter           Complications:  None; patient tolerated the procedure well  Disposition: PACU            Condition: stable    Procedure Details     Decision was made to proceed with  section due to hx LTCS, desire for repeat low-transverse  section, monochorionic diamniotic twin gestation  Patient was made aware of these findings and the proposed plan  Risks, benefits, possible complications, alternate treatment options, and expected outcomes were discussed with the patient  The patient agreed with the proposed plan and gave informed consent  The patient was taken to the operating room where she was properly identified to the OR staff and attending physician  She received spinal anesthesia preoperatively  Fetal heart tones were appreciated and found to be appropriate   A Galaviz catheter was aseptically inserted and SCDs were placed  The vagina was prepped with betadine and the abdomen was prepped with Chloraprep  Following appropriate drying time, the patient was draped in the usual sterile manner for a Pfannenstiel incision  The patient had received Ancef 2 g IV pre-operatively for prophylaxis  A Time Out was held and the above information confirmed  The patient was identified as Rehana Hardin and the procedure verified as  Delivery  A Pfannenstiel incision was made and carried down through the underlying subcutaneous tissue to the fascia using a scalpel and bovie electrocautery  Rectus fascia was then incised in the midline and extended laterally using bovie electrocautery The superior edge of the fascia was grasped with Kocher clamps, tented upward, and the underlying muscle was bluntly dissected off  All anatomic layers were well-demarcated  The rectus muscles were  and the peritoneum was identified and subsequently entered and extended longitudinally with blunt dissection  The bladder blade was inserted  A low transverse uterine incision was made with the scalpel and extended laterally with blunt dissection  The amnion of baby A was entered sharply  Surgeons hand was inserted through the hysterotomy and the fetal head was palpated, elevated, and delivered through the uterine incision with the assistance of fundal pressure  Baby had spontaneous cry with good color and tone  The umbilical cord was clamped and cut  The infant was handed off to the  providers  The amnion of baby B was entered sharply  Surgeons hand was inserted through the hysterotomy and the fetal head was palpated, elevated, and delivered through the uterine incision with the assistance of fundal pressure  Baby had spontaneous cry with good color and tone  The umbilical cord was clamped and cut  The infant was handed off to the  providers  Arterial and venous cord gases, cord blood, and a segment of umbilical cord were obtained for evaluation and promptly sent to the lab  The placenta delivered spontaneously with uterine fundal massage and was noted to have a centrally inserted 3 vessel cord  This was also sent to the lab for placental pathology given monochorionic diamniotic twin gestation  The uterus was exteriorized and a moist lap sponge was used to clear the cavity of clots and products of conception  The uterine incision was closed with a running locked suture of 0 Vicryl  A second layer of the same suture was used to imbricate the first   Good hemostasis was confirmed upon uterine closure  At this point, our attention turned to the adnexa  The bilateral Fallopian tubes were identified  First the right Fallopian tube was identified and grasped with a Elizabeth Kvng in an avascular area and tented up  Bilateral fallopian tubes were clamped across the mesosalpinx and subsequently amputated  The vessels of the mesosalpinx were tied off using 2-0 plain suture  Good hemostasis was noted on this side  Portions of bilateral tube were sent to pathology for routine evaluation  The posterior culdesac was removed of clots and the uterus was returned to the abdomen  The paracolic gutters were inspected and cleared of all clots and debris with moist lap sponges  The fascia was closed with a running suture of 0 Vicryl  Subcutaneous tissues were closed with 3-0 Plain gut suture  The skin was closed with 4-0 Monocryl in a subcuticular fashion  , along with skin glue  At the conclusion of the procedure, all needle, sponge, and instrument counts were noted to be correct x2  The patient tolerated the procedure well and was transferred to her the recovery room in stable condition  Dr Cinthia Phoenix was present and participated in all key portions of the case      Robbi Hansen DO

## 2020-09-17 NOTE — ANESTHESIA PROCEDURE NOTES
Spinal Block    Patient location during procedure: OR  Start time: 9/17/2020 1:26 PM  Reason for block: at surgeon's request and primary anesthetic  Staffing  Anesthesiologist: Maria T Le MD  Performed: anesthesiologist   Preanesthetic Checklist  Completed: patient identified, site marked, surgical consent, pre-op evaluation, timeout performed, IV checked, risks and benefits discussed and monitors and equipment checked  Spinal Block  Patient position: sitting  Prep: Betadine  Patient monitoring: frequent blood pressure checks  Approach: midline  Location: L3-4  Injection technique: single-shot  Needle  Needle type: pencil-tip   Needle gauge: 24 G  Needle length: 10 cm  Assessment  Sensory level: T4  Injection Assessment:  negative aspiration for heme, no paresthesia on injection and positive aspiration for clear CSF    Post-procedure:  site cleaned

## 2020-09-17 NOTE — PLAN OF CARE
Problem: PAIN - ADULT  Goal: Verbalizes/displays adequate comfort level or baseline comfort level  Description: Interventions:  - Encourage patient to monitor pain and request assistance  - Assess pain using appropriate pain scale  - Administer analgesics based on type and severity of pain and evaluate response  - Implement non-pharmacological measures as appropriate and evaluate response  - Consider cultural and social influences on pain and pain management  - Notify physician/advanced practitioner if interventions unsuccessful or patient reports new pain  Outcome: Progressing     Problem: INFECTION - ADULT  Goal: Absence or prevention of progression during hospitalization  Description: INTERVENTIONS:  - Assess and monitor for signs and symptoms of infection  - Monitor lab/diagnostic results  - Monitor all insertion sites, i e  indwelling lines, tubes, and drains  - Monitor endotracheal if appropriate and nasal secretions for changes in amount and color  - [place] appropriate cooling/warming therapies per order  - Administer medications as ordered  - Instruct and encourage patient and family to use good hand hygiene technique  - Identify and instruct in appropriate isolation precautions for identified infection/condition  Outcome: Progressing  Goal: Absence of fever/infection during neutropenic period  Description: INTERVENTIONS:  - Monitor WBC    Outcome: Progressing     Problem: SAFETY ADULT  Goal: Patient will remain free of falls  Description: INTERVENTIONS:  - Assess patient frequently for physical needs  -  Identify cognitive and physical deficits and behaviors that affect risk of falls    -  [place] fall precautions as indicated by assessment   - Educate patient/family on patient safety including physical limitations  - Instruct patient to call for assistance with activity based on assessment  - Modify environment to reduce risk of injury  - Consider OT/PT consult to assist with strengthening/mobility  Outcome: Progressing  Goal: Maintain or return to baseline ADL function  Description: INTERVENTIONS:  -  Assess patient's ability to carry out ADLs; assess patient's baseline for ADL function and identify physical deficits which impact ability to perform ADLs (bathing, care of mouth/teeth, toileting, grooming, dressing, etc )  - Assess/evaluate cause of self-care deficits   - Assess range of motion  - Assess patient's mobility; develop plan if impaired  - Assess patient's need for assistive devices and provide as appropriate  - Encourage maximum independence but intervene and supervise when necessary  - Involve family in performance of ADLs  - Assess for home care needs following discharge   - Consider OT consult to assist with ADL evaluation and planning for discharge  - Provide patient education as appropriate  Outcome: Progressing  Goal: Maintain or return mobility status to optimal level  Description: INTERVENTIONS:  - Assess patient's baseline mobility status (ambulation, transfers, stairs, etc )    - Identify cognitive and physical deficits and behaviors that affect mobility  - Identify mobility aids required to assist with transfers and/or ambulation (gait belt, sit-to-stand, lift, walker, cane, etc )  - [place] fall precautions as indicated by assessment  - Record patient progress and toleration of activity level on Mobility SBAR; progress patient to next Phase/Stage  - Instruct patient to call for assistance with activity based on assessment  - Consider rehabilitation consult to assist with strengthening/weightbearing, etc   Outcome: Progressing     Problem: Knowledge Deficit  Goal: Patient/family/caregiver demonstrates understanding of disease process, treatment plan, medications, and discharge instructions  Description: Complete learning assessment and assess knowledge base    Interventions:  - Provide teaching at level of understanding  - Provide teaching via preferred learning methods  Outcome: Progressing     Problem: DISCHARGE PLANNING  Goal: Discharge to home or other facility with appropriate resources  Description: INTERVENTIONS:  - Identify barriers to discharge w/patient and caregiver  - Arrange for needed discharge resources and transportation as appropriate  - Identify discharge learning needs (meds, wound care, etc )  - Arrange for interpretive services to assist at discharge as needed  - Refer to Case Management Department for coordinating discharge planning if the patient needs post-hospital services based on physician/advanced practitioner order or complex needs related to functional status, cognitive ability, or social support system  Outcome: Progressing     Problem: BIRTH - VAGINAL/ SECTION  Goal: Fetal and maternal status remain reassuring during the birth process  Description: INTERVENTIONS:  - Monitor vital signs  - Monitor fetal heart rate  - Monitor uterine activity  - Monitor labor progression (vaginal delivery)  - DVT prophylaxis  - Antibiotic prophylaxis  Outcome: Progressing  Goal: Emotionally satisfying birthing experience for mother/fetus  Description: Interventions:  - Assess, plan, implement and evaluate the nursing care given to the patient in labor  - Advocate the philosophy that each childbirth experience is a unique experience and support the family's chosen level of involvement and control during the labor process   - Actively participate in both the patient's and family's teaching of the birth process  - Consider cultural, Buddhism and age-specific factors and plan care for the patient in labor  Outcome: Progressing

## 2020-09-17 NOTE — PLAN OF CARE
Problem: PAIN - ADULT  Goal: Verbalizes/displays adequate comfort level or baseline comfort level  Description: Interventions:  - Encourage patient to monitor pain and request assistance  - Assess pain using appropriate pain scale  - Administer analgesics based on type and severity of pain and evaluate response  - Implement non-pharmacological measures as appropriate and evaluate response  - Consider cultural and social influences on pain and pain management  - Notify physician/advanced practitioner if interventions unsuccessful or patient reports new pain  9/17/2020 1512 by Nabil Saunders RN  Outcome: Progressing  9/17/2020 1032 by Nabil Saunders RN  Outcome: Progressing     Problem: INFECTION - ADULT  Goal: Absence or prevention of progression during hospitalization  Description: INTERVENTIONS:  - Assess and monitor for signs and symptoms of infection  - Monitor lab/diagnostic results  - Monitor all insertion sites, i e  indwelling lines, tubes, and drains  - Monitor endotracheal if appropriate and nasal secretions for changes in amount and color  - [place] appropriate cooling/warming therapies per order  - Administer medications as ordered  - Instruct and encourage patient and family to use good hand hygiene technique  - Identify and instruct in appropriate isolation precautions for identified infection/condition  9/17/2020 1512 by Nabil Saunders RN  Outcome: Progressing  9/17/2020 1032 by Nabil Saunders RN  Outcome: Progressing  Goal: Absence of fever/infection during neutropenic period  Description: INTERVENTIONS:  - Monitor WBC    9/17/2020 1512 by Nabil Saunders RN  Outcome: Progressing  9/17/2020 1032 by Nabil Saunders RN  Outcome: Progressing     Problem: SAFETY ADULT  Goal: Patient will remain free of falls  Description: INTERVENTIONS:  - Assess patient frequently for physical needs  -  Identify cognitive and physical deficits and behaviors that affect risk of falls    -  [place] fall precautions as indicated by assessment   - Educate patient/family on patient safety including physical limitations  - Instruct patient to call for assistance with activity based on assessment  - Modify environment to reduce risk of injury  - Consider OT/PT consult to assist with strengthening/mobility  9/17/2020 1512 by Rhonda Sumner RN  Outcome: Progressing  9/17/2020 1032 by Rhonda Sumner RN  Outcome: Progressing  Goal: Maintain or return to baseline ADL function  Description: INTERVENTIONS:  -  Assess patient's ability to carry out ADLs; assess patient's baseline for ADL function and identify physical deficits which impact ability to perform ADLs (bathing, care of mouth/teeth, toileting, grooming, dressing, etc )  - Assess/evaluate cause of self-care deficits   - Assess range of motion  - Assess patient's mobility; develop plan if impaired  - Assess patient's need for assistive devices and provide as appropriate  - Encourage maximum independence but intervene and supervise when necessary  - Involve family in performance of ADLs  - Assess for home care needs following discharge   - Consider OT consult to assist with ADL evaluation and planning for discharge  - Provide patient education as appropriate  9/17/2020 1512 by Rhonda Sumner RN  Outcome: Progressing  9/17/2020 1032 by Rhonda Sumner RN  Outcome: Progressing  Goal: Maintain or return mobility status to optimal level  Description: INTERVENTIONS:  - Assess patient's baseline mobility status (ambulation, transfers, stairs, etc )    - Identify cognitive and physical deficits and behaviors that affect mobility  - Identify mobility aids required to assist with transfers and/or ambulation (gait belt, sit-to-stand, lift, walker, cane, etc )  - [place] fall precautions as indicated by assessment  - Record patient progress and toleration of activity level on Mobility SBAR; progress patient to next Phase/Stage  - Instruct patient to call for assistance with activity based on assessment  - Consider rehabilitation consult to assist with strengthening/weightbearing, etc   2020 151 by Andrea Vogt RN  Outcome: Progressing  2020 by Andrea Vogt RN  Outcome: Progressing     Problem: Knowledge Deficit  Goal: Patient/family/caregiver demonstrates understanding of disease process, treatment plan, medications, and discharge instructions  Description: Complete learning assessment and assess knowledge base    Interventions:  - Provide teaching at level of understanding  - Provide teaching via preferred learning methods  2020 1512 by Andrea Vogt RN  Outcome: Progressing  2020 by Andrea Vogt RN  Outcome: Progressing     Problem: DISCHARGE PLANNING  Goal: Discharge to home or other facility with appropriate resources  Description: INTERVENTIONS:  - Identify barriers to discharge w/patient and caregiver  - Arrange for needed discharge resources and transportation as appropriate  - Identify discharge learning needs (meds, wound care, etc )  - Arrange for interpretive services to assist at discharge as needed  - Refer to Case Management Department for coordinating discharge planning if the patient needs post-hospital services based on physician/advanced practitioner order or complex needs related to functional status, cognitive ability, or social support system  2020 by Andrea Vogt RN  Outcome: Progressing  2020 by Andrea Vogt RN  Outcome: Progressing     Problem: BIRTH - VAGINAL/ SECTION  Goal: Fetal and maternal status remain reassuring during the birth process  Description: INTERVENTIONS:  - Monitor vital signs  - Monitor fetal heart rate  - Monitor uterine activity  - Monitor labor progression (vaginal delivery)  - DVT prophylaxis  - Antibiotic prophylaxis  2020 151 by Andrea Vogt RN  Outcome: Completed  2020 by Andrea Vogt RN  Outcome: Progressing  Goal: Emotionally satisfying birthing experience for mother/fetus  Description: Interventions:  - Assess, plan, implement and evaluate the nursing care given to the patient in labor  - Advocate the philosophy that each childbirth experience is a unique experience and support the family's chosen level of involvement and control during the labor process   - Actively participate in both the patient's and family's teaching of the birth process  - Consider cultural, Faith and age-specific factors and plan care for the patient in labor  2020 1512 by Riccardo Graf RN  Outcome: Completed  2020 1032 by Riccardo Graf RN  Outcome: Progressing     Problem: POSTPARTUM  Goal: Experiences normal postpartum course  Description: INTERVENTIONS:  - Monitor maternal vital signs  - Assess uterine involution and lochia  Outcome: Progressing  Goal: Appropriate maternal -  bonding  Description: INTERVENTIONS:  - Identify family support  - Assess for appropriate maternal/infant bonding   -Encourage maternal/infant bonding opportunities  - Referral to  or  as needed  Outcome: Progressing  Goal: Establishment of infant feeding pattern  Description: INTERVENTIONS:  - Assess breast/bottle feeding  - Refer to lactation as needed  Outcome: Progressing  Goal: Incision(s), wounds(s) or drain site(s) healing without S/S of infection  Description: INTERVENTIONS  - Assess and document risk factors for skin impairment   - Assess and document dressing, incision, wound bed, drain sites and surrounding tissue  - Consider nutrition services referral as needed  - Oral mucous membranes remain intact  - Provide patient/ family education  Outcome: Progressing

## 2020-09-17 NOTE — H&P
[address] [place] 26 y o  female MRN: [id number]  Unit/Bed#: L&D 325-01 Encounter: [id number]    Assessment: 26 y o  Suzanne Serve at 29w2d with monochorionic diamniotic twins  admitted for RLTCS and bilateral tubal ligation in the setting of decreased fetal movement  SVE: 250/-3  FHT: Category I  GBS status: unknown     Plan:   · Admit  · CBC, RPR, Type & Screen  · Anesthesia and NICU aware  · IVF: LR @ 125cc/hr  · NPO  · Ancef 2g IV preoperatively  · To OR for RLTCS and BTL     Dr Melina Hebert aware      Chief Complaint: decreased fetal movement    HPI: Magda Neves is a 26 y o  Suzanne Serve with an BILL of 10/18/2020, by Embryo Transfer at 35w4d with monochorionic diamniotic twins who is being admitted for decreased fetal movement  She reports contractions overnight every 7-8 minutes with shortness interval 5 minutes apart  She denies loss of fluid or vaginal bleeding  Patient states during this time she has been feeling baby A move but has had decreased fetal movement for baby B  This is now the 2nd time for which she has had decreased fetal movement for baby B  She was previously evaluated in triage earlier this week for rule out preeclampsia in the setting of persistent headache  Her preeclampsia workup was negative and she was discharged home with Tylenol and PO headache cocktail of magnesium, riboflavin, cyproheptadine and, coenzyme Q10  She states her headache is slightly improved from earlier this week and that she has not required Tylenol today  She denies vision changes, chest pain, shortness of breath, right upper quadrant pain, lower extremity pain and swelling  On evaluation this morning she is still having cramping and decreased fetal movement for baby B  She was scheduled for repeat low-transverse  section with bilateral tubal ligation on 2020      Patient Active Problem List   Diagnosis    Maternal care due to low transverse uterine scar from previous  delivery    Monochorionic diamniotic twin gestation in third trimester    Pregnancy resulting from in vitro fertilization in third trimester    35 weeks gestation of pregnancy       Baby complications/comments: none    Review of Systems   Constitutional: Negative for chills and fever  HENT: Negative  Eyes: Negative for visual disturbance  Respiratory: Negative for shortness of breath  Cardiovascular: Negative for chest pain  Gastrointestinal: Negative for abdominal pain, constipation, diarrhea, nausea and vomiting  Genitourinary: Negative for dysuria, hematuria, vaginal bleeding, vaginal discharge and vaginal pain  Neurological: Negative for headaches  OB History    Para Term  AB Living   2 1 1 0 0 1   SAB TAB Ectopic Multiple Live Births         0 1      # Outcome Date GA Lbr Eduardo/2nd Weight Sex Delivery Anes PTL Lv   2 Current            1 Term 18 40w1d  3630 g (8 lb) M CS-LTranv EPI N AMELIA      Complications: Fetal Intolerance, Failure to Progress in Second Stage       Past Medical History:   Diagnosis Date    Amenorrhea     last assessed: 2016    Female infertility     seen by [facility] - seen for 1 year, 5 cycles IUI acheived pregnancy via IVF    Fibromyalgia     Irregular menstrual cycle     last assessed: 2016    Osteoarthritis of right acromioclavicular joint     last assessment: 2013    Polycystic ovary syndrome     Separation of right acromioclavicular joint     Last assessed: 2013; this is more consistent with ostiolysis of the distal clavicle vs  acromioclavicular joint arthritis, not acute shoulder seperation    Varicella     vaccine       Past Surgical History:   Procedure Laterality Date     SECTION      DENTAL SURGERY      wisdom teeth    NC  DELIVERY ONLY N/A 2018    Procedure:  SECTION (); Surgeon:  Caleb Brar MD;  Location: [facility];  Service: Obstetrics    SHOULDER SURGERY         Social History     Tobacco Use    Smoking status: Never Smoker    Smokeless tobacco: Never Used   Substance Use Topics    Alcohol use: Not Currently     Alcohol/week: 1 0 standard drinks     Types: 1 Cans of beer per week       No Known Allergies    Medications Prior to Admission   Medication    aspirin (ECOTRIN LOW STRENGTH) 81 mg EC tablet    calcium carbonate (OS-DARRYL) 1250 (500 Ca) MG chewable tablet    ferrous sulfate 324 (65 Fe) mg    folic acid (FOLVITE) 1 mg tablet    Prenatal Vit w/Xe-Tzylimeyk-OK (PNV PO)    co-enzyme Q-10 50 MG capsule    cyproheptadine (PERIACTIN) 4 mg tablet    magnesium oxide (MAG-OX) 400 mg    Riboflavin 100 MG CAPS       Objective:  Temp:  [99 °F (37 2 °C)] 99 °F (37 2 °C)  HR:  [100] 100  Resp:  [18] 18  BP: (124)/(75) 124/75  Body mass index is 35 94 kg/m²  Physical Exam:  Physical Exam  Constitutional:       Appearance: Normal appearance  She is well-developed  HENT:      Head: Normocephalic and atraumatic  Cardiovascular:      Rate and Rhythm: Normal rate  Pulmonary:      Effort: Pulmonary effort is normal    Abdominal:      Palpations: Abdomen is soft  Tenderness: There is no abdominal tenderness  Comments: gravid   Musculoskeletal:         General: No tenderness  Right lower leg: No edema  Left lower leg: No edema  Neurological:      Mental Status: She is alert and oriented to person, place, and time  Skin:     General: Skin is warm and dry  Psychiatric:         Behavior: Behavior normal    Vitals signs reviewed            SVE: 2/50/-3       FHT:      Baby A:  - Baseline: 150  - Variability: Moderate  - Accelerations: present  - Decelerations: none    Baby B:  - Baseline: 140  - Variability: Moderate  - Accelerations: present  - Decelerations: none    TOCO:   Contraction Frequency (minutes): 5-6  Contraction Duration (seconds): 45  Contraction Quality: Mild    Lab Results   Component Value Date    WBC 7 73 09/14/2020 HGB 9 8 (L) 2020    HCT 30 6 (L) 2020     (L) 2020     Lab Results   Component Value Date     2016    K 3 3 (L) 2020     2020    CO2 24 2020    BUN 8 2020    CREATININE 0 52 (L) 2020    GLUCOSE 84 2013    AST 24 2020    ALT 17 2020     Prenatal Labs: Reviewed      Blood type: O positive  Antibody: Negative  GBS:  Unknown  HIV: Non-reactive  Rubella: Immune  VDRL/RPR: Non reactive  HBsAg: Negative  Chlamydia: Negative  Gonorrhea: Negative  Diabetes 1 hour screen: 142  Fastin  Platelets: 948  Hgb: 9 8  >2 Midnights  INPATIENT     Signature/Title: aHng Spangler MD  Date: 2020  Time: 11:04 AM

## 2020-09-17 NOTE — ANESTHESIA PREPROCEDURE EVALUATION
Procedure:   SECTION () (N/A Uterus)  LIGATION/COAGULATION TUBAL (Bilateral Abdomen)    Relevant Problems   ANESTHESIA (within normal limits)      CARDIO (within normal limits)      ENDO (within normal limits)      GI/HEPATIC   (+) Gastroesophageal reflux disease      MUSCULOSKELETAL (within normal limits)      NEURO/PSYCH (within normal limits)      PULMONARY (within normal limits)        Physical Exam    Airway      TM Distance: >3 FB  Neck ROM: full     Dental   No notable dental hx     Cardiovascular  Rhythm: regular, Rate: normal, Cardiovascular exam normal    Pulmonary  Pulmonary exam normal     Other Findings        Anesthesia Plan  ASA Score- 2 Emergent    Anesthesia Type- spinal with ASA Monitors  Additional Monitors:   Airway Plan:           Plan Factors-    Induction-     Postoperative Plan-     Informed Consent- Anesthetic plan and risks discussed with patient

## 2020-09-18 LAB
ERYTHROCYTE [DISTWIDTH] IN BLOOD BY AUTOMATED COUNT: 14.6 % (ref 11.6–15.1)
HCT VFR BLD AUTO: 29 % (ref 34.8–46.1)
HGB BLD-MCNC: 9.5 G/DL (ref 11.5–15.4)
MCH RBC QN AUTO: 34.1 PG (ref 26.8–34.3)
MCHC RBC AUTO-ENTMCNC: 32.8 G/DL (ref 31.4–37.4)
MCV RBC AUTO: 104 FL (ref 82–98)
PLATELET # BLD AUTO: 117 THOUSANDS/UL (ref 149–390)
PMV BLD AUTO: 10.4 FL (ref 8.9–12.7)
RBC # BLD AUTO: 2.79 MILLION/UL (ref 3.81–5.12)
RPR SER QL: NORMAL
WBC # BLD AUTO: 9.3 THOUSAND/UL (ref 4.31–10.16)

## 2020-09-18 PROCEDURE — 99024 POSTOP FOLLOW-UP VISIT: CPT | Performed by: OBSTETRICS & GYNECOLOGY

## 2020-09-18 PROCEDURE — 85027 COMPLETE CBC AUTOMATED: CPT | Performed by: OBSTETRICS & GYNECOLOGY

## 2020-09-18 PROCEDURE — NC001 PR NO CHARGE: Performed by: OBSTETRICS & GYNECOLOGY

## 2020-09-18 RX ORDER — OXYCODONE HYDROCHLORIDE 10 MG/1
10 TABLET ORAL EVERY 4 HOURS PRN
Status: DISCONTINUED | OUTPATIENT
Start: 2020-09-18 | End: 2020-09-18

## 2020-09-18 RX ORDER — ACETAMINOPHEN 325 MG/1
650 TABLET ORAL EVERY 6 HOURS PRN
Status: DISCONTINUED | OUTPATIENT
Start: 2020-09-18 | End: 2020-09-19 | Stop reason: HOSPADM

## 2020-09-18 RX ORDER — OXYCODONE HYDROCHLORIDE 5 MG/1
5 TABLET ORAL EVERY 4 HOURS PRN
Status: DISCONTINUED | OUTPATIENT
Start: 2020-09-18 | End: 2020-09-18

## 2020-09-18 RX ORDER — IBUPROFEN 600 MG/1
600 TABLET ORAL EVERY 6 HOURS PRN
Status: DISCONTINUED | OUTPATIENT
Start: 2020-09-18 | End: 2020-09-19 | Stop reason: HOSPADM

## 2020-09-18 RX ORDER — SIMETHICONE 80 MG
80 TABLET,CHEWABLE ORAL EVERY 6 HOURS PRN
Status: DISCONTINUED | OUTPATIENT
Start: 2020-09-18 | End: 2020-09-19 | Stop reason: HOSPADM

## 2020-09-18 RX ADMIN — KETOROLAC TROMETHAMINE 30 MG: 30 INJECTION, SOLUTION INTRAMUSCULAR at 15:17

## 2020-09-18 RX ADMIN — IBUPROFEN 600 MG: 600 TABLET ORAL at 19:54

## 2020-09-18 RX ADMIN — OXYCODONE HYDROCHLORIDE AND ACETAMINOPHEN 1 TABLET: 5; 325 TABLET ORAL at 11:10

## 2020-09-18 RX ADMIN — SIMETHICONE 80 MG: 80 TABLET, CHEWABLE ORAL at 21:24

## 2020-09-18 RX ADMIN — SODIUM CHLORIDE, SODIUM LACTATE, POTASSIUM CHLORIDE, AND CALCIUM CHLORIDE 500 ML/HR: .6; .31; .03; .02 INJECTION, SOLUTION INTRAVENOUS at 00:27

## 2020-09-18 RX ADMIN — PRENATAL VIT W/ FE FUMARATE-FA TAB 27-0.8 MG 1 TABLET: 27-0.8 TAB at 08:24

## 2020-09-18 RX ADMIN — KETOROLAC TROMETHAMINE 30 MG: 30 INJECTION, SOLUTION INTRAMUSCULAR at 05:43

## 2020-09-18 RX ADMIN — ENOXAPARIN SODIUM 40 MG: 40 INJECTION SUBCUTANEOUS at 08:24

## 2020-09-18 RX ADMIN — HYDROMORPHONE HYDROCHLORIDE 0.5 MG: 1 INJECTION, SOLUTION INTRAMUSCULAR; INTRAVENOUS; SUBCUTANEOUS at 02:05

## 2020-09-18 NOTE — PROGRESS NOTES
Progress Note - OB/GYN   Evette Menendez 26 y o  female MRN: [id number]  Unit/Bed#: L&D 304-01 Encounter: [id number]      Evette Menendez is a patient of [facility]    Assessment:  Post operative day #1 s/p RLTCS + BTL (mono-di twins), stable, and doing well, babies in room    Plan:  1  Hemodynamically stable   - Pre-op Hb 9 8 --> post-op Hb 9 5   - Vitals WNL, currently asymptomatic  2  Galaviz catheter   - Removed this am   - Making adequate urine output   - F/u voiding trial  3  Continue routine post partum care   - Encourage ambulation   - Encourage breastfeeding  4  Continue current meds   - See list below   - plan to transition to p o  Analgesics today  5  DVT prophylaxis   -BMI 35 94   - Lovenox 40 mg daily  6  Disposition   - Stable   - Anticipate discharge home POD#2 vs POD#3    Subjective/Objective     Chief Complaint:     Patient evaluated overnight for passing a clot approximately 4 cm in size  Bimanual exam revealed firm uterus with a small clot that passed  No bleeding since  Patient has been an asymptomatic  Reports her pain is a 6 to 7/10 but improved with medications  Subjective:     Pain: yes  Tolerating Oral Intake: yes  Voiding: yes, via Galaviz  Flatus: yes  Bowel Movement: no  Ambulating: yes  Breastfeeding: Breastfeeding and Bottle feeding  Chest Pain: no  Shortness of Breath: no  Leg Pain/Discomfort: no  Lochia: minimal    Objective:   Vitals:   /66 (BP Location: Right arm)   Pulse 78   Temp 98 5 °F (36 9 °C) (Oral)   Resp 18   Ht 5' 5" (1 651 m)   Wt 98 kg (216 lb)   SpO2 96%   Breastfeeding No   BMI 35 94 kg/m²   Body mass index is 35 94 kg/m²    I/O       09/16 0701 - 09/17 0700 09/17 0701 - 09/18 0700    I V  (mL/kg)  3945 8 (40 3)    Total Intake(mL/kg)  3945 8 (40 3)    Urine (mL/kg/hr)  1625    Blood  590    Total Output  2215    Net  +1730 8              Lab Results   Component Value Date    WBC 9 30 09/18/2020    HGB 9 5 (L) 09/18/2020    HCT 29 0 (L) 09/18/2020     (H) 09/18/2020     (L) 09/18/2020       Meds/Allergies     Physical Exam:  General: in no apparent distress and well developed and well nourished  Cardiovascular: Cor RRR  Lungs: clear to auscultation bilaterally  Abdomen: abdomen is soft without significant tenderness, masses, organomegaly or guarding; incision c/d/i closed with running absorbable suture  Fundus: Firm and non-tender, At the level of the umbilicus  Lower extremeties: nontender, SCDs in place bilaterally    Labs/Tests:   Recent Results (from the past 24 hour(s))   CBC and differential    Collection Time: 09/17/20 11:27 AM   Result Value Ref Range    WBC 8 51 4 31 - 10 16 Thousand/uL    RBC 3 35 (L) 3 81 - 5 12 Million/uL    Hemoglobin 11 4 (L) 11 5 - 15 4 g/dL    Hematocrit 34 1 (L) 34 8 - 46 1 %     (H) 82 - 98 fL    MCH 34 0 26 8 - 34 3 pg    MCHC 33 4 31 4 - 37 4 g/dL    RDW 14 6 11 6 - 15 1 %    MPV 10 7 8 9 - 12 7 fL    Platelets 725 129 - 582 Thousands/uL    nRBC 0 /100 WBCs    Neutrophils Relative 65 43 - 75 %    Immat GRANS % 1 0 - 2 %    Lymphocytes Relative 25 14 - 44 %    Monocytes Relative 8 4 - 12 %    Eosinophils Relative 1 0 - 6 %    Basophils Relative 0 0 - 1 %    Neutrophils Absolute 5 49 1 85 - 7 62 Thousands/µL    Immature Grans Absolute 0 10 0 00 - 0 20 Thousand/uL    Lymphocytes Absolute 2 10 0 60 - 4 47 Thousands/µL    Monocytes Absolute 0 68 0 17 - 1 22 Thousand/µL    Eosinophils Absolute 0 12 0 00 - 0 61 Thousand/µL    Basophils Absolute 0 02 0 00 - 0 10 Thousands/µL   Type and screen    Collection Time: 09/17/20 11:56 AM   Result Value Ref Range    ABO Grouping O     Rh Factor Positive     Antibody Screen Negative     Specimen Expiration Date 20200920    Blood gas, venous, cord    Collection Time: 09/17/20  1:40 PM   Result Value Ref Range    pH, Cord Liu 7 311 7 190 - 7 490    pCO2, Cord Liu 47 8 (H) 27 0 - 43 0 mm HG    pO2, Cord Liu 16 0 15 0 - 45 0 mm HG    HCO3, Cord Liu 23 6 12 2 - 28 6 mmol/L    Base Exc, Cord Liu -2 9 (L) 1 0 - 9 0 mmol/L    O2 Cont, Cord Liu 5 9 mL/dL    O2 HGB,VENOUS CORD 32 6 %   Blood gas, arterial, cord    Collection Time: 09/17/20  1:40 PM   Result Value Ref Range    pH, Cord Art 7 303 7 230 - 7 430    pCO2, Cord Art 53 9 30 0 - 60 0    pO2, Cord Art <10 0 5 0 - 25 0 mm HG    HCO3, Cord Art 26 1 17 3 - 27 3 mmol/L    Base Exc, Cord Art -0 9 (L) 3 0 - 11 0 mmol/L    O2 Hgb, Arterial Cord 16 4 %   Blood gas, venous, cord    Collection Time: 09/17/20  1:41 PM   Result Value Ref Range    pH, Cord Liu 7 332 7 190 - 7 490    pCO2, Cord Liu 56 6 (H) 27 0 - 43 0 mm HG    pO2, Cord Liu 13 5 (L) 15 0 - 45 0 mm HG    HCO3, Cord Liu 29 3 (H) 12 2 - 28 6 mmol/L    Base Exc, Cord Liu 2 3 1 0 - 9 0 mmol/L    O2 Cont, Cord Liu 5 5 mL/dL    O2 HGB,VENOUS CORD 31 9 %   Blood gas, arterial, cord    Collection Time: 09/17/20  1:41 PM   Result Value Ref Range    pH, Cord Art 7 282 7 230 - 7 430    pCO2, Cord Art 57 1 30 0 - 60 0    pO2, Cord Art <10 0 5 0 - 25 0 mm HG    HCO3, Cord Art 26 3 17 3 - 27 3 mmol/L    Base Exc, Cord Art -1 1 (L) 3 0 - 11 0 mmol/L    O2 Hgb, Arterial Cord 13 1 %   CBC    Collection Time: 09/18/20  5:35 AM   Result Value Ref Range    WBC 9 30 4 31 - 10 16 Thousand/uL    RBC 2 79 (L) 3 81 - 5 12 Million/uL    Hemoglobin 9 5 (L) 11 5 - 15 4 g/dL    Hematocrit 29 0 (L) 34 8 - 46 1 %     (H) 82 - 98 fL    MCH 34 1 26 8 - 34 3 pg    MCHC 32 8 31 4 - 37 4 g/dL    RDW 14 6 11 6 - 15 1 %    Platelets 977 (L) 988 - 390 Thousands/uL    MPV 10 4 8 9 - 12 7 fL       MEDS:   Current Facility-Administered Medications   Medication Dose Route Frequency    diphenhydrAMINE (BENADRYL) injection 25 mg  25 mg Intravenous Q6H PRN    HYDROmorphone (DILAUDID) injection 0 5 mg  0 5 mg Intravenous Q3H PRN    ketorolac (TORADOL) injection 30 mg  30 mg Intravenous Q6H PRN    lactated ringers infusion  125 mL/hr Intravenous Continuous    naloxone (NARCAN) injection 0 1 mg  0 1 mg Intravenous Q3 min PRN    ondansetron (ZOFRAN) injection 4 mg  4 mg Intravenous Q4H PRN    oxyCODONE (ROXICODONE) immediate release tablet 10 mg  10 mg Oral Q4H PRN    oxyCODONE (ROXICODONE) IR tablet 5 mg  5 mg Oral Q4H PRN    oxyCODONE-acetaminophen (PERCOCET) 5-325 mg per tablet 1 tablet  1 tablet Oral Q4H PRN    prenatal multivitamin tablet 1 tablet  1 tablet Oral Daily     Invasive Devices     Peripheral Intravenous Line            Peripheral IV 09/17/20 Dorsal (posterior); Left Hand less than 1 day                Christine Blakely MD  PGY-1 OB/GYN   9/18/2020 6:37 AM

## 2020-09-18 NOTE — QUICK NOTE
Patient evaluated at bedside after passing a blood clot approximately 4 cm in size  Fundus feels firm at this time and about 1 cm below the umbilicus  Bimanual exam performed and did not feel atony in the lower uterine segment  Afterwards, there was noted to be a smaller clot that was passed  Vitals are stable and patient otherwise feeling well  Will continue to monitor for further clots

## 2020-09-19 VITALS
DIASTOLIC BLOOD PRESSURE: 72 MMHG | WEIGHT: 216 LBS | RESPIRATION RATE: 18 BRPM | HEART RATE: 72 BPM | SYSTOLIC BLOOD PRESSURE: 132 MMHG | HEIGHT: 65 IN | TEMPERATURE: 98 F | OXYGEN SATURATION: 98 % | BODY MASS INDEX: 35.99 KG/M2

## 2020-09-19 PROCEDURE — 99024 POSTOP FOLLOW-UP VISIT: CPT | Performed by: OBSTETRICS & GYNECOLOGY

## 2020-09-19 RX ORDER — IBUPROFEN 600 MG/1
600 TABLET ORAL EVERY 6 HOURS PRN
Qty: 20 TABLET | Refills: 0 | Status: SHIPPED | OUTPATIENT
Start: 2020-09-19 | End: 2020-09-24 | Stop reason: SDUPTHER

## 2020-09-19 RX ORDER — OXYCODONE HYDROCHLORIDE 5 MG/1
5 TABLET ORAL EVERY 4 HOURS PRN
Qty: 10 TABLET | Refills: 0 | Status: SHIPPED | OUTPATIENT
Start: 2020-09-19 | End: 2020-09-29

## 2020-09-19 RX ORDER — ACETAMINOPHEN 325 MG/1
650 TABLET ORAL EVERY 6 HOURS PRN
Qty: 30 TABLET | Refills: 0 | Status: SHIPPED | OUTPATIENT
Start: 2020-09-19 | End: 2020-09-24 | Stop reason: SDUPTHER

## 2020-09-19 RX ORDER — DOCUSATE SODIUM 100 MG/1
100 CAPSULE, LIQUID FILLED ORAL 2 TIMES DAILY PRN
Qty: 20 CAPSULE | Refills: 1 | Status: SHIPPED | OUTPATIENT
Start: 2020-09-19 | End: 2020-11-04

## 2020-09-19 RX ADMIN — IBUPROFEN 600 MG: 600 TABLET ORAL at 10:29

## 2020-09-19 RX ADMIN — IBUPROFEN 600 MG: 600 TABLET ORAL at 02:07

## 2020-09-19 RX ADMIN — PRENATAL VIT W/ FE FUMARATE-FA TAB 27-0.8 MG 1 TABLET: 27-0.8 TAB at 08:50

## 2020-09-19 RX ADMIN — ENOXAPARIN SODIUM 40 MG: 40 INJECTION SUBCUTANEOUS at 08:50

## 2020-09-19 RX ADMIN — OXYCODONE HYDROCHLORIDE 5 MG: 5 TABLET ORAL at 08:50

## 2020-09-19 NOTE — DISCHARGE SUMMARY
Discharge Summary - Delmi Roman 26 y o  female MRN: [id number]    Unit/Bed#: L&D 304-01 Encounter: [id number]    Admission Date: 2020     Discharge Date: 20    Admitting Attending: Dr Sharon Huff  Delivering Attending: Dr Sharon Huff  Discharge Attending: Dr Olivia Braxton     Diagnosis:  Patient Active Problem List   Diagnosis    Status post primary low transverse  section    Maternal care due to low transverse uterine scar from previous  delivery    Monochorionic diamniotic twin gestation in third trimester    Pregnancy resulting from in vitro fertilization in third trimester    35 weeks gestation of pregnancy    Gastroesophageal reflux disease         Procedures: repeat  low transverse  section     Complications: none apparent     Pt is a 29yo [id number] who was admitted for repeat  section in setting of decreased fetal movement at 35w4d  She underwent an uncomplicated  delivery  She delivered  Two viable males, Delivery of viable male A on 20 at 1340, weight 6 lbs 9 5 oz;  Apgar scores of 8 at one minute and 9 at five minutes  Umbilical artery pH 9 788 (base excess -0 9)  Delivery of viable male B on 20 at 1341, weight 6 lbs 4 oz;  Apgar scores of 8 at one minute and 9 at five minutes  Umbilical artery pH 8 427 (base excess -1 1)  Her preoperative Hb was 9 8  Her postoperative Hb was 9 5  Her postoperative course was UNcomplicated  Condition at discharge: good     On day of discharge pain was well controlled, patient was tolerating PO, passing flatus,  a bowel movement  She was discharged with standard post partum/ post operative instructions to follow up with her physician in 1 week for an incision check and in 3-6 weeks for a postpartum appointment  Discharge instructions/Information to patient and family:   -Do not place anything (no partner, tampons or douche) in your vagina for 6 weeks    -You may walk for exercise for the first 6 weeks then gradually return to your usual activities    -Please do not drive for 1 week if you have no stitches and for 2 weeks if you have stitches or underwent a  delivery     -You may take baths or shower per your preference    -Please look at your bust (breasts) in the mirror daily and call for redness or tenderness or increased warmth    -Please call us for temperature > 100 4*F or 38* C, worsening pain or a foul discharge       Discharge Medications:   Prenatal vitamin daily for 6 months or the duration of nursing whichever is longer  Motrin 600 mg orally every 6 hours as needed for pain  Tylenol (over the counter) per bottle directions as needed for pain: do NOT use with percocet  Hydrocortisone cream 1% (over the counter) applied 1-2x daily to hemorrhoids as needed  Percocet as needed    Provisions for Follow-Up Care: Follow up with your doctor in 1 week for incision site check       Planned Readmission: Brandie Howard MD

## 2020-09-19 NOTE — NURSING NOTE
Discharge paperwork reviewed and signed with MOB and FOB  Followup appointments reviewed  All questions answered

## 2020-09-19 NOTE — PROGRESS NOTES
Progress Note - OB/GYN   Annie Guzman 26 y o  female MRN: [id number]  Unit/Bed#: L&D 304-01 Encounter: [id number]    Assessment:  Post partum Day #2 s/p LTCS, stable, baby in room  Hb Pre-surgery 9 8 , Hb Post-surgery 9 5  Passed her voiding trial  Twin pregnancy    delivery    Pregnancy complicated by :  1  Maternal obesity with BMI of 35:  Patient is on Lovenox prophylaxis    Plan:    Continue routine post partum care  Encourage ambulation  Encourage breastfeeding  Anticipate discharge :  Patient desires early discharge today if babies are okay to go home    Subjective/Objective   Chief Complaint:     Post delivery  Patient is doing well  Lochia WNL  Pain well controlled  Subjective:     Pain: yes, cramping, improved with meds  Tolerating PO: yes  Voiding: yes  Flatus: yes  BM: no  Ambulating: yes  Chest pain: no  Shortness of breath: no  Leg pain: no  Lochia: minimal    Objective:     Vitals: /88 (BP Location: Right arm)   Pulse 87   Temp 97 7 °F (36 5 °C) (Oral)   Resp 16   Ht 5' 5" (1 651 m)   Wt 98 kg (216 lb)   SpO2 98%   Breastfeeding No   BMI 35 94 kg/m²       Intake/Output Summary (Last 24 hours) at 2020 0554  Last data filed at 2020 1200  Gross per 24 hour   Intake    Output 5225 ml   Net -5225 ml       Lab Results   Component Value Date    WBC 9 30 2020    HGB 9 5 (L) 2020    HCT 29 0 (L) 2020     (H) 2020     (L) 2020       Physical Exam:     Gen: AAOx3, NAD  CV: RRR  Lungs: CTA b/l  Abd: Soft, non-tender, non-distended, no rebound or guarding  Uterine fundus firm and non-tender, 1 cm below the umbilicus  Surgical incision without irritative signs, no bleeding, no edema     Ext: Non tender    Malinda Barraza MD  2020  5:54 AM

## 2020-09-19 NOTE — PLAN OF CARE
Problem: PAIN - ADULT  Goal: Verbalizes/displays adequate comfort level or baseline comfort level  Description: Interventions:  - Encourage patient to monitor pain and request assistance  - Assess pain using appropriate pain scale  - Administer analgesics based on type and severity of pain and evaluate response  - Implement non-pharmacological measures as appropriate and evaluate response  - Consider cultural and social influences on pain and pain management  - Notify physician/advanced practitioner if interventions unsuccessful or patient reports new pain  Outcome: Progressing     Problem: INFECTION - ADULT  Goal: Absence or prevention of progression during hospitalization  Description: INTERVENTIONS:  - Assess and monitor for signs and symptoms of infection  - Monitor lab/diagnostic results  - Monitor all insertion sites, i e  indwelling lines, tubes, and drains  - Monitor endotracheal if appropriate and nasal secretions for changes in amount and color  - [place] appropriate cooling/warming therapies per order  - Administer medications as ordered  - Instruct and encourage patient and family to use good hand hygiene technique  - Identify and instruct in appropriate isolation precautions for identified infection/condition  Outcome: Progressing  Goal: Absence of fever/infection during neutropenic period  Description: INTERVENTIONS:  - Monitor WBC    Outcome: Progressing     Problem: SAFETY ADULT  Goal: Patient will remain free of falls  Description: INTERVENTIONS:  - Assess patient frequently for physical needs  -  Identify cognitive and physical deficits and behaviors that affect risk of falls    -  [place] fall precautions as indicated by assessment   - Educate patient/family on patient safety including physical limitations  - Instruct patient to call for assistance with activity based on assessment  - Modify environment to reduce risk of injury  - Consider OT/PT consult to assist with strengthening/mobility  Outcome: Progressing  Goal: Maintain or return to baseline ADL function  Description: INTERVENTIONS:  -  Assess patient's ability to carry out ADLs; assess patient's baseline for ADL function and identify physical deficits which impact ability to perform ADLs (bathing, care of mouth/teeth, toileting, grooming, dressing, etc )  - Assess/evaluate cause of self-care deficits   - Assess range of motion  - Assess patient's mobility; develop plan if impaired  - Assess patient's need for assistive devices and provide as appropriate  - Encourage maximum independence but intervene and supervise when necessary  - Involve family in performance of ADLs  - Assess for home care needs following discharge   - Consider OT consult to assist with ADL evaluation and planning for discharge  - Provide patient education as appropriate  Outcome: Progressing  Goal: Maintain or return mobility status to optimal level  Description: INTERVENTIONS:  - Assess patient's baseline mobility status (ambulation, transfers, stairs, etc )    - Identify cognitive and physical deficits and behaviors that affect mobility  - Identify mobility aids required to assist with transfers and/or ambulation (gait belt, sit-to-stand, lift, walker, cane, etc )  - [place] fall precautions as indicated by assessment  - Record patient progress and toleration of activity level on Mobility SBAR; progress patient to next Phase/Stage  - Instruct patient to call for assistance with activity based on assessment  - Consider rehabilitation consult to assist with strengthening/weightbearing, etc   Outcome: Progressing     Problem: Knowledge Deficit  Goal: Patient/family/caregiver demonstrates understanding of disease process, treatment plan, medications, and discharge instructions  Description: Complete learning assessment and assess knowledge base    Interventions:  - Provide teaching at level of understanding  - Provide teaching via preferred learning methods  Outcome: Progressing     Problem: DISCHARGE PLANNING  Goal: Discharge to home or other facility with appropriate resources  Description: INTERVENTIONS:  - Identify barriers to discharge w/patient and caregiver  - Arrange for needed discharge resources and transportation as appropriate  - Identify discharge learning needs (meds, wound care, etc )  - Arrange for interpretive services to assist at discharge as needed  - Refer to Case Management Department for coordinating discharge planning if the patient needs post-hospital services based on physician/advanced practitioner order or complex needs related to functional status, cognitive ability, or social support system  Outcome: Progressing     Problem: POSTPARTUM  Goal: Experiences normal postpartum course  Description: INTERVENTIONS:  - Monitor maternal vital signs  - Assess uterine involution and lochia  Outcome: Progressing  Goal: Appropriate maternal -  bonding  Description: INTERVENTIONS:  - Identify family support  - Assess for appropriate maternal/infant bonding   -Encourage maternal/infant bonding opportunities  - Referral to  or  as needed  Outcome: Progressing  Goal: Establishment of infant feeding pattern  Description: INTERVENTIONS:  - Assess breast/bottle feeding  - Refer to lactation as needed  Outcome: Progressing  Goal: Incision(s), wounds(s) or drain site(s) healing without S/S of infection  Description: INTERVENTIONS  - Assess and document risk factors for skin impairment   - Assess and document dressing, incision, wound bed, drain sites and surrounding tissue  - Consider nutrition services referral as needed  - Oral mucous membranes remain intact  - Provide patient/ family education  Outcome: Progressing

## 2020-09-19 NOTE — PLAN OF CARE
Problem: PAIN - ADULT  Goal: Verbalizes/displays adequate comfort level or baseline comfort level  Description: Interventions:  - Encourage patient to monitor pain and request assistance  - Assess pain using appropriate pain scale  - Administer analgesics based on type and severity of pain and evaluate response  - Implement non-pharmacological measures as appropriate and evaluate response  - Consider cultural and social influences on pain and pain management  - Notify physician/advanced practitioner if interventions unsuccessful or patient reports new pain  Outcome: Adequate for Discharge     Problem: INFECTION - ADULT  Goal: Absence or prevention of progression during hospitalization  Description: INTERVENTIONS:  - Assess and monitor for signs and symptoms of infection  - Monitor lab/diagnostic results  - Monitor all insertion sites, i e  indwelling lines, tubes, and drains  - Monitor endotracheal if appropriate and nasal secretions for changes in amount and color  - [place] appropriate cooling/warming therapies per order  - Administer medications as ordered  - Instruct and encourage patient and family to use good hand hygiene technique  - Identify and instruct in appropriate isolation precautions for identified infection/condition  Outcome: Adequate for Discharge  Goal: Absence of fever/infection during neutropenic period  Description: INTERVENTIONS:  - Monitor WBC    Outcome: Adequate for Discharge     Problem: SAFETY ADULT  Goal: Patient will remain free of falls  Description: INTERVENTIONS:  - Assess patient frequently for physical needs  -  Identify cognitive and physical deficits and behaviors that affect risk of falls    -  [place] fall precautions as indicated by assessment   - Educate patient/family on patient safety including physical limitations  - Instruct patient to call for assistance with activity based on assessment  - Modify environment to reduce risk of injury  - Consider OT/PT consult to assist with strengthening/mobility  Outcome: Adequate for Discharge  Goal: Maintain or return to baseline ADL function  Description: INTERVENTIONS:  -  Assess patient's ability to carry out ADLs; assess patient's baseline for ADL function and identify physical deficits which impact ability to perform ADLs (bathing, care of mouth/teeth, toileting, grooming, dressing, etc )  - Assess/evaluate cause of self-care deficits   - Assess range of motion  - Assess patient's mobility; develop plan if impaired  - Assess patient's need for assistive devices and provide as appropriate  - Encourage maximum independence but intervene and supervise when necessary  - Involve family in performance of ADLs  - Assess for home care needs following discharge   - Consider OT consult to assist with ADL evaluation and planning for discharge  - Provide patient education as appropriate  Outcome: Adequate for Discharge  Goal: Maintain or return mobility status to optimal level  Description: INTERVENTIONS:  - Assess patient's baseline mobility status (ambulation, transfers, stairs, etc )    - Identify cognitive and physical deficits and behaviors that affect mobility  - Identify mobility aids required to assist with transfers and/or ambulation (gait belt, sit-to-stand, lift, walker, cane, etc )  - [place] fall precautions as indicated by assessment  - Record patient progress and toleration of activity level on Mobility SBAR; progress patient to next Phase/Stage  - Instruct patient to call for assistance with activity based on assessment  - Consider rehabilitation consult to assist with strengthening/weightbearing, etc   Outcome: Adequate for Discharge     Problem: Knowledge Deficit  Goal: Patient/family/caregiver demonstrates understanding of disease process, treatment plan, medications, and discharge instructions  Description: Complete learning assessment and assess knowledge base    Interventions:  - Provide teaching at level of understanding  - Provide teaching via preferred learning methods  Outcome: Adequate for Discharge     Problem: DISCHARGE PLANNING  Goal: Discharge to home or other facility with appropriate resources  Description: INTERVENTIONS:  - Identify barriers to discharge w/patient and caregiver  - Arrange for needed discharge resources and transportation as appropriate  - Identify discharge learning needs (meds, wound care, etc )  - Arrange for interpretive services to assist at discharge as needed  - Refer to Case Management Department for coordinating discharge planning if the patient needs post-hospital services based on physician/advanced practitioner order or complex needs related to functional status, cognitive ability, or social support system  Outcome: Adequate for Discharge     Problem: POSTPARTUM  Goal: Experiences normal postpartum course  Description: INTERVENTIONS:  - Monitor maternal vital signs  - Assess uterine involution and lochia  Outcome: Adequate for Discharge  Goal: Appropriate maternal -  bonding  Description: INTERVENTIONS:  - Identify family support  - Assess for appropriate maternal/infant bonding   -Encourage maternal/infant bonding opportunities  - Referral to  or  as needed  Outcome: Adequate for Discharge  Goal: Establishment of infant feeding pattern  Description: INTERVENTIONS:  - Assess breast/bottle feeding  - Refer to lactation as needed  Outcome: Adequate for Discharge  Goal: Incision(s), wounds(s) or drain site(s) healing without S/S of infection  Description: INTERVENTIONS  - Assess and document risk factors for skin impairment   - Assess and document dressing, incision, wound bed, drain sites and surrounding tissue  - Consider nutrition services referral as needed  - Oral mucous membranes remain intact  - Provide patient/ family education  Outcome: Adequate for Discharge

## 2020-09-21 ENCOUNTER — TRANSITIONAL CARE MANAGEMENT (OUTPATIENT)
Dept: FAMILY MEDICINE CLINIC | Facility: CLINIC | Age: 30
End: 2020-09-21

## 2020-09-21 NOTE — PROGRESS NOTES
c section 9/18/20 - Mono Di twins    Wound- c d I     Bottle feeding only   Today reports feeling well  She reports that she has no pain and only a bit of bleeding

## 2020-09-24 ENCOUNTER — OFFICE VISIT (OUTPATIENT)
Dept: OBGYN CLINIC | Facility: MEDICAL CENTER | Age: 30
End: 2020-09-24

## 2020-09-24 VITALS
SYSTOLIC BLOOD PRESSURE: 126 MMHG | BODY MASS INDEX: 33.67 KG/M2 | HEIGHT: 65 IN | TEMPERATURE: 97 F | DIASTOLIC BLOOD PRESSURE: 82 MMHG | WEIGHT: 202.1 LBS

## 2020-09-24 DIAGNOSIS — Z98.891 STATUS POST PRIMARY LOW TRANSVERSE CESAREAN SECTION: ICD-10-CM

## 2020-09-24 PROCEDURE — 99024 POSTOP FOLLOW-UP VISIT: CPT | Performed by: OBSTETRICS & GYNECOLOGY

## 2020-09-24 RX ORDER — IBUPROFEN 600 MG/1
600 TABLET ORAL EVERY 6 HOURS PRN
Qty: 90 TABLET | Refills: 0 | Status: SHIPPED | OUTPATIENT
Start: 2020-09-24 | End: 2020-11-04

## 2020-09-24 RX ORDER — ACETAMINOPHEN 325 MG/1
650 TABLET ORAL EVERY 6 HOURS PRN
Qty: 90 TABLET | Refills: 0 | Status: SHIPPED | OUTPATIENT
Start: 2020-09-24 | End: 2020-11-04

## 2020-10-13 ENCOUNTER — OFFICE VISIT (OUTPATIENT)
Dept: OBGYN CLINIC | Facility: MEDICAL CENTER | Age: 30
End: 2020-10-13
Payer: COMMERCIAL

## 2020-10-13 VITALS
BODY MASS INDEX: 31.84 KG/M2 | TEMPERATURE: 96.8 F | SYSTOLIC BLOOD PRESSURE: 130 MMHG | HEIGHT: 65 IN | DIASTOLIC BLOOD PRESSURE: 90 MMHG | WEIGHT: 191.1 LBS

## 2020-10-13 DIAGNOSIS — R21 RASH: Primary | ICD-10-CM

## 2020-10-13 PROCEDURE — 99213 OFFICE O/P EST LOW 20 MIN: CPT | Performed by: OBSTETRICS & GYNECOLOGY

## 2020-10-13 PROCEDURE — 1036F TOBACCO NON-USER: CPT | Performed by: OBSTETRICS & GYNECOLOGY

## 2020-11-04 ENCOUNTER — POSTPARTUM VISIT (OUTPATIENT)
Dept: OBGYN CLINIC | Facility: CLINIC | Age: 30
End: 2020-11-04

## 2020-11-04 VITALS
HEIGHT: 65 IN | DIASTOLIC BLOOD PRESSURE: 78 MMHG | SYSTOLIC BLOOD PRESSURE: 118 MMHG | BODY MASS INDEX: 32.14 KG/M2 | WEIGHT: 192.9 LBS | TEMPERATURE: 97.8 F

## 2020-11-04 DIAGNOSIS — Z09 POSTOP CHECK: Primary | ICD-10-CM

## 2020-11-04 PROCEDURE — 99024 POSTOP FOLLOW-UP VISIT: CPT | Performed by: OBSTETRICS & GYNECOLOGY

## 2020-11-04 PROCEDURE — 3008F BODY MASS INDEX DOCD: CPT | Performed by: OBSTETRICS & GYNECOLOGY

## 2020-12-03 DIAGNOSIS — F41.9 ANXIETY: Primary | ICD-10-CM

## 2021-01-01 NOTE — TELEPHONE ENCOUNTER
1900:  SBAR format report received from PRIMITIVO Valerio RN. Baby asleep in prone position in an isolette on air control. Cardiac monitor in use with limits set. On room air. 2100:  Assessment completed. VSS. Alert and active with care. Old NGT removed from right nare and replaced new NGT tube in left nare. NGT secured with tape, placement verified. Tolerated well. To offer po feeding    0000:  VSS. NGT placement verified. To offer po feeding. 0200:  VSS. Reassessment completed. No changes noted. NGT placement verified. To offer po feeding. 0500:  VSS. NGT placement veriifed. To offer po feeding. 0700:  SBAR format report given to TATIANNA Johnson RN. Attempted to reach patient by phone and left voicemail to confirm appointment for MFM ultrasound  1 support person ( must be over the age of 15) may accompany you for your appointment  If you or your support person have traveled outside the state in the past 2 weeks, please call and notify our office today #[phone]  You and your support person must wear a mask ,covering nose and mouth,during your entire visit  You and your support person will have temperature screened upon arrival     To minimize your exposure in our waiting room, please call our office prior to entering the building  Check in and rooming questions will be done via phone  We will give you directions when to enter for your appointment  Inside office # provided:    Tip line:  [phone]      IF you are not feeling well- cough, fever, shortness of breath or any flu like symptoms, contact your primary care physician or [phone]    Any questions with these instructions please call Maternal Fetal Medicine nurse line today @ # [phone]

## 2021-03-10 DIAGNOSIS — Z23 ENCOUNTER FOR IMMUNIZATION: ICD-10-CM

## 2021-03-18 ENCOUNTER — IMMUNIZATIONS (OUTPATIENT)
Dept: FAMILY MEDICINE CLINIC | Facility: HOSPITAL | Age: 31
End: 2021-03-18

## 2021-03-18 DIAGNOSIS — Z23 ENCOUNTER FOR IMMUNIZATION: Primary | ICD-10-CM

## 2021-03-18 PROCEDURE — 91300 SARS-COV-2 / COVID-19 MRNA VACCINE (PFIZER-BIONTECH) 30 MCG: CPT

## 2021-03-18 PROCEDURE — 0001A SARS-COV-2 / COVID-19 MRNA VACCINE (PFIZER-BIONTECH) 30 MCG: CPT

## 2021-03-19 NOTE — PROGRESS NOTES
A/P    1  Annual exam    Last PAP - 3/31/20 neg / neg    Next due -2023   Scheduling of pap discussed in detail     2   contraception - tubal ligation at time of c section     3  Anxiety / depression -   Started on zoloft - 12/3/20   Might consider weaning in a few months      30 y o ,No LMP recorded  C/O no gyn complaints  She is doing well with her zoloft she will consider weaning the zoloft       Past medical / social / surgical / family history reviewed and updated   Medication and allergies discussed in detail and updated     Review of Systems - History obtained from chart review and the patient  General ROS: negative  Psychological ROS: negative  Ophthalmic ROS: negative  ENT ROS: negative  Allergy and Immunology ROS: negative  Hematological and Lymphatic ROS: negative  Endocrine ROS: negative  Breast ROS: negative for breast lumps  Respiratory ROS: no cough, shortness of breath, or wheezing  Cardiovascular ROS: no chest pain or dyspnea on exertion  Gastrointestinal ROS: no abdominal pain, change in bowel habits, or black or bloody stools  Genito-Urinary ROS: no dysuria, trouble voiding, or hematuria  Musculoskeletal ROS: negative  Neurological ROS: no TIA or stroke symptoms  Dermatological ROS: negative          Physical Exam  Vitals signs reviewed  Constitutional:       Appearance: She is well-developed  Neck:      Musculoskeletal: Normal range of motion  Thyroid: No thyromegaly  Cardiovascular:      Rate and Rhythm: Normal rate  Heart sounds: Normal heart sounds  Pulmonary:      Effort: Pulmonary effort is normal  No accessory muscle usage or respiratory distress  Chest:      Chest wall: No tenderness  Breasts: Breasts are symmetrical          Right: No inverted nipple, mass or tenderness  Left: No inverted nipple, mass or tenderness  Abdominal:      General: There is no distension  Palpations: Abdomen is soft  Tenderness: There is no abdominal tenderness  There is no guarding or rebound  Genitourinary:     Exam position: Supine  Labia:         Right: No rash, tenderness, lesion or injury  Left: No rash, tenderness, lesion or injury  Vagina: Normal  No foreign body  No vaginal discharge or bleeding  Cervix: No cervical motion tenderness or discharge  Uterus: Not enlarged and not fixed  Adnexa:         Right: No mass, tenderness or fullness  Left: No mass, tenderness or fullness  Rectum: No external hemorrhoid  Lymphadenopathy:      Cervical: No cervical adenopathy  Upper Body:      Right upper body: No supraclavicular adenopathy  Left upper body: No supraclavicular adenopathy  Neurological:      Mental Status: She is alert and oriented to person, place, and time  Psychiatric:         Speech: Speech normal          Behavior: Behavior normal          Thought Content:  Thought content normal          Judgment: Judgment normal

## 2021-03-22 ENCOUNTER — ANNUAL EXAM (OUTPATIENT)
Dept: OBGYN CLINIC | Facility: MEDICAL CENTER | Age: 31
End: 2021-03-22
Payer: COMMERCIAL

## 2021-03-22 VITALS
SYSTOLIC BLOOD PRESSURE: 128 MMHG | WEIGHT: 193 LBS | BODY MASS INDEX: 32.15 KG/M2 | HEIGHT: 65 IN | DIASTOLIC BLOOD PRESSURE: 80 MMHG

## 2021-03-22 DIAGNOSIS — Z01.419 WOMEN'S ANNUAL ROUTINE GYNECOLOGICAL EXAMINATION: Primary | ICD-10-CM

## 2021-03-22 PROCEDURE — 3008F BODY MASS INDEX DOCD: CPT | Performed by: OBSTETRICS & GYNECOLOGY

## 2021-03-22 PROCEDURE — 99395 PREV VISIT EST AGE 18-39: CPT | Performed by: OBSTETRICS & GYNECOLOGY

## 2021-03-22 PROCEDURE — 1036F TOBACCO NON-USER: CPT | Performed by: OBSTETRICS & GYNECOLOGY

## 2021-04-12 ENCOUNTER — IMMUNIZATIONS (OUTPATIENT)
Dept: FAMILY MEDICINE CLINIC | Facility: HOSPITAL | Age: 31
End: 2021-04-12

## 2021-04-12 DIAGNOSIS — Z23 ENCOUNTER FOR IMMUNIZATION: Primary | ICD-10-CM

## 2021-04-12 PROCEDURE — 91300 SARS-COV-2 / COVID-19 MRNA VACCINE (PFIZER-BIONTECH) 30 MCG: CPT

## 2021-04-12 PROCEDURE — 0002A SARS-COV-2 / COVID-19 MRNA VACCINE (PFIZER-BIONTECH) 30 MCG: CPT

## 2021-05-27 DIAGNOSIS — F41.9 ANXIETY: ICD-10-CM

## 2021-09-03 ENCOUNTER — TELEMEDICINE (OUTPATIENT)
Dept: FAMILY MEDICINE CLINIC | Facility: CLINIC | Age: 31
End: 2021-09-03
Payer: COMMERCIAL

## 2021-09-03 VITALS — HEIGHT: 65 IN | BODY MASS INDEX: 25.16 KG/M2 | WEIGHT: 151 LBS

## 2021-09-03 DIAGNOSIS — J01.00 ACUTE NON-RECURRENT MAXILLARY SINUSITIS: Primary | ICD-10-CM

## 2021-09-03 PROBLEM — Z3A.35 35 WEEKS GESTATION OF PREGNANCY: Status: RESOLVED | Noted: 2020-08-12 | Resolved: 2021-09-03

## 2021-09-03 PROBLEM — O30.033 MONOCHORIONIC DIAMNIOTIC TWIN GESTATION IN THIRD TRIMESTER: Status: RESOLVED | Noted: 2020-07-29 | Resolved: 2021-09-03

## 2021-09-03 PROBLEM — O09.813 PREGNANCY RESULTING FROM IN VITRO FERTILIZATION IN THIRD TRIMESTER: Status: RESOLVED | Noted: 2020-07-29 | Resolved: 2021-09-03

## 2021-09-03 PROBLEM — Z98.891 STATUS POST PRIMARY LOW TRANSVERSE CESAREAN SECTION: Status: RESOLVED | Noted: 2018-08-09 | Resolved: 2021-09-03

## 2021-09-03 PROBLEM — O34.211 MATERNAL CARE DUE TO LOW TRANSVERSE UTERINE SCAR FROM PREVIOUS CESAREAN DELIVERY: Status: RESOLVED | Noted: 2020-05-26 | Resolved: 2021-09-03

## 2021-09-03 PROCEDURE — 1036F TOBACCO NON-USER: CPT | Performed by: FAMILY MEDICINE

## 2021-09-03 PROCEDURE — 99214 OFFICE O/P EST MOD 30 MIN: CPT | Performed by: FAMILY MEDICINE

## 2021-09-03 RX ORDER — CEFUROXIME AXETIL 500 MG/1
500 TABLET ORAL EVERY 12 HOURS SCHEDULED
Qty: 20 TABLET | Refills: 0 | Status: SHIPPED | OUTPATIENT
Start: 2021-09-03 | End: 2021-09-13

## 2021-09-03 RX ORDER — FLUCONAZOLE 150 MG/1
150 TABLET ORAL ONCE
Qty: 1 TABLET | Refills: 0 | Status: SHIPPED | OUTPATIENT
Start: 2021-09-03 | End: 2021-09-03

## 2021-09-03 NOTE — PROGRESS NOTES
Virtual Regular Visit    Verification of patient location:    Patient is located in the following state in which I hold an active license [place]      Assessment/Plan:    Maxillary Sinusitis   Ceftin twice a day for 10 days  Continue Mucinex for 10 days with 8 oz of water twice daily   If there is no improvement in 48-72 hours call for change of antibiotic  If it gets worse in the next 48-72 hours call for change antibiotic    Recheck as scheduled or as needed        Problem List Items Addressed This Visit     None      Visit Diagnoses     Acute non-recurrent maxillary sinusitis    -  Primary    Relevant Medications    cefuroxime (CEFTIN) 500 mg tablet    fluconazole (DIFLUCAN) 150 mg tablet          BMI Counseling: Body mass index is 25 13 kg/m²  The BMI is above normal  Nutrition recommendations include decreasing portion sizes and encouraging healthy choices of fruits and vegetables  Exercise recommendations include exercising 3-5 times per week  Reason for visit is   Chief Complaint   Patient presents with    Headache    loss of smell        Encounter provider Minh Michelle DO    Provider located at [address] [place] [address]  [phone]      Recent Visits  No visits were found meeting these conditions  Showing recent visits within past 7 days and meeting all other requirements  Today's Visits  Date Type Provider Dept   09/03/21 Telemedicine Minh Michelle DO [address]   Showing today's visits and meeting all other requirements  Future Appointments  No visits were found meeting these conditions  Showing future appointments within next 150 days and meeting all other requirements       The patient was identified by name and date of birth   Yenifer Hi was informed that this is a telemedicine visit and that the visit is being conducted through [address] [place] [address] Now and patient was informed that this is a secure, HIPAA-compliant platform  She agrees to proceed     My office door was closed  No one else was in the room  She acknowledged consent and understanding of privacy and security of the video platform  The patient has agreed to participate and understands they can discontinue the visit at any time  Patient is aware this is a billable service  Subjective  Bolivar Duran is a 31 y o  female    About 2 weeks ago her 1 son Alexandria Greenberg started with a fever and a cough and was checked to be COVID negative  He then got better after he went back to the pediatrician is 2nd time and got a steroid  Then her twin babies got it and now patient herself has it  She has a cough, congestion, was taking Mucinex but now the cough is getting worse and she is getting thick yellow sputum that is worse in the morning but continues all day long  She has a temperature of 99°  She also has pain in her right maxillary area  Everything seems to be getting worse rather than better       Past Medical History:   Diagnosis Date    Amenorrhea     last assessed: 2016    Female infertility     seen by [facility] - seen for 1 year, 5 cycles IUI acheived pregnancy via IVF    Fibromyalgia     Irregular menstrual cycle     last assessed: 2016    Osteoarthritis of right acromioclavicular joint     last assessment: 2013    Polycystic ovary syndrome     Separation of right acromioclavicular joint     Last assessed: 2013; this is more consistent with ostiolysis of the distal clavicle vs  acromioclavicular joint arthritis, not acute shoulder seperation    Varicella     vaccine       Past Surgical History:   Procedure Laterality Date     SECTION      DENTAL SURGERY      wisdom teeth    [place]  DELIVERY ONLY N/A 2018    Procedure:  SECTION (); Surgeon:  Erlene Phalen, MD;  Location: [facility];  Service: Obstetrics    NV  DELIVERY ONLY N/A 2020    Procedure:  SECTION (); Surgeon: Erna Dumas MD;  Location: [facility];  Service: Obstetrics    SHOULDER SURGERY      TUBAL LIGATION Bilateral 2020    Procedure: LIGATION/COAGULATION TUBAL;  Surgeon: Erna Dumas MD;  Location: [facility];  Service: Obstetrics       Current Outpatient Medications   Medication Sig Dispense Refill    cefuroxime (CEFTIN) 500 mg tablet Take 1 tablet (500 mg total) by mouth every 12 (twelve) hours for 10 days 20 tablet 0    fluconazole (DIFLUCAN) 150 mg tablet Take 1 tablet (150 mg total) by mouth once for 1 dose 1 tablet 0     No current facility-administered medications for this visit  No Known Allergies    Review of Systems    Video Exam    Vitals:    21 1206   Weight: 68 5 kg (151 lb)   Height: 5' 5" (1 651 m)       Physical Exam    Physical Exam   General:  Patient is oriented to person, place, and time  The patient does not appear ill  No distress  Mental status:  Awake, reasonable, focused, relevant  HEENT-normocephalic atraumatic without facial weakness, no facial pallor or flushing or cyanosis, she has a congested voice  Pulmonary/Chest: Effort normal   No coughing  No signs of respiratory distress, tachypnea, conversational dyspnea or audible wheezing noted  Psychiatric:  The patient is oriented to person, place, and time  mood and affect  Behavior is normal  Thought content normal          I spent 15 minutes directly with the patient during this visit    VIRTUAL VISIT DISCLAIMER      Terrial Sacks verbally agrees to participate in [facility]  Pt is aware that [facility] could be limited without vital signs or the ability to perform a full hands-on physical exam  Nell Lin understands she or the provider may request at any time to terminate the video visit and request the patient to seek care or treatment in person

## 2021-09-30 ENCOUNTER — OFFICE VISIT (OUTPATIENT)
Dept: FAMILY MEDICINE CLINIC | Facility: CLINIC | Age: 31
End: 2021-09-30
Payer: COMMERCIAL

## 2021-09-30 VITALS
DIASTOLIC BLOOD PRESSURE: 76 MMHG | HEART RATE: 80 BPM | RESPIRATION RATE: 15 BRPM | HEIGHT: 65 IN | SYSTOLIC BLOOD PRESSURE: 122 MMHG | BODY MASS INDEX: 26.34 KG/M2 | WEIGHT: 158.1 LBS

## 2021-09-30 DIAGNOSIS — Z23 NEED FOR VACCINATION: ICD-10-CM

## 2021-09-30 DIAGNOSIS — L65.0 TELOGEN EFFLUVIUM: Primary | ICD-10-CM

## 2021-09-30 PROCEDURE — 99213 OFFICE O/P EST LOW 20 MIN: CPT | Performed by: FAMILY MEDICINE

## 2021-09-30 PROCEDURE — 90471 IMMUNIZATION ADMIN: CPT

## 2021-09-30 PROCEDURE — 3008F BODY MASS INDEX DOCD: CPT | Performed by: FAMILY MEDICINE

## 2021-09-30 PROCEDURE — 90682 RIV4 VACC RECOMBINANT DNA IM: CPT

## 2021-09-30 NOTE — PROGRESS NOTES
Assessment/Plan:    Telogen effluvium  Explained this to patient  She will go back to adding carbs but try to stay away from sugar  The fact that there was falling out shows that her hair is starting to grow, her body just went to shock for little while at her diet had been changed  With time it walk come back in again    Recheck as scheduled or as needed           Subjective:   Benito Holloway is a 31 y  o female  Chief Complaint   Patient presents with    Alopecia     Patient is here with hair loss    She had her twins about a year ago  3-6 months later she had the usual postpartum hair loss and was good for while  Now on the last 60 days she has noticed hair loss again  At the same time she also stop losing weight  She was on a program where she was taking supplements and basically having face chicken vegetables and no carbohydrates and no alcohol  She noticed the weight loss stops and so the hair started falling out as well  She also notes she is not healing very well on her legs      Past medical history, social history, and family history reviewed as appropriate for the complaint of this patient  MEDICATIONS REVIEWED AND UPDATED    10 point review of systems performed, the remainder of the ROS is negative except for what is noted in the history of chief complaint    Objective:    Vitals:    09/30/21 0910   BP: 122/76   Pulse: 80   Resp: 15     Body mass index is 26 51 kg/m²  Physical Exam    General  Patient in no acute distress, well appearing, well nourished and appears stated age    Mental status  Good judgment and insight, oriented to time person and place, recent and remote memory is intact, mood and affect are normal, cooperative, and patient is reasonable      Hair pull test  Positive with for hairs with bulbs on the end of each 1 of them

## 2021-09-30 NOTE — PATIENT INSTRUCTIONS
Telogen effluvium  Explained this to patient  She will go back to adding carbs but try to stay away from sugar  The fact that there was falling out shows that her hair is starting to grow, her body just went to shock for little while at her diet had been changed  With time it walk come back in again

## 2021-11-23 ENCOUNTER — COSMETIC (OUTPATIENT)
Dept: PLASTIC SURGERY | Facility: CLINIC | Age: 31
End: 2021-11-23

## 2021-11-23 VITALS
HEART RATE: 87 BPM | TEMPERATURE: 98.4 F | SYSTOLIC BLOOD PRESSURE: 127 MMHG | HEIGHT: 65 IN | DIASTOLIC BLOOD PRESSURE: 69 MMHG | BODY MASS INDEX: 26.12 KG/M2 | WEIGHT: 156.8 LBS

## 2021-11-23 DIAGNOSIS — Z41.1 ENCOUNTER FOR COSMETIC SURGERY: Primary | ICD-10-CM

## 2021-11-23 PROCEDURE — COSCON: Performed by: STUDENT IN AN ORGANIZED HEALTH CARE EDUCATION/TRAINING PROGRAM

## 2022-01-17 ENCOUNTER — OFFICE VISIT (OUTPATIENT)
Dept: FAMILY MEDICINE CLINIC | Facility: CLINIC | Age: 32
End: 2022-01-17
Payer: COMMERCIAL

## 2022-01-17 ENCOUNTER — IMMUNIZATIONS (OUTPATIENT)
Dept: FAMILY MEDICINE CLINIC | Facility: HOSPITAL | Age: 32
End: 2022-01-17

## 2022-01-17 VITALS
RESPIRATION RATE: 15 BRPM | HEART RATE: 80 BPM | WEIGHT: 158.5 LBS | DIASTOLIC BLOOD PRESSURE: 74 MMHG | SYSTOLIC BLOOD PRESSURE: 120 MMHG | HEIGHT: 65 IN | OXYGEN SATURATION: 99 % | BODY MASS INDEX: 26.41 KG/M2

## 2022-01-17 DIAGNOSIS — F43.22 ACUTE ADJUSTMENT DISORDER WITH ANXIETY: Primary | ICD-10-CM

## 2022-01-17 DIAGNOSIS — Z23 ENCOUNTER FOR IMMUNIZATION: Primary | ICD-10-CM

## 2022-01-17 PROCEDURE — 0001A COVID-19 PFIZER VACC 0.3 ML: CPT

## 2022-01-17 PROCEDURE — 99214 OFFICE O/P EST MOD 30 MIN: CPT | Performed by: FAMILY MEDICINE

## 2022-01-17 PROCEDURE — 91300 COVID-19 PFIZER VACC 0.3 ML: CPT

## 2022-01-17 NOTE — PROGRESS NOTES
Assessment/Plan:    Adjustment disorder with anxiety  Spoke with patient at length regarding changing schedule and asking for help and taking something off of her plate   I explained to her that a medicine would only add 1 more level and anxiety and really would not help her   I asked her to talk with her  and her father and come up with a plan  I recommended she do nothing but take care of her children for the next 3-6 months   At the end of that she will know if she wants to spend her time with the kids constantly or if she needs the work to in order to define herself and have some worth  Then from there on multitude of schedules can be done    I gave her information for active learning centers for  where she may consider having all the children be in taking care of at  2 days week in order to work or have time for herself  Patient will consider the above and will let me know if there is anything I can do to help her further  BMI Counseling: Body mass index is 26 79 kg/m²  The BMI is above normal  Nutrition recommendations include decreasing portion sizes  Exercise recommendations include exercising 3-5 times per week  Rationale for BMI follow-up plan is due to patient being overweight or obese  Subjective:   Zina Van is a 27 y  o female  Chief Complaint   Patient presents with    Depression     Patient is here feeling like she is battling depression  Because of COVID she feels like she is a prisoner in her own house with her 3 kids, she has 312month-old twins and a 1year-old   She finds that she does not want to play with them  She feels like she does not want to do anything   She never feels like she wants to her thumb but she does not feel any motivation    She has had anxiety in the past postpartum but it does not feel the same  Her twins are now being more irritable and wanting to do more in getting into more trouble      Patient tries to work from home and take care of them at the same time   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  Her biggest problem is she does not have any motivation to do anything  Her kids constantly need to be watched  If she does not get enough work done in her own mind, she feels guilty  She came in thinking she needs medicine but does not want to gain weight   She worked hard to get a great deal of weight off and does not want any to come back    Her  is very supportive but works for the same company that her father owned and is the    He thought perhaps he could stay home with the kids 1 day and she could go to work but it would be hard for  to do that  Patient gets very little time outside the home    In now that she thinks of it, she does not know how whole year went by already in the life of the twins      Past medical history, social history, and family history reviewed as appropriate for the complaint of this patient  MEDICATIONS REVIEWED AND UPDATED    10 point review of systems performed, the remainder of the ROS is negative except for what is noted in the history of chief complaint    Objective:    Vitals:    01/17/22 1459   BP: 120/74   Pulse: 80   Resp: 15   SpO2: 99%     Body mass index is 26 79 kg/m²  Physical Exam    General  Patient in no acute distress, well appearing, well nourished and appears stated age    Mental status  Good judgment and insight, oriented to time person and place, recent and remote memory is intact, mood and affect are normal, cooperative, and patient is reasonable      Counseling with patient and giving advice took all of 40 minutes

## 2022-01-21 LAB
BASOPHILS # BLD AUTO: 42 CELLS/UL (ref 0–200)
BASOPHILS NFR BLD AUTO: 0.8 %
BUN SERPL-MCNC: 14 MG/DL (ref 7–25)
BUN/CREAT SERPL: NORMAL (CALC) (ref 6–22)
CALCIUM SERPL-MCNC: 9.3 MG/DL (ref 8.6–10.2)
CHLORIDE SERPL-SCNC: 103 MMOL/L (ref 98–110)
CO2 SERPL-SCNC: 32 MMOL/L (ref 20–32)
CREAT SERPL-MCNC: 0.66 MG/DL (ref 0.5–1.1)
EOSINOPHIL # BLD AUTO: 148 CELLS/UL (ref 15–500)
EOSINOPHIL NFR BLD AUTO: 2.8 %
ERYTHROCYTE [DISTWIDTH] IN BLOOD BY AUTOMATED COUNT: 12 % (ref 11–15)
GLUCOSE SERPL-MCNC: 86 MG/DL (ref 65–99)
HCT VFR BLD AUTO: 38.6 % (ref 35–45)
HGB BLD-MCNC: 13.1 G/DL (ref 11.7–15.5)
LYMPHOCYTES # BLD AUTO: 2396 CELLS/UL (ref 850–3900)
LYMPHOCYTES NFR BLD AUTO: 45.2 %
MCH RBC QN AUTO: 30 PG (ref 27–33)
MCHC RBC AUTO-ENTMCNC: 33.9 G/DL (ref 32–36)
MCV RBC AUTO: 88.3 FL (ref 80–100)
MONOCYTES # BLD AUTO: 509 CELLS/UL (ref 200–950)
MONOCYTES NFR BLD AUTO: 9.6 %
NEUTROPHILS # BLD AUTO: 2205 CELLS/UL (ref 1500–7800)
NEUTROPHILS NFR BLD AUTO: 41.6 %
PLATELET # BLD AUTO: 259 THOUSAND/UL (ref 140–400)
PMV BLD REES-ECKER: 10.7 FL (ref 7.5–12.5)
POTASSIUM SERPL-SCNC: 4.2 MMOL/L (ref 3.5–5.3)
RBC # BLD AUTO: 4.37 MILLION/UL (ref 3.8–5.1)
SL AMB EGFR AFRICAN AMERICAN: 135 ML/MIN/1.73M2
SL AMB EGFR NON AFRICAN AMERICAN: 117 ML/MIN/1.73M2
SODIUM SERPL-SCNC: 140 MMOL/L (ref 135–146)
WBC # BLD AUTO: 5.3 THOUSAND/UL (ref 3.8–10.8)

## 2022-01-28 ENCOUNTER — OFFICE VISIT (OUTPATIENT)
Dept: FAMILY MEDICINE CLINIC | Facility: CLINIC | Age: 32
End: 2022-01-28
Payer: COMMERCIAL

## 2022-01-28 VITALS
TEMPERATURE: 98.1 F | SYSTOLIC BLOOD PRESSURE: 120 MMHG | RESPIRATION RATE: 16 BRPM | OXYGEN SATURATION: 98 % | HEART RATE: 72 BPM | HEIGHT: 65 IN | BODY MASS INDEX: 26.24 KG/M2 | WEIGHT: 157.5 LBS | DIASTOLIC BLOOD PRESSURE: 80 MMHG

## 2022-01-28 DIAGNOSIS — R09.81 SINUS CONGESTION: ICD-10-CM

## 2022-01-28 DIAGNOSIS — R05.9 COUGH: Primary | ICD-10-CM

## 2022-01-28 PROCEDURE — 1036F TOBACCO NON-USER: CPT | Performed by: FAMILY MEDICINE

## 2022-01-28 PROCEDURE — 3008F BODY MASS INDEX DOCD: CPT | Performed by: FAMILY MEDICINE

## 2022-01-28 PROCEDURE — 99214 OFFICE O/P EST MOD 30 MIN: CPT | Performed by: FAMILY MEDICINE

## 2022-01-28 RX ORDER — DIPHENOXYLATE HYDROCHLORIDE AND ATROPINE SULFATE 2.5; .025 MG/1; MG/1
1 TABLET ORAL DAILY
COMMUNITY

## 2022-01-28 RX ORDER — PREDNISONE 10 MG/1
TABLET ORAL
Qty: 20 TABLET | Refills: 0 | Status: SHIPPED | OUTPATIENT
Start: 2022-01-28 | End: 2022-04-27 | Stop reason: ALTCHOICE

## 2022-01-28 NOTE — PATIENT INSTRUCTIONS
1  Regarding prednisone take as follows  Tonight Friday night, take 4 pills at once after eating  Saturday morning take 4 pills at once after eating  Sunday and Monday 3 pills at once after eating  Tuesday and Wednesday 2 pills at once after eating  Wednesday and Thursday 1 pill daily after eating    2   Continue Mucinex twice daily with 8 oz of water    If you get fevers let me now  If you do not gradually get better let me kknow if you get worse let me know    Recheck as scheduled or needed

## 2022-01-28 NOTE — PROGRESS NOTES
Assessment/Plan:    Cough with sinus congestion  Lungs are clear  Prednisone taper  Continue Mucinex  Let us know if she gets worse rather than better    Otherwise recheck as scheduled or needed               Subjective:   Alexander Romero is a 27 y  o female  Chief Complaint   Patient presents with    Cough    Shortness of Breath     Patient is already through [employer] and had a COVID cough and now about a week ago started with a new cough that has been progressively worse  She has been on Mucinex for 5 days and has not really helped  She has a lot of congestion in the back of her throat feels like she has clogged with mucus that she really can look can not clear  Of mucus is yellow green in seems to last all day  Has been colored for the last few days      Past medical history, social history, and family history reviewed as appropriate for the complaint of this patient  MEDICATIONS REVIEWED AND UPDATED    10 point review of systems performed, the remainder of the ROS is negative except for what is noted in the history of chief complaint    Objective:    Vitals:    01/28/22 1443   BP: 120/80   Pulse: 72   Resp: 16   Temp: 98 1 °F (36 7 °C)   SpO2: 98%     Body mass index is 26 41 kg/m²  Physical Exam    Constitutional  Patient appears ill and is coughing but is in no acute distress    Mental Status  Alert, Oriented, Cooperative, Memory function normal , clean, and reasonable    HEENT  TMs cerumen turbinates are open boggy watery discharge pharynx benign frontal sinuses hurt less than her maxillary sinuses      Neck  No neck mass, No thyromegaly, Good carotid upstrokes bilaterally, trachea midline positive click    Respiratory  Breath sounds normal, No rales, No rhonchi, No wheezing, normal palpation    Cardiac   Regular rhythm without ectopy or murmur no S3-S4, no heave lift or thrill to palpation    Vascular  No leg edema, No pedal edema    Muscular skeletal  No clubbing cyanosis , muscle tone normal    Skin  No appreciable rashes or abnormal appearing lesions

## 2022-02-03 ENCOUNTER — TELEPHONE (OUTPATIENT)
Dept: FAMILY MEDICINE CLINIC | Facility: CLINIC | Age: 32
End: 2022-02-03

## 2022-02-03 NOTE — TELEPHONE ENCOUNTER
Patient called to let you know she is not feeling any better still has cough not improved  Still has Sore throat andnow ear hurts  If you prescribe antibiotics they always prescribe something so she does not get yeast infection      Please send to [facility]

## 2022-02-03 NOTE — TELEPHONE ENCOUNTER
Gianluca Huizar,    1  Inform this patient that an antibiotic has been called in 1 twice a day for 2 weeks  She should take it until bottle is empty    2  Medicine for yeast infection has been called in  Do not take it until the antibiotic is done and only if she has a yeast infection    if she needs to take something prior to the end of the antibiotic, I asked her to use something over the counter such as Monistat

## 2022-02-03 NOTE — TELEPHONE ENCOUNTER
Christian, sent to [facility] in [facility]

## 2022-02-03 NOTE — TELEPHONE ENCOUNTER
Prescriptions sent today were sent to the wrong pharmacy  Can you please resend them to the [facility] in [place] in her chart

## 2022-04-04 ENCOUNTER — ANNUAL EXAM (OUTPATIENT)
Dept: OBGYN CLINIC | Facility: MEDICAL CENTER | Age: 32
End: 2022-04-04
Payer: COMMERCIAL

## 2022-04-04 VITALS
SYSTOLIC BLOOD PRESSURE: 120 MMHG | WEIGHT: 162 LBS | DIASTOLIC BLOOD PRESSURE: 70 MMHG | HEIGHT: 64 IN | BODY MASS INDEX: 27.66 KG/M2

## 2022-04-04 DIAGNOSIS — Z01.419 WOMEN'S ANNUAL ROUTINE GYNECOLOGICAL EXAMINATION: Primary | ICD-10-CM

## 2022-04-04 PROCEDURE — 99395 PREV VISIT EST AGE 18-39: CPT | Performed by: OBSTETRICS & GYNECOLOGY

## 2022-04-04 PROCEDURE — 1036F TOBACCO NON-USER: CPT | Performed by: OBSTETRICS & GYNECOLOGY

## 2022-04-04 NOTE — PROGRESS NOTES
A/P    1  Annual exam    Last PAP - 3/31/20- neg neg   Next due 2025   Scheduling of pap discussed in detail          27 y o ,No LMP recorded  C/O no gyn complaints   Past medical / social / surgical / family history reviewed and updated   Medication and allergies discussed in detail and updated     Review of Systems - History obtained from chart review and the patient  General ROS: negative  Psychological ROS: negative  Ophthalmic ROS: negative  ENT ROS: negative  Allergy and Immunology ROS: negative  Hematological and Lymphatic ROS: negative  Endocrine ROS: negative  Breast ROS: negative for breast lumps  Respiratory ROS: no cough, shortness of breath, or wheezing  Cardiovascular ROS: no chest pain or dyspnea on exertion  Gastrointestinal ROS: no abdominal pain, change in bowel habits, or black or bloody stools  Genito-Urinary ROS: no dysuria, trouble voiding, or hematuria  Musculoskeletal ROS: negative  Neurological ROS: no TIA or stroke symptoms  Dermatological ROS: negative        Physical Exam  Vitals reviewed  Constitutional:       Appearance: She is well-developed  Neck:      Thyroid: No thyromegaly  Cardiovascular:      Rate and Rhythm: Normal rate  Heart sounds: Normal heart sounds  Pulmonary:      Effort: Pulmonary effort is normal  No accessory muscle usage or respiratory distress  Chest:      Chest wall: No tenderness  Breasts: Breasts are symmetrical       Right: No inverted nipple, mass, tenderness or supraclavicular adenopathy  Left: No inverted nipple, mass, tenderness or supraclavicular adenopathy  Abdominal:      General: There is no distension  Palpations: Abdomen is soft  Tenderness: There is no abdominal tenderness  There is no guarding or rebound  Genitourinary:     Exam position: Supine  Labia:         Right: No rash, tenderness, lesion or injury  Left: No rash, tenderness, lesion or injury  Vagina: Normal  No foreign body   No vaginal discharge or bleeding  Cervix: No cervical motion tenderness or discharge  Uterus: Not enlarged and not fixed  Adnexa:         Right: No mass, tenderness or fullness  Left: No mass, tenderness or fullness  Rectum: No external hemorrhoid  Musculoskeletal:      Cervical back: Normal range of motion  Lymphadenopathy:      Cervical: No cervical adenopathy  Upper Body:      Right upper body: No supraclavicular adenopathy  Left upper body: No supraclavicular adenopathy  Neurological:      Mental Status: She is alert and oriented to person, place, and time  Psychiatric:         Speech: Speech normal          Behavior: Behavior normal          Thought Content:  Thought content normal          Judgment: Judgment normal

## 2022-04-27 ENCOUNTER — TELEPHONE (OUTPATIENT)
Dept: FAMILY MEDICINE CLINIC | Facility: CLINIC | Age: 32
End: 2022-04-27

## 2022-04-27 ENCOUNTER — OFFICE VISIT (OUTPATIENT)
Dept: FAMILY MEDICINE CLINIC | Facility: CLINIC | Age: 32
End: 2022-04-27
Payer: COMMERCIAL

## 2022-04-27 ENCOUNTER — TELEPHONE (OUTPATIENT)
Dept: BEHAVIORAL/MENTAL HEALTH CLINIC | Facility: CLINIC | Age: 32
End: 2022-04-27

## 2022-04-27 VITALS
HEART RATE: 79 BPM | DIASTOLIC BLOOD PRESSURE: 84 MMHG | BODY MASS INDEX: 26.59 KG/M2 | SYSTOLIC BLOOD PRESSURE: 122 MMHG | HEIGHT: 65 IN | RESPIRATION RATE: 16 BRPM | WEIGHT: 159.6 LBS

## 2022-04-27 DIAGNOSIS — F43.23 ADJUSTMENT DISORDER WITH MIXED ANXIETY AND DEPRESSED MOOD: Primary | ICD-10-CM

## 2022-04-27 PROCEDURE — 3008F BODY MASS INDEX DOCD: CPT | Performed by: OBSTETRICS & GYNECOLOGY

## 2022-04-27 PROCEDURE — 99213 OFFICE O/P EST LOW 20 MIN: CPT | Performed by: FAMILY MEDICINE

## 2022-04-27 RX ORDER — BUPROPION HYDROCHLORIDE 150 MG/1
150 TABLET ORAL EVERY MORNING
Qty: 30 TABLET | Refills: 3 | Status: SHIPPED | OUTPATIENT
Start: 2022-04-27 | End: 2022-05-16 | Stop reason: SDUPTHER

## 2022-04-27 NOTE — PROGRESS NOTES
Assessment/Plan:    1  Adjustment disorder with mixed anxiety and depressed mood  - patient is interested in therapy, will refer to [address], advised to start Wellbutrin, 's reviewed, follow up in 4-5 weeks or sooner for worsening symptoms  - buPROPion (Wellbutrin XL) 150 mg 24 hr tablet; Take 1 tablet (150 mg total) by mouth every morning  Dispense: 30 tablet; Refill: 3  - Ambulatory Referral to [facility]; Future         Greater than 50% of this time was spent in counseling/coordination of care regarding: risks and benefits of treatment options, instructions for management and patient education  The patient understands and agrees with the treatment plan  Subjective:   Chief Complaint   Patient presents with    Anxiety    Depression      Patient ID: Uvaldo Lanes is a 27 y o  female who presents today with c/o increased anxiety, feeling stress out, low energy and motivation and depressed mood for the past several months, her symptoms now getting progressively worse  They are building a new house and planning to move in the next 1-2 months, she has 1kids 23month-old twins and a 1year old and finds in difficult to function and frequently feels overwhelmed  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  Her 1year old now goes to day care twice a week, her  is very supportive and her Mom also helps out with kids  Patient took Zoloft for postpartum depression for 3-4 months with good results, but gained a lot of weight on it and is not interested in restarting Zoloft         The following portions of the patient's history were reviewed and updated as appropriate: allergies, current medications, past family history, past medical history, past social history, past surgical history and problem list     Past Medical History:   Diagnosis Date    Amenorrhea     last assessed: 08/22/2016    Female infertility     seen by [facility] - seen for 1 year, 5 cycles IUI acheived pregnancy via IVF    Fibromyalgia     Irregular menstrual cycle     last assessed: 2016    Osteoarthritis of right acromioclavicular joint     last assessment: 2013    Polycystic ovary syndrome     Separation of right acromioclavicular joint     Last assessed: 2013; this is more consistent with ostiolysis of the distal clavicle vs  acromioclavicular joint arthritis, not acute shoulder seperation    Varicella     vaccine     Past Surgical History:   Procedure Laterality Date     SECTION      DENTAL SURGERY      wisdom teeth    AL  DELIVERY ONLY N/A 2018    Procedure:  SECTION (); Surgeon: Salazar Yuan MD;  Location: [facility];  Service: Obstetrics    AL  DELIVERY ONLY N/A 2020    Procedure:  SECTION (); Surgeon: Salazar Yuan MD;  Location: [facility];  Service: Obstetrics    SHOULDER SURGERY      TUBAL LIGATION Bilateral 2020    Procedure: LIGATION/COAGULATION TUBAL;  Surgeon:  Salazar Yuan MD;  Location: [facility];  Service: Obstetrics     Family History   Problem Relation Age of Onset    Fibromyalgia Mother     Hyperlipidemia Father     Hypertension Father     Diabetes Maternal Grandmother     Diabetes Paternal Grandfather     Cancer Maternal Grandfather      Social History     Socioeconomic History    Marital status: /Civil Union     Spouse name: Not on file    Number of children: Not on file    Years of education: Not on file    Highest education level: Not on file   Occupational History    Not on file   Tobacco Use    Smoking status: Never Smoker    Smokeless tobacco: Never Used   Vaping Use    Vaping Use: Never used   Substance and Sexual Activity    Alcohol use: Not Currently     Alcohol/week: 1 0 standard drink     Types: 1 Cans of beer per week    Drug use: No    Sexual activity: Yes     Partners: Male   Other Topics Concern    Not on file   Social History Narrative    History of  0    Caffeine use-1 cup of coffee/day    Has smoke detectors     Denied history of sun protection    Uses safety equipment- protective head gear     Social Determinants of Health     Financial Resource Strain: Not on file   Food Insecurity: Not on file   Transportation Needs: Not on file   Physical Activity: Not on file   Stress: Not on file   Social Connections: Not on file   Intimate Partner Violence: Not on file   Housing Stability: Not on file       Current Outpatient Medications:     multivitamin (THERAGRAN) TABS, Take 1 tablet by mouth daily, Disp: , Rfl:     Review of Systems   Constitutional: Negative for appetite change, chills, fatigue, fever and unexpected weight change  Respiratory: Negative for cough, shortness of breath and wheezing  Cardiovascular: Negative for chest pain, palpitations and leg swelling  Gastrointestinal: Negative for abdominal pain, blood in stool, diarrhea, nausea and vomiting  Neurological: Negative for dizziness, syncope and headaches  Hematological: Negative for adenopathy  Psychiatric/Behavioral: Positive for dysphoric mood  Negative for sleep disturbance and suicidal ideas  The patient is nervous/anxious  Objective:    Vitals:    22 1001   BP: 122/84   Pulse: 79   Resp: 16   Weight: 72 4 kg (159 lb 9 6 oz)   Height: 5' 4 75" (1 645 m)        Physical Exam  Constitutional:       General: She is not in acute distress  Appearance: She is well-developed  Neck:      Thyroid: No thyromegaly  Cardiovascular:      Rate and Rhythm: Normal rate and regular rhythm  Heart sounds: Normal heart sounds  No murmur heard  Pulmonary:      Effort: Pulmonary effort is normal  No respiratory distress  Breath sounds: No wheezing, rhonchi or rales  Musculoskeletal:      Cervical back: Neck supple  Lymphadenopathy:      Cervical: No cervical adenopathy     Neurological:      Mental Status: She is alert and oriented to person, place, and time  Psychiatric:         Mood and Affect: Mood normal  Affect is tearful  Speech: Speech normal          Behavior: Behavior normal          Thought Content:  Thought content normal          Cognition and Memory: Cognition normal          Judgment: Judgment normal

## 2022-04-27 NOTE — TELEPHONE ENCOUNTER
----- Message from Elmer Murrell sent at 4/27/2022 10:47 AM EDT -----  Regarding: Referral  Hello,     Please see below: This writer reached out to Mackenzie Ramirez and the request of Dr Virgen Pettit wanting Mackenzie Ramirez to be referred for mental sandee treatment  Demographic Information: [place], [address], [place]  Physical Health Insurance: [employer]  Behavioral Health Coverage: [employer]  This writer spoke with Mackenzie Ramirez and provided her with a referral for  [facility] located @ [address] [place] [id number] [phone]) [phone] for outpatient psychotherapy services  Per their website they are in-network with [employer] and have openings for therapy as early as today and this week  Mackenzie Ramirez plans to contact them to schedule an appointment, this writer e-mailed her the link to schedule as well  Requested that she contact the office back if in need of any additional support with referrals at this time  PCP notified  Thank you       Chip Boo

## 2022-05-16 ENCOUNTER — TELEPHONE (OUTPATIENT)
Dept: FAMILY MEDICINE CLINIC | Facility: CLINIC | Age: 32
End: 2022-05-16

## 2022-05-16 DIAGNOSIS — F43.23 ADJUSTMENT DISORDER WITH MIXED ANXIETY AND DEPRESSED MOOD: ICD-10-CM

## 2022-05-16 RX ORDER — BUPROPION HYDROCHLORIDE 300 MG/1
300 TABLET ORAL EVERY MORNING
Qty: 30 TABLET | Refills: 3 | Status: SHIPPED | OUTPATIENT
Start: 2022-05-16 | End: 2022-06-21 | Stop reason: SDUPTHER

## 2022-05-16 NOTE — TELEPHONE ENCOUNTER
I am sending a new script for Wellbutrin 300 mg daily to her [facility] pharmacy in [place]  I recommend alternating 150 mg and 300 mg every other day for at least a week, then increase to 300 mg daily until next visit

## 2022-05-16 NOTE — TELEPHONE ENCOUNTER
Saw you on April 27 and started taking Wellbutrin  For the first week, she and her  noticed a difference  Now she's feeling like she's going backwards and thinks she may need an adjustment in the dosage  Uses [facility] in [place]

## 2022-06-21 ENCOUNTER — OFFICE VISIT (OUTPATIENT)
Dept: FAMILY MEDICINE CLINIC | Facility: CLINIC | Age: 32
End: 2022-06-21
Payer: COMMERCIAL

## 2022-06-21 VITALS
RESPIRATION RATE: 16 BRPM | BODY MASS INDEX: 26.52 KG/M2 | HEART RATE: 76 BPM | SYSTOLIC BLOOD PRESSURE: 118 MMHG | WEIGHT: 159.2 LBS | HEIGHT: 65 IN | DIASTOLIC BLOOD PRESSURE: 72 MMHG

## 2022-06-21 DIAGNOSIS — F43.23 ADJUSTMENT DISORDER WITH MIXED ANXIETY AND DEPRESSED MOOD: Primary | ICD-10-CM

## 2022-06-21 PROCEDURE — 1036F TOBACCO NON-USER: CPT | Performed by: FAMILY MEDICINE

## 2022-06-21 PROCEDURE — 99213 OFFICE O/P EST LOW 20 MIN: CPT | Performed by: FAMILY MEDICINE

## 2022-06-21 PROCEDURE — 3008F BODY MASS INDEX DOCD: CPT | Performed by: FAMILY MEDICINE

## 2022-06-21 RX ORDER — BUPROPION HYDROCHLORIDE 300 MG/1
300 TABLET ORAL EVERY MORNING
Qty: 90 TABLET | Refills: 0
Start: 2022-06-21

## 2022-06-21 NOTE — PROGRESS NOTES
Assessment/Plan:    1  Adjustment disorder with mixed anxiety and depressed mood  - patient is doing much better on Wellbutrin and would like to stay on her current dose, advised to continue therapy and follow up in 6 months  - buPROPion (Wellbutrin XL) 300 mg 24 hr tablet; Take 1 tablet (300 mg total) by mouth every morning  Dispense: 90 tablet; Refill: 0         Follow up in 6 months or sooner as needed  The patient understands and agrees with the treatment plan  Subjective:   Chief Complaint   Patient presents with    Anxiety      Patient ID: Leonides Fox is a 27 y o  female who presents today for follow up visit for anxiety  Patient has been on Wellbutrin since her last visit and now doing much better, her mood has improved, her anxiety is well controlled and she is handling stress much better  Patient started therapy which has been also helpful  Patient reports no significant side effects while taking Wellbutrin            The following portions of the patient's history were reviewed and updated as appropriate: allergies, current medications, past family history, past medical history, past social history, past surgical history and problem list     Past Medical History:   Diagnosis Date    Amenorrhea     last assessed: 2016    Female infertility     seen by [facility] - seen for 1 year, 5 cycles IUI acheived pregnancy via IVF    Fibromyalgia     Irregular menstrual cycle     last assessed: 2016    Osteoarthritis of right acromioclavicular joint     last assessment: 2013    Polycystic ovary syndrome     Separation of right acromioclavicular joint     Last assessed: 2013; this is more consistent with ostiolysis of the distal clavicle vs  acromioclavicular joint arthritis, not acute shoulder seperation    Varicella     vaccine     Past Surgical History:   Procedure Laterality Date     SECTION      DENTAL SURGERY      wisdom teeth    [place]  DELIVERY ONLY N/A 2018    Procedure:  SECTION (); Surgeon: Rafita Rodrigues MD;  Location: [facility];  Service: Obstetrics    NH  DELIVERY ONLY N/A 2020    Procedure:  SECTION (); Surgeon: Rafita Rodrigues MD;  Location: [facility];  Service: Obstetrics    SHOULDER SURGERY      TUBAL LIGATION Bilateral 2020    Procedure: LIGATION/COAGULATION TUBAL;  Surgeon:  Rafita Rodrigues MD;  Location: [facility];  Service: Obstetrics     Family History   Problem Relation Age of Onset    Fibromyalgia Mother     Hyperlipidemia Father     Hypertension Father     Diabetes Maternal Grandmother     Diabetes Paternal Grandfather     Cancer Maternal Grandfather      Social History     Socioeconomic History    Marital status: /Civil Union     Spouse name: Not on file    Number of children: Not on file    Years of education: Not on file    Highest education level: Not on file   Occupational History    Not on file   Tobacco Use    Smoking status: Never Smoker    Smokeless tobacco: Never Used   Vaping Use    Vaping Use: Never used   Substance and Sexual Activity    Alcohol use: Not Currently     Alcohol/week: 1 0 standard drink     Types: 1 Cans of beer per week    Drug use: No    Sexual activity: Yes     Partners: Male   Other Topics Concern    Not on file   Social History Narrative    History of  0    Caffeine use-1 cup of coffee/day    Has smoke detectors     Denied history of sun protection    Uses safety equipment- protective head gear     Social Determinants of Health     Financial Resource Strain: Not on file   Food Insecurity: Not on file   Transportation Needs: Not on file   Physical Activity: Not on file   Stress: Not on file   Social Connections: Not on file   Intimate Partner Violence: Not on file   Housing Stability: Not on file       Current Outpatient Medications:     buPROPion (Wellbutrin XL) 300 mg 24 hr tablet, Take 1 tablet (300 mg total) by mouth every morning, Disp: 90 tablet, Rfl: 0    multivitamin (THERAGRAN) TABS, Take 1 tablet by mouth daily, Disp: , Rfl:     Review of Systems   Constitutional: Negative for appetite change, chills, fatigue, fever and unexpected weight change  Respiratory: Negative for cough, shortness of breath and wheezing  Cardiovascular: Negative for chest pain, palpitations and leg swelling  Gastrointestinal: Negative for abdominal pain, blood in stool, diarrhea, nausea and vomiting  Neurological: Negative for dizziness, syncope and headaches  Hematological: Negative for adenopathy  Psychiatric/Behavioral: Negative for dysphoric mood, sleep disturbance and suicidal ideas  The patient is nervous/anxious  Objective:    Vitals:    06/21/22 1528   BP: 118/72   Pulse: 76   Resp: 16   Weight: 72 2 kg (159 lb 3 2 oz)   Height: 5' 4 75" (1 645 m)        Physical Exam  Constitutional:       General: She is not in acute distress  Appearance: She is well-developed  Neck:      Thyroid: No thyromegaly  Cardiovascular:      Rate and Rhythm: Normal rate and regular rhythm  Heart sounds: Normal heart sounds  No murmur heard  Pulmonary:      Effort: Pulmonary effort is normal  No respiratory distress  Breath sounds: No wheezing, rhonchi or rales  Musculoskeletal:      Cervical back: Neck supple  Lymphadenopathy:      Cervical: No cervical adenopathy  Neurological:      Mental Status: She is alert and oriented to person, place, and time  Psychiatric:         Mood and Affect: Mood normal          Speech: Speech normal          Behavior: Behavior normal          Thought Content:  Thought content normal          Cognition and Memory: Cognition normal          Judgment: Judgment normal

## 2022-08-15 DIAGNOSIS — F43.23 ADJUSTMENT DISORDER WITH MIXED ANXIETY AND DEPRESSED MOOD: ICD-10-CM

## 2022-08-15 RX ORDER — BUPROPION HYDROCHLORIDE 300 MG/1
300 TABLET ORAL EVERY MORNING
Qty: 90 TABLET | Refills: 1 | Status: SHIPPED | OUTPATIENT
Start: 2022-08-15 | End: 2022-09-09 | Stop reason: SDUPTHER

## 2022-09-09 DIAGNOSIS — F43.23 ADJUSTMENT DISORDER WITH MIXED ANXIETY AND DEPRESSED MOOD: ICD-10-CM

## 2022-09-09 RX ORDER — BUPROPION HYDROCHLORIDE 450 MG/1
450 TABLET, FILM COATED, EXTENDED RELEASE ORAL EVERY MORNING
Qty: 30 TABLET | Refills: 3 | Status: SHIPPED | OUTPATIENT
Start: 2022-09-09 | End: 2022-09-12 | Stop reason: CLARIF

## 2022-09-12 ENCOUNTER — TELEPHONE (OUTPATIENT)
Dept: FAMILY MEDICINE CLINIC | Facility: CLINIC | Age: 32
End: 2022-09-12

## 2022-09-12 DIAGNOSIS — F43.23 ADJUSTMENT DISORDER WITH MIXED ANXIETY AND DEPRESSED MOOD: Primary | ICD-10-CM

## 2022-09-12 RX ORDER — BUPROPION HYDROCHLORIDE 300 MG/1
300 TABLET ORAL EVERY MORNING
Qty: 90 TABLET | Refills: 1
Start: 2022-09-12 | End: 2023-02-13 | Stop reason: SDUPTHER

## 2022-09-12 RX ORDER — BUPROPION HYDROCHLORIDE 150 MG/1
150 TABLET ORAL EVERY MORNING
Qty: 90 TABLET | Refills: 1 | Status: SHIPPED | OUTPATIENT
Start: 2022-09-12 | End: 2023-02-13 | Stop reason: DRUGHIGH

## 2022-09-12 RX ORDER — BUPROPION HYDROCHLORIDE 300 MG/1
300 TABLET ORAL EVERY MORNING
Qty: 90 TABLET | Refills: 1 | Status: SHIPPED | OUTPATIENT
Start: 2022-09-12 | End: 2022-09-12 | Stop reason: SDUPTHER

## 2022-09-12 NOTE — TELEPHONE ENCOUNTER
Pt received an automated message stating that the 450 mg Wellbutrin is not covered by her insurance  She suggested that you send in a script for 150 mg since she still has 300 mg tablets at home and she can combine the two? Is that something that you can do for her?

## 2022-09-22 ENCOUNTER — OFFICE VISIT (OUTPATIENT)
Dept: FAMILY MEDICINE CLINIC | Facility: CLINIC | Age: 32
End: 2022-09-22
Payer: COMMERCIAL

## 2022-09-22 VITALS
RESPIRATION RATE: 15 BRPM | WEIGHT: 164.3 LBS | SYSTOLIC BLOOD PRESSURE: 120 MMHG | BODY MASS INDEX: 27.38 KG/M2 | DIASTOLIC BLOOD PRESSURE: 72 MMHG | OXYGEN SATURATION: 98 % | TEMPERATURE: 98.4 F | HEIGHT: 65 IN | HEART RATE: 80 BPM

## 2022-09-22 DIAGNOSIS — J02.9 SORE THROAT: Primary | ICD-10-CM

## 2022-09-22 DIAGNOSIS — Z20.822 SUSPECTED COVID-19 VIRUS INFECTION: ICD-10-CM

## 2022-09-22 LAB — S PYO AG THROAT QL: NEGATIVE

## 2022-09-22 PROCEDURE — 99213 OFFICE O/P EST LOW 20 MIN: CPT | Performed by: NURSE PRACTITIONER

## 2022-09-22 PROCEDURE — U0003 INFECTIOUS AGENT DETECTION BY NUCLEIC ACID (DNA OR RNA); SEVERE ACUTE RESPIRATORY SYNDROME CORONAVIRUS 2 (SARS-COV-2) (CORONAVIRUS DISEASE [COVID-19]), AMPLIFIED PROBE TECHNIQUE, MAKING USE OF HIGH THROUGHPUT TECHNOLOGIES AS DESCRIBED BY CMS-2020-01-R: HCPCS | Performed by: NURSE PRACTITIONER

## 2022-09-22 PROCEDURE — 87880 STREP A ASSAY W/OPTIC: CPT | Performed by: NURSE PRACTITIONER

## 2022-09-22 PROCEDURE — U0005 INFEC AGEN DETEC AMPLI PROBE: HCPCS | Performed by: NURSE PRACTITIONER

## 2022-09-22 NOTE — PROGRESS NOTES
Assessment/Plan:     Diagnoses and all orders for this visit:    Sore throat  -     POCT rapid strepA  -     COVID Only- Office Collect      Rapid strep negative  Will send for throat culture and covid PCR to be thorough  For the time being, I want patient to alternate between Tylenol and Ibuprofen for better pain control  She may also try Chloraseptic spray PRN  She is to rest and keep well hydrated  We did discuss avoiding spicy or acidic foods that may aggravate the throat  Pt to also start warm salt water gargles  We will contact her w/ results once available  Patient is encouraged to call our office for any questions/concerns, persistent or worsening symptoms  Patient states they understand and agree with treatment plan  Pt to f/u PRN  Subjective:      Patient ID: Paulo Villalobos is a 27 y o  female  Pt presents today for symptoms that started several days ago including sore throat and fever  She admits the highest her fever got was 102 and lasted 1 5 days  She admits it broke yesterday afternoon  Patient notes she tested at home for covid yesterday morning and it was negative  She has been taking Tylenol which seems to dull the pain  She denies any sick contacts  The following portions of the patient's history were reviewed and updated as appropriate: allergies, current medications, past family history, past medical history, past social history, past surgical history and problem list     Review of Systems    As noted per HPI  Objective:      /72   Pulse 80   Temp 98 4 °F (36 9 °C) (Oral)   Resp 15   Ht 5' 4 5" (1 638 m)   Wt 74 5 kg (164 lb 4 8 oz)   SpO2 98%   BMI 27 77 kg/m²          Physical Exam  Vitals reviewed  Constitutional:       General: She is not in acute distress  Appearance: Normal appearance  She is well-developed  She is not ill-appearing     HENT:      Right Ear: Tympanic membrane, ear canal and external ear normal       Left Ear: Tympanic membrane, ear canal and external ear normal       Nose: No congestion or rhinorrhea  Mouth/Throat:      Pharynx: Posterior oropharyngeal erythema present  No oropharyngeal exudate  Cardiovascular:      Rate and Rhythm: Normal rate and regular rhythm  Pulses: Normal pulses  Heart sounds: Normal heart sounds  No murmur heard  Pulmonary:      Effort: Pulmonary effort is normal  No respiratory distress  Breath sounds: Normal breath sounds  No wheezing  Neurological:      Mental Status: She is alert and oriented to person, place, and time  Mental status is at baseline  Psychiatric:         Mood and Affect: Mood normal          Behavior: Behavior normal          Thought Content:  Thought content normal          Judgment: Judgment normal

## 2022-09-23 LAB — SARS-COV-2 RNA RESP QL NAA+PROBE: NEGATIVE

## 2022-10-04 ENCOUNTER — HOSPITAL ENCOUNTER (OUTPATIENT)
Dept: MRI IMAGING | Facility: HOSPITAL | Age: 32
Discharge: HOME | End: 2022-10-04
Payer: COMMERCIAL

## 2022-10-04 DIAGNOSIS — R43.0 ANOSMIA: ICD-10-CM

## 2022-10-04 PROCEDURE — 70553 MRI BRAIN STEM W/O & W/DYE: CPT

## 2022-10-04 PROCEDURE — G1004 CDSM NDSC: HCPCS

## 2022-10-04 PROCEDURE — A9585 GADOBUTROL INJECTION: HCPCS | Performed by: PHYSICIAN ASSISTANT

## 2022-10-04 RX ADMIN — GADOBUTROL 7 ML: 604.72 INJECTION INTRAVENOUS at 14:29

## 2023-02-13 ENCOUNTER — OFFICE VISIT (OUTPATIENT)
Dept: FAMILY MEDICINE CLINIC | Facility: CLINIC | Age: 33
End: 2023-02-13

## 2023-02-13 VITALS
HEIGHT: 65 IN | RESPIRATION RATE: 16 BRPM | SYSTOLIC BLOOD PRESSURE: 124 MMHG | BODY MASS INDEX: 28.06 KG/M2 | HEART RATE: 75 BPM | WEIGHT: 168.4 LBS | DIASTOLIC BLOOD PRESSURE: 82 MMHG

## 2023-02-13 DIAGNOSIS — F43.23 ADJUSTMENT DISORDER WITH MIXED ANXIETY AND DEPRESSED MOOD: ICD-10-CM

## 2023-02-13 RX ORDER — FLUOXETINE 10 MG/1
10 TABLET, FILM COATED ORAL DAILY
Qty: 30 TABLET | Refills: 2 | Status: SHIPPED | OUTPATIENT
Start: 2023-02-13 | End: 2023-02-22

## 2023-02-13 RX ORDER — BUPROPION HYDROCHLORIDE 300 MG/1
300 TABLET ORAL EVERY MORNING
Qty: 90 TABLET | Refills: 1 | Status: SHIPPED | OUTPATIENT
Start: 2023-02-13

## 2023-02-14 NOTE — PROGRESS NOTES
Assessment/Plan:     Diagnoses and all orders for this visit:    Adjustment disorder with mixed anxiety and depressed mood  -  Will decreased Wellbutrin to 300 mg daily and will add fluoxetine 10 mg daily, patient was in therapy which was very helpful, but her therapist has left the area and she is looking for a new therapist  -     buPROPion (Wellbutrin XL) 300 mg 24 hr tablet; Take 1 tablet (300 mg total) by mouth every morning  -     FLUoxetine (PROzac) 10 MG tablet; Take 1 tablet (10 mg total) by mouth daily      Follow up in 1-2 months or sooner as needed  The patient understands and agrees with the treatment plan  Subjective:   Chief Complaint   Patient presents with   • Medication Management      Patient ID: Melony Meza is a 34 y o  female who presents today for follo wup depression/ anxiety  She is currently on Wellbutrin 450 mg daily which was increased in September  Patient states that over the past few months she had been having increased anxiety, feeling stressed and overwhelmed and getting easily frustrated with her kids  Patient noted that her symptoms usually get worse about 7-8 day prior to her period and once her period starts her symptoms get much better          The following portions of the patient's history were reviewed and updated as appropriate: allergies, current medications, past family history, past medical history, past social history, past surgical history and problem list     Past Medical History:   Diagnosis Date   • Amenorrhea     last assessed: 08/22/2016   • Female infertility     seen by [facility] - seen for 1 year, 5 cycles IUI acheived pregnancy via IVF   • Fibromyalgia    • Irregular menstrual cycle     last assessed: 09/26/2016   • Osteoarthritis of right acromioclavicular joint     last assessment: 01/28/2013   • Polycystic ovary syndrome    • Separation of right acromioclavicular joint     Last assessed: 01/25/2013; this is more consistent with ostiolysis of the distal clavicle vs  acromioclavicular joint arthritis, not acute shoulder seperation   • Varicella     vaccine     Past Surgical History:   Procedure Laterality Date   •  SECTION     • DENTAL SURGERY      wisdom teeth   • OR  DELIVERY ONLY N/A 2018    Procedure:  SECTION (); Surgeon: Patricia Miller MD;  Location: [facility];  Service: Obstetrics   • OR  DELIVERY ONLY N/A 2020    Procedure:  SECTION (); Surgeon: Patricia Miller MD;  Location: [facility];  Service: Obstetrics   • SHOULDER SURGERY     • TUBAL LIGATION Bilateral 2020    Procedure: LIGATION/COAGULATION TUBAL;  Surgeon:  Patricia Miller MD;  Location: [facility];  Service: Obstetrics     Family History   Problem Relation Age of Onset   • Fibromyalgia Mother    • Hyperlipidemia Father    • Hypertension Father    • Diabetes Maternal Grandmother    • Diabetes Paternal Grandfather    • Cancer Maternal Grandfather      Social History     Socioeconomic History   • Marital status: /Civil Union     Spouse name: Not on file   • Number of children: Not on file   • Years of education: Not on file   • Highest education level: Not on file   Occupational History   • Not on file   Tobacco Use   • Smoking status: Never   • Smokeless tobacco: Never   Vaping Use   • Vaping Use: Never used   Substance and Sexual Activity   • Alcohol use: Not Currently     Alcohol/week: 1 0 standard drink     Types: 1 Cans of beer per week   • Drug use: No   • Sexual activity: Yes     Partners: Male   Other Topics Concern   • Not on file   Social History Narrative    History of  0    Caffeine use-1 cup of coffee/day    Has smoke detectors     Denied history of sun protection    Uses safety equipment- protective head gear     Social Determinants of Health     Financial Resource Strain: Not on file   Food Insecurity: Not on file   Transportation Needs: Not on file   Physical Activity: Not on file   Stress: Not on file   Social Connections: Not on file   Intimate Partner Violence: Not on file   Housing Stability: Not on file       Current Outpatient Medications:   •  buPROPion (Wellbutrin XL) 300 mg 24 hr tablet, Take 1 tablet (300 mg total) by mouth every morning, Disp: 90 tablet, Rfl: 1  •  FLUoxetine (PROzac) 10 MG tablet, Take 1 tablet (10 mg total) by mouth daily, Disp: 30 tablet, Rfl: 2  •  multivitamin (THERAGRAN) TABS, Take 1 tablet by mouth daily, Disp: , Rfl:     Review of Systems   Constitutional: Negative for appetite change, chills, fatigue, fever and unexpected weight change  Respiratory: Negative for cough, shortness of breath and wheezing  Cardiovascular: Negative for chest pain, palpitations and leg swelling  Gastrointestinal: Negative for abdominal pain, blood in stool, diarrhea, nausea and vomiting  Neurological: Negative for dizziness, syncope and headaches  Hematological: Negative for adenopathy  Psychiatric/Behavioral: Positive for sleep disturbance  Negative for dysphoric mood and suicidal ideas  The patient is nervous/anxious  Objective:    Vitals:    02/13/23 1359   BP: 124/82   Pulse: 75   Resp: 16   Weight: 76 4 kg (168 lb 6 4 oz)   Height: 5' 4 5" (1 638 m)        Physical Exam  Constitutional:       General: She is not in acute distress  Appearance: She is well-developed  Neck:      Thyroid: No thyromegaly  Cardiovascular:      Rate and Rhythm: Normal rate and regular rhythm  Heart sounds: Normal heart sounds  No murmur heard  Pulmonary:      Effort: Pulmonary effort is normal  No respiratory distress  Breath sounds: No wheezing, rhonchi or rales  Musculoskeletal:      Cervical back: Neck supple  Right lower leg: No edema  Left lower leg: No edema  Lymphadenopathy:      Cervical: No cervical adenopathy  Neurological:      Mental Status: She is alert and oriented to person, place, and time     Psychiatric:         Mood and Affect: Mood normal          Speech: Speech normal          Behavior: Behavior normal          Thought Content:  Thought content normal          Cognition and Memory: Cognition normal          Judgment: Judgment normal

## 2023-02-22 RX ORDER — FLUOXETINE 10 MG/1
TABLET, FILM COATED ORAL
Qty: 30 TABLET | Refills: 2 | Status: SHIPPED | OUTPATIENT
Start: 2023-02-22

## 2023-03-09 DIAGNOSIS — F43.23 ADJUSTMENT DISORDER WITH MIXED ANXIETY AND DEPRESSED MOOD: ICD-10-CM

## 2023-03-09 RX ORDER — FLUOXETINE 10 MG/1
TABLET, FILM COATED ORAL
Qty: 90 TABLET | Refills: 1 | Status: SHIPPED | OUTPATIENT
Start: 2023-03-09

## 2023-04-06 NOTE — TELEPHONE ENCOUNTER
Yes that is fine thank you
PAST MEDICAL HISTORY:  Anxiety     GERD (gastroesophageal reflux disease)     HTN (hypertension)     Hyperlipidemia

## 2023-05-12 ENCOUNTER — OFFICE VISIT (OUTPATIENT)
Dept: PODIATRY | Facility: CLINIC | Age: 33
End: 2023-05-12

## 2023-05-12 VITALS
WEIGHT: 167 LBS | HEIGHT: 65 IN | HEART RATE: 78 BPM | SYSTOLIC BLOOD PRESSURE: 126 MMHG | BODY MASS INDEX: 27.82 KG/M2 | DIASTOLIC BLOOD PRESSURE: 85 MMHG

## 2023-05-12 DIAGNOSIS — L60.1 TRAUMATIC ONYCHOLYSIS: Primary | ICD-10-CM

## 2023-05-12 RX ORDER — LIDOCAINE HYDROCHLORIDE 10 MG/ML
3 INJECTION, SOLUTION INFILTRATION; PERINEURAL ONCE
Status: COMPLETED | OUTPATIENT
Start: 2023-05-12 | End: 2023-05-12

## 2023-05-12 RX ORDER — ACETAMINOPHEN 500 MG
500 TABLET ORAL EVERY 6 HOURS PRN
COMMUNITY

## 2023-05-12 RX ADMIN — LIDOCAINE HYDROCHLORIDE 3 ML: 10 INJECTION, SOLUTION INFILTRATION; PERINEURAL at 09:14

## 2023-05-12 NOTE — PROGRESS NOTES
"Patient ID: Kyrie Velazquez is a 34 y o  female Date of Birth 1990       Chief Complaint   Patient presents with   • Nail Problem     Right foot great toe             Diagnosis:  1  Traumatic onycholysis  -     lidocaine (XYLOCAINE) 1 % injection 3 mL      1  Initial pedal examination with socks and shoes removed bilaterally  2   Today we discussed etiology and treatment options for onycholysis of right great toe nail  3   Total nail avulsion of right great toenail was performed today  Please see procedure note  4   Patient was given soaking instructions  5   Follow-up 1 week  Nail removal    Date/Time: 5/12/2023 9:00 AM  Performed by: Isabelle Primrose, DPM  Authorized by: Isabelle Primrose, DPM     Patient location:  [facility] Protocol:  Consent: Verbal consent obtained  Risks and benefits: risks, benefits and alternatives were discussed  Consent given by: patient  Time out: Immediately prior to procedure a \"time out\" was called to verify the correct patient, procedure, equipment, support staff and site/side marked as required  Patient understanding: patient states understanding of the procedure being performed  Patient identity confirmed: verbally with patient      Location:     Foot:  R big toe  Pre-procedure details:     Preparation: Patient was prepped and draped in the usual sterile fashion    Anesthesia (see MAR for exact dosages): Anesthesia method:  Local infiltration  Nail Removal:     Nail removed:  Complete    Nail bed sutured: no    Ingrown nail:     Wedge excision of skin: no      Nail matrix removed or ablated:  None  Post-procedure details:     Dressing:  4x4 sterile gauze and antibiotic ointment    Patient tolerance of procedure: Tolerated well, no immediate complications  Comments: Total nail avulsion right great toenail was performed, ring block of 3 cc of 1% plain lidocaine           Subjective:   Patient presents today for right great toenail which is about " three quarters weight loss, she states this started when she was moving a table this past weekend and the leg of the table kicked up and hit her toenail  The following portions of the patient's history were reviewed and updated as appropriate:     Past Medical History:   Diagnosis Date   • Amenorrhea     last assessed: 2016   • Female infertility     seen by [facility] - seen for 1 year, 5 cycles IUI acheived pregnancy via IVF   • Fibromyalgia    • Irregular menstrual cycle     last assessed: 2016   • Osteoarthritis of right acromioclavicular joint     last assessment: 2013   • Polycystic ovary syndrome    • Separation of right acromioclavicular joint     Last assessed: 2013; this is more consistent with ostiolysis of the distal clavicle vs  acromioclavicular joint arthritis, not acute shoulder seperation   • Varicella     vaccine       Past Surgical History:   Procedure Laterality Date   •  SECTION     • DENTAL SURGERY      wisdom teeth   • AK  DELIVERY ONLY N/A 2018    Procedure:  SECTION (); Surgeon: Hannah Fung MD;  Location: [facility];  Service: Obstetrics   • AK  DELIVERY ONLY N/A 2020    Procedure:  SECTION (); Surgeon: Hannah Fung MD;  Location: [facility];  Service: Obstetrics   • SHOULDER SURGERY     • TUBAL LIGATION Bilateral 2020    Procedure: LIGATION/COAGULATION TUBAL;  Surgeon:  Hannah Fung MD;  Location: [facility];  Service: Obstetrics       Social History     Socioeconomic History   • Marital status: /Civil Union     Spouse name: None   • Number of children: None   • Years of education: None   • Highest education level: None   Occupational History   • None   Tobacco Use   • Smoking status: Never   • Smokeless tobacco: Never   Vaping Use   • Vaping Use: Never used   Substance and Sexual Activity   • Alcohol use: Not Currently     Alcohol/week: 1 0 standard drink     Types: 1 Cans of beer per week   • Drug use: No   • Sexual activity: Yes     Partners: Male   Other Topics Concern   • None   Social History Narrative    History of  0    Caffeine use-1 cup of coffee/day    Has smoke detectors     Denied history of sun protection    Uses safety equipment- protective head gear     Social Determinants of Health     Financial Resource Strain: Not on file   Food Insecurity: Not on file   Transportation Needs: Not on file   Physical Activity: Not on file   Stress: Not on file   Social Connections: Not on file   Intimate Partner Violence: Not on file   Housing Stability: Not on file          Current Outpatient Medications:   •  acetaminophen (TYLENOL) 500 mg tablet, Take 500 mg by mouth every 6 (six) hours as needed for mild pain, Disp: , Rfl:   •  buPROPion (Wellbutrin XL) 300 mg 24 hr tablet, Take 1 tablet (300 mg total) by mouth every morning, Disp: 90 tablet, Rfl: 1  •  FLUoxetine (PROzac) 10 MG tablet, TAKE 1 TABLET BY MOUTH EVERY DAY, Disp: 90 tablet, Rfl: 1  •  Hydrocod Maikel-Chlorphe Maikel ER (TUSSIONEX) 10-8 mg/5 mL ER suspension, Take 5 mL by mouth daily at bedtime as needed for cough Max Daily Amount: 5 mL, Disp: 115 mL, Rfl: 0  •  multivitamin (THERAGRAN) TABS, Take 1 tablet by mouth daily, Disp: , Rfl:   •  norethindrone-ethinyl estradiol (Loestrin Fe ) 1-20 MG-MCG per tablet, Take 1 tablet by mouth daily, Disp: 90 tablet, Rfl: 3  •  predniSONE 10 mg tablet, Take 4 tablets with food on days 1 & 2  Then take 3 tablets with food on days 3 &4  Then take 2 tablets with food on days 5 & 6  Then take 1 tablet with food on days 7 & 8 , Disp: 20 tablet, Rfl: 0  No current facility-administered medications for this visit  Allergies  Patient has no known allergies      Family History   Problem Relation Age of Onset   • Fibromyalgia Mother    • Hyperlipidemia Father    • Hypertension Father    • Diabetes Maternal Grandmother    • Diabetes Paternal Grandfather    • Cancer Maternal Grandfather "          Objective:  /85 (BP Location: Left arm, Patient Position: Sitting, Cuff Size: Adult)   Pulse 78   Ht 5' 5\" (1 651 m) Comment: verbal  Wt 75 8 kg (167 lb)   BMI 27 79 kg/m²     Review of Systems   Constitutional: Negative for chills and fever  HENT: Negative for ear pain and sore throat  Eyes: Negative for pain and visual disturbance  Respiratory: Negative for cough and shortness of breath  Cardiovascular: Negative for chest pain and palpitations  Gastrointestinal: Negative for abdominal pain and vomiting  Genitourinary: Negative for dysuria and hematuria  Musculoskeletal: Negative for arthralgias and back pain  Skin: Negative for color change and rash  Right great toe nail lifting   Neurological: Negative for seizures and syncope  All other systems reviewed and are negative  Physical Exam  Constitutional:       Appearance: Normal appearance  She is well-developed and normal weight  HENT:      Head: Normocephalic and atraumatic  Nose: Nose normal       Mouth/Throat:      Mouth: Mucous membranes are moist       Pharynx: Oropharynx is clear  Eyes:      Conjunctiva/sclera: Conjunctivae normal    Cardiovascular:      Pulses:           Dorsalis pedis pulses are 2+ on the right side and 2+ on the left side  Posterior tibial pulses are 2+ on the right side and 2+ on the left side  Pulmonary:      Effort: Pulmonary effort is normal    Musculoskeletal:         General: Normal range of motion  Cervical back: Normal range of motion  Right lower leg: No edema  Left lower leg: No edema  Feet:      Right foot:      Protective Sensation: 10 sites tested  10 sites sensed  Skin integrity: Skin integrity normal       Left foot:      Protective Sensation: 10 sites tested  10 sites sensed  Skin integrity: Skin integrity normal       Toenail Condition: Left toenails are normal       Comments: 1   Right hallux nail with partial nail avulsion " "medial, nail is still attached on lateral side  No infection, serosanguineous drainage is present, pain on palpation, minimal edema and erythema  2  MMT 5/5 BL  3  Active and passive ROM BL foot and ankles is WNL and pain free  Skin:     General: Skin is warm and dry  Capillary Refill: Capillary refill takes less than 2 seconds  Neurological:      General: No focal deficit present  Mental Status: She is alert and oriented to person, place, and time  Mental status is at baseline  Psychiatric:         Mood and Affect: Mood normal          Behavior: Behavior normal          Thought Content: Thought content normal          Judgment: Judgment normal               No pertinent results found  Tyra Alvarez DPM, DPM, FACFAS    Portions of the record may have been created with voice recognition software  Occasional wrong word or \"sound a like\" substitutions may have occurred due to the inherent limitations of voice recognition software  Read the chart carefully and recognize, using context, where substitutions have occurred    "

## 2023-05-18 ENCOUNTER — OFFICE VISIT (OUTPATIENT)
Dept: PODIATRY | Facility: CLINIC | Age: 33
End: 2023-05-18

## 2023-05-18 VITALS
HEIGHT: 65 IN | DIASTOLIC BLOOD PRESSURE: 81 MMHG | HEART RATE: 74 BPM | WEIGHT: 167 LBS | BODY MASS INDEX: 27.82 KG/M2 | SYSTOLIC BLOOD PRESSURE: 118 MMHG

## 2023-05-18 DIAGNOSIS — L60.1 TRAUMATIC ONYCHOLYSIS: Primary | ICD-10-CM

## 2023-05-18 DIAGNOSIS — M21.621 TAILOR'S BUNIONETTE, RIGHT: ICD-10-CM

## 2023-05-18 NOTE — PROGRESS NOTES
Patient ID: Dom Lorenzo is a 34 y o  female Date of Birth 1990       Chief Complaint   Patient presents with   • Nail Problem     1 week F/U  Right foot great toe               Diagnosis:  1  Traumatic onycholysis    2  Tailor's bunionette, right      1  Pedal examination with socks and shoes removed  2   Right great toe nailbed is intact, at this time she can discontinue dressings at night, discontinue soaks, apply dry dressing during the day only as needed  Resume all activities as tolerated  3   Patient with new complaint of tailor's bunion right foot, today we discussed biomechanics, foot structure, because of tailor's bunion, treatment options  We discussed conservative care with wider deeper shoes, proper supportive shoes, list was given to her in her after visit summary  4   We also discussed tailor's bunionectomy and osteotomy with reverse Hilton and screw fixation, we discussed preop, Intra-Op and postoperative course, we discussed what is a good time to have surgery, at this time patient is doing well with conservative measures, we will defer any surgical intervention  Patient is aware to right foot that she will not be able to drive for 4 to 6 weeks  She states she knows she is going to have it done sooner than later but that was not a good time  5   Patient to follow-up as needed  Subjective:   Patient presents today for follow-up of right great toenail avulsion and new complaint of right foot tailor's bunion, she states is painful at times, her sister had the same problem and had to have surgery which she knows is in her future and she would like to learn about it          The following portions of the patient's history were reviewed and updated as appropriate: allergies, current medications, past family history, past medical history, past social history, past surgical history and problem list         Objective:  /81 (BP Location: Left arm, Patient Position: "Sitting, Cuff Size: Adult)   Pulse 74   Ht 5' 5\" (1 651 m) Comment: verbal  Wt 75 8 kg (167 lb)   BMI 27 79 kg/m²     Review of Systems   Constitutional: Negative for chills and fever  HENT: Negative for ear pain and sore throat  Eyes: Negative for pain and visual disturbance  Respiratory: Negative for cough and shortness of breath  Cardiovascular: Negative for chest pain and palpitations  Gastrointestinal: Negative for abdominal pain and vomiting  Genitourinary: Negative for dysuria and hematuria  Musculoskeletal: Negative for arthralgias and back pain  Right foot tailor's bunion pain   Skin: Negative for color change and rash  Traumatic lysis of right great toenail   Neurological: Negative for seizures and syncope  All other systems reviewed and are negative  Physical Exam  Constitutional:       Appearance: Normal appearance  She is well-developed and normal weight  HENT:      Head: Normocephalic and atraumatic  Nose: Nose normal       Mouth/Throat:      Mouth: Mucous membranes are moist       Pharynx: Oropharynx is clear  Eyes:      Conjunctiva/sclera: Conjunctivae normal    Cardiovascular:      Pulses:           Dorsalis pedis pulses are 2+ on the right side and 2+ on the left side  Posterior tibial pulses are 2+ on the right side and 2+ on the left side  Pulmonary:      Effort: Pulmonary effort is normal    Musculoskeletal:      Cervical back: Normal range of motion  Right lower leg: No edema  Left lower leg: No edema  Feet:      Right foot:      Protective Sensation: 10 sites tested  10 sites sensed  Skin integrity: Skin integrity normal       Toenail Condition: Right toenails are normal       Left foot:      Protective Sensation: 10 sites tested  10 sites sensed        Skin integrity: Skin integrity normal       Toenail Condition: Left toenails are normal       Comments: Right great toe nailbed is intact, well epithelialized, no " "erythema, edema, signs of infection, no pain on palpation  Right foot with lateral fifth metatarsal head protrusion/tailor's bunion, deformity is reducible by applying lateral pressure to fifth metatarsal head, tenderness on palpation adductovarus fifth toe  MMT 5 out of 5 bilateral  Active and passive range and motion is pain-free and within normal limits  Skin:     General: Skin is warm and dry  Neurological:      Mental Status: She is alert and oriented to person, place, and time  Psychiatric:         Behavior: Behavior normal          Thought Content: Thought content normal          Judgment: Judgment normal                   No pertinent results found  Elena Terrell DPM, DPM, FACFAS    Portions of the record may have been created with voice recognition software  Occasional wrong word or \"sound a like\" substitutions may have occurred due to the inherent limitations of voice recognition software  Read the chart carefully and recognize, using context, where substitutions have occurred    "

## 2023-06-18 NOTE — PROGRESS NOTES
A/P    1  Annual exam    Last PAP - 3/31/20- neg neg   Next due 2025   Scheduling of pap discussed in detail     2  PMDD    Started on Prozac for the luteal phase - march    Reports today that she feels a bit better off the Wellbutrin and only now on prozac and OCp   She is having some break through bleeding but getting better  Will not increase dose bc of the weight gain        32 y o ,No LMP recorded  C/O as detailed above  Past medical / social / surgical / family history reviewed and updated   Medication and allergies discussed in detail and updated     Review of Systems - History obtained from chart review and the patient  General ROS: negative  Psychological ROS: negative  Ophthalmic ROS: negative  ENT ROS: negative  Allergy and Immunology ROS: negative  Hematological and Lymphatic ROS: negative  Endocrine ROS: negative  Breast ROS: negative for breast lumps  Respiratory ROS: no cough, shortness of breath, or wheezing  Cardiovascular ROS: no chest pain or dyspnea on exertion  Gastrointestinal ROS: no abdominal pain, change in bowel habits, or black or bloody stools  Genito-Urinary ROS: no dysuria, trouble voiding, or hematuria  Musculoskeletal ROS: negative  Neurological ROS: no TIA or stroke symptoms  Dermatological ROS: negative          Physical Exam  Vitals reviewed  Constitutional:       Appearance: She is well-developed  Neck:      Thyroid: No thyromegaly  Cardiovascular:      Rate and Rhythm: Normal rate  Heart sounds: Normal heart sounds  Pulmonary:      Effort: Pulmonary effort is normal  No accessory muscle usage or respiratory distress  Chest:      Chest wall: No tenderness  Breasts:     Breasts are symmetrical       Right: No inverted nipple, mass or tenderness  Left: No inverted nipple, mass or tenderness  Abdominal:      General: There is no distension  Palpations: Abdomen is soft  Tenderness: There is no abdominal tenderness   There is no guarding or rebound  Genitourinary:     Exam position: Supine  Labia:         Right: No rash, tenderness, lesion or injury  Left: No rash, tenderness, lesion or injury  Vagina: Normal  No foreign body  No vaginal discharge or bleeding  Cervix: No cervical motion tenderness or discharge  Uterus: Not enlarged and not fixed  Adnexa:         Right: No mass, tenderness or fullness  Left: No mass, tenderness or fullness  Rectum: No external hemorrhoid  Musculoskeletal:      Cervical back: Normal range of motion  Lymphadenopathy:      Cervical: No cervical adenopathy  Upper Body:      Right upper body: No supraclavicular adenopathy  Left upper body: No supraclavicular adenopathy  Neurological:      Mental Status: She is alert and oriented to person, place, and time  Psychiatric:         Speech: Speech normal          Behavior: Behavior normal          Thought Content:  Thought content normal          Judgment: Judgment normal

## 2023-06-19 ENCOUNTER — ANNUAL EXAM (OUTPATIENT)
Dept: OBGYN CLINIC | Facility: MEDICAL CENTER | Age: 33
End: 2023-06-19
Payer: COMMERCIAL

## 2023-06-19 VITALS
HEIGHT: 65 IN | BODY MASS INDEX: 28.49 KG/M2 | SYSTOLIC BLOOD PRESSURE: 120 MMHG | WEIGHT: 171 LBS | DIASTOLIC BLOOD PRESSURE: 72 MMHG

## 2023-06-19 DIAGNOSIS — F32.81 PMDD (PREMENSTRUAL DYSPHORIC DISORDER): ICD-10-CM

## 2023-06-19 DIAGNOSIS — Z01.419 WOMEN'S ANNUAL ROUTINE GYNECOLOGICAL EXAMINATION: Primary | ICD-10-CM

## 2023-06-19 PROCEDURE — S0612 ANNUAL GYNECOLOGICAL EXAMINA: HCPCS | Performed by: OBSTETRICS & GYNECOLOGY

## 2023-06-19 RX ORDER — PREDNISONE 20 MG/1
TABLET ORAL
COMMUNITY
Start: 2023-06-02 | End: 2023-06-21

## 2023-06-19 RX ORDER — FLUCONAZOLE 150 MG/1
150 TABLET ORAL ONCE
COMMUNITY
Start: 2023-04-10 | End: 2023-06-21

## 2023-06-21 ENCOUNTER — OFFICE VISIT (OUTPATIENT)
Dept: BARIATRICS | Facility: CLINIC | Age: 33
End: 2023-06-21
Payer: COMMERCIAL

## 2023-06-21 VITALS
SYSTOLIC BLOOD PRESSURE: 118 MMHG | WEIGHT: 172 LBS | BODY MASS INDEX: 28.66 KG/M2 | HEIGHT: 65 IN | RESPIRATION RATE: 16 BRPM | DIASTOLIC BLOOD PRESSURE: 60 MMHG | HEART RATE: 72 BPM

## 2023-06-21 DIAGNOSIS — E28.2 PCOS (POLYCYSTIC OVARIAN SYNDROME): ICD-10-CM

## 2023-06-21 DIAGNOSIS — Z13.6 SCREENING FOR CARDIOVASCULAR CONDITION: ICD-10-CM

## 2023-06-21 DIAGNOSIS — R53.83 FATIGUE: ICD-10-CM

## 2023-06-21 DIAGNOSIS — E28.2 PCOS (POLYCYSTIC OVARIAN SYNDROME): Primary | ICD-10-CM

## 2023-06-21 DIAGNOSIS — F32.81 PMDD (PREMENSTRUAL DYSPHORIC DISORDER): ICD-10-CM

## 2023-06-21 DIAGNOSIS — E66.3 OVERWEIGHT: Primary | ICD-10-CM

## 2023-06-21 PROBLEM — K21.9 GASTROESOPHAGEAL REFLUX DISEASE: Status: RESOLVED | Noted: 2020-09-17 | Resolved: 2023-06-21

## 2023-06-21 PROCEDURE — 99204 OFFICE O/P NEW MOD 45 MIN: CPT | Performed by: INTERNAL MEDICINE

## 2023-06-21 NOTE — PROGRESS NOTES
Assessment/Plan:  Mary Jo Crockett was seen today for consult  Diagnoses and all orders for this visit:    Overweight  -     TSH, 3rd generation with Free T4 reflex; Future  -     Lipid panel; Future  -     Ambulatory referral to Sleep Medicine; Future    PCOS (polycystic ovarian syndrome)    PMDD (premenstrual dysphoric disorder)    Fatigue  -     TSH, 3rd generation with Free T4 reflex; Future    Screening for cardiovascular condition  -     Lipid panel; Future       Overweight  - Discussed options of HealthyCORE-Intensive Lifestyle Intervention Program, Very Low Calorie Diet-VLCD, and Conservative Program and the role of weight loss medications  - Explained the importance of making lifestyle changes first before starting anti-obesity medications  - Patient should demonstrate lifestyle changes first before anti-obesity medication initiated  Should she not be able to get her BMI below 27 may consider AOM based on PCOS comorbidity   - Patient is interested in pursuing Conservative Program  - Initial weight loss goal of 5-10% weight loss for improved health as studies have shown this is where we see the greatest impact on improving health and decreasing risk of obesity related conditions  - Weight loss can improve patient's co-morbid conditions and/or prevent weight-related complications  - Stop Bang 1/8  Constant somnolence - recommend sleep medicine evaluation  - Labs reviewed: As below  Recommendations:  Nutrition:  Eat breakfast daily  Do not skip meals  Food log (ie ) www myfitnesspal com, sparkpeople  com, loseit com, calorieking  com, etc   EVERY day for 2 weeks, then even just 1-2 days a week may affect how you eat the rest of the week  Consider meal planning package    Practice mindful eating  Be sure to set aside time to eat, eat slowly, and savor your food  Hydration: At least 64oz of water daily  No sugar sweetened beverages  No juice (eat the fruit instead)      Exercise:  Studies have shown that the ideal exercise goal is somewhere between 150 to 300 minutes of moderate intensity exercise a week  Start with exercising 10 minutes every other day and gradually increase physical activity with a goal of at least 150 minutes of moderate intensity exercise a week, divided over at least 3 days a week  An example of this would be exercising 30 minutes a day, 5 days a week  Resistance training can increase muscle mass and increase our resting metabolic rate  FULL BODY resistance training is recommended 2-3 times a week  Do not do this on consecutive days to allow for muscle recovery  Aim for a bare minimum 5000 steps, even on days you do not exercise  Monitoring:   Weigh  yourself once a week  Weigh yourself the same time of the day with the same amount of clothing on  Preferably this should be done after waking up, before you eat, and with no clothing or minimal clothing on  Specific Recommendations:  1  TSH and lipid panel blood test after overnight 10 hour fast   2  Sleep medicine evaluation  3  Discuss trying a different SSRI such as sertraline vs restarting wellbutrin in addition to your current SSRI with your gynecologist or PCP  4  Discuss potentially starting metformin for treatment of PCOS as this may also help weight  5  Avoid progesterone only shots and implant as these may cause weight gain  Calorie goal:  1200 calories (Provided with meal plan to follow)      Return visit:  3 months       Total time spent reviewing chart, interviewing patient, examining patient, discussing plan, answering all questions, and documentin min        ______________________________________________________________________      Subjective:   Chief Complaint   Patient presents with   • Consult     MWM consult; waist 30 5in; goal wt 140; stop bang 1-8       HPI: Taco Neil  is a 34 y o  female with history of PCOS, PMDD, and excess weight, here to pursue weight loss "management  Previous notes and records have been reviewed  HPI  Wt Readings from Last 20 Encounters:   06/21/23 78 kg (172 lb)   06/19/23 77 6 kg (171 lb)   05/18/23 75 8 kg (167 lb)   05/12/23 75 8 kg (167 lb)   04/10/23 75 2 kg (165 lb 12 8 oz)   03/31/23 3 175 kg (7 lb)   02/13/23 76 4 kg (168 lb 6 4 oz)   09/22/22 74 5 kg (164 lb 4 8 oz)   06/21/22 72 2 kg (159 lb 3 2 oz)   04/27/22 72 4 kg (159 lb 9 6 oz)   04/04/22 73 5 kg (162 lb)   01/28/22 71 4 kg (157 lb 8 oz)   01/17/22 71 9 kg (158 lb 8 oz)   11/23/21 71 1 kg (156 lb 12 8 oz)   09/30/21 71 7 kg (158 lb 1 6 oz)   09/03/21 68 5 kg (151 lb)   03/22/21 87 5 kg (193 lb)   11/04/20 87 5 kg (192 lb 14 4 oz)   10/13/20 86 7 kg (191 lb 1 6 oz)   09/24/20 91 7 kg (202 lb 1 6 oz)     Excess Weight:  Onset:  3-4 months (gained about 15 pounds)  Weaned down and now off of wellbutrin for 3-4 months  Highest weight: 190  Lowest Weight: 150  Current weight: 172  Goal 130-140  What has been tried: Diet and Exercise   Ketogenic diet  Dr Kimberly Moran program - strict diet and supplement  [employer] rebeca  Contributing factors: Poor Food Choices, Insufficient Physical Activity and Medications    Food logging:  [employer] rebeca - not currently    Hunger/Cravings: + hunger and cravings (salty)  Dining out:  Once a week  Hydration: Water 64+ oz a day, 20oz coffee with 2-3 tablespoons of flavored creamer  Alcohol: 0-2 drinks a month  Smoking: No  Exercise: No [place] on occasion  Weight Monitoring:  Sleep: 8  STOP-BANG Score:  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    + \"frequently cold,\" fatigue  No skin or hair changes      Past Medical History:   Diagnosis Date   • Amenorrhea     last assessed: 08/22/2016   • Female infertility     seen by [facility] - seen for 1 year, 5 cycles IUI acheived pregnancy via IVF   • Fibromyalgia    • Irregular menstrual cycle     last assessed: 09/26/2016   • Osteoarthritis of right acromioclavicular joint     last assessment: " 2013   • Polycystic ovary syndrome    • Separation of right acromioclavicular joint     Last assessed: 2013; this is more consistent with ostiolysis of the distal clavicle vs  acromioclavicular joint arthritis, not acute shoulder seperation   • Varicella     vaccine     Patient denies personal and family history of  pancreatitis, thyroid cancer, MEN-2 tumors  Denies any hx of glaucoma, seizures, kidney stones, gallstones  Denies Hx of CAD, PAD, palpitations, arrhythmia  Denies uncontrolled anxiety or depression, suicidal behavior or thinking , insomnia or sleep disturbance  Past Surgical History:   Procedure Laterality Date   •  SECTION     • DENTAL SURGERY      wisdom teeth   • [place]  DELIVERY ONLY N/A 2018    Procedure:  SECTION (); Surgeon: Dc Neves MD;  Location: [facility];  Service: Obstetrics   • [place]  DELIVERY ONLY N/A 2020    Procedure:  SECTION (); Surgeon: Dc Neves MD;  Location: [facility];  Service: Obstetrics   • SHOULDER SURGERY     • TUBAL LIGATION Bilateral 2020    Procedure: LIGATION/COAGULATION TUBAL;  Surgeon: Dc Neves MD;  Location: [facility];  Service: Obstetrics     The following portions of the patient's history were reviewed and updated as appropriate: allergies, current medications, past family history, past medical history, past social history, past surgical history, and problem list     Review Of Systems:  Review of Systems   Constitutional: Negative for activity change, appetite change, fatigue and fever  Respiratory: Negative for cough and shortness of breath  Cardiovascular: Negative for chest pain, palpitations and leg swelling  Gastrointestinal: Negative for abdominal pain, constipation, diarrhea, nausea and vomiting  Endocrine: Negative for cold intolerance and heat intolerance  Genitourinary: Negative for difficulty urinating and dysuria     Musculoskeletal: Negative for "arthralgias, back pain and gait problem  Skin: Negative for pallor and rash  Neurological: Negative for headaches  Psychiatric/Behavioral: Negative for dysphoric mood, sleep disturbance and suicidal ideas (or HI)  The patient is not nervous/anxious  Objective:  /60   Pulse 72   Resp 16   Ht 5' 5\" (1 651 m)   Wt 78 kg (172 lb)   LMP 05/30/2023 (Exact Date) Comment: tubal 2020  BMI 28 62 kg/m²   Physical Exam  Vitals and nursing note reviewed  Constitutional:       General: She is not in acute distress  Appearance: Normal appearance  She is not ill-appearing or diaphoretic  Eyes:      General: No scleral icterus  Cardiovascular:      Rate and Rhythm: Normal rate and regular rhythm  Pulses: Normal pulses  Heart sounds: No murmur heard  Pulmonary:      Effort: Pulmonary effort is normal  No respiratory distress  Breath sounds: Normal breath sounds  No wheezing or rhonchi  Abdominal:      General: Bowel sounds are normal  There is no distension  Palpations: Abdomen is soft  There is no mass  Tenderness: There is no abdominal tenderness  Musculoskeletal:      Cervical back: Neck supple  Right lower leg: No edema  Left lower leg: No edema  Lymphadenopathy:      Cervical: No cervical adenopathy  Skin:     Capillary Refill: Capillary refill takes less than 2 seconds  Findings: No lesion or rash  Neurological:      Mental Status: She is alert and oriented to person, place, and time  Gait: Gait normal    Psychiatric:         Mood and Affect: Mood normal          Behavior: Behavior normal          Labs and Imaging  Recent labs and imaging have been personally reviewed    Lab Results   Component Value Date    WBC 5 3 01/21/2022    HGB 13 1 01/21/2022    HCT 38 6 01/21/2022    MCV 88 3 01/21/2022     01/21/2022     Lab Results   Component Value Date     04/11/2016    SODIUM 140 01/21/2022    K 4 2 01/21/2022     01/21/2022 " CO2 32 01/21/2022    ANIONGAP 7 08/29/2013    AGAP 8 09/14/2020    BUN 14 10/04/2022    CREATININE 0 70 10/04/2022    GLUC 86 01/21/2022    GLUF 84 07/17/2020    CALCIUM 9 3 01/21/2022    AST 24 09/14/2020    ALT 17 09/14/2020    ALKPHOS 139 (H) 09/14/2020    PROT 7 0 04/11/2016    TP 5 7 (L) 09/14/2020    BILITOT 0 5 04/11/2016    TBILI 0 25 09/14/2020    EGFR 118 10/04/2022     Lab Results   Component Value Date    HGBA1C 5 0 03/31/2020     Lab Results   Component Value Date    NYC2EIURKSCX 1 530 10/11/2016     TSH, 3rd generation  Order: [id number]   Status: Final result      Visible to patient: Yes (seen)      Next appt: 11/28/2023 at 01:40 PM in Dermatology Marcell Garcia MD)      Dx:  Irregular menstruation      0 Result Notes          Component Ref Range & Units 10/11/16  7:26 AM 8/22/16  8:12 AM 4/11/16  9:27 AM 8/29/13  3:20 PM   TSH 3RD GENERATON 0 358 - 3 740 uIU/mL 1 530  1 820  1 43 R, CM  2 095 R, CM

## 2023-06-21 NOTE — PROGRESS NOTES
pcos   Spoke with [facility]ha management   Restart metformin and see how she does before stopping the other meds

## 2023-06-21 NOTE — ASSESSMENT & PLAN NOTE
- Discussed options of HealthyCORE-Intensive Lifestyle Intervention Program, Very Low Calorie Diet-VLCD, and Conservative Program and the role of weight loss medications  - Explained the importance of making lifestyle changes first before starting anti-obesity medications  - Patient should demonstrate lifestyle changes first before anti-obesity medication initiated  Should she not be able to get her BMI below 27 may consider AOM based on PCOS comorbidity   - Patient is interested in pursuing Conservative Program  - Initial weight loss goal of 5-10% weight loss for improved health as studies have shown this is where we see the greatest impact on improving health and decreasing risk of obesity related conditions  - Weight loss can improve patient's co-morbid conditions and/or prevent weight-related complications  - Stop Bang 1/8  Constant somnolence - recommend sleep medicine evaluation  - Labs reviewed: As below  Recommendations:  Nutrition:  Eat breakfast daily  Do not skip meals  Food log (ie ) www myfitnesspal com, sparkpeople  com, loseit com, calorieking  com, etc   EVERY day for 2 weeks, then even just 1-2 days a week may affect how you eat the rest of the week  Consider meal planning package    Practice mindful eating  Be sure to set aside time to eat, eat slowly, and savor your food  Hydration: At least 64oz of water daily  No sugar sweetened beverages  No juice (eat the fruit instead)  Exercise:  Studies have shown that the ideal exercise goal is somewhere between 150 to 300 minutes of moderate intensity exercise a week  Start with exercising 10 minutes every other day and gradually increase physical activity with a goal of at least 150 minutes of moderate intensity exercise a week, divided over at least 3 days a week  An example of this would be exercising 30 minutes a day, 5 days a week  Resistance training can increase muscle mass and increase our resting metabolic rate  FULL BODY resistance training is recommended 2-3 times a week  Do not do this on consecutive days to allow for muscle recovery  Aim for a bare minimum 5000 steps, even on days you do not exercise  Monitoring:   Weigh  yourself once a week  Weigh yourself the same time of the day with the same amount of clothing on  Preferably this should be done after waking up, before you eat, and with no clothing or minimal clothing on  Specific Recommendations:  1  TSH and lipid panel blood test after overnight 10 hour fast   2  Sleep medicine evaluation  3  Discuss trying a different SSRI such as sertraline vs restarting wellbutrin in addition to your current SSRI with your gynecologist or PCP  4  Discuss potentially starting metformin for treatment of PCOS as this may also help weight  5  Avoid progesterone only shots and implant as these may cause weight gain  Calorie goal:  1200 calories (Provided with meal plan to follow)      Return visit:  3 months

## 2023-06-21 NOTE — PATIENT INSTRUCTIONS
Recommendations:  Nutrition:  Eat breakfast daily  Do not skip meals  Food log (ie ) www myfitnesspal com, sparkpeople  com, loseit com, calorieking  com, etc   EVERY day for 2 weeks, then even just 1-2 days a week may affect how you eat the rest of the week  Consider meal planning package    Practice mindful eating  Be sure to set aside time to eat, eat slowly, and savor your food  Hydration: At least 64oz of water daily  No sugar sweetened beverages  No juice (eat the fruit instead)  Exercise:  Studies have shown that the ideal exercise goal is somewhere between 150 to 300 minutes of moderate intensity exercise a week  Start with exercising 10 minutes every other day and gradually increase physical activity with a goal of at least 150 minutes of moderate intensity exercise a week, divided over at least 3 days a week  An example of this would be exercising 30 minutes a day, 5 days a week  Resistance training can increase muscle mass and increase our resting metabolic rate  FULL BODY resistance training is recommended 2-3 times a week  Do not do this on consecutive days to allow for muscle recovery  Aim for a bare minimum 5000 steps, even on days you do not exercise  Monitoring:   Weigh  yourself once a week  Weigh yourself the same time of the day with the same amount of clothing on  Preferably this should be done after waking up, before you eat, and with no clothing or minimal clothing on  Specific Recommendations:  TSH and lipid panel blood test after overnight 10 hour fast   Sleep medicine evaluation  Discuss trying a different SSRI such as sertraline vs restarting wellbutrin in addition to your current SSRI with your gynecologist or PCP  Discuss potentially starting metformin for treatment of PCOS as this may also help weight  Avoid progesterone only shots and implant as these may cause weight gain       Calorie goal:  1200 calories (Provided with meal plan to follow)      Return visit:  3 months

## 2023-06-22 LAB
CHOLEST SERPL-MCNC: 135 MG/DL
CHOLEST/HDLC SERPL: 2.5 (CALC)
HDLC SERPL-MCNC: 53 MG/DL
LDLC SERPL CALC-MCNC: 69 MG/DL (CALC)
NONHDLC SERPL-MCNC: 82 MG/DL (CALC)
TRIGL SERPL-MCNC: 58 MG/DL
TSH SERPL-ACNC: 1.27 MIU/L

## 2023-06-26 NOTE — PROGRESS NOTES
Weight Management Medical Nutrition Assessment  Karsten Hines presented for a meal planning session. Today's weight is 171.2#. Karsten Hines is very motivated. She reports a busy schedule and reports portion sizes are her concern. She started Metformin approximately 2 weeks ago. Downloaded the [facility] [employer] and plans to food log. Reviewed calorie needs on active and non-active days. Patient interested in meal replacements. Provided samples. We developed and reviewed a low calorie meal plan.     Patient seen by Medical Provider in past 6 months:  yes  Requested to schedule appointment with Medical Provider: No      Anthropometric Measurements  Start Weight (#): 171.2#  Current Weight (#): 171.2#  TBW % Change from start weight: n/a  Ideal Body Weight (#): 125#  Goal Weight (#): 130-140#  Highest: 190#  Lowest: 150#    Weight Loss History  Previous weight loss attempts: Exercise  High Protein/Low CHO diets (Atkins, [place], etc.)  Self Created Diets (Portion Control, Healthy Food Choices, etc.)    Food and Nutrition Related History  Wake up: 6-6:30AM  Bed Time: varies    Food Recall  Breakfast: 8-9AM banana OR whole-wheat english muffin with peanut butter or butter   Snack: skip  Lunch: 12-1:30PM bag of vegetables with canned tuna and light blas OR leftovers OR salad with chicken with light ranch or balsamic or Meet dressing  Snack: pretzels with hummus or cucumber or cherry tomatoes or salsa  Dinner: 8:30-9PM [employer] protein shake OR [employer] protein bar OR grilled chicken with hot sauce and sometimes corn and vegetables OR burgers with lettuce wrap OR steak   Snack: skip    Beverages: 64oz water, 20oz coffee with 2-3 Tbsp flavored [place] vanilla creamer  Alcohol: 0-2 drinks per month  Volume of beverage intake: > 80oz    Weekends: Same  Cravings: none reported  Trouble area of day: evening (sometimes skips meal)    Frequency of Eating out: weekends (occasional)   Food restrictions: none reported  Cooking: self   Food Shopping: self    Physical Activity Intake  Activity: [place] (30 minutes)  Frequency: occasional   Physical limitations/barriers to exercise: none reported    Estimated Needs  Energy  [employer] [place] [employer] Needs: BMR : 1995   1-2# loss weekly sedentary: 301-3007             1-2# loss weekly lightly active: 8590-1235  Maintenance calories for sedentary activity level: 1783  Protein: 68-85g     (1.2-1.5g/kg IBW)  Fluid: 67oz     (35mL/kg IBW)    Nutrition Diagnosis  Yes; Overweight/obesity  related to Food and nutrition related knowledge deficit as evidenced by  BMI more than normative standard for age and sex (overweight 25-29. 9)       Nutrition Intervention    Nutrition Prescription  Calories: 5003-0909 (flex up on active days)  Protein: 68-85g  Fluid: 67oz    Meal Plan (Petros/Pro/Carb)  Breakfast: 200/25  Snack:  Lunch: 400/25  Snack: 150/5-10  Dinner: 400-450/25  Snack:    Nutrition Education:    Calorie controlled menu  Lean protein food choices  Healthy snack options  Food journaling tips      Nutrition Counseling:  Strategies: meal planning, portion sizes, healthy snack choices, hydration, fiber intake, protein intake, exercise, food journal      Monitoring and Evaluation:  Evaluation criteria:  Energy Intake  Meet protein needs  Maintain adequate hydration  Monitor weekly weight  Meal planning/preparation  Food journal   Decreased portions at mealtimes and snacks  Physical activity     Barriers to learning:none  Readiness to change: Action:  (Changing behavior)  Comprehension: good  Expected Compliance: good

## 2023-07-06 ENCOUNTER — OFFICE VISIT (OUTPATIENT)
Dept: BARIATRICS | Facility: CLINIC | Age: 33
End: 2023-07-06

## 2023-07-06 VITALS — WEIGHT: 171.2 LBS | HEIGHT: 65 IN | BODY MASS INDEX: 28.52 KG/M2

## 2023-07-06 DIAGNOSIS — R63.5 ABNORMAL WEIGHT GAIN: ICD-10-CM

## 2023-07-06 PROCEDURE — WMDI30

## 2023-07-06 PROCEDURE — RECHECK

## 2023-07-14 ENCOUNTER — TELEPHONE (OUTPATIENT)
Dept: BARIATRICS | Facility: CLINIC | Age: 33
End: 2023-07-14

## 2023-07-14 NOTE — TELEPHONE ENCOUNTER
Called patient to see if she would like to schedule an appointment with the RD to do a Body Comp and REE per José Manuel Espitia RD but no voicemail was set up to leave a message.

## 2023-07-21 ENCOUNTER — OFFICE VISIT (OUTPATIENT)
Dept: FAMILY MEDICINE CLINIC | Facility: CLINIC | Age: 33
End: 2023-07-21
Payer: COMMERCIAL

## 2023-07-21 VITALS
DIASTOLIC BLOOD PRESSURE: 80 MMHG | RESPIRATION RATE: 16 BRPM | SYSTOLIC BLOOD PRESSURE: 118 MMHG | TEMPERATURE: 97.9 F | HEART RATE: 72 BPM | BODY MASS INDEX: 29.12 KG/M2 | HEIGHT: 65 IN | WEIGHT: 174.8 LBS

## 2023-07-21 DIAGNOSIS — J32.4 CHRONIC PANSINUSITIS: ICD-10-CM

## 2023-07-21 DIAGNOSIS — R00.2 PALPITATIONS: Primary | ICD-10-CM

## 2023-07-21 DIAGNOSIS — R94.31 ABNORMAL FINDING ON EKG: ICD-10-CM

## 2023-07-21 DIAGNOSIS — F43.23 ADJUSTMENT DISORDER WITH MIXED ANXIETY AND DEPRESSED MOOD: ICD-10-CM

## 2023-07-21 PROCEDURE — 99214 OFFICE O/P EST MOD 30 MIN: CPT | Performed by: FAMILY MEDICINE

## 2023-07-21 PROCEDURE — 93000 ELECTROCARDIOGRAM COMPLETE: CPT | Performed by: FAMILY MEDICINE

## 2023-07-21 NOTE — PROGRESS NOTES
Assessment/Plan:    Palpitations  - ECG showed NSR at 70 BPM with PAC's and otherwise normal, advised to increase fluids and reduce caffeine intake, will obtain Echo and Holter monitor and call with results once available  - Echo complete w/ contrast if indicated; Future  - Holter monitor; Future    Abnormal finding on EKG  - Echo complete w/ contrast if indicated; Future  - Holter monitor; Future    Adjustment disorder with mixed anxiety and depressed mood  - patient is doing much better since she started taking OCP's a few months ago, she recently weaned herself off Wellbutrin and doing well on fluoxetine 5 mg daily       Chronic pansinusitis  - s/p sinus surgery/ septoplasty and doing much better    Return for annual physical in 1-2 months or sooner as needed. The patient understands and agrees with the treatment plan. Subjective:   Chief Complaint   Patient presents with   • Irregular Heart Beat     When having surgery last week       Patient ID: Ronit Rocha is a 34 y.o. female who presents today with c/o occasional palpitations for the past few months, usually at rest, no associated chest pain, shortness or breath or dizziness, no exertional symptoms. Patient recently underwent sinus surgery/ septoplasty on 7/12/23 and was advised that while under anesthesia she was found to have irregular heart beat and was recommended to follow up with PCP.        The following portions of the patient's history were reviewed and updated as appropriate: allergies, current medications, past family history, past medical history, past social history, past surgical history and problem list.    Past Medical History:   Diagnosis Date   • Amenorrhea     last assessed: 08/22/2016   • Female infertility     seen by [facility] - seen for 1 year, 5 cycles IUI acheived pregnancy via IVF   • Fibromyalgia    • Irregular menstrual cycle     last assessed: 09/26/2016   • Osteoarthritis of right acromioclavicular joint     last assessment: 2013   • Polycystic ovary syndrome    • Separation of right acromioclavicular joint     Last assessed: 2013; this is more consistent with ostiolysis of the distal clavicle vs. acromioclavicular joint arthritis, not acute shoulder seperation   • Varicella     vaccine     Past Surgical History:   Procedure Laterality Date   •  SECTION     • DENTAL SURGERY      wisdom teeth   • MS  DELIVERY ONLY N/A 2018    Procedure:  SECTION (); Surgeon: Eliana Yuen MD;  Location: [facility];  Service: Obstetrics   • MS  DELIVERY ONLY N/A 2020    Procedure:  SECTION (); Surgeon: Eliana Yuen MD;  Location: [facility];  Service: Obstetrics   • SHOULDER SURGERY     • TUBAL LIGATION Bilateral 2020    Procedure: LIGATION/COAGULATION TUBAL;  Surgeon:  Eliana Yuen MD;  Location: [facility];  Service: Obstetrics     Family History   Problem Relation Age of Onset   • Fibromyalgia Mother    • Hyperlipidemia Father    • Hypertension Father    • Diabetes Maternal Grandmother    • Diabetes Paternal Grandfather    • Cancer Maternal Grandfather      Social History     Socioeconomic History   • Marital status: /Civil Union     Spouse name: Not on file   • Number of children: Not on file   • Years of education: Not on file   • Highest education level: Not on file   Occupational History   • Not on file   Tobacco Use   • Smoking status: Never   • Smokeless tobacco: Never   Vaping Use   • Vaping Use: Never used   Substance and Sexual Activity   • Alcohol use: Not Currently     Alcohol/week: 1.0 standard drink of alcohol     Types: 1 Cans of beer per week   • Drug use: No   • Sexual activity: Yes     Partners: Male     Birth control/protection: OCP   Other Topics Concern   • Not on file   Social History Narrative    History of  0    Caffeine use-1 cup of coffee/day    Has smoke detectors     Denied history of sun protection    Uses safety equipment- protective head gear     Social Determinants of Health     Financial Resource Strain: Not on file   Food Insecurity: Not on file   Transportation Needs: Not on file   Physical Activity: Not on file   Stress: Not on file   Social Connections: Not on file   Intimate Partner Violence: Not on file   Housing Stability: Not on file       Current Outpatient Medications:   •  FLUoxetine (PROzac) 10 MG tablet, TAKE 1 TABLET BY MOUTH EVERY DAY (Patient taking differently: 5 mg daily), Disp: 90 tablet, Rfl: 1  •  metFORMIN (GLUCOPHAGE) 500 mg tablet, Take 1 tablet (500 mg total) by mouth 2 (two) times a day with meals, Disp: 180 tablet, Rfl: 3  •  multivitamin (THERAGRAN) TABS, Take 1 tablet by mouth daily, Disp: , Rfl:   •  norethindrone-ethinyl estradiol (Loestrin Fe 1/20) 1-20 MG-MCG per tablet, Take 1 tablet by mouth daily, Disp: 90 tablet, Rfl: 3    Review of Systems   Constitutional: Negative for appetite change, chills, fatigue, fever and unexpected weight change. Respiratory: Negative for cough, shortness of breath and wheezing. Cardiovascular: Positive for palpitations. Negative for chest pain and leg swelling. Gastrointestinal: Negative for abdominal pain, blood in stool, constipation, diarrhea, nausea and vomiting. Genitourinary: Negative for difficulty urinating, dysuria, flank pain, frequency, hematuria, pelvic pain and urgency. Neurological: Negative for dizziness, syncope, weakness, numbness and headaches. Hematological: Negative for adenopathy. Psychiatric/Behavioral: Negative for dysphoric mood and sleep disturbance. The patient is not nervous/anxious. Objective:    Vitals:    07/21/23 1428   BP: 118/80   Pulse: 72   Resp: 16   Temp: 97.9 °F (36.6 °C)   Weight: 79.3 kg (174 lb 12.8 oz)   Height: 5' 5" (1.651 m)        Physical Exam  Constitutional:       General: She is not in acute distress. Appearance: She is well-developed.    Neck:      Thyroid: No thyromegaly. Cardiovascular:      Rate and Rhythm: Normal rate and regular rhythm. Heart sounds: Normal heart sounds. No murmur heard. Pulmonary:      Effort: Pulmonary effort is normal. No respiratory distress. Breath sounds: No wheezing, rhonchi or rales. Abdominal:      General: There is no distension. Palpations: Abdomen is soft. There is no mass. Tenderness: There is no abdominal tenderness. Musculoskeletal:      Cervical back: Neck supple. Right lower leg: No edema. Left lower leg: No edema. Lymphadenopathy:      Cervical: No cervical adenopathy. Neurological:      Mental Status: She is alert and oriented to person, place, and time. Psychiatric:         Mood and Affect: Mood normal.         Behavior: Behavior normal.         Thought Content:  Thought content normal.        Component Ref Range 6/22/23   TSH 0.40-4.50 mIU/L 1.27         Ref Range 6/13/23    WBC 3.8 - 10.8 Thousand/uL 5.7    RBC 3.80 - 5.10 Million/uL 4.42    Hemoglobin 11.7 - 15.5 g/dL 13.5    Hematocrit 35.0 - 45.0 % 40.3    MCV 80.0 - 100.0 fL 91.2    MCH 27.0 - 33.0 pg 30.5    MCHC 32.0 - 36.0 g/dL 33.5    RDW 11.0 - 15.0 % 12.4    Platelet Count 551 - 400 Thousand/uL 252

## 2023-08-02 ENCOUNTER — OFFICE VISIT (OUTPATIENT)
Dept: BARIATRICS | Facility: CLINIC | Age: 33
End: 2023-08-02

## 2023-08-02 VITALS — HEIGHT: 65 IN | WEIGHT: 174.6 LBS | BODY MASS INDEX: 29.09 KG/M2

## 2023-08-02 DIAGNOSIS — R63.5 ABNORMAL WEIGHT GAIN: ICD-10-CM

## 2023-08-02 PROCEDURE — RECHECK

## 2023-08-02 PROCEDURE — WEIGHT

## 2023-08-02 PROCEDURE — WMREE PR REE WEIGHT MANAGEMENT

## 2023-08-02 NOTE — PROGRESS NOTES
Completed a body composition using SECA scale and reviewed results with patient. Reevue indirect calorimter revealed REE is fast (+19%) compared to the predictive normal for someone her same age, height, and gender. Questionable accuracy. Plan to repeat REE next month per RD recommendation. Patient agreeable. Ordered product.

## 2023-08-08 ENCOUNTER — OFFICE VISIT (OUTPATIENT)
Dept: FAMILY MEDICINE CLINIC | Facility: CLINIC | Age: 33
End: 2023-08-08
Payer: COMMERCIAL

## 2023-08-08 VITALS
HEART RATE: 78 BPM | WEIGHT: 178.2 LBS | BODY MASS INDEX: 29.69 KG/M2 | SYSTOLIC BLOOD PRESSURE: 118 MMHG | RESPIRATION RATE: 14 BRPM | TEMPERATURE: 97.8 F | DIASTOLIC BLOOD PRESSURE: 80 MMHG | HEIGHT: 65 IN

## 2023-08-08 DIAGNOSIS — E28.2 PCOS (POLYCYSTIC OVARIAN SYNDROME): ICD-10-CM

## 2023-08-08 DIAGNOSIS — E66.3 OVERWEIGHT WITH BODY MASS INDEX (BMI) OF 29 TO 29.9 IN ADULT: ICD-10-CM

## 2023-08-08 DIAGNOSIS — R63.5 WEIGHT GAIN: Primary | ICD-10-CM

## 2023-08-08 PROCEDURE — 99213 OFFICE O/P EST LOW 20 MIN: CPT | Performed by: FAMILY MEDICINE

## 2023-08-10 NOTE — PROGRESS NOTES
Assessment/Plan:    Overweight with body mass index (BMI) of 29 to 29.9 in adult  - following with Bariatrics/ [facility]NT, continue metformin and life style changes, discussed GLP-1 receptor agonists and advised to check with insurance for coverage    PCOS (polycystic ovarian syndrome)  - continue OCP's and metformin, follow up with Ob/Gyn         Return as scheduled or sooner as needed. The patient understands and agrees with the treatment plan. Subjective:   Chief Complaint   Patient presents with   • Discuss blood work   • Polycystic Ovary Syndrome      Patient ID: Xander Douglas is a 34 y.o. female who presents today with c/o weight gain and to discuss weight loss medications. She has been seen by [facility] and was recently started on metformin, she has been following 1200 calorie diet without any weight loss. The following portions of the patient's history were reviewed and updated as appropriate: allergies, current medications, past family history, past medical history, past social history, past surgical history and problem list.    Past Medical History:   Diagnosis Date   • Amenorrhea     last assessed: 2016   • Female infertility     seen by [facility] - seen for 1 year, 5 cycles IUI acheived pregnancy via IVF   • Fibromyalgia    • Irregular menstrual cycle     last assessed: 2016   • Osteoarthritis of right acromioclavicular joint     last assessment: 2013   • Polycystic ovary syndrome    • Separation of right acromioclavicular joint     Last assessed: 2013; this is more consistent with ostiolysis of the distal clavicle vs. acromioclavicular joint arthritis, not acute shoulder seperation   • Varicella     vaccine     Past Surgical History:   Procedure Laterality Date   •  SECTION     • DENTAL SURGERY      wisdom teeth   • OR  DELIVERY ONLY N/A 2018    Procedure:  SECTION (); Surgeon:  Jack Lewis MD;  Location: [facility];  Service: Obstetrics   • ME  DELIVERY ONLY N/A 2020    Procedure:  SECTION (); Surgeon: Joel Teixeira MD;  Location: [facility];  Service: Obstetrics   • SHOULDER SURGERY     • TUBAL LIGATION Bilateral 2020    Procedure: LIGATION/COAGULATION TUBAL;  Surgeon:  Joel Teixeira MD;  Location: [facility];  Service: Obstetrics     Family History   Problem Relation Age of Onset   • Fibromyalgia Mother    • Hyperlipidemia Father    • Hypertension Father    • Diabetes Maternal Grandmother    • Diabetes Paternal Grandfather    • Cancer Maternal Grandfather      Social History     Socioeconomic History   • Marital status: /Civil Union     Spouse name: Not on file   • Number of children: Not on file   • Years of education: Not on file   • Highest education level: Not on file   Occupational History   • Not on file   Tobacco Use   • Smoking status: Never   • Smokeless tobacco: Never   Vaping Use   • Vaping Use: Never used   Substance and Sexual Activity   • Alcohol use: Not Currently     Alcohol/week: 1.0 standard drink of alcohol     Types: 1 Cans of beer per week   • Drug use: No   • Sexual activity: Yes     Partners: Male     Birth control/protection: OCP   Other Topics Concern   • Not on file   Social History Narrative    History of  0    Caffeine use-1 cup of coffee/day    Has smoke detectors     Denied history of sun protection    Uses safety equipment- protective head gear     Social Determinants of Health     Financial Resource Strain: Not on file   Food Insecurity: Not on file   Transportation Needs: Not on file   Physical Activity: Not on file   Stress: Not on file   Social Connections: Not on file   Intimate Partner Violence: Not on file   Housing Stability: Not on file       Current Outpatient Medications:   •  FLUoxetine (PROzac) 10 MG tablet, TAKE 1 TABLET BY MOUTH EVERY DAY (Patient taking differently: 5 mg daily), Disp: 90 tablet, Rfl: 1  •  metFORMIN (GLUCOPHAGE) 500 mg tablet, Take 1 tablet (500 mg total) by mouth 2 (two) times a day with meals, Disp: 180 tablet, Rfl: 3  •  multivitamin (THERAGRAN) TABS, Take 1 tablet by mouth daily, Disp: , Rfl:   •  norethindrone-ethinyl estradiol (Loestrin Fe 1/20) 1-20 MG-MCG per tablet, Take 1 tablet by mouth daily, Disp: 90 tablet, Rfl: 3    Review of Systems   Constitutional: Negative for chills, fatigue and fever. Respiratory: Negative for shortness of breath. Cardiovascular: Negative for chest pain. Neurological: Negative for dizziness and headaches. Psychiatric/Behavioral: Negative for dysphoric mood and sleep disturbance. The patient is not nervous/anxious. Objective:    Vitals:    08/08/23 0933   BP: 118/80   Pulse: 78   Resp: 14   Temp: 97.8 °F (36.6 °C)   Weight: 80.8 kg (178 lb 3.2 oz)   Height: 5' 4.75" (1.645 m)     Wt Readings from Last 3 Encounters:   08/08/23 80.8 kg (178 lb 3.2 oz)   08/02/23 79.2 kg (174 lb 9.6 oz)   07/21/23 79.3 kg (174 lb 12.8 oz)        Physical Exam  Constitutional:       General: She is not in acute distress. Cardiovascular:      Rate and Rhythm: Normal rate and regular rhythm. Heart sounds: Normal heart sounds. Pulmonary:      Effort: No respiratory distress. Breath sounds: Normal breath sounds. Neurological:      Mental Status: She is alert and oriented to person, place, and time. Psychiatric:         Mood and Affect: Mood normal.         Behavior: Behavior normal.         Thought Content:  Thought content normal.         Judgment: Judgment normal.        Lab Results   Component Value Date    ZKY0HNGBKWFX 1.530 10/11/2016     Lab Results   Component Value Date    CHOLESTEROL 135 06/22/2023     Lab Results   Component Value Date    HDL 53 06/22/2023     Lab Results   Component Value Date    TRIG 58 06/22/2023     Lab Results   Component Value Date    LDLCALC 69 06/22/2023     Lab Results   Component Value Date    WBC 5.3 01/21/2022    HGB 13.1 01/21/2022    HCT 38.6 01/21/2022    MCV 88.3 01/21/2022     01/21/2022     Lab Results   Component Value Date     04/11/2016    SODIUM 140 01/21/2022    K 4.2 01/21/2022     01/21/2022    CO2 32 01/21/2022    ANIONGAP 7 08/29/2013    AGAP 8 09/14/2020    BUN 14 10/04/2022    CREATININE 0.70 10/04/2022    GLUC 86 01/21/2022    GLUF 84 07/17/2020    CALCIUM 9.3 01/21/2022    AST 24 09/14/2020    ALT 17 09/14/2020    ALKPHOS 139 (H) 09/14/2020    PROT 7.0 04/11/2016    TP 5.7 (L) 09/14/2020    BILITOT 0.5 04/11/2016    TBILI 0.25 09/14/2020    EGFR 118 10/04/2022

## 2023-08-11 DIAGNOSIS — E66.3 OVERWEIGHT WITH BODY MASS INDEX (BMI) OF 29 TO 29.9 IN ADULT: Primary | ICD-10-CM

## 2023-08-16 ENCOUNTER — APPOINTMENT (OUTPATIENT)
Dept: NON INVASIVE DIAGNOSTICS | Facility: HOSPITAL | Age: 33
End: 2023-08-16
Payer: COMMERCIAL

## 2023-08-16 ENCOUNTER — HOSPITAL ENCOUNTER (OUTPATIENT)
Dept: NON INVASIVE DIAGNOSTICS | Facility: HOSPITAL | Age: 33
Discharge: HOME | End: 2023-08-16
Payer: COMMERCIAL

## 2023-08-16 DIAGNOSIS — R00.2 PALPITATIONS: ICD-10-CM

## 2023-08-16 PROCEDURE — 93225 XTRNL ECG REC<48 HRS REC: CPT

## 2023-08-16 PROCEDURE — 93226 XTRNL ECG REC<48 HR SCAN A/R: CPT

## 2023-09-05 ENCOUNTER — OFFICE VISIT (OUTPATIENT)
Dept: SLEEP CENTER | Facility: HOSPITAL | Age: 33
End: 2023-09-05
Payer: COMMERCIAL

## 2023-09-05 VITALS
HEIGHT: 64 IN | BODY MASS INDEX: 29.88 KG/M2 | HEART RATE: 78 BPM | WEIGHT: 175 LBS | DIASTOLIC BLOOD PRESSURE: 80 MMHG | OXYGEN SATURATION: 98 % | SYSTOLIC BLOOD PRESSURE: 110 MMHG | RESPIRATION RATE: 17 BRPM

## 2023-09-05 DIAGNOSIS — F32.81 PMDD (PREMENSTRUAL DYSPHORIC DISORDER): ICD-10-CM

## 2023-09-05 DIAGNOSIS — G47.8 NON-RESTORATIVE SLEEP: Primary | ICD-10-CM

## 2023-09-05 DIAGNOSIS — E66.3 OVERWEIGHT: ICD-10-CM

## 2023-09-05 DIAGNOSIS — R06.89 GASPING FOR BREATH: ICD-10-CM

## 2023-09-05 DIAGNOSIS — G47.19 EXCESSIVE DAYTIME SLEEPINESS: ICD-10-CM

## 2023-09-05 DIAGNOSIS — E28.2 PCOS (POLYCYSTIC OVARIAN SYNDROME): ICD-10-CM

## 2023-09-05 DIAGNOSIS — Z98.890 S/P NASAL SEPTOPLASTY: ICD-10-CM

## 2023-09-05 PROCEDURE — 99244 OFF/OP CNSLTJ NEW/EST MOD 40: CPT | Performed by: INTERNAL MEDICINE

## 2023-09-05 NOTE — PROGRESS NOTES
Consultation - [id number]  34 y.o. female  QQY:[id number]  YFR:[id number]  DOS:9/5/2023    Physician Requesting Consult: Sourav Reece             Reason for Consult : At your kind request I saw Juan C Bello for initial sleep evaluation today. The patient is here to evaluate for suspected Obstructive Sleep Apnea. PFSH, Problem List, Medications & Allergies were reviewed in EMR. Gideon Velez  has a past medical history of Amenorrhea, Female infertility, Fibromyalgia, Irregular menstrual cycle, Osteoarthritis of right acromioclavicular joint, Polycystic ovary syndrome, Separation of right acromioclavicular joint, and Varicella. She has a current medication list which includes the following prescription(s): fluoxetine, metformin, multivitamin, norethindrone-ethinyl estradiol, and semaglutide (0.25 or 0.5 mg/dose). HPI: She presents with complaint of "never feels rested "for as long as she can recall and irrespective of sleep.  reports snoring of several years duration and notes she appears to be gasping for breath. She is aware of snoring or breathing difficulties during sleep. Additional Complaints: None. Restless Leg Syndrome: Has been reports jerking movements during sleep  Parasomnia: no features reported    Sleep Routine (averaged): Typical Bedtime: 10 PM.  Gets OOB: 6:30 AM. TIB:8.5 hrs. Sleep latency:< 15 minutes Sleep Interruptions:  2-4,  not always sure of the cause and able to fall back asleep. Awakens: needing an alarm  Upon awakening: never feels rested. [She estimates getting [ ]hrs sleep.] Daytime Function:Nell reports excessive daytime sleepiness [feels like napping but does not have the opportunity]. She rated [herself] at Total score: 15 /24 on the Sparks Sleepiness Scale. Habits:   reports that she has never smoked.  She has never used smokeless tobacco.;  reports that she does not currently use alcohol after a past usage of about 1.0 standard drink of alcohol per week.;[ reports no history of drug use. ];[  E-Cigarette/Vaping   • E-Cigarette Use Never User    ]; Caffeine use:limited; Exercise routine: regular. Occupation:     Family History: Father has symptoms of obstructive sleep apnea  ROS: Significant for around 15 pounds weight gain in the past 6 months. She recently had sinus surgery that included nasal septoplasty and turbinate reduction in July of this year but there has been no improvement in her symptoms. Presently she is reporting no nasal, respiratory or cardiac symptoms. He is on medication for PCOS. Abdulaziz Zavala EXAM:  /80   Pulse 78   Resp 17   Ht 5' 4" (1.626 m)   Wt 79.4 kg (175 lb)   SpO2 98%   BMI 30.04 kg/m²    General: Well groomed female, well appearing, in no apparent distress. Neurological: Alert and orientated; cooperative; Cranial nerves intact;    Psychiatric: Speech: Clear and coherent; normal mood, affect & thought   Skin: Warm and dry; Color& Hydration good; no facial rashes or lesions   HEENT:  Craniofacial anatomy: normal Sinuses: Non-tender. TMJ: Normal   Eyes: EOM's intact; conjunctiva/corneas clear   Ears: Externally appear normal     Nasal Airway: is patent Septum: Intact; Mucosa: Normal; Turbinates: Hyperemic; Rhinorrhea: Some postnasal drip  Mouth: Lips: Normal posture; Dentition: normal . Mucosa: Moist; Hard Palate:normal    Oropharryx: crowded and AP narrowing Tongue: Mallampati:Class III and MobileSoft Palate:  redundant  Tonsils: absent  Neck:; [Neck Circumference: 15 ";] Supple; no abnormal masses; Thyroid: Normal. Trachea: Central.    Lymph: No cervical or submandibular Lymhadenopathy  Heart: S1,S2 normal; RRR; no gallop; no murmur   Lungs: Respiratory Effort: Normal. Air entry good bilaterally. No wheezes. No rales  Abdomen: Obese, soft & non-tender    Extremities: No pedal edema. No clubbing or cyanosis.     Musculoskeletal:  Motor normal; Gait: Normal.       IMPRESSION: Primary/Secondary Sleep Diagnoses (to Medical or Psych conditions) & Comorbidities   1. Non-restorative sleep  Diagnostic Sleep Study      2. Gasping for breath  Diagnostic Sleep Study      3. Excessive daytime sleepiness  Diagnostic Sleep Study      4. S/P nasal septoplasty        5. PCOS (polycystic ovarian syndrome)        6. PMDD (premenstrual dysphoric disorder)        7. Overweight  Ambulatory referral to Sleep Medicine           PLAN:   1. With respect to above conditions, comprehensive counseling provided on pathophysiology; effects on symptoms and comorbidities, diagnostic strategies & limitations; treatment options; risks or no treament; risks & benefits of the various therapeutic options; costs and insurance aspects. 2. I advised weight reduction, avoiding sleeping supine, using alcohol or sedating medications close to bed time and on safe driving practices. 3. Sleep testing is indicated and a diagnostic study will be scheduled. Home sleep testing is contraindicated because suspect upper airway resistance that would not be demonstrated by Home sleep testing  4. Patient is willing to try Positive airway pressure therapy and will be scheduled for a titration study if indicated. 5. Follow-up to be scheduled after testing /initiation of therapy to review results, further details of treatment options and to adjust therapy. Sincerely,      Authenticated electronically on 41/88/53   Board Certified Specialist     Portions of the record may have been created with voice recognition software. Occasional wrong word or "sound a like" substitutions may have occurred due to the inherent limitations of voice recognition software. There may also be notations and random deletions of words or characters from malfunctioning software. Read the chart carefully and recognize, using context, where substitutions/deletions have occurred.

## 2023-09-05 NOTE — PATIENT INSTRUCTIONS
What is [place]? Obstructive sleep apnea is a common and serious sleep disorder that causes you to stop breathing during sleep. The airway repeatedly becomes blocked, limiting the amount of air that reaches your lungs. When this happens, you may snore loudly or making choking noises as you try to breathe. Your brain and body becomes oxygen deprived and you may wake up. This may happen a few times a night, or in more severe cases, several hundred times a night. Sleep apnea can make you wake up in the morning feeling tired or unrefreshed even though you have had a full night of sleep. During the day, you may feel fatigued, have difficulty concentrating or you may even unintentionally fall asleep. This is because your body is waking up numerous times throughout the night, even though you might not be conscious of each awakening. The lack of oxygen your body receives can have negative long-term consequences for your health. This includes:  High blood pressure  Heart disease  Irregular heart rhythms  Stroke  Pre-diabetes and diabetes  Depression  Testing  An objective evaluation of your sleep may be needed before your board certified sleep physician can make a diagnosis. Options include:   In-lab overnight sleep study  This type of sleep study requires you to stay overnight at a sleep center, in a bed that may resemble a hotel room. You will sleep with sensors hooked up to various parts of your body. These sensors record your brain waves, heartbeat, breathing and movement. An overnight sleep study also provides your doctor with the most complete information about your sleep. Learn more about an overnight sleep study. Home sleep apnea test  Some patients with high risk factors for obstructive sleep apnea and no other medical disorders may be candidates for a home sleep apnea test. The testing equipment differs in that it is less complicated than what is used in an overnight sleep study.  As such, does not give all the data an in-lab will and if negative, may not mean you do not have the problem. Treatment for sleep apnea includes using a continuous positive airway pressure (CPAP) machine to keep your airway open during sleep. A mask is placed over your nose and mouth, or just your nose. The mask is hooked to the CPAP machine that blows a gentle stream of air into the mask when you breathe. This helps keep your airway open so you can breathe more regularly. Extra oxygen may be given to you through the machine. You may be given a mouth device. It looks like a mouth guard or dental retainer and stops your tongue and mouth tissues from blocking your throat while you sleep. Surgery may be needed to remove extra tissues that block your mouth, throat, or nose. Manage sleep apnea:   Do not smoke. Nicotine and other chemicals in cigarettes and cigars can cause lung damage. Ask your healthcare provider for information if you currently smoke and need help to quit. E-cigarettes or smokeless tobacco still contain nicotine. Talk to your healthcare provider before you use these products. Do not drink alcohol or take sedative medicine before you go to sleep. Alcohol and sedatives can relax the muscles and tissues around your throat. This can block the airflow to your lungs. Maintain a healthy weight. Excess tissue around your throat may restrict your breathing. Ask your healthcare provider for information if you need to lose weight. Sleep on your side or use pillows designed to prevent sleep apnea. This prevents your tongue or other tissues from blocking your throat. You can also raise the head of your bed. Driving Safety. Refrain from driving when drowsy. Follow up with your healthcare provider as directed: Write down your questions so you remember to ask them during your visits. Go to [employer] website for more information: Sleepeducation. org  What is [place]?    Obstructive sleep apnea is a common and serious sleep disorder that causes you to stop breathing during sleep. The airway repeatedly becomes blocked, limiting the amount of air that reaches your lungs. When this happens, you may snore loudly or making choking noises as you try to breathe. Your brain and body becomes oxygen deprived and you may wake up. This may happen a few times a night, or in more severe cases, several hundred times a night. Sleep apnea can make you wake up in the morning feeling tired or unrefreshed even though you have had a full night of sleep. During the day, you may feel fatigued, have difficulty concentrating or you may even unintentionally fall asleep. This is because your body is waking up numerous times throughout the night, even though you might not be conscious of each awakening. The lack of oxygen your body receives can have negative long-term consequences for your health. This includes:  High blood pressure  Heart disease  Irregular heart rhythms  Stroke  Pre-diabetes and diabetes  Depression  Testing  An objective evaluation of your sleep may be needed before your board certified sleep physician can make a diagnosis. Options include:   In-lab overnight sleep study  This type of sleep study requires you to stay overnight at a sleep center, in a bed that may resemble a hotel room. You will sleep with sensors hooked up to various parts of your body. These sensors record your brain waves, heartbeat, breathing and movement. An overnight sleep study also provides your doctor with the most complete information about your sleep. Learn more about an overnight sleep study. Home sleep apnea test  Some patients with high risk factors for obstructive sleep apnea and no other medical disorders may be candidates for a home sleep apnea test. The testing equipment differs in that it is less complicated than what is used in an overnight sleep study. As such, does not give all the data an in-lab will and if negative, may not mean you do not have the problem.   Treatment for sleep apnea includes using a continuous positive airway pressure (CPAP) machine to keep your airway open during sleep. A mask is placed over your nose and mouth, or just your nose. The mask is hooked to the CPAP machine that blows a gentle stream of air into the mask when you breathe. This helps keep your airway open so you can breathe more regularly. Extra oxygen may be given to you through the machine. You may be given a mouth device. It looks like a mouth guard or dental retainer and stops your tongue and mouth tissues from blocking your throat while you sleep. Surgery may be needed to remove extra tissues that block your mouth, throat, or nose. Manage sleep apnea:   Do not smoke. Nicotine and other chemicals in cigarettes and cigars can cause lung damage. Ask your healthcare provider for information if you currently smoke and need help to quit. E-cigarettes or smokeless tobacco still contain nicotine. Talk to your healthcare provider before you use these products. Do not drink alcohol or take sedative medicine before you go to sleep. Alcohol and sedatives can relax the muscles and tissues around your throat. This can block the airflow to your lungs. Maintain a healthy weight. Excess tissue around your throat may restrict your breathing. Ask your healthcare provider for information if you need to lose weight. Sleep on your side or use pillows designed to prevent sleep apnea. This prevents your tongue or other tissues from blocking your throat. You can also raise the head of your bed. Driving Safety. Refrain from driving when drowsy. Follow up with your healthcare provider as directed: Write down your questions so you remember to ask them during your visits. Go to AASM website for more information: Sleepeducation. org    Nursing Support:  When: Monday through Friday 7A-5PM except holidays  Where: Our direct line is [phone]. If you are having a true emergency please call 911.   In the event that the line is busy or it is after hours please leave a voice message and we will return your call. Please speak clearly, leaving your full name, birth date, best number to reach you and the reason for your call. Medication refills: We will need the name of the medication, the dosage, the ordering provider, whether you get a 30 or 90 day refill, and the pharmacy name and address. Medications will be ordered by the provider only. Nurses cannot call in prescriptions. Please allow 7 days for medication refills. Physician requested updates: If your provider requested that you call with an update after starting medication, please be ready to provide us the medication and dosage, what time you take your medication, the time you attempt to fall asleep, time you fall asleep, when you wake up, and what time you get out of bed. Sleep Study Results: We will contact you with sleep study results and/or next steps after the physician has reviewed your testing.

## 2023-09-09 DIAGNOSIS — F43.23 ADJUSTMENT DISORDER WITH MIXED ANXIETY AND DEPRESSED MOOD: ICD-10-CM

## 2023-09-10 RX ORDER — FLUOXETINE 10 MG/1
TABLET, FILM COATED ORAL
Qty: 90 TABLET | Refills: 0 | Status: SHIPPED | OUTPATIENT
Start: 2023-09-10

## 2023-09-14 ENCOUNTER — TELEPHONE (OUTPATIENT)
Dept: SLEEP CENTER | Facility: CLINIC | Age: 33
End: 2023-09-14

## 2023-09-14 DIAGNOSIS — G47.8 NON-RESTORATIVE SLEEP: Primary | ICD-10-CM

## 2023-09-14 DIAGNOSIS — G47.19 EXCESSIVE DAYTIME SLEEPINESS: ICD-10-CM

## 2023-09-14 DIAGNOSIS — R06.89 GASPING FOR BREATH: ICD-10-CM

## 2023-09-15 ENCOUNTER — HOSPITAL ENCOUNTER (OUTPATIENT)
Dept: NON INVASIVE DIAGNOSTICS | Facility: HOSPITAL | Age: 33
Discharge: HOME | End: 2023-09-15
Payer: COMMERCIAL

## 2023-09-15 VITALS
WEIGHT: 175 LBS | HEIGHT: 64 IN | BODY MASS INDEX: 29.88 KG/M2 | DIASTOLIC BLOOD PRESSURE: 78 MMHG | HEART RATE: 64 BPM | SYSTOLIC BLOOD PRESSURE: 118 MMHG

## 2023-09-15 DIAGNOSIS — R00.2 PALPITATIONS: ICD-10-CM

## 2023-09-15 LAB
AORTIC ROOT: 3 CM
APICAL FOUR CHAMBER EJECTION FRACTION: 59 %
ASCENDING AORTA: 2.8 CM
DOP CALC LVOT AREA: 3.14 CM2
DOP CALC LVOT DIAMETER: 2 CM
E WAVE DECELERATION TIME: 214 MS
FRACTIONAL SHORTENING: 32 % (ref 28–44)
INTERVENTRICULAR SEPTUM IN DIASTOLE (PARASTERNAL SHORT AXIS VIEW): 0.8 CM
INTERVENTRICULAR SEPTUM: 0.8 CM (ref 0.6–1.1)
LEFT ATRIUM AREA SYSTOLE SINGLE PLANE A4C: 11.5 CM2
LEFT ATRIUM SIZE: 2.8 CM
LEFT INTERNAL DIMENSION IN SYSTOLE: 3.4 CM (ref 2.1–4)
LEFT VENTRICULAR INTERNAL DIMENSION IN DIASTOLE: 5 CM (ref 3.5–6)
LEFT VENTRICULAR POSTERIOR WALL IN END DIASTOLE: 0.9 CM
LEFT VENTRICULAR STROKE VOLUME: 70 ML
LVSV (TEICH): 70 ML
MV E'TISSUE VEL-SEP: 11 CM/S
MV PEAK A VEL: 0.58 M/S
MV PEAK E VEL: 64 CM/S
MV STENOSIS PRESSURE HALF TIME: 62 MS
MV VALVE AREA P 1/2 METHOD: 3.55 CM2
RA PRESSURE ESTIMATED: 10 MMHG
RIGHT ATRIUM AREA SYSTOLE A4C: 14.9 CM2
RIGHT VENTRICLE ID DIMENSION: 3.2 CM
RV PSP: 24 MMHG
SL CV LV EF: 55
SL CV PED ECHO LEFT VENTRICLE DIASTOLIC VOLUME (MOD BIPLANE) 2D: 119 ML
SL CV PED ECHO LEFT VENTRICLE SYSTOLIC VOLUME (MOD BIPLANE) 2D: 49 ML
TR MAX PG: 14 MMHG
TR PEAK VELOCITY: 1.9 M/S
TRICUSPID ANNULAR PLANE SYSTOLIC EXCURSION: 1.8 CM
TRICUSPID VALVE PEAK REGURGITATION VELOCITY: 1.85 M/S

## 2023-09-15 PROCEDURE — 93306 TTE W/DOPPLER COMPLETE: CPT | Performed by: INTERNAL MEDICINE

## 2023-09-15 PROCEDURE — 93306 TTE W/DOPPLER COMPLETE: CPT

## 2023-09-20 ENCOUNTER — OFFICE VISIT (OUTPATIENT)
Dept: OBGYN CLINIC | Facility: MEDICAL CENTER | Age: 33
End: 2023-09-20
Payer: COMMERCIAL

## 2023-09-20 VITALS
HEIGHT: 64 IN | DIASTOLIC BLOOD PRESSURE: 76 MMHG | WEIGHT: 172.6 LBS | BODY MASS INDEX: 29.47 KG/M2 | SYSTOLIC BLOOD PRESSURE: 120 MMHG

## 2023-09-20 DIAGNOSIS — R10.2 PELVIC PAIN: Primary | ICD-10-CM

## 2023-09-20 PROCEDURE — 99212 OFFICE O/P EST SF 10 MIN: CPT | Performed by: OBSTETRICS & GYNECOLOGY

## 2023-09-20 NOTE — PROGRESS NOTES
r- hip pain   It hurts with walking and also with sitting   If I lift her leg and she trys to put down that hurts  Does not hurt ith induction     Will have her get sonogram to see if there is ovarian cyst if not PCP

## 2023-09-26 ENCOUNTER — HOSPITAL ENCOUNTER (OUTPATIENT)
Dept: ULTRASOUND IMAGING | Facility: HOSPITAL | Age: 33
Discharge: HOME | End: 2023-09-26
Attending: OBSTETRICS & GYNECOLOGY
Payer: COMMERCIAL

## 2023-09-26 DIAGNOSIS — R10.2 PELVIC PAIN: ICD-10-CM

## 2023-09-26 PROCEDURE — 76856 US EXAM PELVIC COMPLETE: CPT

## 2023-09-26 PROCEDURE — 76830 TRANSVAGINAL US NON-OB: CPT

## 2023-10-02 ENCOUNTER — TELEPHONE (OUTPATIENT)
Dept: BARIATRICS | Facility: CLINIC | Age: 33
End: 2023-10-02

## 2023-10-02 NOTE — TELEPHONE ENCOUNTER
Called patient and left a message to give the office a call back to reschedule her 10/4/23 appointment with Marilyn John RD that she had cancelled through My Chart.

## 2023-10-03 ENCOUNTER — OFFICE VISIT (OUTPATIENT)
Dept: FAMILY MEDICINE CLINIC | Facility: CLINIC | Age: 33
End: 2023-10-03
Payer: COMMERCIAL

## 2023-10-03 VITALS
SYSTOLIC BLOOD PRESSURE: 114 MMHG | HEART RATE: 66 BPM | BODY MASS INDEX: 29.09 KG/M2 | RESPIRATION RATE: 14 BRPM | WEIGHT: 170.4 LBS | DIASTOLIC BLOOD PRESSURE: 78 MMHG | HEIGHT: 64 IN

## 2023-10-03 DIAGNOSIS — R63.5 WEIGHT GAIN: Primary | ICD-10-CM

## 2023-10-03 DIAGNOSIS — Z23 NEED FOR PROPHYLACTIC VACCINATION AND INOCULATION AGAINST INFLUENZA: ICD-10-CM

## 2023-10-03 DIAGNOSIS — R00.2 PALPITATIONS: ICD-10-CM

## 2023-10-03 PROCEDURE — 99213 OFFICE O/P EST LOW 20 MIN: CPT | Performed by: FAMILY MEDICINE

## 2023-10-03 PROCEDURE — 90471 IMMUNIZATION ADMIN: CPT

## 2023-10-03 PROCEDURE — 90686 IIV4 VACC NO PRSV 0.5 ML IM: CPT

## 2023-10-05 ENCOUNTER — OFFICE VISIT (OUTPATIENT)
Dept: OBGYN CLINIC | Facility: CLINIC | Age: 33
End: 2023-10-05
Payer: COMMERCIAL

## 2023-10-05 ENCOUNTER — APPOINTMENT (OUTPATIENT)
Dept: RADIOLOGY | Facility: CLINIC | Age: 33
End: 2023-10-05
Payer: COMMERCIAL

## 2023-10-05 VITALS
WEIGHT: 170 LBS | SYSTOLIC BLOOD PRESSURE: 120 MMHG | HEIGHT: 64 IN | BODY MASS INDEX: 29.02 KG/M2 | DIASTOLIC BLOOD PRESSURE: 72 MMHG

## 2023-10-05 DIAGNOSIS — M25.551 PAIN IN RIGHT HIP: Primary | ICD-10-CM

## 2023-10-05 DIAGNOSIS — M25.551 PAIN IN RIGHT HIP: ICD-10-CM

## 2023-10-05 PROCEDURE — 73502 X-RAY EXAM HIP UNI 2-3 VIEWS: CPT

## 2023-10-05 PROCEDURE — 99243 OFF/OP CNSLTJ NEW/EST LOW 30: CPT | Performed by: ORTHOPAEDIC SURGERY

## 2023-10-05 NOTE — ASSESSMENT & PLAN NOTE
Findings consistent with right hip pain, possible abductor strain. Imaging and prognosis reviewed with patient. Primary hip pain in abductor region. At this time recommend physical therapy to rehab right hip. For time being avoid any strenuous activities with right hip. She can continue with Aleve or another anti inflammatory, also use voltaren gel or aspercreme for pain control. See patient back in 6 weeks. All patient's questions were answered to her satisfaction. This note is created using dictation transcription. It may contain typographical errors, grammatical errors, improperly dictated words, background noise and other errors.

## 2023-10-05 NOTE — PROGRESS NOTES
Assessment/Plan:    Weight gain  - will increase Ozempic to 1 mg weekly, continue life style changes  - semaglutide, 1 mg/dose, (Ozempic) 4 mg/3 mL injection pen; Inject 0.75 mL (1 mg total) under the skin once a week  Dispense: 9 mL; Refill: 1    Palpitations  - Echo 9/15/23 showed normal LV systolic and diastolic function with EF 55%, normal RV systolic function, no significant valvular heart disease  - 24 hour Holter monitor 8/16/23 showed frequent PAC's totaling 14.3 % of total beats  - follow up with Cardiology as scheduled on 10/25/23          Return in 3 months or sooner as needed. The patient understands and agrees with the treatment plan. Subjective:   Chief Complaint   Patient presents with   • Medication Management   • Palpitations      Patient ID: Sage Duran is a 34 y.o. female who presents today for follow up visit for palpitations and review her Echo and Holter monitor results. She reports less frequent episodes of palpitations, no dizziness or syncope. Patient has been on Ozempic for 2 months and doing well, she has lost about 8 lbs and reports no significant side effects, no abdominal pain, no nausea or vomiting.          The following portions of the patient's history were reviewed and updated as appropriate: allergies, current medications, past family history, past medical history, past social history, past surgical history and problem list.    Past Medical History:   Diagnosis Date   • Amenorrhea     last assessed: 08/22/2016   • Female infertility     seen by [facility] - seen for 1 year, 5 cycles IUI acheived pregnancy via IVF   • Fibromyalgia    • Irregular menstrual cycle     last assessed: 09/26/2016   • Osteoarthritis of right acromioclavicular joint     last assessment: 01/28/2013   • Polycystic ovary syndrome    • Separation of right acromioclavicular joint     Last assessed: 01/25/2013; this is more consistent with ostiolysis of the distal clavicle vs. acromioclavicular joint arthritis, not acute shoulder seperation   • Varicella     vaccine     Past Surgical History:   Procedure Laterality Date   •  SECTION     • DENTAL SURGERY      wisdom teeth   • MD  DELIVERY ONLY N/A 2018    Procedure:  SECTION (); Surgeon: Paris Gilliland MD;  Location: [facility];  Service: Obstetrics   • MD  DELIVERY ONLY N/A 2020    Procedure:  SECTION (); Surgeon: Paris Gilliland MD;  Location: [facility];  Service: Obstetrics   • SHOULDER SURGERY     • TUBAL LIGATION Bilateral 2020    Procedure: LIGATION/COAGULATION TUBAL;  Surgeon:  Paris Gilliland MD;  Location: [facility];  Service: Obstetrics     Family History   Problem Relation Age of Onset   • Fibromyalgia Mother    • Hyperlipidemia Father    • Hypertension Father    • Diabetes Maternal Grandmother    • Diabetes Paternal Grandfather    • Cancer Maternal Grandfather      Social History     Socioeconomic History   • Marital status: /Civil Union     Spouse name: Not on file   • Number of children: Not on file   • Years of education: Not on file   • Highest education level: Not on file   Occupational History   • Not on file   Tobacco Use   • Smoking status: Never   • Smokeless tobacco: Never   Vaping Use   • Vaping Use: Never used   Substance and Sexual Activity   • Alcohol use: Not Currently     Alcohol/week: 1.0 standard drink of alcohol     Types: 1 Cans of beer per week   • Drug use: No   • Sexual activity: Yes     Partners: Male     Birth control/protection: OCP   Other Topics Concern   • Not on file   Social History Narrative    History of  0    Caffeine use-1 cup of coffee/day    Has smoke detectors     Denied history of sun protection    Uses safety equipment- protective head gear     Social Determinants of Health     Financial Resource Strain: Not on file   Food Insecurity: Not on file   Transportation Needs: Not on file   Physical Activity: Not on file Stress: Not on file   Social Connections: Not on file   Intimate Partner Violence: Not on file   Housing Stability: Not on file       Current Outpatient Medications:   •  FLUoxetine (PROzac) 10 MG tablet, TAKE 1 TABLET BY MOUTH EVERY DAY (Patient taking differently: daily Take 0.5 tablet daily), Disp: 90 tablet, Rfl: 0  •  metFORMIN (GLUCOPHAGE) 500 mg tablet, Take 1 tablet (500 mg total) by mouth 2 (two) times a day with meals (Patient taking differently: Take 500 mg by mouth in the morning), Disp: 180 tablet, Rfl: 3  •  multivitamin (THERAGRAN) TABS, Take 1 tablet by mouth daily, Disp: , Rfl:   •  norethindrone-ethinyl estradiol (Loestrin Fe 1/20) 1-20 MG-MCG per tablet, Take 1 tablet by mouth daily, Disp: 90 tablet, Rfl: 3  •  semaglutide, 1 mg/dose, (Ozempic) 4 mg/3 mL injection pen, Inject 0.75 mL (1 mg total) under the skin once a week, Disp: 9 mL, Rfl: 1    Review of Systems   Constitutional: Negative for chills, fatigue and fever. Respiratory: Negative for shortness of breath. Cardiovascular: Negative for chest pain, palpitations and leg swelling. Gastrointestinal: Negative for abdominal pain, blood in stool, constipation, diarrhea, nausea and vomiting. Neurological: Negative for dizziness, syncope and headaches. Psychiatric/Behavioral: Negative for dysphoric mood and sleep disturbance. The patient is not nervous/anxious. Objective:    Vitals:    10/03/23 0923   BP: 114/78   Pulse: 66   Resp: 14   Weight: 77.3 kg (170 lb 6.4 oz)   Height: 5' 4" (1.626 m)     Wt Readings from Last 3 Encounters:   10/03/23 77.3 kg (170 lb 6.4 oz)   09/20/23 78.3 kg (172 lb 9.6 oz)   09/15/23 79.4 kg (175 lb)        Physical Exam  Constitutional:       General: She is not in acute distress. Cardiovascular:      Rate and Rhythm: Normal rate and regular rhythm. Heart sounds: Normal heart sounds. No murmur heard. Pulmonary:      Effort: No respiratory distress.       Breath sounds: Normal breath sounds. Abdominal:      General: There is no distension. Palpations: Abdomen is soft. There is no mass. Tenderness: There is no abdominal tenderness. Musculoskeletal:      Right lower leg: No edema. Left lower leg: No edema. Neurological:      Mental Status: She is alert and oriented to person, place, and time. Psychiatric:         Mood and Affect: Mood normal.         Behavior: Behavior normal.         Thought Content:  Thought content normal.

## 2023-10-05 NOTE — PROGRESS NOTES
Assessment:     1. Pain in right hip        Plan:     Problem List Items Addressed This Visit        Other    Pain in right hip - Primary     Findings consistent with right hip pain, possible abductor strain. Imaging and prognosis reviewed with patient. Primary hip pain in abductor region. At this time recommend physical therapy to rehab right hip. For time being avoid any strenuous activities with right hip. She can continue with Aleve or another anti inflammatory, also use voltaren gel or aspercreme for pain control. See patient back in 6 weeks. All patient's questions were answered to her satisfaction. This note is created using dictation transcription. It may contain typographical errors, grammatical errors, improperly dictated words, background noise and other errors. Relevant Orders    XR hip/pelv 2-3 vws right if performed    Ambulatory Referral to Physical Therapy       Subjective:     Patient ID: Carlos Jordan is a 34 y.o. female. Chief Complaint:  34 yr old female in for evaluation of right hip pain. Referred by Dr Georgette Galvez. She denies any injury or trauma. She states for the past few months gradual increase in pain lateral region of hip. She states pain at end of day is worse and can radiate into posterior region. She states she does have pain during day but at night worse. Walking, standing, getting up from seated positions painful. Not able to lay on right side at night. She denies any groin pain, denies any numbness or tingling in leg or back pain. Aleve not helping. Information on patient's intake form was reviewed.     Allergy:  No Known Allergies  Medications:  all current active meds have been reviewed  Past Medical History:  Past Medical History:   Diagnosis Date   • Amenorrhea     last assessed: 08/22/2016   • Female infertility     seen by [facility] - seen for 1 year, 5 cycles IUI acheived pregnancy via IVF   • Fibromyalgia    • Irregular menstrual cycle     last assessed: 2016   • Osteoarthritis of right acromioclavicular joint     last assessment: 2013   • Polycystic ovary syndrome    • Separation of right acromioclavicular joint     Last assessed: 2013; this is more consistent with ostiolysis of the distal clavicle vs. acromioclavicular joint arthritis, not acute shoulder seperation   • Varicella     vaccine     Past Surgical History:  Past Surgical History:   Procedure Laterality Date   •  SECTION     • DENTAL SURGERY      wisdom teeth   • CT  DELIVERY ONLY N/A 2018    Procedure:  SECTION (); Surgeon: Joi Novak MD;  Location: [facility];  Service: Obstetrics   • CT  DELIVERY ONLY N/A 2020    Procedure:  SECTION (); Surgeon: Joi Novak MD;  Location: [facility];  Service: Obstetrics   • SHOULDER SURGERY     • TUBAL LIGATION Bilateral 2020    Procedure: LIGATION/COAGULATION TUBAL;  Surgeon: Joi Novak MD;  Location: [facility];  Service: Obstetrics     Family History:  Family History   Problem Relation Age of Onset   • Fibromyalgia Mother    • Hyperlipidemia Father    • Hypertension Father    • Diabetes Maternal Grandmother    • Diabetes Paternal Grandfather    • Cancer Maternal Grandfather      Social History:  Social History     Substance and Sexual Activity   Alcohol Use Not Currently   • Alcohol/week: 1.0 standard drink of alcohol   • Types: 1 Cans of beer per week     Social History     Substance and Sexual Activity   Drug Use No     Social History     Tobacco Use   Smoking Status Never   Smokeless Tobacco Never     Review of Systems   Constitutional: Negative for chills and fever. HENT: Negative for ear pain and sore throat. Eyes: Negative for pain and visual disturbance. Respiratory: Negative for cough and shortness of breath. Cardiovascular: Negative for chest pain and palpitations. Gastrointestinal: Negative for abdominal pain and vomiting.    Genitourinary: Negative for dysuria and hematuria. Musculoskeletal: Positive for arthralgias (right hip). Negative for back pain, gait problem and joint swelling. Skin: Negative for color change and rash. Neurological: Negative for seizures, syncope, weakness and numbness. Psychiatric/Behavioral: Negative. All other systems reviewed and are negative. Objective:  BP Readings from Last 1 Encounters:   10/05/23 120/72      Wt Readings from Last 1 Encounters:   10/05/23 77.1 kg (170 lb)      BMI:   Estimated body mass index is 29.18 kg/m² as calculated from the following:    Height as of this encounter: 5' 4" (1.626 m). Weight as of this encounter: 77.1 kg (170 lb). BSA:   Estimated body surface area is 1.83 meters squared as calculated from the following:    Height as of this encounter: 5' 4" (1.626 m). Weight as of this encounter: 77.1 kg (170 lb). Physical Exam  Vitals and nursing note reviewed. Constitutional:       Appearance: Normal appearance. She is well-developed. HENT:      Head: Normocephalic and atraumatic. Right Ear: External ear normal.      Left Ear: External ear normal.   Eyes:      Extraocular Movements: Extraocular movements intact. Conjunctiva/sclera: Conjunctivae normal.   Pulmonary:      Effort: Pulmonary effort is normal.   Musculoskeletal:         General: Tenderness (right hip arthralgia ) present. Cervical back: Neck supple. Skin:     General: Skin is warm and dry. Neurological:      Mental Status: She is alert and oriented to person, place, and time. Deep Tendon Reflexes: Reflexes are normal and symmetric. Psychiatric:         Mood and Affect: Mood normal.         Behavior: Behavior normal.       Right Hip Exam     Tenderness   Right hip tenderness location: abductors     Range of Motion   Abduction: normal Right hip abduction: Pain. Flexion: normal   External rotation: normal Right hip external rotation: Pain laterally.    Internal rotation: normal Muscle Strength   The patient has normal right hip strength. Tests   VENKAT: negative  Vipul: negative    Other   Erythema: absent  Scars: absent  Sensation: normal  Pulse: present            I have personally reviewed pertinent films in PACS and my interpretation is xr right hip demonstrates well maintained joint space, no fracture or dislocation. No soft tissue calcification.      Scribe Attestation    I,:  Marlen Alfredo am acting as a scribe while in the presence of the attending physician.:       I,:  Kamilah Wang MD personally performed the services described in this documentation    as scribed in my presence.:

## 2023-10-06 ENCOUNTER — TELEPHONE (OUTPATIENT)
Age: 33
End: 2023-10-06

## 2023-10-06 NOTE — TELEPHONE ENCOUNTER
Caller: Patient    Doctor: Rheumatology    Reason for call: Patient calling to schedule appt with Rheumatology with diagnosis of Fibromyalgia diagnosed at age 7. Advised per guide that we do not accept previously diagnosed Fibromyalgia. Patient asking to speak to someone. E-mail sent to practice administrator.     Call back#: [phone]

## 2023-10-16 ENCOUNTER — EVALUATION (OUTPATIENT)
Dept: PHYSICAL THERAPY | Facility: CLINIC | Age: 33
End: 2023-10-16
Payer: COMMERCIAL

## 2023-10-16 DIAGNOSIS — M25.551 PAIN IN RIGHT HIP: Primary | ICD-10-CM

## 2023-10-16 PROCEDURE — 97162 PT EVAL MOD COMPLEX 30 MIN: CPT | Performed by: PHYSICAL THERAPIST

## 2023-10-16 PROCEDURE — 97110 THERAPEUTIC EXERCISES: CPT | Performed by: PHYSICAL THERAPIST

## 2023-10-16 NOTE — PROGRESS NOTES
PT Evaluation     Today's date: 10/16/2023  Patient name: Vamshi Barrientos  : 1990  MRN: [id number]  Referring provider: Rosa Myers MD  Dx:   Encounter Diagnosis     ICD-10-CM    1. Pain in right hip  M25.551 Ambulatory Referral to Physical Therapy                     Assessment  Assessment details: Pt is a 34y.o. year old female coming to outpatient PT with a diagnosis of R hip pain onset about 3 months ago. Pt presents with increased pain and TTP, decreased lumbar ROM, decreased R LE strength, (+) SI joint abnormalities and overall decreased functional mobility. Pt would benefit from skilled PT services in order to address these deficits and reach maximum level of function. Thank you kindly for the referral!    Pt was issued a written HEP to be performed on a daily basis. Impairments: abnormal gait, abnormal or restricted ROM, activity intolerance, impaired physical strength, lacks appropriate home exercise program and pain with function  Barriers to therapy: High co pay/ busy schedule  Understanding of Dx/Px/POC: good   Prognosis: good    Goals  STG's ( 3-4 weeks)  1. Pt will be independent in HEP  2. Pt will have improved lumbar ROM to WFL's  3. Normalize SI joint alignment  LTG's ( 6- 8 weeks)  1. Improve FOTO score by 4-6 points  2. Pt will have decreased pain to 2/10 at worst  3. Pt will have less pain walking community distances  4.  Pt will have less pain when going up and down the steps    Plan  Patient would benefit from: skilled physical therapy  Planned modality interventions: cryotherapy  Planned therapy interventions: joint mobilization, manual therapy, neuromuscular re-education, strengthening, stretching, therapeutic activities, therapeutic exercise, functional ROM exercises and flexibility  Frequency: 2x week  Duration in weeks: 6  Plan of Care beginning date: 10/16/2023  Plan of Care expiration date: 2023  Treatment plan discussed with: patient and PTA        Subjective Evaluation    History of Present Illness  Mechanism of injury: Pt reports R hip pain onset about 3 months ago 2* unknown etiology. Pt wakes up achy at times 2* 4 y.o sleeping in bed with her at times. Pt had ovarian cysts ruled out and is normal. Pt has a history of fibromyalgia and has had more symptoms in the past 6-9 months. Pt has having intense, constant pain in her R hip. Pt is not able to lay on her R side and has increased pain when transferring sit to stand, walking distances in the grocery store or from the car into a building. Pt taking Aleve more regularly, and it takes the edge off, but is not pain free. Pt has pain in sitting and also when weightbearing and standing in her kitchen and when going up and down the steps. Pt has more pain when exercising on her [place] bike.     Work: office work part time  Hobbies: 11year old, twin 1year old boys; peleton bike  Gait; mild antalgic gait with decreased weight bearing on R LE  Patient Goals  Patient goals for therapy: decreased pain, independence with ADLs/IADLs and return to sport/leisure activities    Pain  At best pain ratin  At worst pain ratin  Location: R hip  Quality: radiating, sharp and dull ache    Social Support  Steps to enter house: yes (2)  Stairs in house: yes (13)   Lives in: multiple-level home  Lives with: young children    Employment status: working    Diagnostic Tests  X-ray: normal  Treatments  No previous or current treatments      Objective     Neurological Testing     Sensation     Lumbar   Left   Intact: light touch    Right   Intact: light touch    Reflexes   Left   Patellar (L4): normal (2+)  Achilles (S1): normal (2+)    Right   Patellar (L4): normal (2+)  Achilles (S1): normal (2+)    Active Range of Motion     Lumbar   Flexion: 80 degrees  with pain  Extension: 15 degrees  with pain  Left lateral flexion: 15 degrees    with pain  Right lateral flexion: 15 degrees  with pain  Left rotation:  with pain Restriction level: moderate  Right rotation:  with pain Restriction level: minimal    Additional Active Range of Motion Details  In standing; symmetrical iliac crest and PSIS levels  (+) TTP R QL/ gluteus medius  (+) hypomobility lumbar spine with PAIVM testing  HS flexibility: WFL's with opposite knee flexed  (-) piriformis tightness  (+) SKTC  Pain with prone on elbows  (-) slump test  (+) supine to long sit test: R innominate forward rotated      Dx: R hip pain/ R SI pain/ LBP  EPOC: 11/27/23  CO-MORBIDITIES:   PERSONAL FACTORS: 10 y.o, twin 1 y.o boys  Precautions: none      Manuals 10/16            R QL/ iliac crest MFR             Lumbar PA/UPA             SI MET/ shot gun Th                          Neuro Re-Ed             DLS: abd bracing instruct            florian             Bird dog             bridges instruct            DLS; dead bug             Modified plank on knees                          Ther Ex             Bike for LE ROM             HS stretch             Piriformis stretch             LTR                                       Sciatic n slider w/ ankle pump R LE             HEP instruct 8'            Self MFR w/ tennis ball                          Ther Activity                                       Gait Training                                       Modalities

## 2023-10-24 ENCOUNTER — TELEPHONE (OUTPATIENT)
Dept: OBGYN CLINIC | Facility: HOSPITAL | Age: 33
End: 2023-10-24

## 2023-10-24 DIAGNOSIS — M25.551 PAIN IN RIGHT HIP: Primary | ICD-10-CM

## 2023-10-24 RX ORDER — CYCLOBENZAPRINE HCL 10 MG
10 TABLET ORAL
Qty: 20 TABLET | Refills: 0 | Status: SHIPPED | OUTPATIENT
Start: 2023-10-24

## 2023-10-24 NOTE — TELEPHONE ENCOUNTER
Caller: Patient    Doctor: Fatoumata Agee    Reason for call: Patient is asking if possible she could get a muscle relaxer. Patient started PT last week and is having more pain then usual. Please advise.     [facility]    Call back#: [phone]

## 2023-10-25 ENCOUNTER — APPOINTMENT (OUTPATIENT)
Dept: PHYSICAL THERAPY | Facility: CLINIC | Age: 33
End: 2023-10-25
Payer: COMMERCIAL

## 2023-11-01 ENCOUNTER — APPOINTMENT (OUTPATIENT)
Dept: PHYSICAL THERAPY | Facility: CLINIC | Age: 33
End: 2023-11-01
Payer: COMMERCIAL

## 2023-11-01 NOTE — PROGRESS NOTES
Daily Note     Today's date: 2023  Patient name: Benedetto Cranker  : 1990  MRN: [id number]  Referring provider: Adrien Webb MD  Dx:   Encounter Diagnosis     ICD-10-CM    1. Pain in right hip  M25.551                      Subjective: ***      Objective: See treatment diary below      Assessment: Tolerated treatment {Tolerated treatment :[id number]}. Patient {assessment:}      Plan: {PLAN:2045225106}     {There is no content from the last Daily Treatment Diary section. }    Dx: R hip pain/ R SI pain/ LBP  EPOC: 23  CO-MORBIDITIES:   PERSONAL FACTORS: 10 y.o, twin 1 y.o boys  Precautions: none        Manuals 10/16  11/1                   R QL/ iliac crest MFR                       Lumbar PA/UPA                       SI MET/ shot gun Th   ***                                           Neuro Re-Ed                       DLS: abd bracing instruct  ***                   clamshells                       Bird dog                       bridges instruct  ***                   DLS; dead bug                       Modified plank on knees                                               Ther Ex                       Bike for LE ROM    ***                   HS stretch    ***                   Piriformis stretch    ***                   LTR    ***                                                                   Sciatic n slider w/ ankle pump R LE                       HEP instruct 8'  ***                   Self MFR w/ tennis ball                                               Ther Activity                                                                       Gait Training                                                                       Modalities

## 2023-11-03 ENCOUNTER — OFFICE VISIT (OUTPATIENT)
Dept: CARDIOLOGY CLINIC | Facility: CLINIC | Age: 33
End: 2023-11-03
Payer: COMMERCIAL

## 2023-11-03 VITALS
HEIGHT: 64 IN | WEIGHT: 166 LBS | BODY MASS INDEX: 28.34 KG/M2 | OXYGEN SATURATION: 99 % | HEART RATE: 65 BPM | DIASTOLIC BLOOD PRESSURE: 70 MMHG | SYSTOLIC BLOOD PRESSURE: 100 MMHG

## 2023-11-03 DIAGNOSIS — R00.2 PALPITATION: Primary | ICD-10-CM

## 2023-11-03 PROCEDURE — 99204 OFFICE O/P NEW MOD 45 MIN: CPT | Performed by: INTERNAL MEDICINE

## 2023-11-03 PROCEDURE — 93000 ELECTROCARDIOGRAM COMPLETE: CPT | Performed by: INTERNAL MEDICINE

## 2023-11-03 RX ORDER — PROPRANOLOL HYDROCHLORIDE 10 MG/1
10 TABLET ORAL 2 TIMES DAILY
Qty: 60 TABLET | Refills: 1 | Status: SHIPPED | OUTPATIENT
Start: 2023-11-03

## 2023-11-03 NOTE — LETTER
November 3, 2023     Laith Lorenzo MD  [address] [facility] [facility] [address]    Patient: Sage Duran   YOB: 1990   Date of Visit: 11/3/2023       Dear Dr. Lisa Langford:    Thank you for referring Raya Villagran to me for evaluation. Below are my notes for this consultation. If you have questions, please do not hesitate to call me. I look forward to following your patient along with you. Sincerely,        Loni Pradhan MD        CC: No Recipients    Loni Pradhan MD  11/3/2023 11:35 AM  Sign when Signing Visit  Cardiology   Sage Duran 34 y.o. female MRN: [id number]  Unit/Bed#:  Encounter: [id number]        Reason for Consult / Principal Problem:PACs    Physician Requesting Consult:  Laith Lorenzo MD    PCP: Laith Lorenzo MD        Assessment/Plan:    >> PACs- 14% of all beats on Holter, echo is unremarkable  >> PCOS  >> Back pain      Plan:    - Recent TSH is normal. She does not drink excessive caffeine. Will start propranolol 10 mg every evening, if she tolerates this, can increase to 10 mg bid  - Follow up in 3 months  - Advised on SE of propranolol and to call in if she is dizzy lightheaded, has low BP etc. Advised not to drive after taking it until she has completely adjusted to it. - Warned of potential drowsiness especially in combination w flexeril. CC: Palpitations    HPI  34 y.o. female with palpitations for about a year. When she was having sinus surgery over the summer they noted PACs on her tele. She went to her PCP and had a holter and echo;. Echo was ok but her holter showed 14% burden of PACs.      She denies chest pain, shortness of breath, orthopnea, PND, pedal edema, syncope, presyncope, diaphoresis, nausea/vomiting     She is otherwise healthy and exercises on a Peloton for 20-30 mins  a day    Does not drink smoke or use drugs    No known drug allergies    No chance of pregnancy- undergone tubal ligation Takes metformin for PCOS and Ozempic for weight loss and PCOS. TSH was unremarkable in June. Physical exam  Objective  Vitals: Blood pressure 100/70, pulse 65, height 5' 4" (1.626 m), weight 75.3 kg (166 lb), SpO2 99 %, not currently breastfeeding. General:  AO x3, no acute distress  Cardiac:  S1-S2 normal  No murmurs, rubs or gallops, extra heart beats noted JVP: none  Lungs:  Clear to auscultation bilaterally, no wheezing or crackles. Abdomen:  Soft, nontender, nondistended. Extremities:  Warm, well perfused, pulses palpable, no ulcers or rashes. Edema:none  Neuro: Grossly nonfocal        ======================================================  TREADMILL STRESS  No results found for this or any previous visit.     ----------------------------------------------------------------------------------------------  NUCLEAR STRESS TEST: No results found for this or any previous visit. No results found for this or any previous visit.      --------------------------------------------------------------------------------  CATH:  No results found for this or any previous visit.    --------------------------------------------------------------------------------  ECHO:   9/2023: •  Left Ventricle: Left ventricular cavity size is normal. Wall thickness is normal. The left ventricular ejection fraction is 55%. Systolic function is normal. Wall motion is normal. Diastolic function is normal.  •  Right Ventricle: Right ventricular cavity size is normal. Systolic function is normal.  •  Mitral Valve: There is trace regurgitation.    --------------------------------------------------------------------------------  HOLTER  No results found for this or any previous visit.    --------------------------------------------------------------------------------  CAROTIDS  No results found for this or any previous visit.        Diagnoses and all orders for this visit:    Palpitation  -     POCT ECG  - propranolol (INDERAL) 10 mg tablet; Take 1 tablet (10 mg total) by mouth 2 (two) times a day For the first week take only one per day.       ======================================================          Review of Systems  ROS as noted above, otherwise 12 point review of systems was performed and is negative. Historical Information  Past Medical History:   Diagnosis Date   • Amenorrhea     last assessed: 2016   • Female infertility     seen by [facility] - seen for 1 year, 5 cycles IUI acheived pregnancy via IVF   • Fibromyalgia    • Irregular menstrual cycle     last assessed: 2016   • Osteoarthritis of right acromioclavicular joint     last assessment: 2013   • Polycystic ovary syndrome    • Separation of right acromioclavicular joint     Last assessed: 2013; this is more consistent with ostiolysis of the distal clavicle vs. acromioclavicular joint arthritis, not acute shoulder seperation   • Varicella     vaccine     Past Surgical History:   Procedure Laterality Date   •  SECTION     • DENTAL SURGERY      wisdom teeth   • [place]  DELIVERY ONLY N/A 2018    Procedure:  SECTION (); Surgeon: Demario Colon MD;  Location: [facility];  Service: Obstetrics   • [place]  DELIV[place] ONLY N/A 2020    Procedure:  SECTION (); Surgeon: Demario Colon MD;  Location: [facility];  Service: Obstetrics   • SHOULDER SURGERY     • TUBAL LIGATION Bilateral 2020    Procedure: LIGATION/COAGULATION TUBAL;  Surgeon:  Demario Colon MD;  Location: [facility];  Service: Obstetrics     Social History     Substance and Sexual Activity   Alcohol Use Not Currently   • Alcohol/week: 1.0 standard drink of alcohol   • Types: 1 Cans of beer per week     Social History     Substance and Sexual Activity   Drug Use No     Social History     Tobacco Use   Smoking Status Never   Smokeless Tobacco Never     Family History   Problem Relation Age of Onset   • Fibromyalgia Mother    • Hyperlipidemia Father    • Hypertension Father    • Diabetes Maternal Grandmother    • Diabetes Paternal Grandfather    • Cancer Maternal Grandfather        Meds/Allergies  Hospital Medications:   No current facility-administered medications for this visit. Home Medications: (Not in a hospital admission)      No Known Allergies      Portions of the record may have been created with voice recognition software. Occasional wrong words or "sound a like" substitutions may have occurred due to the inherent limitations of voice recognition software. Read the chart carefully and recognize, using context, where substitutions have occurred.

## 2023-11-03 NOTE — PROGRESS NOTES
Cardiology   Oni Vásquez 34 y.o. female MRN: [id number]  Unit/Bed#:  Encounter: [id number]        Reason for Consult / Principal Problem:PACs    Physician Requesting Consult:  Mulu Woodson MD    PCP: Mulu Woodson MD        Assessment/Plan:    >> PACs- 14% of all beats on Holter, echo is unremarkable  >> PCOS  >> Back pain      Plan:    - Recent TSH is normal. She does not drink excessive caffeine. Will start propranolol 10 mg every evening, if she tolerates this, can increase to 10 mg bid  - Follow up in 3 months  - Advised on SE of propranolol and to call in if she is dizzy lightheaded, has low BP etc. Advised not to drive after taking it until she has completely adjusted to it. - Warned of potential drowsiness especially in combination w flexeril. CC: Palpitations    HPI  34 y.o. female with palpitations for about a year. When she was having sinus surgery over the summer they noted PACs on her tele. She went to her PCP and had a holter and echo;. Echo was ok but her holter showed 14% burden of PACs. She denies chest pain, shortness of breath, orthopnea, PND, pedal edema, syncope, presyncope, diaphoresis, nausea/vomiting     She is otherwise healthy and exercises on a Peloton for 20-30 mins  a day    Does not drink smoke or use drugs    No known drug allergies    No chance of pregnancy- undergone tubal ligation     Takes metformin for PCOS and Ozempic for weight loss and PCOS. TSH was unremarkable in June. Physical exam  Objective   Vitals: Blood pressure 100/70, pulse 65, height 5' 4" (1.626 m), weight 75.3 kg (166 lb), SpO2 99 %, not currently breastfeeding. General:  AO x3, no acute distress  Cardiac:  S1-S2 normal  No murmurs, rubs or gallops, extra heart beats noted JVP: none  Lungs:  Clear to auscultation bilaterally, no wheezing or crackles. Abdomen:  Soft, nontender, nondistended.   Extremities:  Warm, well perfused, pulses palpable, no ulcers or oleg. Edema:none  Neuro: Grossly nonfocal        ======================================================  TREADMILL STRESS  No results found for this or any previous visit.     ----------------------------------------------------------------------------------------------  NUCLEAR STRESS TEST: No results found for this or any previous visit. No results found for this or any previous visit.      --------------------------------------------------------------------------------  CATH:  No results found for this or any previous visit.    --------------------------------------------------------------------------------  ECHO:   9/2023:   Left Ventricle: Left ventricular cavity size is normal. Wall thickness is normal. The left ventricular ejection fraction is 55%. Systolic function is normal. Wall motion is normal. Diastolic function is normal.    Right Ventricle: Right ventricular cavity size is normal. Systolic function is normal.    Mitral Valve: There is trace regurgitation.    --------------------------------------------------------------------------------  HOLTER  No results found for this or any previous visit.    --------------------------------------------------------------------------------  CAROTIDS  No results found for this or any previous visit. Diagnoses and all orders for this visit:    Palpitation  -     POCT ECG  -     propranolol (INDERAL) 10 mg tablet; Take 1 tablet (10 mg total) by mouth 2 (two) times a day For the first week take only one per day.       ======================================================          Review of Systems  ROS as noted above, otherwise 12 point review of systems was performed and is negative.      Historical Information   Past Medical History:   Diagnosis Date    Amenorrhea     last assessed: 08/22/2016    Female infertility     seen by [facility] - seen for 1 year, 5 cycles IUI acheived pregnancy via IVF    Fibromyalgia     Irregular menstrual cycle last assessed: 2016    Osteoarthritis of right acromioclavicular joint     last assessment: 2013    Polycystic ovary syndrome     Separation of right acromioclavicular joint     Last assessed: 2013; this is more consistent with ostiolysis of the distal clavicle vs. acromioclavicular joint arthritis, not acute shoulder seperation    Varicella     vaccine     Past Surgical History:   Procedure Laterality Date     SECTION      DENT[place] SURGERY      wisdom teeth    [place]A 2018    Procedure:  SECTION (); Surgeon: Mary Lou Hallman MD;  Location: [facility];  Service: Obstetrics    [place]/A 2020    Procedure:  SECTION (); Surgeon: Mary Lou Hallman MD;  Location: [facility];  Service: Obstetrics    SHOULDER SURGERY      TUBAL LIGATION Bilateral 2020    Procedure: LIGATION/COAGULATION TUBAL;  Surgeon: Mary Lou Hallman MD;  Location: [facility];  Service: Obstetrics     Social History     Substance and Sexual Activity   Alcohol Use Not Currently    Alcohol/week: 1.0 standard drink of alcohol    Types: 1 Cans of beer per week     Social History     Substance and Sexual Activity   Drug Use No     Social History     Tobacco Use   Smoking Status Never   Smokeless Tobacco Never     Family History   Problem Relation Age of Onset    Fibromyalgia Mother     Hyperlipidemia Father     Hypertension Father     Diabetes Maternal Grandmother     Diabetes Paternal Grandfather     Cancer Maternal Grandfather        Meds/Allergies   Hospital Medications:   No current facility-administered medications for this visit. Home Medications: (Not in a hospital admission)      No Known Allergies      Portions of the record may have been created with voice recognition software. Occasional wrong words or "sound a like" substitutions may have occurred due to the inherent limitations of voice recognition software.   Read the chart carefully and recognize, using context, where substitutions have occurred.

## 2023-11-06 ENCOUNTER — HOSPITAL ENCOUNTER (OUTPATIENT)
Dept: SLEEP CENTER | Facility: HOSPITAL | Age: 33
Discharge: HOME | End: 2023-11-06
Attending: INTERNAL MEDICINE
Payer: COMMERCIAL

## 2023-11-06 DIAGNOSIS — R06.89 GASPING FOR BREATH: ICD-10-CM

## 2023-11-06 DIAGNOSIS — G47.8 NON-RESTORATIVE SLEEP: ICD-10-CM

## 2023-11-06 DIAGNOSIS — G47.19 EXCESSIVE DAYTIME SLEEPINESS: ICD-10-CM

## 2023-11-06 PROCEDURE — G0399 HOME SLEEP TEST/TYPE 3 PORTA: HCPCS

## 2023-11-06 NOTE — PROGRESS NOTES
Home Sleep Study Documentation    HOME STUDY DEVICE: Noxturnal no                                           Lulu G3 yes      Pre-Sleep Home Study:    Set-up and instructions performed by: Lambert Martinez performed demonstration for Patient: yes    Return demonstration performed by Patient: yes    Written instructions provided to Patient: yes    Patient signed consent form: yes        Post-Sleep Home Study:    Additional comments by Patient: none    Home Sleep Study Failed:no:    Failure reason: N/A    Reported or Detected: N/A    Scored by: Elisabeth Sarmiento

## 2023-11-08 ENCOUNTER — OFFICE VISIT (OUTPATIENT)
Dept: PHYSICAL THERAPY | Facility: CLINIC | Age: 33
End: 2023-11-08
Payer: COMMERCIAL

## 2023-11-08 DIAGNOSIS — M25.551 PAIN IN RIGHT HIP: Primary | ICD-10-CM

## 2023-11-08 PROCEDURE — 97110 THERAPEUTIC EXERCISES: CPT

## 2023-11-08 PROCEDURE — 97140 MANUAL THERAPY 1/> REGIONS: CPT

## 2023-11-08 PROCEDURE — 97014 ELECTRIC STIMULATION THERAPY: CPT

## 2023-11-08 PROCEDURE — G0283 ELEC STIM OTHER THAN WOUND: HCPCS

## 2023-11-08 PROCEDURE — 97112 NEUROMUSCULAR REEDUCATION: CPT

## 2023-11-08 NOTE — PROGRESS NOTES
Daily Note     Today's date: 2023  Patient name: Tiff Mcintosh  : 1990  MRN: [id number]  Referring provider: Bernabe Galvez MD  Dx:   Encounter Diagnosis     ICD-10-CM    1. Pain in right hip  M25.551                      Subjective: Pt reports she had a lot of increased pain post IE and called him Dr to prescribe mm relaxers for sleeping. Pt states her cardiologist asked her not to take the pills. Pt reports the pain has moved from down the lat hip to centralized LB. Pt reports she cont's w/ her peleton workout and dealing w/ the pain. Pt reported centralized to LB 6/10. Objective: See treatment diary below      Assessment: Tolerated treatment fair. Patient would benefit from continued PT to work on pan management and postural strengthening. Pt given a trial of TENS for pain relief. Pt reported feeling a little looser post TENS. Pt states pain 5/10 post Rx but is located more on the R hip glut med/ glut. Plan: Continue per plan of care. Progress treatment as tolerated.        Dx: R hip pain/ R SI pain/ LBP  EPOC: 23  CO-MORBIDITIES:   PERSONAL FACTORS: 10 y.o, twin 1 y.o boys  Precautions: none      Manuals 10/16 11/8           R QL/ iliac crest MFR             Lumbar PA/UPA             SI MET/ shot gun                           Neuro Re-Ed             DLS: abd bracing instruct 10x5"           clamshells             Bird dog             bridges instruct 21x5"           DLS; dead bug             Modified plank on knees                          Ther Ex             Bike for LE ROM  5'           HS stretch  10x5" ankle pumps           Piriformis stretch             LTR                                       Sciatic n slider w/ ankle pump R LE  / HS stretch           HEP instruct 8'            Self MFR w/ tennis ball                          Ther Activity                                       Gait Training                                       Modalities             TENS post  8'

## 2023-11-09 PROBLEM — G47.33 OSA (OBSTRUCTIVE SLEEP APNEA): Status: ACTIVE | Noted: 2023-11-09

## 2023-11-10 ENCOUNTER — OFFICE VISIT (OUTPATIENT)
Dept: PHYSICAL THERAPY | Facility: CLINIC | Age: 33
End: 2023-11-10
Payer: COMMERCIAL

## 2023-11-10 DIAGNOSIS — G47.19 EXCESSIVE DAYTIME SLEEPINESS: Primary | ICD-10-CM

## 2023-11-10 DIAGNOSIS — G47.8 NON-RESTORATIVE SLEEP: ICD-10-CM

## 2023-11-10 DIAGNOSIS — M25.551 PAIN IN RIGHT HIP: Primary | ICD-10-CM

## 2023-11-10 DIAGNOSIS — R06.89 GASPING FOR BREATH: ICD-10-CM

## 2023-11-10 PROCEDURE — 97112 NEUROMUSCULAR REEDUCATION: CPT | Performed by: PHYSICAL THERAPIST

## 2023-11-10 PROCEDURE — 97110 THERAPEUTIC EXERCISES: CPT | Performed by: PHYSICAL THERAPIST

## 2023-11-10 PROCEDURE — 97014 ELECTRIC STIMULATION THERAPY: CPT | Performed by: PHYSICAL THERAPIST

## 2023-11-10 PROCEDURE — G0283 ELEC STIM OTHER THAN WOUND: HCPCS | Performed by: PHYSICAL THERAPIST

## 2023-11-10 PROCEDURE — 97140 MANUAL THERAPY 1/> REGIONS: CPT | Performed by: PHYSICAL THERAPIST

## 2023-11-10 NOTE — PROGRESS NOTES
Daily Note     Today's date: 11/10/2023  Patient name: William Torres  : 1990  MRN: [id number]  Referring provider: Adeel Valenzuela MD  Dx:   Encounter Diagnosis     ICD-10-CM    1. Pain in right hip  M25.551                      Subjective:  5/10 pain level currently. Pt had some relief after last visit and had slightly less pain when transferring sit to stand to get out of the chair. Objective: See treatment diary below      Assessment: Tolerated treatment well. Patient exhibited good technique with therapeutic exercises. Issued updated HEP.  510 pain levels post tx. SI joint normalized post MET. Pain with MET. Plan: Continue per plan of care.       Dx: R hip pain/ R SI pain/ LBP  EPOC: 23  CO-MORBIDITIES:   PERSONAL FACTORS: 10 y.o, twin 1 y.o boys  Precautions: none      Manuals 10/16 11/8 11/10          R QL/ iliac crest MFR   th          Lumbar PA/UPA             SI MET/ shot gun Th   th             13'          Neuro Re-Ed             DLS: abd bracing instruct 10x5" T/o          clamshells   10x3"          Bird dog             bridges instruct 21x5" 10x5"          DLS; dead bug   10          Modified plank on knees                          Ther Ex             Bike for LE ROM  5' 6'          HS stretch  10x5" ankle pumps           Piriformis stretch   3x20"          LTR   10"x5                                    Sciatic n slider w/ ankle pump R LE  / HS stretch           HEP instruct 8'            Self MFR w/ tennis ball                          Ther Activity                                       Gait Training                                       Modalities             TENS post  10' 10' s/l

## 2023-11-11 NOTE — PROGRESS NOTES
Pt has no complaints today   Believes fetal movement has started faint  No cramping no vaginal bleeding or abnormal dischare reported  Labs reviewed and entered into results consult (came from IVF office)  Sequential testing reviewed - neg  Has apt for MFM - level 2 sonogram scheduled  Follow up in 3-4 weeks   Prn if needed is alert and oriented person place time and   situation   Eyes:  Conjunctivae/corneas clear. PERRL, EOMs intact. Fundi   benign   Ears:  Normal TMs and external ear canals both ears. Nose: Nares normal. Septum midline. Mucosa normal. No drainage or   sinus tenderness. Mouth/Throat: Lips, mucosa, and tongue normal. Teeth and gums   normal.   Neck: Supple, symmetrical, trachea midline, no adenopathy,   thyroid: no enlargment/tenderness/nodules, no carotid bruit and   no JVD. Back:   Symmetric, no curvature. ROM normal. No CVA tenderness. Lungs:   Clear to auscultation bilaterally. Heart:  Regular rate and rhythm, S1, S2 normal, no murmur, click,   rub or gallop. Abdomen:   Soft, non-tender. Bowel sounds normal. No masses,  No   organomegaly. No lymphadenopathy in all 4 extremities  Alignment- pt has some obvious deformity of the left hip  Range of motion- with any range of motion leftt hip  Vascular-distal pulses palpable in left lower extremity  Sensory/motor-deep tendon reflexes normal left lower extremity. Motor and sensory function intact.   Stability- is difficult to assess stability because of the   presence of the fracture  Tenderness to palpation over the left greater trochanter area  Skin- no rashes ulcerations or open wounds left lower extremity  Gait- cannot put any weight on the left lower extremity      Assessment:     Hospital Problems             Last Modified POA    * (Principal) Closed comminuted intertrochanteric fracture of   left femur, initial encounter ([address]) 11/10/2023 Yes    Benign prostatic hyperplasia with urinary retention (Chronic)   11/10/2023 Yes    Dyslipidemia (Chronic) 11/10/2023 Yes    Stage 3b chronic kidney disease ([address]) (Chronic) 11/10/2023 Yes    Secondary hypercoagulable state ([address]) (Chronic) 11/10/2023 Yes    Atrial fibrillation with rapid ventricular response ([address])   11/10/2023 Yes    SIRS (systemic inflammatory response syndrome) ([address]) 11/10/2023   Yes

## 2023-11-13 DIAGNOSIS — M25.551 PAIN IN RIGHT HIP: Primary | ICD-10-CM

## 2023-11-15 ENCOUNTER — OFFICE VISIT (OUTPATIENT)
Dept: PHYSICAL THERAPY | Facility: CLINIC | Age: 33
End: 2023-11-15
Payer: COMMERCIAL

## 2023-11-15 DIAGNOSIS — M25.551 PAIN IN RIGHT HIP: Primary | ICD-10-CM

## 2023-11-15 PROCEDURE — 97112 NEUROMUSCULAR REEDUCATION: CPT

## 2023-11-15 PROCEDURE — G0283 ELEC STIM OTHER THAN WOUND: HCPCS

## 2023-11-15 PROCEDURE — 97110 THERAPEUTIC EXERCISES: CPT

## 2023-11-15 PROCEDURE — 97014 ELECTRIC STIMULATION THERAPY: CPT

## 2023-11-15 PROCEDURE — 97140 MANUAL THERAPY 1/> REGIONS: CPT

## 2023-11-15 NOTE — PROGRESS NOTES
Daily Note     Today's date: 11/15/2023  Patient name: Hang Hoffman  : 1990  MRN: [id number]  Referring provider: Gracie Zarco MD  Dx:   Encounter Diagnosis     ICD-10-CM    1. Pain in right hip  M25.551                      Subjective: Pt reports changing the pillow between her knees to a flatter pillow and since has had a little more relief each day. Pt reports getting 1 day relief post.  Pt states she is getting a little better sleep. Pt reports she works through her pain. Objective: See treatment diary below      Assessment: Tolerated treatment fair. Patient exhibited good technique with therapeutic exercises and would benefit from continued PT. Pt w/ slight SI malalignment. Pt reports some relief of the hip post RX, cont's w/ LBP. Plan: Continue per plan of care. Progress treatment as tolerated.        Dx: R hip pain/ R SI pain/ LBP  EPOC: 23  CO-MORBIDITIES:   PERSONAL FACTORS: 10 y.o, twin 1 y.o boys  Precautions: none      Manuals 10/16 11/8 11/10 11/15         R QL/ iliac crest MFR   th JK         Lumbar PA/UPA             SI MET/ shot gun Th    JK            15' 13'         Neuro Re-Ed             DLS: abd bracing instruct 10x5" T/o          clamshells   10x3" OTB 10x3"         Bird dog             bridges instruct 21x5" 10x5" 10x5"         DLS; dead bug   10 2# UE/2# LE 10         Modified plank on knees                          Ther Ex             Bike for LE ROM  5' 6' 6'         HS stretch  10x5" ankle pumps           Piriformis stretch   3x20" 3x20"         LTR   10"x5 10"x10         Glut stretch    3x20"                      Sciatic n slider w/ ankle pump R LE  / HS stretch           HEP instruct 8'            Self MFR w/ tennis ball                          Ther Activity                                       Gait Training                                       Modalities             TENS post  10' 10' s/l

## 2023-11-18 ENCOUNTER — OFFICE VISIT (OUTPATIENT)
Dept: URGENT CARE | Facility: CLINIC | Age: 33
End: 2023-11-18
Payer: COMMERCIAL

## 2023-11-18 VITALS
TEMPERATURE: 99.1 F | RESPIRATION RATE: 18 BRPM | SYSTOLIC BLOOD PRESSURE: 103 MMHG | DIASTOLIC BLOOD PRESSURE: 55 MMHG | OXYGEN SATURATION: 100 % | HEART RATE: 98 BPM

## 2023-11-18 DIAGNOSIS — R50.9 FEVER, UNSPECIFIED FEVER CAUSE: ICD-10-CM

## 2023-11-18 DIAGNOSIS — J02.0 STREP PHARYNGITIS: Primary | ICD-10-CM

## 2023-11-18 DIAGNOSIS — J02.9 SORE THROAT: ICD-10-CM

## 2023-11-18 LAB
S PYO AG THROAT QL: POSITIVE
SARS-COV-2 AG UPPER RESP QL IA: NEGATIVE
VALID CONTROL: NORMAL

## 2023-11-18 PROCEDURE — 87811 SARS-COV-2 COVID19 W/OPTIC: CPT | Performed by: FAMILY MEDICINE

## 2023-11-18 PROCEDURE — 87880 STREP A ASSAY W/OPTIC: CPT | Performed by: FAMILY MEDICINE

## 2023-11-18 PROCEDURE — 99213 OFFICE O/P EST LOW 20 MIN: CPT | Performed by: FAMILY MEDICINE

## 2023-11-18 RX ORDER — AMOXICILLIN 500 MG/1
500 CAPSULE ORAL EVERY 12 HOURS SCHEDULED
Qty: 20 CAPSULE | Refills: 0 | Status: SHIPPED | OUTPATIENT
Start: 2023-11-18 | End: 2023-11-28

## 2023-11-18 NOTE — PROGRESS NOTES
[facility]        NAME: Steve Bridges is a 28 y.o. female  : 1990    MRN: [id number]  DATE: 2023  TIME: 2:38 PM    Assessment and Plan   Strep pharyngitis [J02.0]  1. Strep pharyngitis  amoxicillin (AMOXIL) 500 mg capsule      2. Sore throat  Poct Covid 19 Rapid Antigen Test    POCT rapid strepA      3. Fever, unspecified fever cause  Poct Covid 19 Rapid Antigen Test    POCT rapid strepA            Patient Instructions       Follow up with PCP in 3-5 days. Proceed to  ER if symptoms worsen. Chief Complaint     Chief Complaint   Patient presents with    Sore Throat     Pt reports sore throat x3 days. History of Present Illness       69-year-old female with 3-day history of sore throat and painful swallowing. She also reports fevers and chills at home. No noted relief with using over-the-counter anti-inflammatories. Denies headaches nausea vomiting. Review of Systems   Review of Systems   Constitutional: Negative. HENT:  Positive for sore throat. Eyes: Negative. Respiratory: Negative. Cardiovascular: Negative. Gastrointestinal: Negative. Genitourinary: Negative. Musculoskeletal: Negative. Skin: Negative. Allergic/Immunologic: Negative. Neurological: Negative. Hematological: Negative. Psychiatric/Behavioral: Negative.            Current Medications       Current Outpatient Medications:     amoxicillin (AMOXIL) 500 mg capsule, Take 1 capsule (500 mg total) by mouth every 12 (twelve) hours for 10 days, Disp: 20 capsule, Rfl: 0    multivitamin (THERAGRAN) TABS, Take 1 tablet by mouth daily, Disp: , Rfl:     norethindrone-ethinyl estradiol (Loestrin Fe ) 1-20 MG-MCG per tablet, Take 1 tablet by mouth daily, Disp: 90 tablet, Rfl: 3    propranolol (INDERAL) 10 mg tablet, Take 1 tablet (10 mg total) by mouth 2 (two) times a day For the first week take only one per day., Disp: 60 tablet, Rfl: 1    semaglutide, 1 mg/dose, (Ozempic) 4 mg/3 mL injection pen, Inject 0.75 mL (1 mg total) under the skin once a week, Disp: 9 mL, Rfl: 1    cyclobenzaprine (FLEXERIL) 10 mg tablet, Take 1 tablet (10 mg total) by mouth daily at bedtime as needed for muscle spasms, Disp: 20 tablet, Rfl: 0    FLUoxetine (PROzac) 10 MG tablet, TAKE 1 TABLET BY MOUTH EVERY DAY (Patient taking differently: daily Take 0.5 tablet daily), Disp: 90 tablet, Rfl: 0    metFORMIN (GLUCOPHAGE) 500 mg tablet, Take 1 tablet (500 mg total) by mouth 2 (two) times a day with meals (Patient taking differently: Take 500 mg by mouth in the morning), Disp: 180 tablet, Rfl: 3    Nerve Stimulator (Standard TENS) ANA, Use in the morning, Disp: 1 each, Rfl: 0    Current Allergies     Allergies as of 2023    (No Known Allergies)            The following portions of the patient's history were reviewed and updated as appropriate: allergies, current medications, past family history, past medical history, past social history, past surgical history and problem list.     Past Medical History:   Diagnosis Date    Amenorrhea     last assessed: 2016    Female infertility     seen by [facility] - seen for 1 year, 5 cycles IUI acheived pregnancy via IVF    Fibromyalgia     Irregular menstrual cycle     last assessed: 2016    Osteoarthritis of right acromioclavicular joint     last assessment: 2013    Polycystic ovary syndrome     Separation of right acromioclavicular joint     Last assessed: 2013; this is more consistent with ostiolysis of the distal clavicle vs. acromioclavicular joint arthritis, not acute shoulder seperation    Varicella     vaccine       Past Surgical History:   Procedure Laterality Date     SECTION      DENTAL SURGERY      wisdom teeth    [place]  DELIVERY ONLY N/A 2018    Procedure:  SECTION (); Surgeon:  Flavio Mai MD;  Location: [facility];  Service: Obstetrics    [place]  DELIVERY ONLY N/A 2020 Procedure:  SECTION (); Surgeon: Mary Lou Hallman MD;  Location: [facility];  Service: Obstetrics    SHOULDER SURGERY      TUBAL LIGATION Bilateral 2020    Procedure: LIGATION/COAGULATION TUBAL;  Surgeon: Mary Lou Hallman MD;  Location: [facility];  Service: Obstetrics       Family History   Problem Relation Age of Onset    Fibromyalgia Mother     Hyperlipidemia Father     Hypertension Father     Diabetes Maternal Grandmother     Diabetes Paternal Grandfather     Cancer Maternal Grandfather          Medications have been verified. Objective   /55   Pulse 98   Temp 99.1 °F (37.3 °C) (Tympanic)   Resp 18   SpO2 100%   No LMP recorded. (Menstrual status: Birth Control). Physical Exam     Physical Exam  Vitals and nursing note reviewed. Constitutional:       Appearance: She is well-developed. HENT:      Head: Normocephalic. Nose: Nose normal.      Mouth/Throat:      Pharynx: Oropharyngeal exudate and posterior oropharyngeal erythema present. Tonsils: Tonsillar exudate present. Eyes:      Pupils: Pupils are equal, round, and reactive to light. Cardiovascular:      Rate and Rhythm: Normal rate. Pulmonary:      Effort: Pulmonary effort is normal.   Abdominal:      General: Abdomen is flat. Musculoskeletal:         General: Normal range of motion. Cervical back: Normal range of motion. Skin:     General: Skin is warm and dry. Neurological:      Mental Status: She is alert and oriented to person, place, and time.

## 2023-11-20 ENCOUNTER — NEW PATIENT (OUTPATIENT)
Dept: URBAN - METROPOLITAN AREA CLINIC 6 | Facility: CLINIC | Age: 33
End: 2023-11-20

## 2023-11-20 DIAGNOSIS — H52.13: ICD-10-CM

## 2023-11-20 DIAGNOSIS — H52.203: ICD-10-CM

## 2023-11-20 DIAGNOSIS — Z01.00: ICD-10-CM

## 2023-11-20 PROCEDURE — 92015 DETERMINE REFRACTIVE STATE: CPT

## 2023-11-20 PROCEDURE — 92004 COMPRE OPH EXAM NEW PT 1/>: CPT

## 2023-11-20 ASSESSMENT — TONOMETRY
OD_IOP_MMHG: 15
OS_IOP_MMHG: 13

## 2023-11-20 ASSESSMENT — VISUAL ACUITY
OS_SC: 20/30
OD_SC: 20/25
OU_SC: J1

## 2023-11-21 ENCOUNTER — TELEPHONE (OUTPATIENT)
Dept: FAMILY MEDICINE CLINIC | Facility: CLINIC | Age: 33
End: 2023-11-21

## 2023-11-21 DIAGNOSIS — B37.31 YEAST VAGINITIS: Primary | ICD-10-CM

## 2023-11-21 RX ORDER — FLUCONAZOLE 150 MG/1
150 TABLET ORAL ONCE
Qty: 1 TABLET | Refills: 0 | Status: SHIPPED | OUTPATIENT
Start: 2023-11-21 | End: 2023-11-21

## 2023-11-21 NOTE — TELEPHONE ENCOUNTER
Ezra Campoverde said she went to the Urgent Care on Saturday and was prescribed Amoxicillin. She states that whenever she is put on an antibiotic, she automatically gets Diflucan because she gets yeast infections from the antibiotics. She said she was too sick on Saturday to think about it and now is feeling the symptoms.   Asking if we can call it in to [facility] in [place] (in her chart)

## 2023-11-21 NOTE — TELEPHONE ENCOUNTER
Called patient to give her the information. She said she already received a phone call from [facility] telling her that they are out of stock. I advised her to call some other pharmacies and we would transfer the script wherever she can find it.

## 2023-11-22 ENCOUNTER — OFFICE VISIT (OUTPATIENT)
Dept: PHYSICAL THERAPY | Facility: CLINIC | Age: 33
End: 2023-11-22
Payer: COMMERCIAL

## 2023-11-22 DIAGNOSIS — M25.551 PAIN IN RIGHT HIP: Primary | ICD-10-CM

## 2023-11-22 PROCEDURE — 97014 ELECTRIC STIMULATION THERAPY: CPT

## 2023-11-22 PROCEDURE — 97112 NEUROMUSCULAR REEDUCATION: CPT

## 2023-11-22 PROCEDURE — 97110 THERAPEUTIC EXERCISES: CPT

## 2023-11-22 PROCEDURE — 97140 MANUAL THERAPY 1/> REGIONS: CPT

## 2023-11-22 PROCEDURE — G0283 ELEC STIM OTHER THAN WOUND: HCPCS

## 2023-11-22 NOTE — PROGRESS NOTES
Daily Note     Today's date: 2023  Patient name: Tally Snellen  : 1990  MRN: [id number]  Referring provider: Arjun Quezada MD  Dx:   Encounter Diagnosis     ICD-10-CM    1. Pain in right hip  M25.551                      Subjective: Pt reports shed had strep throat last week and did not have time to do her ex's. Reports her hip pain has centralized to her LB. C/o :BP 8/10. Objective: See treatment diary below      Assessment: Tolerated treatment well. Patient exhibited good technique with therapeutic exercises and would benefit from continued PT to cont' working on latoya relief. Pt w/ c/o's pain w/ palpation to superficial glut mm's. Pt reported pain brought down to 6/10 post TNES. Pt would benefit from home TENS unit. Plan: Continue per plan of care. Progress treatment as tolerated.        Dx: R hip pain/ R SI pain/ LBP  EPOC: 23  CO-MORBIDITIES:   PERSONAL FACTORS: 10 y.o, twin 1 y.o boys  Precautions: none      Manuals 10/16 11/8 11/10 11/15 11/22        R QL/ iliac crest MFR   th JK JK        Lumbar PA/UPA     JA I SL rotation mob        SI MET/ shot gun Th   th JK np           15' 13' 10'        Neuro Re-Ed             DLS: abd bracing instruct 10x5" T/o          clamshells   10x3" OTB 10x3" OTB 10x5"        Bird dog     BTB 10xea        bridges instruct 21x5" 10x5" 10x5" Pball 10x5"        DLS; dead bug   10 2# UE/2# LE 10 2# UE/2# LE20        Modified plank on knees                          Ther Ex             Bike for LE ROM  5' 6' 6' 6'        HS stretch  10x5" ankle pumps           Piriformis stretch   3x20" 3x20" 3x20"        LTR   10"x5 10"x10 3x20"        Glut stretch    3x20"         Hip hinge to wall     10# KB 10x        Sciatic n slider w/ ankle pump R LE  / HS stretch           HEP instruct 8'            Self MFR w/ tennis ball                          Ther Activity                                       Gait Training Modalities             TENS post  10' 10' s/l  10' sl

## 2023-11-27 ENCOUNTER — OFFICE VISIT (OUTPATIENT)
Dept: FAMILY MEDICINE CLINIC | Facility: CLINIC | Age: 33
End: 2023-11-27
Payer: COMMERCIAL

## 2023-11-27 VITALS
HEART RATE: 65 BPM | WEIGHT: 160 LBS | RESPIRATION RATE: 16 BRPM | TEMPERATURE: 99 F | SYSTOLIC BLOOD PRESSURE: 110 MMHG | BODY MASS INDEX: 27.31 KG/M2 | OXYGEN SATURATION: 98 % | DIASTOLIC BLOOD PRESSURE: 74 MMHG | HEIGHT: 64 IN

## 2023-11-27 DIAGNOSIS — J20.9 ACUTE BRONCHITIS, UNSPECIFIED ORGANISM: Primary | ICD-10-CM

## 2023-11-27 PROCEDURE — 99213 OFFICE O/P EST LOW 20 MIN: CPT | Performed by: FAMILY MEDICINE

## 2023-11-27 RX ORDER — AZITHROMYCIN 250 MG/1
TABLET, FILM COATED ORAL
Qty: 6 TABLET | Refills: 0 | Status: SHIPPED | OUTPATIENT
Start: 2023-11-27 | End: 2023-12-02

## 2023-11-27 RX ORDER — PREDNISONE 10 MG/1
TABLET ORAL
Qty: 20 TABLET | Refills: 0 | Status: SHIPPED | OUTPATIENT
Start: 2023-11-27

## 2023-11-27 NOTE — PROGRESS NOTES
Assessment/Plan:    Acute bronchitis, unspecified organism  - advised to start prednisone taper, Z-guillermo and take Mucinex 2 times a day as needed for cough/ congestion, encouraged to follow up in the office for persistent or worsening symptoms  - azithromycin (Zithromax) 250 mg tablet; Take 2 tablets (500 mg total) by mouth daily for 1 day, THEN 1 tablet (250 mg total) daily for 4 days. Dispense: 6 tablet; Refill: 0  - predniSONE 10 mg tablet; Take 4 tablets daily with food for 2 days, 3 tablets daily for 2 days, 2 tablets daily for 2 days, 1 tablet daily for 2 days  Dispense: 20 tablet; Refill: 0          Return as scheduled ro sooner as needed. The treatment plan and possible side effects of new medications were reviewed with the patient today. The patient understands and agrees with the treatment plan. Subjective:   Chief Complaint   Patient presents with    Sore Throat     Diagnosed with strep at urgent care and prescribed amoxicillin  Sore throat improved    Cough     Treated for strep and still with cough and progressively worsening        Patient ID: Saman Rendon is a 28 y.o. female who presents today with c/o cough and fever . She initially developed sore throat over 10 days ago and went to urgent care center on 11/18/23, her rapid strep was positive and she was prescribed 10 day course of amoxicillin. Then on Monday a week ago she developed nasal congestion and cough which is now getting progressively worse and associated with chest tightness, she reports yellow mucus production, no pleuritic chest pain, sore throat has now improved.           The following portions of the patient's history were reviewed and updated as appropriate: allergies, current medications, past family history, past medical history, past social history, past surgical history and problem list.    Past Medical History:   Diagnosis Date    Amenorrhea     last assessed: 08/22/2016    Female infertility     seen by [facility] - seen for 1 year, 5 cycles IUI acheived pregnancy via IVF    Fibromyalgia     Irregular menstrual cycle     last assessed: 2016    Osteoarthritis of right acromioclavicular joint     last assessment: 2013    Polycystic ovary syndrome     Separation of right acromioclavicular joint     Last assessed: 2013; this is more consistent with ostiolysis of the distal clavicle vs. acromioclavicular joint arthritis, not acute shoulder seperation    Varicella     vaccine     Past Surgical History:   Procedure Laterality Date     SECTION      DENTAL SURGERY      wisdom teeth    [place]  DELIVERY ONLY N/A 2018    Procedure:  SECTION (); Surgeon: Bhumi Baird MD;  Location: [facility];  Service: Obstetrics    [place]  DELIV[place] ONLY N/A 2020    Procedure:  SECTION (); Surgeon: Bhumi Baird MD;  Location: [facility];  Service: Obstetrics    SHOULDER SURGERY      SINUS SURGERY  2023    TUBAL LIGATION Bilateral 2020    Procedure: LIGATION/COAGULATION TUBAL;  Surgeon:  Bhumi Baird MD;  Location: [facility];  Service: Obstetrics     Family History   Problem Relation Age of Onset    Fibromyalgia Mother     Hyperlipidemia Father     Hypertension Father     Diabetes Maternal Grandmother     Diabetes Paternal Grandfather     Cancer Maternal Grandfather      Social History     Socioeconomic History    Marital status: /Civil Union     Spouse name: Not on file    Number of children: Not on file    Years of education: Not on file    Highest education level: Not on file   Occupational History    Not on file   Tobacco Use    Smoking status: Never    Smokeless tobacco: Never   Vaping Use    Vaping Use: Never used   Substance and Sexual Activity    Alcohol use: Not Currently     Alcohol/week: 1.0 standard drink of alcohol     Types: 1 Cans of beer per week    Drug use: No    Sexual activity: Yes     Partners: Male     Birth control/protection: OCP   Other Topics Concern    Not on file   Social History Narrative    History of  0    Caffeine use-1 cup of coffee/day    Has smoke detectors     Denied history of sun protection    Uses safety equipment- protective head gear     Social Determinants of Health     Financial Resource Strain: Not on file   Food Insecurity: Not on file   Transportation Needs: Not on file   Physical Activity: Not on file   Stress: Not on file   Social Connections: Not on file   Intimate Partner Violence: Not on file   Housing Stability: Not on file       Current Outpatient Medications:     amoxicillin (AMOXIL) 500 mg capsule, Take 1 capsule (500 mg total) by mouth every 12 (twelve) hours for 10 days, Disp: 20 capsule, Rfl: 0    cyclobenzaprine (FLEXERIL) 10 mg tablet, Take 1 tablet (10 mg total) by mouth daily at bedtime as needed for muscle spasms, Disp: 20 tablet, Rfl: 0    FLUoxetine (PROzac) 10 MG tablet, TAKE 1 TABLET BY MOUTH EVERY DAY (Patient taking differently: daily Take 0.5 tablet daily), Disp: 90 tablet, Rfl: 0    metFORMIN (GLUCOPHAGE) 500 mg tablet, Take 1 tablet (500 mg total) by mouth 2 (two) times a day with meals (Patient taking differently: Take 500 mg by mouth in the morning), Disp: 180 tablet, Rfl: 3    multivitamin (THERAGRAN) TABS, Take 1 tablet by mouth daily, Disp: , Rfl:     Nerve Stimulator (Standard TENS) ANA, Use in the morning, Disp: 1 each, Rfl: 0    norethindrone-ethinyl estradiol (Loestrin Fe ) 1-20 MG-MCG per tablet, Take 1 tablet by mouth daily, Disp: 90 tablet, Rfl: 3    propranolol (INDERAL) 10 mg tablet, Take 1 tablet (10 mg total) by mouth 2 (two) times a day For the first week take only one per day., Disp: 60 tablet, Rfl: 1    semaglutide, 1 mg/dose, (Ozempic) 4 mg/3 mL injection pen, Inject 0.75 mL (1 mg total) under the skin once a week, Disp: 9 mL, Rfl: 1    Review of Systems   Constitutional:  Positive for fatigue. Negative for chills and fever.    HENT: Positive for congestion. Negative for ear pain and sore throat. Respiratory:  Positive for cough and chest tightness. Negative for wheezing. Cardiovascular:  Negative for chest pain, palpitations and leg swelling. Gastrointestinal:  Negative for abdominal pain, diarrhea, nausea and vomiting. Neurological:  Negative for dizziness and headaches. Hematological:  Negative for adenopathy. Objective:    Vitals:    11/27/23 1052   BP: 110/74   Pulse: 65   Resp: 16   Temp: 99 °F (37.2 °C)   TempSrc: Oral   SpO2: 98%   Weight: 72.6 kg (160 lb)   Height: 5' 4" (1.626 m)        Physical Exam  Constitutional:       General: She is not in acute distress. Appearance: She is well-developed. HENT:      Head: Normocephalic and atraumatic. Right Ear: Tympanic membrane and ear canal normal.      Left Ear: Tympanic membrane and ear canal normal.      Nose: Mucosal edema present. Mouth/Throat:      Pharynx: No oropharyngeal exudate or posterior oropharyngeal erythema. Cardiovascular:      Rate and Rhythm: Normal rate and regular rhythm. Heart sounds: Normal heart sounds. No murmur heard. Pulmonary:      Effort: Pulmonary effort is normal. No respiratory distress. Breath sounds: No wheezing or rales. Comments: Rhonchi in upper lung fields cleared with cough  Musculoskeletal:      Cervical back: Neck supple. Lymphadenopathy:      Cervical: No cervical adenopathy. Neurological:      Mental Status: She is alert and oriented to person, place, and time.    Psychiatric:         Mood and Affect: Mood normal.         Behavior: Behavior normal.

## 2023-11-28 ENCOUNTER — OFFICE VISIT (OUTPATIENT)
Dept: DERMATOLOGY | Facility: CLINIC | Age: 33
End: 2023-11-28
Payer: COMMERCIAL

## 2023-11-28 VITALS — TEMPERATURE: 97 F | BODY MASS INDEX: 27.31 KG/M2 | WEIGHT: 160 LBS | HEIGHT: 64 IN

## 2023-11-28 DIAGNOSIS — L82.1 SEBORRHEIC KERATOSIS: ICD-10-CM

## 2023-11-28 DIAGNOSIS — D22.72 MULTIPLE BENIGN MELANOCYTIC NEVI OF BOTH UPPER EXTREMITIES, BOTH LOWER EXTREMITIES, AND TRUNK: ICD-10-CM

## 2023-11-28 DIAGNOSIS — L81.1 MELASMA: Primary | ICD-10-CM

## 2023-11-28 DIAGNOSIS — D22.71 MULTIPLE BENIGN MELANOCYTIC NEVI OF BOTH UPPER EXTREMITIES, BOTH LOWER EXTREMITIES, AND TRUNK: ICD-10-CM

## 2023-11-28 DIAGNOSIS — D22.61 MULTIPLE BENIGN MELANOCYTIC NEVI OF BOTH UPPER EXTREMITIES, BOTH LOWER EXTREMITIES, AND TRUNK: ICD-10-CM

## 2023-11-28 DIAGNOSIS — L81.4 SOLAR LENTIGO: ICD-10-CM

## 2023-11-28 DIAGNOSIS — D22.5 MULTIPLE BENIGN MELANOCYTIC NEVI OF BOTH UPPER EXTREMITIES, BOTH LOWER EXTREMITIES, AND TRUNK: ICD-10-CM

## 2023-11-28 DIAGNOSIS — D23.9 DERMATOFIBROMA: ICD-10-CM

## 2023-11-28 DIAGNOSIS — L71.9 ROSACEA: ICD-10-CM

## 2023-11-28 DIAGNOSIS — D18.01 CHERRY ANGIOMA: ICD-10-CM

## 2023-11-28 DIAGNOSIS — D22.62 MULTIPLE BENIGN MELANOCYTIC NEVI OF BOTH UPPER EXTREMITIES, BOTH LOWER EXTREMITIES, AND TRUNK: ICD-10-CM

## 2023-11-28 PROCEDURE — 99204 OFFICE O/P NEW MOD 45 MIN: CPT | Performed by: DERMATOLOGY

## 2023-11-28 RX ORDER — HYDROQUINONE 40 MG/G
CREAM TOPICAL
Qty: 30 G | Refills: 3 | Status: SHIPPED | OUTPATIENT
Start: 2023-11-28

## 2023-11-28 NOTE — PROGRESS NOTES
[facility] Note     Patient Name: Roxann Mehta  Encounter Date: 11/28/23     Have you been cared for by a [facility]atologist in the last 3 years and, if so, which description applies to you? NO. I am considered a "new" patient and must complete all patient intake questions. I am FEMALE/of child-bearing potential.    REVIEW OF SYSTEMS:  Have you recently had or currently have any of the following? Recent fever or chills? YES, Fever - 48 hours ago, on prednisone  Any non-healing wound? YES, above left ankle since summer 2023  Are you pregnant or planning to become pregnant? No  Are you currently or planning to be nursing or breast feeding? No   PAST MEDICAL HISTORY:  Have you personally ever had or currently have any of the following? If "YES," then please provide more detail. Skin cancer (such as Melanoma, Basal Cell Carcinoma, Squamous Cell Carcinoma? No  Tuberculosis, HIV/AIDS, Hepatitis B or C: No  Radiation Treatment No   HISTORY OF IMMUNOSUPPRESSION:   Do you have a history of any of the following:  Systemic Immunosuppression such as Diabetes, Biologic or Immunotherapy, Chemotherapy, Organ Transplantation, Bone Marrow Transplantation? No    Answering "YES" requires the addition of the dotphrase "IMMUNOSUPPRESSED" as the first diagnosis of the patient's visit. FAMILY HISTORY:  Any "first degree relatives" (parent, brother, sister, or child) with the following? Skin Cancer, Pancreatic or Other Cancer? No   PATIENT EXPERIENCE:    Do you want the Dermatologist to perform a COMPLETE skin exam today including a clinical examination under the "bra and underwear" areas? Yes  If necessary, do we have your permission to call and leave a detailed message on your Preferred Phone number that includes your specific medical information?   Yes      No Known Allergies   Current Outpatient Medications:     azithromycin (Zithromax) 250 mg tablet, Take 2 tablets (500 mg total) by mouth daily for 1 day, THEN 1 tablet (250 mg total) daily for 4 days. , Disp: 6 tablet, Rfl: 0    cyclobenzaprine (FLEXERIL) 10 mg tablet, Take 1 tablet (10 mg total) by mouth daily at bedtime as needed for muscle spasms, Disp: 20 tablet, Rfl: 0    FLUoxetine (PROzac) 10 MG tablet, TAKE 1 TABLET BY MOUTH EVERY DAY (Patient taking differently: daily Take 0.5 tablet daily), Disp: 90 tablet, Rfl: 0    multivitamin (THERAGRAN) TABS, Take 1 tablet by mouth daily, Disp: , Rfl:     norethindrone-ethinyl estradiol (Loestrin Fe 1/20) 1-20 MG-MCG per tablet, Take 1 tablet by mouth daily, Disp: 90 tablet, Rfl: 3    predniSONE 10 mg tablet, Take 4 tablets daily with food for 2 days, 3 tablets daily for 2 days, 2 tablets daily for 2 days, 1 tablet daily for 2 days, Disp: 20 tablet, Rfl: 0    propranolol (INDERAL) 10 mg tablet, Take 1 tablet (10 mg total) by mouth 2 (two) times a day For the first week take only one per day., Disp: 60 tablet, Rfl: 1    semaglutide, 1 mg/dose, (Ozempic) 4 mg/3 mL injection pen, Inject 0.75 mL (1 mg total) under the skin once a week, Disp: 9 mL, Rfl: 1    amoxicillin (AMOXIL) 500 mg capsule, Take 1 capsule (500 mg total) by mouth every 12 (twelve) hours for 10 days (Patient not taking: Reported on 11/28/2023), Disp: 20 capsule, Rfl: 0    metFORMIN (GLUCOPHAGE) 500 mg tablet, Take 1 tablet (500 mg total) by mouth 2 (two) times a day with meals (Patient taking differently: Take 500 mg by mouth in the morning), Disp: 180 tablet, Rfl: 3    Nerve Stimulator (Standard TENS) ANA, Use in the morning, Disp: 1 each, Rfl: 0          Whom besides the patient is providing clinical information about today's encounter? NO ADDITIONAL HISTORIAN (patient alone provided history)    Physical Exam and Assessment/Plan by Diagnosis:    MELASMA   Physical Exam:  Anatomic Location & Morphological Description: light brown well demarcated patches on upper lip only  Pertinent Positives:  Pertinent Negatives:     Additional History of Present Condition:     Patient pregnant: no  Patient on hormonal birth control or other hormonal medication: Yes   Using sunscreen daily? yes   If so, what type? Neutrogena          The patient was counseled on the diagnosis of melasma, which is caused by a combination of hormonal factors (birth control, pregnancy, etc) and the sun. Diligent sun protection is incredibly important to improve and maintain control. I recommended using a mineral based SPF 50+sunscreen every morning, and a wide-brimmed hat whenever outdoors. The patient can also consider using an opaque foundation or tinted moisturizer in the morning, since even visible light worsens melasma. The patient was prescribed a bleaching cream (hydroquinone 6%/dexamethasone/tretinoin . 025%) to spot treat the dark patches. This treatment should only be used for 3-4 months at a time, after which the skin will need a one month break to prevent ochronosis (darkening of the skin). The patient was counseled on this risk that can occur when the medication is overused for very extended periods of time. Patient advised that even after successful treatment, melasma is a condition that can easily come back from even one day in the sun. Referral for cosmetic therapy such as laser treatment/chemical peels also discussed. DERMATOFIBROMA    Physical Exam:  Anatomic Location Affected:  left ankle, right shin   Morphological Description: Pink firm papules with surrounding hyperpigmentation, consistent dermoscopy, and + dimple sign  Pertinent Positives:  Pertinent Negatives: No itch, pain    Additional History of Present Condition: Site of concern on left ankle started with a bug bite and has not healed. Admits to scratching the site at times.     Assessment and Plan:  - History and physical consistent with dermatofibroma  - Educated that these lesions are generally benign   - No evidence of eruptive dermatofibromas (not >15 lesions with acute onset)    Based on a thorough discussion of this condition and the management approach to it (including a comprehensive discussion of the known risks, side effects and potential benefits of treatment), the patient (family) agrees to implement the following specific plan:    Educated patient to call clinic if lesion changes (enlarges, ulcerates)   Discussed cosmetic removal is an option if desired       SEBORRHEIC KERATOSES  - Relevant exam: Scattered over the trunk/extremities are waxy brown to black plaques and papules with stuck on appearance and consistent dermoscopy  - Exam and clinical history consistent with seborrheic keratoses  - Counseled that these are benign growths that do not require treatment  - Counseled that removal of lesions is considered cosmetic and so would incur a fee should patient elect to move forward. MELANOCYTIC NEVI  -Relevant exam: Scattered over the trunk/extremities are homogenously pigmented brown macules and papules. ELM performed and without concerning findings. No outliers unless otherwise noted in today's note  - Exam and clinical history consistent with melanocytic nevi  - Educated on the ABCDE's of melanoma; handout provided  - Counseled to return to clinic prior to scheduled appointment should any of these lesions change or should any new lesions of concern arise  - Counseled on use of sun protection daily. Reviewed latest FDA sunscreen guidelines, including use of broad spectrum (UVA and UVB blocking) sunscreen or sun protective clothing with SPF 30-50 every 2-3 hours and reapplied after exposure to water; use of photoprotective clothing, including a broad brim hat and UPF rated clothing if outdoors for several hours; avoid use of tanning beds as these pose significant risk for melanoma and skin cancer. LENTIGINES  OTHER SKIN CHANGES DUE TO CHRONIC EXPOSURE TO NONIONIZING RADIATION  - Relevant exam: Over sun exposed areas are brown macules.  ELM performed and without concerning findings. - Exam and clinical history consistent with lentigines. - Educated that these are indicative of prior sun exposure. - Counseled to return to clinic prior to scheduled appointment should any of these lesions change or should any new lesions of concern arise.  - Recommended use of sunscreen as above and below. - Counseled on use of sun protection daily. Reviewed latest FDA sunscreen guidelines, including use of broad spectrum (UVA and UVB blocking) sunscreen or sun protective clothing with SPF 30-50 every 2-3 hours and reapplied after exposure to water; use of photoprotective clothing, including a broad brim hat and UPF rated clothing if outdoors for several hours; avoid use of tanning beds as these pose significant risk for melanoma and skin cancer. CHERRY ANGIOMAS  - Relevant exam: Scattered over the trunk/extremities are red papules  - Exam and clinical history consistent with cherry angiomas  - Educated that these are benign  - Educated that removal is considered aesthetic and would incur a fee. ROSACEA    Physical Exam:  Anatomic Location Affected:  Cheeks, chin, nose  Morphological Description:  Background centrofacial erythema   Pertinent Positives:  Pertinent Negatives: Additional History of Present Condition:  Patient has noted some redness on her cheeks. Drinking less coffee which has helped. No other clear triggers. No symptoms. Is not getting acne like lesions. Assessment and Plan:  Based on a thorough discussion of this condition and the management approach to it (including a comprehensive discussion of the known risks, side effects and potential benefits of treatment), the patient (family) agrees to implement the following specific plan:  Recommended a green tinted concealer    Offered trial of oxymetazoline and cosmetic referral for laser therapy;  She would like to monitor for now        Rosacea is a chronic rash affecting the mid-face including the nose, cheeks, chin, forehead, and eyelids. The incidence is usually greatest between the ages of 30-60 years and is more common in people with fair skin. Common characteristics include redness, telangiectasias, papules and pustules over affected areas. Rosacea may look similar to acne, but there is a lack of comedones. Occasionally the eyes may also be involved in ocular rosacea. In advanced disease, enlargement of the sebaceous glands in the nose, termed rhinophyma, may be present. Rosacea results in red spots (papules) and sometimes pustules over the face, but unlike acne there are no blackheads, whiteheads, or cystic nodules. Patients often experience increased facial flushing with prominent blood vessels (erythematotelangiectatic rosacea) and dry, sensitive skin. These symptoms are exacerbated by sun exposure, hot or spicy foods, topical steroids and oil-based facial products. In ocular rosacea, eyelids may be red and sore due to conjunctivitis, keratitis, and episcleritis. If rhinophyma develops due to enlargement of sebaceous glands, the patient may have an enlarged and irregularly shaped nose with prominent pores. In rosacea that is refractory to treatment, patients can develop persistent redness and swelling of the face due to lymphatic obstruction (Morbihan disease). Distribution around the cheeks may be confused with the malar or “butterfly rash” of lupus. However, the rash of lupus spares the nasal creases and lacks papules and pustules. If signs of photosensitivity, oral ulcers, arthritis, and kidney dysfunction are present then consider referral to a rheumatologist.     There are many potential causes of rosacea including genetic, environmental, vascular, and inflammatory factors.  These include, but are not limited to:  Chronic exposure to ultraviolet radiation   Increased immune responses in the form of cathelicidins that promote vessel dilation and infiltration with white blood cells (neutrophils) into the dermis  Increased matrix metalloproteinases such as collagen and elastase that remodel normal tissue may contribute to inflammation of the skin making it thicker and harder  There is some evidence to suggest that increased numbers of demodex mites on patient skin may contribute to rosacea papules     General Treatment Approach   Avoid exacerbating factors such as heat, spicy foods, and alcohol   Use daily SPF30+ sunscreen and other methods of coverage for sun protection  Use water-based make-up   Avoid applying topical steroids to affected areas as they can cause perioral dermatitis and exacerbate rosacea     Topical Treatment Approach  Metronidazole cream or gel by itself or in combination with oral antibiotics for more severe cases  Azelaic acid cream or lotion is effective for mild inflammatory rosacea when applied twice daily to affected areas  Brimonidine gel and oxymetazoline hydrochloride cream can reduce facial redness temporarily   Ivermectin cream can treat papulopustular rosacea by controlling demodex mites and inflammation   Pimecrolimus cream or tacrolimus ointment twice a day for 2-3 months can help reduce inflammation    Oral Treatment Approach  Antibiotics such as doxycycline, minocycline, or erythromycin for 1-3 months  Clonidine and carvedilol can help reduce facial flushing and are generally well tolerated. Common side effects include low blood pressure, gastrointestinal upset, dry eyes, blurred vision and low heart rate. Isotretinoin at low doses can be effective for long term treatment when antibiotics fail. Side effects may make it unsuitable for some patients. NSAIDs such as diclofenac can help reduce discomfort and redness in the skin.      Procedural/Surgical Treatment Approach   Vascular lasers or intense pulsed light treatment may be used to treat persistent telangiectasia and papulopustular rosacea  Plastic surgery and carbon dioxide lasers may be used to treat tyler       Scribdarya Attestation      I,:  Erika Lovelace am acting as a scribe while in the presence of the attending physician.:       I,:  Caleb Mobley MD personally performed the services described in this documentation    as scribed in my presence.:

## 2023-11-29 ENCOUNTER — TELEPHONE (OUTPATIENT)
Dept: BARIATRICS | Facility: CLINIC | Age: 33
End: 2023-11-29

## 2023-11-29 ENCOUNTER — OFFICE VISIT (OUTPATIENT)
Dept: PHYSICAL THERAPY | Facility: CLINIC | Age: 33
End: 2023-11-29
Payer: COMMERCIAL

## 2023-11-29 ENCOUNTER — TELEPHONE (OUTPATIENT)
Dept: SLEEP CENTER | Facility: CLINIC | Age: 33
End: 2023-11-29

## 2023-11-29 ENCOUNTER — TELEPHONE (OUTPATIENT)
Dept: FAMILY MEDICINE CLINIC | Facility: CLINIC | Age: 33
End: 2023-11-29

## 2023-11-29 DIAGNOSIS — M25.551 PAIN IN RIGHT HIP: Primary | ICD-10-CM

## 2023-11-29 PROCEDURE — 97014 ELECTRIC STIMULATION THERAPY: CPT

## 2023-11-29 PROCEDURE — G0283 ELEC STIM OTHER THAN WOUND: HCPCS

## 2023-11-29 PROCEDURE — 97140 MANUAL THERAPY 1/> REGIONS: CPT | Performed by: PHYSICAL THERAPIST

## 2023-11-29 PROCEDURE — 97112 NEUROMUSCULAR REEDUCATION: CPT

## 2023-11-29 PROCEDURE — 97110 THERAPEUTIC EXERCISES: CPT

## 2023-11-29 NOTE — PROGRESS NOTES
Daily Note     Today's date: 2023  Patient name: Kendra Clifford  : 1990  MRN: [id number]  Referring provider: Tamara Soto MD  Dx:   Encounter Diagnosis     ICD-10-CM    1. Pain in right hip  M25.551                      Subjective: Ptreports she is still having centralized LBP. Pt reports having in general seeing improvement since starting PT. States getting OOB  she is not as stiff and getting out the car is not as bad. Objective: See treatment diary below      Assessment: Tolerated treatment well post manual. Patient session followed up post manuals w/ stretching ex's. Pt reported good relief post.  Pt cont's to get good relief w/ TENS. Pt instructed to keep stretching in new mobility and work on core strengthening. Plan: Continue per plan of care. Progress treatment as tolerated.        Dx: R hip pain/ R SI pain/ LBP  EPOC: 23  CO-MORBIDITIES:   PERSONAL FACTORS: 10 y.o, twin 1 y.o boys  Precautions: none      Manuals 10/16 11/8 11/10 11/15 11/22 11/29       R QL/ iliac crest MFR    JK JK        Lumbar PA/UPA     JA I SL rotation mob        SI MET/ shot gun Th   th JK np NS 2'       G5 Reverse [place]      NS L & R          15' 13' 10' 10'       Neuro Re-Ed             DLS: abd bracing instruct 10x5" T/o          clamshells   10x3" OTB 10x3" OTB 10x5"        Bird dog     BTB 10xea BTB 10xea       bridges instruct 21x5" 10x5" 10x5" Pball 10x5"        DLS; dead bug   10 2# UE/2# LE 10 2# UE/2# LE20        Modified plank on knees      30"x1                    Ther Ex             Bike for LE ROM  5' 6' 6' 6' 6'       HS stretch  10x5" ankle pumps           DKTC      10"x5       Cat stretch      10"x5       Camel w/ PPT      10"x5       Piriformis stretch   3x20" 3x20" 3x20" 10"x5       LTR   10"x5 10"x10 3x20" 10" 5 xtra       Glut stretch    3x20"         Hip hinge to wall     10# KB 10x 10# KB 10x       Sciatic n slider w/ ankle pump R LE  / HS stretch           HEP instruct 8'            Self MFR w/ tennis ball                          Ther Activity                                       Gait Training                                       Modalities             TENS post  10' 10' s/l  10' sl 10' sl

## 2023-11-29 NOTE — TELEPHONE ENCOUNTER
Per Dr. Curry Egyonathan sleep study shows no [place] but may underestimate results.  Patient will call back in January to schedule in lab diagnostic

## 2023-11-29 NOTE — TELEPHONE ENCOUNTER
LMOM returning call left from practice voicemail asking patient to call us back. I guess. Hi, my name is Judge Patton. My YOB: 1990. I've been trying to call since a little before 8:00 to get an appointment today to get a sick visit. I was seen at Urgent Care, not this past Saturday but the Saturday before that with the positive strep throat test and they gave me antibiotics and I it took care of the sore throat but I have a nasty cough that is still there and my chest is really painful when I breathe, so I wanted to see if I could get in today. My phone number is [phone]. Thank you.  antonio Butterfield.

## 2023-11-30 ENCOUNTER — APPOINTMENT (OUTPATIENT)
Dept: RADIOLOGY | Facility: CLINIC | Age: 33
End: 2023-11-30
Payer: COMMERCIAL

## 2023-11-30 ENCOUNTER — TELEPHONE (OUTPATIENT)
Dept: FAMILY MEDICINE CLINIC | Facility: CLINIC | Age: 33
End: 2023-11-30

## 2023-11-30 DIAGNOSIS — R05.9 COUGH, UNSPECIFIED TYPE: Primary | ICD-10-CM

## 2023-11-30 DIAGNOSIS — R05.9 COUGH, UNSPECIFIED TYPE: ICD-10-CM

## 2023-11-30 PROCEDURE — 71046 X-RAY EXAM CHEST 2 VIEWS: CPT

## 2023-11-30 RX ORDER — BENZONATATE 200 MG/1
200 CAPSULE ORAL 3 TIMES DAILY PRN
Qty: 20 CAPSULE | Refills: 0 | Status: SHIPPED | OUTPATIENT
Start: 2023-11-30

## 2023-11-30 NOTE — TELEPHONE ENCOUNTER
Patient is calling she is not improving feels the cough is getting worse. She is asking if you can prescribe something for the cough. Also, asking if she can get the chest xray if you can enter the script. Please send to [facility] in [place].

## 2023-11-30 NOTE — TELEPHONE ENCOUNTER
Chest x-ray order placed, I am also sending a script for cough medicine Tessalon to take as needed. Patient should continue prednisone taper and Z-guillermo until finished and contact the office if her symptoms not better in the next 2-3 days.

## 2023-12-05 ENCOUNTER — OFFICE VISIT (OUTPATIENT)
Dept: OBGYN CLINIC | Facility: CLINIC | Age: 33
End: 2023-12-05
Payer: COMMERCIAL

## 2023-12-05 VITALS
SYSTOLIC BLOOD PRESSURE: 112 MMHG | BODY MASS INDEX: 26.98 KG/M2 | HEIGHT: 64 IN | WEIGHT: 158 LBS | DIASTOLIC BLOOD PRESSURE: 72 MMHG

## 2023-12-05 DIAGNOSIS — G89.29 CHRONIC MIDLINE LOW BACK PAIN WITHOUT SCIATICA: ICD-10-CM

## 2023-12-05 DIAGNOSIS — M25.551 PAIN IN RIGHT HIP: Primary | ICD-10-CM

## 2023-12-05 DIAGNOSIS — M54.50 CHRONIC MIDLINE LOW BACK PAIN WITHOUT SCIATICA: ICD-10-CM

## 2023-12-05 PROCEDURE — 99213 OFFICE O/P EST LOW 20 MIN: CPT | Performed by: ORTHOPAEDIC SURGERY

## 2023-12-05 NOTE — PROGRESS NOTES
Assessment:     1. Pain in right hip    2. Chronic midline low back pain without sciatica        Plan:     Problem List Items Addressed This Visit          Other    Pain in right hip - Primary     Findings today are consistent with right hip pain, possible abductor strain. Imaging and prognosis was reviewed with the patient today. Reviewed that this can take 3-4 months to resolve. She was encouraged to continue her physical therapy exercises. Referral to pain management provided for further evaluation of chronic mid-line lumbar spine pain. Follow up as needed. All patient's questions were answered to her satisfaction. This note is created using dictation transcription. It may contain typographical errors, grammatical errors, improperly dictated words, background noise and other errors. Other Visit Diagnoses       Chronic midline low back pain without sciatica        Relevant Orders    Ambulatory referral to Spine & Pain Management           Subjective:     Patient ID: Tiffanie Blackman is a 28 y.o. female. Chief Complaint:  28 y.o. female presents to the office for follow up of right hip pain, possible abductor strain. Since last visit she has been attending physical therapy. She notes improvement of her right hip pain but has worsening chronic mid-line low back pain. She notes stiffness in the mornings and describes that it takes a few minutes to stand upright. She has tried muscle relaxers but has needed to halt them due to her cardiac pathology. She has not had a full work up for her lumbar symptoms. She denies any radiating pain, numbness or tingling.      Allergy:  No Known Allergies  Medications:  all current active meds have been reviewed  Past Medical History:  Past Medical History:   Diagnosis Date    Amenorrhea     last assessed: 08/22/2016    Female infertility     seen by [facility] - seen for 1 year, 5 cycles IUI acheived pregnancy via IVF    Fibromyalgia     Irregular menstrual cycle     last assessed: 2016    Osteoarthritis of right acromioclavicular joint     last assessment: 2013    Polycystic ovary syndrome     Separation of right acromioclavicular joint     Last assessed: 2013; this is more consistent with ostiolysis of the distal clavicle vs. acromioclavicular joint arthritis, not acute shoulder seperation    Varicella     vaccine     Past Surgical History:  Past Surgical History:   Procedure Laterality Date     SECTION      DENTAL SURGERY      wisdom teeth    [place]IV[place] ONLY N/A 2018    Procedure:  SECTION (); Surgeon: Ever Carcamo MD;  Location: [facility];  Service: Obstetrics    [place]  DEL[place] ONLY N/A 2020    Procedure:  SECTION (); Surgeon: Ever Carcamo MD;  Location: [facility];  Service: Obstetrics    SHOULDER SURGERY      SINUS SURGERY  2023    TUBAL LIGATION Bilateral 2020    Procedure: LIGATION/COAGULATION TUBAL;  Surgeon: Ever Carcamo MD;  Location: [facility];  Service: Obstetrics     Family History:  Family History   Problem Relation Age of Onset    Fibromyalgia Mother     Hyperlipidemia Father     Hypertension Father     Diabetes Maternal Grandmother     Diabetes Paternal Grandfather     Cancer Maternal Grandfather      Social History:  Social History     Substance and Sexual Activity   Alcohol Use Not Currently    Alcohol/week: 1.0 standard drink of alcohol    Types: 1 Cans of beer per week     Social History     Substance and Sexual Activity   Drug Use No     Social History     Tobacco Use   Smoking Status Never   Smokeless Tobacco Never     Review of Systems   Constitutional:  Negative for fever and unexpected weight change. HENT:  Negative for hearing loss, nosebleeds and sore throat. Eyes:  Negative for redness and visual disturbance. Respiratory:  Negative for cough, shortness of breath and wheezing.     Cardiovascular:  Negative for chest pain, palpitations and leg swelling. Gastrointestinal:  Negative for abdominal pain, nausea and vomiting. Endocrine: Negative for polydipsia and polyuria. Genitourinary:  Negative for dysuria and hematuria. Musculoskeletal:  Positive for back pain. Skin:  Negative for rash and wound. Neurological:  Negative for dizziness and headaches. Psychiatric/Behavioral:  Negative for agitation. Objective:  BP Readings from Last 1 Encounters:   12/05/23 112/72      Wt Readings from Last 1 Encounters:   12/05/23 71.7 kg (158 lb)      BMI:   Estimated body mass index is 27.12 kg/m² as calculated from the following:    Height as of this encounter: 5' 4" (1.626 m). Weight as of this encounter: 71.7 kg (158 lb). BSA:   Estimated body surface area is 1.77 meters squared as calculated from the following:    Height as of this encounter: 5' 4" (1.626 m). Weight as of this encounter: 71.7 kg (158 lb). Physical Exam  Vitals and nursing note reviewed. Constitutional:       Appearance: Normal appearance. She is well-developed. HENT:      Head: Normocephalic and atraumatic. Right Ear: External ear normal.      Left Ear: External ear normal.   Eyes:      Extraocular Movements: Extraocular movements intact. Conjunctiva/sclera: Conjunctivae normal.   Pulmonary:      Effort: Pulmonary effort is normal.   Musculoskeletal:      Cervical back: Neck supple. Right knee: No effusion. Skin:     General: Skin is warm and dry. Neurological:      Mental Status: She is alert and oriented to person, place, and time. Deep Tendon Reflexes: Reflexes are normal and symmetric. Psychiatric:         Mood and Affect: Mood normal.         Behavior: Behavior normal.       Right Knee Exam     Other   Effusion: no effusion present      Right Hip Exam     Tenderness   The patient is experiencing no tenderness (abductors ). Range of Motion   Abduction:  normal Right hip abduction: Pain.   Flexion:  normal   External rotation: normal Right hip external rotation: Pain laterally. Internal rotation:  normal     Muscle Strength   The patient has normal right hip strength.     Tests   VENKAT: negative  Vipul: negative    Other   Erythema: absent  Scars: absent  Sensation: normal  Pulse: present            No new imaging obtained today    Scribe Attestation      I,:  Marilee Geovanyliz am acting as a scribe while in the presence of the attending physician.:       I,:  Shavonne Triana MD personally performed the services described in this documentation    as scribed in my presence.:

## 2023-12-05 NOTE — ASSESSMENT & PLAN NOTE
Findings today are consistent with right hip pain, possible abductor strain. Imaging and prognosis was reviewed with the patient today. Reviewed that this can take 3-4 months to resolve. She was encouraged to continue her physical therapy exercises. Referral to pain management provided for further evaluation of chronic mid-line lumbar spine pain. Follow up as needed. All patient's questions were answered to her satisfaction. This note is created using dictation transcription. It may contain typographical errors, grammatical errors, improperly dictated words, background noise and other errors.

## 2023-12-06 ENCOUNTER — OFFICE VISIT (OUTPATIENT)
Dept: PHYSICAL THERAPY | Facility: CLINIC | Age: 33
End: 2023-12-06
Payer: COMMERCIAL

## 2023-12-06 ENCOUNTER — TELEPHONE (OUTPATIENT)
Dept: FAMILY MEDICINE CLINIC | Facility: CLINIC | Age: 33
End: 2023-12-06

## 2023-12-06 DIAGNOSIS — M25.551 PAIN IN RIGHT HIP: Primary | ICD-10-CM

## 2023-12-06 DIAGNOSIS — S76.011A STRAIN OF MUSCLE OF RIGHT HIP, INITIAL ENCOUNTER: ICD-10-CM

## 2023-12-06 PROCEDURE — G0283 ELEC STIM OTHER THAN WOUND: HCPCS | Performed by: PHYSICAL THERAPIST

## 2023-12-06 PROCEDURE — 97140 MANUAL THERAPY 1/> REGIONS: CPT | Performed by: PHYSICAL THERAPIST

## 2023-12-06 PROCEDURE — 97014 ELECTRIC STIMULATION THERAPY: CPT | Performed by: PHYSICAL THERAPIST

## 2023-12-06 NOTE — PROGRESS NOTES
PT Re-Evaluation     Today's date: 2023  Patient name: Oni Vásquez  : 1990  MRN: [id number]  Referring provider: Steven Kay MD  Dx:   Encounter Diagnosis     ICD-10-CM    1. Pain in right hip  M25.551                      Assessment  Assessment details: Since starting skilled PT, pain levels in R hip are decreased, lumbar pain remains elevated, lumbar ROM is improving, B LE strength is WFL's with good functional progress with her R hip, however lumbar spine pain continues. Recommend pt continue skilled PT on her lumbar spine. Pt gets good relief from TENS therapy in the clinic. Pt would benefit from a TENS unit for home use. Impairments: abnormal gait, abnormal or restricted ROM, activity intolerance, impaired physical strength and pain with function  Barriers to therapy: High co pay/ busy schedule  Understanding of Dx/Px/POC: good   Prognosis: good    Goals  STG's ( 3-4 weeks)  1. Pt will be independent in HEP- met  2. Pt will have improved lumbar ROM to WFL's- met  3. Normalize SI joint alignment- not met  LTG's ( 6- 8 weeks)  1. Improve FOTO score by 4-6 points- NT  2. Pt will have decreased pain to 2/10 at worst- not met  3. Pt will have less pain walking community distances- not met  4. Pt will have less pain when going up and down the steps- met for hip; not met for low back    Plan  Patient would benefit from: skilled physical therapy  Planned modality interventions: cryotherapy  Planned therapy interventions: joint mobilization, manual therapy, neuromuscular re-education, strengthening, stretching, therapeutic activities, therapeutic exercise, functional ROM exercises and flexibility  Frequency: 2x week  Duration in weeks: 6  Plan of Care beginning date: 2023  Plan of Care expiration date: 2024  Treatment plan discussed with: patient and PTA      Subjective Evaluation    History of Present Illness  Mechanism of injury:  I.E: Pt reports R hip pain onset about 3 months ago 2* unknown etiology. Pt wakes up achy at times 2* 4 y.o sleeping in bed with her at times. Pt had ovarian cysts ruled out and is normal. Pt has a history of fibromyalgia and has had more symptoms in the past 6-9 months. Pt has having intense, constant pain in her R hip. Pt is not able to lay on her R side and has increased pain when transferring sit to stand, walking distances in the grocery store or from the car into a building. Pt taking Aleve more regularly, and it takes the edge off, but is not pain free. Pt has pain in sitting and also when weightbearing and standing in her kitchen and when going up and down the steps. Pt has more pain when exercising on her [place] bike. 23; Pt is having significantly less hip pain. Pt can still feel the pain, in her hip. Pt has had the back pain for about 5 years, so it is hard for her to . Pt continues to have pain when transferring sit to stand to get out of the chair and the car. Pt has low back pain when standing and when going up and down the steps.     Work: office work part time  Hobbies: 11year old, twin 1year old boys; [place] bike  Gait; mild antalgic gait with decreased weight bearing on R LE  Patient Goals  Patient goals for therapy: decreased pain, independence with ADLs/IADLs and return to sport/leisure activities    Pain  At best pain ratin  At worst pain rating: 3  Location: R hip; lumbar spine: 4/10 best  8/10 worst  Quality: radiating, sharp and dull ache    Social Support  Steps to enter house: yes (2)  Stairs in house: yes (13)   Lives in: multiple-level home  Lives with: young children    Employment status: working    Diagnostic Tests  X-ray: normal  Treatments  No previous or current treatments      Objective     Neurological Testing     Sensation     Lumbar   Left   Intact: light touch    Right   Intact: light touch    Reflexes   Left   Patellar (L4): normal (2+)  Achilles (S1): normal (2+)    Right   Patellar (L4): normal (2+)  Achilles (S1): normal (2+)    Active Range of Motion     Lumbar   Flexion: 82 degrees  with pain  Extension: 15 degrees  with pain  Left lateral flexion: 25 degrees    with pain  Right lateral flexion: 25 degrees  with pain  Left rotation:  WFL and with pain  Right rotation:  WFL and with pain    Additional Active Range of Motion Details  In standing; symmetrical iliac crest and PSIS levels  (+) TTP R QL/ gluteus medius  (+) hypomobility lumbar spine with PAIVM testing  HS flexibility: WFL's with opposite knee flexed  (-) piriformis tightness  (+) SKTC  Pain with prone on elbows  (-) slump test  (+) supine to long sit test: R innominate forward rotated    Strength/Myotome Testing     Left Hip   Planes of Motion   Flexion: 4+  Extension: 4+ (pain)  Abduction: 5  External rotation: 4+  Internal rotation: 4+    Right Hip   Planes of Motion   Flexion: 4+  Extension: 4+ (pain)  Abduction: 5  External rotation: 4+  Internal rotation: 4+    Left Knee   Flexion: 5  Extension: 5    Right Knee   Flexion: 5  Extension: 5    Left Ankle/Foot   Dorsiflexion: 5    Right Ankle/Foot   Dorsiflexion: 5      Dx: R hip pain/ R SI pain/ LBP  EPOC: 11/27/23  CO-MORBIDITIES:   PERSONAL FACTORS: 10 y.o, twin 1 y.o boys  Precautions: none      Manuals 10/16 11/8 11/10 11/15 11/22 11/29 12/6      R QL/ iliac crest MFR   th JK JK  th      Progress note       th      Lumbar PA/UPA     JA I SL rotation mob  th      SI MET/ shot gun Th   th JK np NS 2'       G5 Reverse [place]      NS L & R          15' 13' 10' 10' 23'      Neuro Re-Ed             [facility]: abd bracing instruct 10x5" T/o          clamshells   10x3" OTB 10x3" OTB 10x5"        Bird dog     BTB 10xea BTB 10xea       bridges instruct 21x5" 10x5" 10x5" Pball 10x5"        DLS; dead bug   10 2# UE/2# LE 10 2# UE/2# LE20        Modified plank on knees      30"x1                    Ther Ex             Bike for LE ROM  5' 6' 6' 6' 6' 6'      HS stretch  10x5" ankle pumps           DKTC 10"x5       Cat stretch      10"x5       Camel w/ PPT      10"x5       Piriformis stretch   3x20" 3x20" 3x20" 10"x5       LTR   10"x5 10"x10 3x20" 10" 5 xtra       Glut stretch    3x20"         Hip hinge to wall     10# KB 10x 10# KB 10x       Sciatic n slider w/ ankle pump R LE  / HS stretch           HEP instruct 8'            Self MFR w/ tennis ball                          Ther Activity                                       Gait Training                                       Modalities             TENS post  10' 10' s/l  10' sl 10' sl 15' s/l

## 2023-12-06 NOTE — TELEPHONE ENCOUNTER
Kulwinder Babb called looking for her chest XR results. I called the radiology reading room and they will get it read.

## 2023-12-13 ENCOUNTER — CONSULT (OUTPATIENT)
Dept: PAIN MEDICINE | Facility: CLINIC | Age: 33
End: 2023-12-13
Payer: COMMERCIAL

## 2023-12-13 ENCOUNTER — APPOINTMENT (OUTPATIENT)
Dept: PHYSICAL THERAPY | Facility: CLINIC | Age: 33
End: 2023-12-13
Payer: COMMERCIAL

## 2023-12-13 VITALS
BODY MASS INDEX: 26.82 KG/M2 | HEIGHT: 65 IN | TEMPERATURE: 98.2 F | WEIGHT: 161 LBS | DIASTOLIC BLOOD PRESSURE: 78 MMHG | SYSTOLIC BLOOD PRESSURE: 128 MMHG | HEART RATE: 80 BPM

## 2023-12-13 DIAGNOSIS — G89.29 CHRONIC MIDLINE LOW BACK PAIN WITHOUT SCIATICA: ICD-10-CM

## 2023-12-13 DIAGNOSIS — M54.50 CHRONIC MIDLINE LOW BACK PAIN WITHOUT SCIATICA: ICD-10-CM

## 2023-12-13 DIAGNOSIS — M51.16 LUMBAR DISC DISEASE WITH RADICULOPATHY: Primary | ICD-10-CM

## 2023-12-13 PROBLEM — J32.4 CHRONIC PANSINUSITIS: Status: ACTIVE | Noted: 2022-11-28

## 2023-12-13 PROCEDURE — 99244 OFF/OP CNSLTJ NEW/EST MOD 40: CPT | Performed by: ANESTHESIOLOGY

## 2023-12-13 NOTE — PROGRESS NOTES
Assessment  1. Lumbar disc disease with radiculopathy    2. Chronic midline low back pain without sciatica        Plan      At this point the patient's pain persists despite time, relative rest, activity modification, and nonsteroidal anti-inflammatories. Her pain is significantly interfering with her daily living activities. She has undergone 2 months of physical therapy. It is appropriate to order MRI of the lumbar spine to rule out any significant etiology of her symptoms. Once we obtain MRI results, I will follow-up with the patient, review the results and current symptoms, and discuss the next steps of the treatment plan. My impressions and treatment recommendations were discussed in detail with the patient who verbalized understanding and had no further questions. Discharge instructions were provided. I personally saw and examined the patient and I agree with the above discussed plan of care. This note is created using dictation transcription. It may contain typographical errors, grammatical errors, improperly dictated words, background noise and other errors. Orders Placed This Encounter   Procedures    MRI lumbar spine without contrast     Standing Status:   Future     Standing Expiration Date:   12/13/2027     Scheduling Instructions: There is no preparation for this test. Please leave your jewelry and valuables at home, wedding rings are the exception. All patients will be required to change into a hospital gown and pants. Street clothes are not permitted in the MRI. Magnetic nail polish must be removed prior to arrival for your test. Please bring your insurance cards, a form of photo ID and a list of your medications with you. Arrive 15 minutes prior to your appointment time in order to register. Please bring any prior CT or MRI studies of this area that were not performed at a [facility].             To schedule this appointment, please contact Central Scheduling at [phone]. Prior to your appointment, please make sure you complete the MRI Screening Form when you e-Check in for your appointment. This will be available starting 7 days before your appointment in [address] [place] [address]. You may receive an e-mail with an activation code if you do not have a [employer] account. If you do not have access to a device, we will complete your screening at your appointment. Order Specific Question:   What is the patient's sedation requirement? If Medication for Claustrophobia is selected, order medication at this point. Answer:   No Sedation     Order Specific Question:   Is the patient pregnant? Answer:   No     Order Specific Question:   Does this procedure require the 3T MRI at [facility] or [place]?     Answer:   No     Order Specific Question:   Release to patient through [facility]     Answer:   Immediate     Order Specific Question:   Is order priority selected as STAT? Answer:   No     Order Specific Question:   Reason for Exam (FREE TEXT)     Answer:   low back pain    X-ray lumbar spine complete 4+ views     Standing Status:   Future     Standing Expiration Date:   12/13/2027     Scheduling Instructions:      Bring along any outside films relating to this procedure. Order Specific Question:   Is the patient pregnant? Answer:   Unknown     No orders of the defined types were placed in this encounter. Referred By: Adrien Webb MD  History of Present Illness    Benedetto Cranker is a 28 y.o. female with 5-year 6 months of worsening low back pain. This occurred after her 2 pregnancies. She has undergone physical therapy has tried nonsteroidal inflammatories but her pain persist visit moderate to severe. If she rates as 8 out of 10 the visual analog scale significant impairing with her daily living tibias. Is constant described as shooting pressure-like with a throbbing sensation.   She reports that lying down standing bending and sitting all aggravate her symptoms. Chiropractic treatment provide no relief where physical therapy provided moderate but short-term relief. Denies any loss of bowel or bladder function. I have personally reviewed and/or updated the patient's past medical history, past surgical history, family history, social history, current medications, allergies, and vital signs today. Review of Systems   Constitutional:  Negative for fever and unexpected weight change. HENT:  Negative for trouble swallowing. Eyes:  Negative for visual disturbance. Respiratory:  Negative for shortness of breath and wheezing. Cardiovascular:  Negative for chest pain and palpitations. Gastrointestinal:  Negative for constipation, diarrhea, nausea and vomiting. Endocrine: Negative for cold intolerance, heat intolerance and polydipsia. Genitourinary:  Negative for difficulty urinating and frequency. Musculoskeletal:  Positive for myalgias. Negative for arthralgias, gait problem and joint swelling. Skin:  Negative for rash. Neurological:  Negative for dizziness, seizures, syncope, weakness and headaches. Hematological:  Does not bruise/bleed easily. Psychiatric/Behavioral:  Negative for dysphoric mood. All other systems reviewed and are negative.       Patient Active Problem List   Diagnosis    PCOS (polycystic ovarian syndrome)    Overweight    PMDD (premenstrual dysphoric disorder)    Pain in right hip    [place] (obstructive sleep apnea)    Strep pharyngitis    Strain of muscle of right hip, initial encounter    Chronic pansinusitis       Past Medical History:   Diagnosis Date    Amenorrhea     last assessed: 08/22/2016    Anxiety     Female infertility     seen by [facility] - seen for 1 year, 5 cycles IUI acheived pregnancy via IVF    Fibromyalgia     Irregular menstrual cycle     last assessed: 09/26/2016    Osteoarthritis of right acromioclavicular joint     last assessment: 01/28/2013    Polycystic ovary syndrome Separation of right acromioclavicular joint     Last assessed: 2013; this is more consistent with ostiolysis of the distal clavicle vs. acromioclavicular joint arthritis, not acute shoulder seperation    Strain of muscle of right hip, initial encounter 2023    Varicella     vaccine       Past Surgical History:   Procedure Laterality Date     SECTION      DENTAL SURGERY      wisdom teeth    [place] N/A 2018    Procedure:  SECTION (); Surgeon: Alvarez Witt MD;  Location: [facility];  Service: Obstetrics    [place] N/A 2020    Procedure:  SECTION (); Surgeon: Alvarez Witt MD;  Location: [facility];  Service: Obstetrics    SHOULDER SURGERY      SINUS SURGERY  2023    TUBAL LIGATION Bilateral 2020    Procedure: LIGATION/COAGULATION TUBAL;  Surgeon:  Alvarez Witt MD;  Location: [facility];  Service: Obstetrics       Family History   Problem Relation Age of Onset    Fibromyalgia Mother     Hyperlipidemia Father     Hypertension Father     Diabetes Maternal Grandmother     Diabetes Paternal Grandfather     Cancer Maternal Grandfather        Social History     Occupational History    Not on file   Tobacco Use    Smoking status: Never    Smokeless tobacco: Never   Vaping Use    Vaping status: Never Used   Substance and Sexual Activity    Alcohol use: Not Currently     Alcohol/week: 1.0 standard drink of alcohol     Types: 1 Cans of beer per week    Drug use: No    Sexual activity: Yes     Partners: Male     Birth control/protection: OCP       Current Outpatient Medications on File Prior to Visit   Medication Sig    benzonatate (TESSALON) 200 MG capsule Take 1 capsule (200 mg total) by mouth 3 (three) times a day as needed for cough    cyclobenzaprine (FLEXERIL) 10 mg tablet Take 1 tablet (10 mg total) by mouth daily at bedtime as needed for muscle spasms    hydroquinone 4 % cream Apply topically daily at bedtime Use three months on, then take 1 month break and can repeat cycles. multivitamin (THERAGRAN) TABS Take 1 tablet by mouth daily    Nerve Stimulator (Standard TENS) ANA Use in the morning    norethindrone-ethinyl estradiol (Loestrin Fe 1/20) 1-20 MG-MCG per tablet Take 1 tablet by mouth daily    propranolol (INDERAL) 10 mg tablet Take 1 tablet (10 mg total) by mouth 2 (two) times a day For the first week take only one per day. semaglutide, 1 mg/dose, (Ozempic) 4 mg/3 mL injection pen Inject 0.75 mL (1 mg total) under the skin once a week    FLUoxetine (PROzac) 10 MG tablet TAKE 1 TABLET BY MOUTH EVERY DAY (Patient taking differently: daily Take 0.5 tablet daily)    metFORMIN (GLUCOPHAGE) 500 mg tablet Take 1 tablet (500 mg total) by mouth 2 (two) times a day with meals (Patient taking differently: Take 500 mg by mouth in the morning)    predniSONE 10 mg tablet Take 4 tablets daily with food for 2 days, 3 tablets daily for 2 days, 2 tablets daily for 2 days, 1 tablet daily for 2 days     No current facility-administered medications on file prior to visit. No Known Allergies    Physical Exam    /78 (BP Location: Left arm, Patient Position: Sitting, Cuff Size: Standard)   Pulse 80   Temp 98.2 °F (36.8 °C)   Ht 5' 5" (1.651 m)   Wt 73 kg (161 lb)   BMI 26.79 kg/m²     Constitutional: normal, well developed, well nourished, alert, in no distress and non-toxic and no overt pain behavior.  and overweight  Eyes: anicteric  HEENT: grossly intact  Neck: supple, symmetric, trachea midline and no masses   Pulmonary:even and unlabored  Cardiovascular:No edema or pitting edema present  Skin:Normal without rashes or lesions and well hydrated  Psychiatric:Mood and affect appropriate  Neurologic:Cranial Nerves II-XII grossly intact  Musculoskeletal:normal, difficulty going from sitting to standing sitting position; no obvious skin lesions or erythema lumbar sacral spine; mild tenderness in lumbar paravertebrals, no sacroiliac or greater trochanteric tenderness bilateral; deep tendon reflexes are diminished but symmetrical bilateral patellar and achilles; no focal motor deficit appreciated lower limbs; negative bilateral straight leg raising. Imaging  RIGHT HIP @  10-5-23     INDICATION:   M25.551: Pain in right hip. COMPARISON:  None     VIEWS:  XR HIP/PELV 2-3 VWS RIGHT W PELVIS IF PERFORMED        FINDINGS:     There is no acute fracture or dislocation. No significant hip degenerative changes. No lytic or blastic osseous lesion. Soft tissues are unremarkable. The visualized lumbar spine is unremarkable. IMPRESSION:     No acute osseous abnormality. I have personally reviewed pertinent films in PACS.   Normal exam

## 2023-12-14 ENCOUNTER — APPOINTMENT (OUTPATIENT)
Dept: RADIOLOGY | Facility: CLINIC | Age: 33
End: 2023-12-14
Payer: COMMERCIAL

## 2023-12-14 DIAGNOSIS — G89.29 CHRONIC MIDLINE LOW BACK PAIN WITHOUT SCIATICA: ICD-10-CM

## 2023-12-14 DIAGNOSIS — M54.50 CHRONIC MIDLINE LOW BACK PAIN WITHOUT SCIATICA: ICD-10-CM

## 2023-12-14 PROCEDURE — 72110 X-RAY EXAM L-2 SPINE 4/>VWS: CPT

## 2023-12-17 DIAGNOSIS — F43.23 ADJUSTMENT DISORDER WITH MIXED ANXIETY AND DEPRESSED MOOD: ICD-10-CM

## 2023-12-17 RX ORDER — FLUOXETINE 10 MG/1
TABLET, FILM COATED ORAL
Qty: 90 TABLET | Refills: 0 | Status: SHIPPED | OUTPATIENT
Start: 2023-12-17

## 2023-12-19 ENCOUNTER — HOSPITAL ENCOUNTER (OUTPATIENT)
Dept: RADIOLOGY | Facility: IMAGING CENTER | Age: 33
Discharge: HOME | End: 2023-12-19
Payer: COMMERCIAL

## 2023-12-19 DIAGNOSIS — G89.29 CHRONIC MIDLINE LOW BACK PAIN WITHOUT SCIATICA: ICD-10-CM

## 2023-12-19 DIAGNOSIS — M54.50 CHRONIC MIDLINE LOW BACK PAIN WITHOUT SCIATICA: ICD-10-CM

## 2023-12-19 PROCEDURE — G1004 CDSM NDSC: HCPCS

## 2023-12-19 PROCEDURE — 72148 MRI LUMBAR SPINE W/O DYE: CPT

## 2023-12-20 ENCOUNTER — OFFICE VISIT (OUTPATIENT)
Dept: PHYSICAL THERAPY | Facility: CLINIC | Age: 33
End: 2023-12-20
Payer: COMMERCIAL

## 2023-12-20 DIAGNOSIS — M25.551 PAIN IN RIGHT HIP: Primary | ICD-10-CM

## 2023-12-20 PROCEDURE — 97110 THERAPEUTIC EXERCISES: CPT

## 2023-12-20 PROCEDURE — 97014 ELECTRIC STIMULATION THERAPY: CPT

## 2023-12-20 PROCEDURE — 97112 NEUROMUSCULAR REEDUCATION: CPT

## 2023-12-20 PROCEDURE — G0283 ELEC STIM OTHER THAN WOUND: HCPCS

## 2023-12-20 PROCEDURE — 97140 MANUAL THERAPY 1/> REGIONS: CPT

## 2023-12-20 NOTE — PROGRESS NOTES
"Daily Note     Today's date: 2023  Patient name: Nell Baldwin  : 1990  MRN: [id number]  Referring provider: Rico Donovan MD  Dx:   Encounter Diagnosis     ICD-10-CM    1. Pain in right hip  M25.551                      Subjective: Pt reports she is having quite a bit of pain in the LB today.      Objective: See treatment diary below      Assessment: Tolerated treatment fair. Patient exhibited good technique with therapeutic exercises and would benefit from continued PT for cont'd work on pain relief and self management.  Pt distributed home TENS unit and educated in use.  Pt w/ tightness in QL and close to spine around L5.      Plan: Continue per plan of care.  Progress treatment as tolerated.       Dx: R hip pain/ R SI pain/ LBP  EPOC: 23  CO-MORBIDITIES:   PERSONAL FACTORS: 4 y.o, twin 3 y.o boys  Precautions: none      Manuals 10/16 11/8 11/10 11/15 11/22 11/29 12/6 12/20     R QL/ iliac crest MFR   th JK JK  th JK     Progress note       th      Lumbar PA/UPA     JA I SL rotation mob  th      SI MET/ shot gun Th   th JK np NS 2'       G5 Reverse [place]      NS L & R          15' 13' 10' 10' 23' 15'     Neuro Re-Ed             DLS: abd bracing instruct 10x5\" T/o          clamshells   10x3\" OTB 10x3\" OTB 10x5\"   OTB 10x5\"     Bird dog     BTB 10xea BTB 10xea  10xea     bridges instruct 21x5\" 10x5\" 10x5\" Pball 10x5\"   Pball 10x5\"     DLS; dead bug   10 2# UE/2# LE 10 2# UE/2# LE20   2# UE/2# LE20     Modified plank on knees      30\"x1                    Ther Ex             Bike for LE ROM  5' 6' 6' 6' 6' 6' 6'     HS stretch  10x5\" ankle pumps           DKTC      10\"x5       Cat stretch      10\"x5       Camel w/ PPT      10\"x5       Piriformis stretch   3x20\" 3x20\" 3x20\" 10\"x5       LTR   10\"x5 10\"x10 3x20\" 10\" 5 xtra       Glut stretch    3x20\"         Hip hinge to wall     10# KB 10x 10# KB 10x  10# KB 10x     Sciatic n slider w/ ankle pump R LE  / HS stretch           HEP " instruct 8'            Self MFR w/ tennis ball                          Ther Activity                                       Gait Training                                       Modalities             TENS post  10' 10' s/l  10' sl 10' sl 15' s/l Education on home unit

## 2023-12-27 ENCOUNTER — APPOINTMENT (OUTPATIENT)
Dept: PHYSICAL THERAPY | Facility: CLINIC | Age: 33
End: 2023-12-27
Payer: COMMERCIAL

## 2023-12-29 DIAGNOSIS — R00.2 PALPITATION: ICD-10-CM

## 2023-12-29 RX ORDER — PROPRANOLOL HYDROCHLORIDE 10 MG/1
10 TABLET ORAL 2 TIMES DAILY
Qty: 90 TABLET | Refills: 3 | Status: SHIPPED | OUTPATIENT
Start: 2023-12-29

## 2024-01-03 ENCOUNTER — EVALUATION (OUTPATIENT)
Dept: PHYSICAL THERAPY | Facility: CLINIC | Age: 34
End: 2024-01-03
Payer: COMMERCIAL

## 2024-01-03 ENCOUNTER — OFFICE VISIT (OUTPATIENT)
Dept: PAIN MEDICINE | Facility: CLINIC | Age: 34
End: 2024-01-03
Payer: COMMERCIAL

## 2024-01-03 VITALS
WEIGHT: 160 LBS | DIASTOLIC BLOOD PRESSURE: 72 MMHG | TEMPERATURE: 98 F | BODY MASS INDEX: 26.66 KG/M2 | HEIGHT: 65 IN | HEART RATE: 78 BPM | SYSTOLIC BLOOD PRESSURE: 116 MMHG

## 2024-01-03 DIAGNOSIS — M46.1 SACROILIITIS (HCC): Primary | ICD-10-CM

## 2024-01-03 DIAGNOSIS — G89.29 CHRONIC BILATERAL LOW BACK PAIN WITH BILATERAL SCIATICA: ICD-10-CM

## 2024-01-03 DIAGNOSIS — M54.42 CHRONIC BILATERAL LOW BACK PAIN WITH BILATERAL SCIATICA: ICD-10-CM

## 2024-01-03 DIAGNOSIS — M51.26 PROTRUSION OF LUMBAR INTERVERTEBRAL DISC: ICD-10-CM

## 2024-01-03 DIAGNOSIS — M25.551 PAIN IN RIGHT HIP: Primary | ICD-10-CM

## 2024-01-03 DIAGNOSIS — M54.41 CHRONIC BILATERAL LOW BACK PAIN WITH BILATERAL SCIATICA: ICD-10-CM

## 2024-01-03 PROCEDURE — G0283 ELEC STIM OTHER THAN WOUND: HCPCS | Performed by: PHYSICAL THERAPIST

## 2024-01-03 PROCEDURE — 97140 MANUAL THERAPY 1/> REGIONS: CPT | Performed by: PHYSICAL THERAPIST

## 2024-01-03 PROCEDURE — 99214 OFFICE O/P EST MOD 30 MIN: CPT | Performed by: PHYSICIAN ASSISTANT

## 2024-01-03 PROCEDURE — 97110 THERAPEUTIC EXERCISES: CPT | Performed by: PHYSICAL THERAPIST

## 2024-01-03 PROCEDURE — 97014 ELECTRIC STIMULATION THERAPY: CPT | Performed by: PHYSICAL THERAPIST

## 2024-01-03 NOTE — PROGRESS NOTES
Assessment:  1. Sacroiliitis (HCC)    2. Protrusion of lumbar intervertebral disc        Plan:  While the patient was in the office today, I did have a thorough conversation regarding their chronic pain syndrome, medication management, and treatment plan options.    Based on patient report and physical exam, the patient's symptomatology does seem to be consistent with sacroiliac mediated pain from sacroiliitis. We will schedule the patient for a bilateral SIJ injection to decrease any inflammatory component of the patient's pain symptoms.    The MRI of the lumbar spine reveals a small left foraminal disc protrusion at L4-5 with only mild left foraminal narrowing.  This does not correlate with her pain pattern.    At this point she will discontinue physical therapy as she has not been able to report any progression of improvement.    The patient was advised to contact the office should their symptoms worsen in the interim. The patient was agreeable and verbalized an understanding.        History of Present Illness:    The patient is a 33 y.o. female last seen on 12/13/2023 who presents for a follow up office visit in regards to chronic low back pain.  The patient currently reports chronic back pain that she presently rates a 7-8 out of 10 and describes it as a constant dull, aching, throbbing and pressure-like pain.  She has referred pain patterns into bilateral hips.  Patient has attended extensive physical therapy and unfortunately is not able to note any progression of improvement.  She has tried chiropractic therapy in the past with no relief either.  As stated previously, this pain began after her pregnancies, 1 of which was twins.  She has completed the MRI of the lumbar spine and presents today to review that and discuss treatment options.    I have personally reviewed and/or updated the patient's past medical history, past surgical history, family history, social history, current medications, allergies, and  vital signs today.       Review of Systems:    Review of Systems   Respiratory:  Negative for shortness of breath.    Cardiovascular:  Negative for chest pain.   Gastrointestinal:  Negative for constipation, diarrhea, nausea and vomiting.   Musculoskeletal:  Positive for gait problem. Negative for arthralgias, joint swelling and myalgias.   Skin:  Negative for rash.   Neurological:  Positive for weakness. Negative for dizziness and seizures.   All other systems reviewed and are negative.        Past Medical History:   Diagnosis Date   • Amenorrhea     last assessed: 2016   • Anxiety    • Female infertility     seen by [facility] - seen for 1 year, 5 cycles IUI acheived pregnancy via IVF   • Fibromyalgia    • Irregular menstrual cycle     last assessed: 2016   • Osteoarthritis of right acromioclavicular joint     last assessment: 2013   • Polycystic ovary syndrome    • Separation of right acromioclavicular joint     Last assessed: 2013; this is more consistent with ostiolysis of the distal clavicle vs. acromioclavicular joint arthritis, not acute shoulder seperation   • Strain of muscle of right hip, initial encounter 2023   • Varicella     vaccine       Past Surgical History:   Procedure Laterality Date   •  SECTION     • DENTAL SURGERY      wisdom teeth   • [place]  DELIVERY ONLY N/A 2018    Procedure:  SECTION ();  Surgeon: Ismael Marquez MD;  Location: [facility];  Service: Obstetrics   • MD  DELIVERY ONLY N/A 2020    Procedure:  SECTION ();  Surgeon: Ismael Marquez MD;  Location: [facility];  Service: Obstetrics   • SHOULDER SURGERY     • SINUS SURGERY  2023   • TUBAL LIGATION Bilateral 2020    Procedure: LIGATION/COAGULATION TUBAL;  Surgeon: Ismael Marquez MD;  Location: [facility];  Service: Obstetrics       Family History   Problem Relation Age of Onset   • Fibromyalgia Mother    • Hyperlipidemia Father    •  Hypertension Father    • Diabetes Maternal Grandmother    • Diabetes Paternal Grandfather    • Cancer Maternal Grandfather        Social History     Occupational History   • Not on file   Tobacco Use   • Smoking status: Never   • Smokeless tobacco: Never   Vaping Use   • Vaping status: Never Used   Substance and Sexual Activity   • Alcohol use: Not Currently     Alcohol/week: 1.0 standard drink of alcohol     Types: 1 Cans of beer per week   • Drug use: No   • Sexual activity: Yes     Partners: Male     Birth control/protection: OCP         Current Outpatient Medications:   •  FLUoxetine (PROzac) 10 MG tablet, TAKE 1 TABLET BY MOUTH EVERY DAY, Disp: 90 tablet, Rfl: 0  •  metFORMIN (GLUCOPHAGE) 500 mg tablet, Take 1 tablet (500 mg total) by mouth 2 (two) times a day with meals (Patient taking differently: Take 500 mg by mouth in the morning), Disp: 180 tablet, Rfl: 3  •  multivitamin (THERAGRAN) TABS, Take 1 tablet by mouth daily, Disp: , Rfl:   •  norethindrone-ethinyl estradiol (Loestrin Fe 1/20) 1-20 MG-MCG per tablet, Take 1 tablet by mouth daily, Disp: 90 tablet, Rfl: 3  •  propranolol (INDERAL) 10 mg tablet, TAKE 1 TABLET (10 MG TOTAL) BY MOUTH 2 (TWO) TIMES A DAY FOR THE FIRST WEEK TAKE ONLY ONE PER DAY., Disp: 90 tablet, Rfl: 3  •  semaglutide, 1 mg/dose, (Ozempic) 4 mg/3 mL injection pen, Inject 0.75 mL (1 mg total) under the skin once a week, Disp: 9 mL, Rfl: 1  •  cyclobenzaprine (FLEXERIL) 10 mg tablet, Take 1 tablet (10 mg total) by mouth daily at bedtime as needed for muscle spasms (Patient not taking: Reported on 1/3/2024), Disp: 20 tablet, Rfl: 0  •  hydroquinone 4 % cream, Apply topically daily at bedtime Use three months on, then take 1 month break and can repeat cycles., Disp: 30 g, Rfl: 3  •  Nerve Stimulator (Standard TENS) ANA, Use in the morning, Disp: 1 each, Rfl: 0    No Known Allergies    Physical Exam:    /72 (BP Location: Left arm, Patient Position: Sitting, Cuff Size: Standard)    "Pulse 78   Temp 98 °F (36.7 °C)   Ht 5' 5\" (1.651 m)   Wt 72.6 kg (160 lb)   LMP 11/23/2023 (Exact Date)   BMI 26.63 kg/m²     Constitutional:normal, well developed, well nourished, alert, in no distress and non-toxic and no overt pain behavior.  Eyes:anicteric  HEENT:grossly intact  Neck:supple, symmetric, trachea midline and no masses   Pulmonary:even and unlabored  Cardiovascular:No edema or pitting edema present  Skin:Normal without rashes or lesions and well hydrated  Psychiatric:Mood and affect appropriate  Neurologic:Cranial Nerves II-XII grossly intact  Musculoskeletal: Bilateral + Elodia finger sign + Patricks test +Gaenslen's test+ Posterior pelvic pain provocation      Imaging  MRI LUMBAR SPINE WITHOUT CONTRAST     INDICATION: M54.50: Low back pain, unspecified  G89.29: Other chronic pain.     COMPARISON: 7/14/2004     TECHNIQUE:  Multiplanar, multisequence imaging of the lumbar spine was performed. .        IMAGE QUALITY:  Diagnostic     FINDINGS:     VERTEBRAL BODIES:  There are 5 lumbar type vertebral bodies.  Normal alignment of the lumbar spine.  No spondylolysis or spondylolisthesis. No scoliosis.  No compression fracture.    Normal marrow signal is identified within the visualized bony   structures.  No discrete marrow lesion.     SACRUM:  Normal signal within the sacrum. No evidence of insufficiency or stress fracture.     DISTAL CORD AND CONUS:  Normal size and signal within the distal cord and conus.     PARASPINAL SOFT TISSUES:  Paraspinal soft tissues are unremarkable.     LOWER THORACIC DISC SPACES:  Normal disc height and signal.  No disc herniation, canal stenosis or foraminal narrowing.     LUMBAR DISC SPACES:     L1-L2:  Normal.     L2-L3:  Normal.     L3-L4:  Normal.     L4-L5: Small left foraminal disc protrusion. No significant canal stenosis. Mild left foraminal encroachment.     L5-S1:  Normal.     OTHER FINDINGS:  None.     IMPRESSION:     Small left foraminal disc protrusion " at L4-L5 with mild left foraminal narrowing. Otherwise no significant canal stenosis or foraminal narrowing identified.     FL spine and pain procedure    (Results Pending)         Orders Placed This Encounter   Procedures   • FL spine and pain procedure

## 2024-01-03 NOTE — PATIENT INSTRUCTIONS
Sacroiliac Joint Injection   WHAT YOU NEED TO KNOW:   What do I need to know about a sacroiliac joint injection?  A sacroiliac (SI) joint injection is done to diagnose or treat pain from sacroiliac joint syndrome. The pain caused by this syndrome may be felt in your lower back, buttocks, groin, and your thigh.   How do I prepare for an SI injection?  Your healthcare provider will ask you to not take any pain medicine the day of the injection. Ask him or her if there are any other medicines you should not take. You will need to find someone to drive you home after your procedure.  What will happen during the SI injection?  You will be awake for your injection. You may be given calming medicine if you are anxious.  You will lie on your stomach with a pillow under your abdomen. Your healthcare provider will give you an injection of medicine to numb the area. He or she may use an x-ray, ultrasound, or CT scan to find the area to inject. You may also be given an injection of contrast material to help your SI joint show up better. Then, your healthcare provider will inject local anesthesia, antiinflammatory medicine, or both into your SI joint.    Healthcare providers will watch you closely for any problems for up to 30 minutes after your injection. Your healthcare provider will check to see if your pain decreases after the injection.    What are the risks of an SI injection?  You may have some weakness for a short time after your injection. The SI injection can cause bleeding, infection, and pain. It can also cause temporary weakness in your leg and problems urinating. You may have an allergic reaction to the medicine that is injected into your SI joint. Your pain may return and you may need more treatment.  CARE AGREEMENT:   You have the right to help plan your care. Learn about your health condition and how it may be treated. Discuss treatment options with your healthcare providers to decide what care you want to  receive. You always have the right to refuse treatment. The above information is an  only. It is not intended as medical advice for individual conditions or treatments. Talk to your doctor, nurse or pharmacist before following any medical regimen to see if it is safe and effective for you.  © Copyright [employer] 2023 Information is for End User's use only and may not be sold, redistributed or otherwise used for commercial purposes.

## 2024-01-03 NOTE — PROGRESS NOTES
PT Re-Evaluation     Today's date: 1/3/2024  Patient name: Nell Baldwin  : 1990  MRN: [id number]  Referring provider: Rico Donovan MD  Dx:   Encounter Diagnosis     ICD-10-CM    1. Pain in right hip  M25.551                      Assessment  Assessment details: Pt has had a lapse of care in PT since 23 2* illness and family issues. Pt is having increased pain levels, decreased lumbar ROM and no significant change in functional activities. Pt is having a consult in pain management today. Please advise regarding continuing skilled PT.  Impairments: abnormal gait, abnormal or restricted ROM, activity intolerance, impaired physical strength and pain with function  Barriers to therapy: High co pay/ busy schedule  Understanding of Dx/Px/POC: good   Prognosis: good    Goals  STG's ( 3-4 weeks)  1. Pt will be independent in HEP- met  2. Pt will have improved lumbar ROM to WFL's- partial met  3. Normalize SI joint alignment- met  LTG's ( 6- 8 weeks)  1. Improve FOTO score by 4-6 points- NT  2. Pt will have decreased pain to 2/10 at worst- not met  3. Pt will have less pain walking community distances- not met  4. Pt will have less pain when going up and down the steps- met for hip; not met for low back    Plan  Patient would benefit from: skilled physical therapy  Planned modality interventions: cryotherapy  Planned therapy interventions: joint mobilization, manual therapy, neuromuscular re-education, strengthening, stretching, therapeutic activities, therapeutic exercise, functional ROM exercises and flexibility  Frequency: 2x week  Duration in weeks: 6  Plan of Care beginning date: 1/3/2024  Plan of Care expiration date: 2024  Treatment plan discussed with: patient and PTA        Subjective Evaluation    History of Present Illness  Mechanism of injury: I.E: Pt reports R hip pain onset about 3 months ago 2* unknown etiology. Pt wakes up achy at times 2* 4 y.o sleeping in bed with her at times.  Pt had ovarian cysts ruled out and is normal. Pt has a history of fibromyalgia and has had more symptoms in the past 6-9 months. Pt has having intense, constant pain in her R hip. Pt is not able to lay on her R side and has increased pain when transferring sit to stand, walking distances in the grocery store or from the car into a building. Pt taking Aleve more regularly, and it takes the edge off, but is not pain free. Pt has pain in sitting and also when weightbearing and standing in her kitchen and when going up and down the steps. Pt has more pain when exercising on her [place] bike.    23; Pt is having significantly less hip pain. Pt can still feel the pain, in her hip. Pt has had the back pain for about 5 years, so it is hard for her to . Pt continues to have pain when transferring sit to stand to get out of the chair and the car. Pt has low back pain when standing and when going up and down the steps.    1/3/24: Pt reports her low back pain is increasing to a 6/10 at best and 8/10 pain at worst. Pt has infrequent times over the past several years with radicular LE pain, and currently pt believes this is linked with her lumbar spine. Pt has tingling in B LE's and calf musculature. No other significant functional changes. Pt continues to have increased pain when bending forward to wrap Alicia presents, standing in the kitchen, and transferring sit to stand. Pt feels a cracking sensation in her sacral region. Pt is using a home TENS unit for pain relief.    Work: office work part time  Hobbies: 5 year old, twin 3 year old boys; [place] bike  Gait; mild antalgic gait with decreased weight bearing on R LE  Patient Goals  Patient goals for therapy: decreased pain, independence with ADLs/IADLs and return to sport/leisure activities    Pain  At best pain ratin  At worst pain rating: 3  Location: R hip; lumbar spine: 6/10 best  8/10 worst  Quality: radiating, sharp and dull ache    Social  Support  Steps to enter house: yes (2)  Stairs in house: yes (13)   Lives in: multiple-level home  Lives with: young children    Employment status: working    Diagnostic Tests  X-ray: normal  Treatments  No previous or current treatments        Objective     Neurological Testing     Sensation     Lumbar   Left   Intact: light touch    Right   Intact: light touch    Reflexes   Left   Patellar (L4): normal (2+)  Achilles (S1): normal (2+)    Right   Patellar (L4): normal (2+)  Achilles (S1): normal (2+)    Active Range of Motion     Lumbar   Flexion: 85 degrees  with pain  Extension: 5 degrees  with pain  Left lateral flexion: 20 degrees    with pain  Right lateral flexion: 20 degrees  with pain  Left rotation:  WFL and with pain  Right rotation:  WFL and with pain    Additional Active Range of Motion Details  In standing; symmetrical iliac crest and PSIS levels  (+) TTP R QL/ gluteus medius  (+) hypomobility lumbar spine with PAIVM testing  HS flexibility: WFL's with opposite knee flexed  (-) piriformis tightness  (+) SKTC  Pain with prone on elbows  (-) slump test  (+) supine to long sit test: R innominate forward rotated    Strength/Myotome Testing     Left Hip   Planes of Motion   Flexion: 4+  Extension: 4+ (pain)  Abduction: 5  External rotation: 4+  Internal rotation: 4+    Right Hip   Planes of Motion   Flexion: 4+  Extension: 4+ (pain)  Abduction: 5  External rotation: 4+  Internal rotation: 4+    Left Knee   Flexion: 5  Extension: 5    Right Knee   Flexion: 5  Extension: 5    Left Ankle/Foot   Dorsiflexion: 5    Right Ankle/Foot   Dorsiflexion: 5       Dx: R hip pain/ R SI pain/ LBP  EPOC: 11/27/23  CO-MORBIDITIES:   PERSONAL FACTORS: 4 y.o, twin 3 y.o boys  Precautions: none      Manuals 10/16 11/8 11/10 11/15 11/22 11/29 12/6 12/20 1/3    R QL/ iliac crest MFR   th JK JK  th JK th    Progress note       th th    Lumbar PA/UPA     JA I SL rotation mob  th th    SI MET/ shot gun Th   th JK np NS 2'       G5  "Reverse [place]      NS L & R   Hold        15' 13' 10' 10' 23' 15' 30'    Neuro Re-Ed             DLS: abd bracing instruct 10x5\" T/o          clamshells   10x3\" OTB 10x3\" OTB 10x5\"   OTB 10x5\"     Bird dog     BTB 10xea BTB 10xea  10xea     bridges instruct 21x5\" 10x5\" 10x5\" Pball 10x5\"   Pball 10x5\"     DLS; dead bug   10 2# UE/2# LE 10 2# UE/2# LE20   2# UE/2# LE20     Modified plank on knees      30\"x1                    Ther Ex             Bike for LE ROM  5' 6' 6' 6' 6' 6' 6' TM x5.5'    HS stretch  10x5\" ankle pumps           DKTC      10\"x5       Cat stretch      10\"x5       Camel w/ PPT      10\"x5       Piriformis stretch   3x20\" 3x20\" 3x20\" 10\"x5   3x20\"    LTR   10\"x5 10\"x10 3x20\" 10\" 5 xtra   3x20\"    Glut stretch    3x20\"         Hip hinge to wall     10# KB 10x 10# KB 10x  10# KB 10x     Sciatic n slider w/ ankle pump R LE  / HS stretch       10 pumps x3    HEP instruct 8'            Self MFR w/ tennis ball                          Ther Activity                                       Gait Training                                       Modalities             TENS post  10' 10' s/l  10' sl 10' sl 15' s/l Education on home unit                                "

## 2024-01-10 ENCOUNTER — APPOINTMENT (OUTPATIENT)
Dept: PHYSICAL THERAPY | Facility: CLINIC | Age: 34
End: 2024-01-10
Payer: COMMERCIAL

## 2024-01-12 ENCOUNTER — RA CDI HCC (OUTPATIENT)
Dept: OTHER | Facility: HOSPITAL | Age: 34
End: 2024-01-12

## 2024-01-17 ENCOUNTER — APPOINTMENT (OUTPATIENT)
Dept: PHYSICAL THERAPY | Facility: CLINIC | Age: 34
End: 2024-01-17
Payer: COMMERCIAL

## 2024-01-22 ENCOUNTER — HOSPITAL ENCOUNTER (OUTPATIENT)
Dept: RADIOLOGY | Facility: CLINIC | Age: 34
Discharge: HOME | End: 2024-01-22
Payer: COMMERCIAL

## 2024-01-22 VITALS
OXYGEN SATURATION: 96 % | DIASTOLIC BLOOD PRESSURE: 74 MMHG | RESPIRATION RATE: 18 BRPM | SYSTOLIC BLOOD PRESSURE: 108 MMHG | TEMPERATURE: 97.6 F | HEART RATE: 66 BPM

## 2024-01-22 DIAGNOSIS — M46.1 SACROILIITIS (HCC): ICD-10-CM

## 2024-01-22 PROCEDURE — 27096 INJECT SACROILIAC JOINT: CPT | Performed by: ANESTHESIOLOGY

## 2024-01-22 RX ORDER — ROPIVACAINE HYDROCHLORIDE 2 MG/ML
2 INJECTION, SOLUTION EPIDURAL; INFILTRATION; PERINEURAL ONCE
Status: COMPLETED | OUTPATIENT
Start: 2024-01-22 | End: 2024-01-22

## 2024-01-22 RX ORDER — METHYLPREDNISOLONE ACETATE 80 MG/ML
80 INJECTION, SUSPENSION INTRA-ARTICULAR; INTRALESIONAL; INTRAMUSCULAR; PARENTERAL; SOFT TISSUE ONCE
Status: COMPLETED | OUTPATIENT
Start: 2024-01-22 | End: 2024-01-22

## 2024-01-22 RX ADMIN — IOHEXOL 0.4 ML: 300 INJECTION, SOLUTION INTRAVENOUS at 11:21

## 2024-01-22 RX ADMIN — ROPIVACAINE HYDROCHLORIDE 2 ML: 2 INJECTION, SOLUTION EPIDURAL; INFILTRATION at 11:22

## 2024-01-22 RX ADMIN — METHYLPREDNISOLONE ACETATE 80 MG: 80 INJECTION, SUSPENSION INTRA-ARTICULAR; INTRALESIONAL; INTRAMUSCULAR; SOFT TISSUE at 11:22

## 2024-01-22 NOTE — DISCHARGE INSTRUCTIONS

## 2024-01-22 NOTE — H&P
History of Present Illness: The patient is a 33 y.o. female who presents with complaints of low back pain.    Past Medical History:   Diagnosis Date    Amenorrhea     last assessed: 2016    Anxiety     Female infertility     seen by [facility] - seen for 1 year, 5 cycles IUI acheived pregnancy via IVF    Fibromyalgia     Irregular menstrual cycle     last assessed: 2016    Osteoarthritis of right acromioclavicular joint     last assessment: 2013    Polycystic ovary syndrome     Separation of right acromioclavicular joint     Last assessed: 2013; this is more consistent with ostiolysis of the distal clavicle vs. acromioclavicular joint arthritis, not acute shoulder seperation    Strain of muscle of right hip, initial encounter 2023    Varicella     vaccine       Past Surgical History:   Procedure Laterality Date     SECTION      DENTAL SURGERY      wisdom teeth    [place]  DELIVERY ONLY N/A 2018    Procedure:  SECTION ();  Surgeon: Ismael Marquez MD;  Location: [facility];  Service: Obstetrics    [place]  DELIVERY ONLY N/A 2020    Procedure:  SECTION ();  Surgeon: Ismael Marquez MD;  Location: [facility];  Service: Obstetrics    SHOULDER SURGERY      SINUS SURGERY  2023    TUBAL LIGATION Bilateral 2020    Procedure: LIGATION/COAGULATION TUBAL;  Surgeon: Ismael Marquez MD;  Location: [facility];  Service: Obstetrics         Current Outpatient Medications:     cyclobenzaprine (FLEXERIL) 10 mg tablet, Take 1 tablet (10 mg total) by mouth daily at bedtime as needed for muscle spasms (Patient not taking: Reported on 1/3/2024), Disp: 20 tablet, Rfl: 0    FLUoxetine (PROzac) 10 MG tablet, TAKE 1 TABLET BY MOUTH EVERY DAY, Disp: 90 tablet, Rfl: 0    hydroquinone 4 % cream, Apply topically daily at bedtime Use three months on, then take 1 month break and can repeat cycles., Disp: 30 g, Rfl: 3    metFORMIN (GLUCOPHAGE) 500 mg tablet,  Take 1 tablet (500 mg total) by mouth 2 (two) times a day with meals (Patient taking differently: Take 500 mg by mouth in the morning), Disp: 180 tablet, Rfl: 3    multivitamin (THERAGRAN) TABS, Take 1 tablet by mouth daily, Disp: , Rfl:     Nerve Stimulator (Standard TENS) ANA, Use in the morning, Disp: 1 each, Rfl: 0    norethindrone-ethinyl estradiol (Loestrin Fe 1/20) 1-20 MG-MCG per tablet, Take 1 tablet by mouth daily, Disp: 90 tablet, Rfl: 3    propranolol (INDERAL) 10 mg tablet, TAKE 1 TABLET (10 MG TOTAL) BY MOUTH 2 (TWO) TIMES A DAY FOR THE FIRST WEEK TAKE ONLY ONE PER DAY., Disp: 90 tablet, Rfl: 3    semaglutide, 1 mg/dose, (Ozempic) 4 mg/3 mL injection pen, Inject 0.75 mL (1 mg total) under the skin once a week, Disp: 9 mL, Rfl: 1    Current Facility-Administered Medications:     iohexol (OMNIPAQUE) 300 mg/mL injection 0.4 mL, 0.4 mL, Intra-articular, Once, Arnoldo Holliday DO    methylPREDNISolone acetate (DEPO-MEDROL) injection 80 mg, 80 mg, Intra-articular, Once, Arnoldo Holliday DO    ropivacaine (NAROPIN) injection 2 mL, 2 mL, Intra-articular, Once, Arnoldo Holliday DO    No Known Allergies    Physical Exam:   General: Awake, Alert, Oriented x 3, Mood and affect appropriate  Respiratory: Respirations even and unlabored  Cardiovascular: Peripheral pulses intact; no edema  Musculoskeletal Exam: Normal gait    ASA Score: II    Patient/Chart Verification  Patient ID Verified: Verbal  ID Band Applied: No  Consents Confirmed: Procedural, To be obtained in the Pre-Procedure area  Interval H&P(within 24 hr) Complete (required for Outpatients and Surgery Admit only): To be obtained in the Pre-Procedure area  Allergies Reviewed: Yes  Anticoag/NSAID held?: NA  Currently on antibiotics?: No  Pregnancy denied?: Yes    Assessment:   1. Sacroiliitis (HCC)        Plan: b/l SIJ

## 2024-01-24 ENCOUNTER — APPOINTMENT (OUTPATIENT)
Dept: PHYSICAL THERAPY | Facility: CLINIC | Age: 34
End: 2024-01-24
Payer: COMMERCIAL

## 2024-01-29 ENCOUNTER — TELEPHONE (OUTPATIENT)
Dept: PAIN MEDICINE | Facility: CLINIC | Age: 34
End: 2024-01-29

## 2024-01-29 NOTE — TELEPHONE ENCOUNTER
Pt reports no improvement post inj   Pain level 6-7/10  She said it feels worse  Pt aware I will call next week for an update    B/L SIJ 1/22, 2/12 F/u

## 2024-01-31 ENCOUNTER — APPOINTMENT (OUTPATIENT)
Dept: PHYSICAL THERAPY | Facility: CLINIC | Age: 34
End: 2024-01-31
Payer: COMMERCIAL

## 2024-01-31 DIAGNOSIS — R63.5 WEIGHT GAIN: ICD-10-CM

## 2024-02-05 NOTE — TELEPHONE ENCOUNTER
Patient Reports      no   improvement post injection. It seems like it got worse    Pain Level   7  /10   Keeps her up at night.

## 2024-02-05 NOTE — TELEPHONE ENCOUNTER
Please see if she would like to move her follow-up appointment if there is availability with NP/PA

## 2024-02-06 ENCOUNTER — OFFICE VISIT (OUTPATIENT)
Dept: PAIN MEDICINE | Facility: CLINIC | Age: 34
End: 2024-02-06
Payer: COMMERCIAL

## 2024-02-06 VITALS
HEIGHT: 65 IN | SYSTOLIC BLOOD PRESSURE: 116 MMHG | WEIGHT: 159 LBS | DIASTOLIC BLOOD PRESSURE: 76 MMHG | TEMPERATURE: 98 F | BODY MASS INDEX: 26.49 KG/M2 | HEART RATE: 75 BPM

## 2024-02-06 DIAGNOSIS — M46.1 SACROILIITIS (HCC): Primary | ICD-10-CM

## 2024-02-06 DIAGNOSIS — G89.29 CHRONIC BILATERAL LOW BACK PAIN WITHOUT SCIATICA: ICD-10-CM

## 2024-02-06 DIAGNOSIS — M54.50 CHRONIC BILATERAL LOW BACK PAIN WITHOUT SCIATICA: ICD-10-CM

## 2024-02-06 PROCEDURE — 99214 OFFICE O/P EST MOD 30 MIN: CPT | Performed by: PHYSICIAN ASSISTANT

## 2024-02-06 RX ORDER — METHYLPREDNISOLONE 4 MG/1
TABLET ORAL
Qty: 1 EACH | Refills: 0 | Status: SHIPPED | OUTPATIENT
Start: 2024-02-06

## 2024-02-06 RX ORDER — METHYLPREDNISOLONE 4 MG/1
TABLET ORAL
Qty: 1 EACH | Refills: 0 | Status: SHIPPED | OUTPATIENT
Start: 2024-02-06 | End: 2024-02-06 | Stop reason: SDUPTHER

## 2024-02-06 RX ORDER — TIZANIDINE 2 MG/1
2 TABLET ORAL
Qty: 60 TABLET | Refills: 2 | Status: SHIPPED | OUTPATIENT
Start: 2024-02-06

## 2024-02-06 NOTE — PROGRESS NOTES
Assessment:  1. Sacroiliitis (HCC)    2. Chronic bilateral low back pain without sciatica        Plan:  While the patient was in the office today, I did have a thorough conversation regarding their chronic pain syndrome, medication management, and treatment plan options.    Unfortunately the patient was unable to note any improvement status post sacroiliac joint injections.  On today's visit I have prescribed a Medrol Dosepak to take as directed to calm down the flareup she is currently experiencing.  We will also proceed with tizanidine 2 to 4 mg nightly as needed.  I advised the patient that they should not drive or operate machinery while on this medication until they see how it affects them, as it could cause lethargy and mental cloudiness. I advised the patient to call our office if they experience any side effects or issues with the medication changes. The patient verbalized an understanding.    I have referred the patient to Dr. Chanel to address the myofascial component of her pain pattern.    The patient was advised to contact the office should their symptoms worsen in the interim. The patient was agreeable and verbalized an understanding.        History of Present Illness:    The patient is a 33 y.o. female last seen on 1/22/2024 who presents for a follow up office visit in regards to chronic low back pain secondary to sacroiliitis/sacroiliac joint dysfunction with myofascial features.   The patient currently reports an increase in low back pain that she presently rates a 7 out of 10 and describes it as a constant dull, aching, sharp, throbbing and pressure-like pain.  She reports referred pain patterns into bilateral hips.  Patient denies any lower extremity radicular pain.  She states at times her legs feel restless and she does have difficulty sleeping at night.  Patient underwent bilateral sacroiliac joint injections on 1/22/2024 and unfortunately is unable to note any improvement.  Patient states  that she feels that her pain became worse.  MRI reviewed again which reveals only a small L4-5 disc protrusion with no significant narrowing.  She has tried and failed physical therapy as well as chiropractic therapy in the past.    I have personally reviewed and/or updated the patient's past medical history, past surgical history, family history, social history, current medications, allergies, and vital signs today.       Review of Systems:    Review of Systems   Respiratory:  Negative for shortness of breath.    Cardiovascular:  Negative for chest pain.   Gastrointestinal:  Negative for constipation, diarrhea, nausea and vomiting.   Musculoskeletal:  Negative for arthralgias, gait problem, joint swelling and myalgias.   Skin:  Negative for rash.   Neurological:  Negative for dizziness, seizures and weakness.   All other systems reviewed and are negative.        Past Medical History:   Diagnosis Date   • Amenorrhea     last assessed: 2016   • Anxiety    • Female infertility     seen by [facility] - seen for 1 year, 5 cycles IUI acheived pregnancy via IVF   • Fibromyalgia    • Irregular menstrual cycle     last assessed: 2016   • Osteoarthritis of right acromioclavicular joint     last assessment: 2013   • Polycystic ovary syndrome    • Separation of right acromioclavicular joint     Last assessed: 2013; this is more consistent with ostiolysis of the distal clavicle vs. acromioclavicular joint arthritis, not acute shoulder seperation   • Strain of muscle of right hip, initial encounter 2023   • Varicella     vaccine       Past Surgical History:   Procedure Laterality Date   •  SECTION     • DENTAL SURGERY      wisdom teeth   • [place]  DELIVERY ONLY N/A 2018    Procedure:  SECTION ();  Surgeon: Ismael Marquez MD;  Location: [facility];  Service: Obstetrics   • PA  DELIVERY ONLY N/A 2020    Procedure:  SECTION ();   Surgeon: Ismael Marquez MD;  Location: [facility];  Service: Obstetrics   • SHOULDER SURGERY     • SINUS SURGERY  07/2023   • TUBAL LIGATION Bilateral 09/17/2020    Procedure: LIGATION/COAGULATION TUBAL;  Surgeon: Ismael Marquez MD;  Location: [facility];  Service: Obstetrics       Family History   Problem Relation Age of Onset   • Fibromyalgia Mother    • Hyperlipidemia Father    • Hypertension Father    • Diabetes Maternal Grandmother    • Diabetes Paternal Grandfather    • Cancer Maternal Grandfather        Social History     Occupational History   • Not on file   Tobacco Use   • Smoking status: Never   • Smokeless tobacco: Never   Vaping Use   • Vaping status: Never Used   Substance and Sexual Activity   • Alcohol use: Not Currently     Alcohol/week: 1.0 standard drink of alcohol     Types: 1 Cans of beer per week   • Drug use: No   • Sexual activity: Yes     Partners: Male     Birth control/protection: OCP         Current Outpatient Medications:   •  FLUoxetine (PROzac) 10 MG tablet, TAKE 1 TABLET BY MOUTH EVERY DAY, Disp: 90 tablet, Rfl: 0  •  metFORMIN (GLUCOPHAGE) 500 mg tablet, Take 1 tablet (500 mg total) by mouth 2 (two) times a day with meals, Disp: 180 tablet, Rfl: 3  •  methylPREDNISolone 4 MG tablet therapy pack, Use as directed on package, Disp: 1 each, Rfl: 0  •  multivitamin (THERAGRAN) TABS, Take 1 tablet by mouth daily, Disp: , Rfl:   •  norethindrone-ethinyl estradiol (Loestrin Fe 1/20) 1-20 MG-MCG per tablet, Take 1 tablet by mouth daily, Disp: 90 tablet, Rfl: 3  •  propranolol (INDERAL) 10 mg tablet, TAKE 1 TABLET (10 MG TOTAL) BY MOUTH 2 (TWO) TIMES A DAY FOR THE FIRST WEEK TAKE ONLY ONE PER DAY., Disp: 90 tablet, Rfl: 3  •  semaglutide, 1 mg/dose, (Ozempic) 4 mg/3 mL injection pen, Inject 0.75 mL (1 mg total) under the skin once a week, Disp: 9 mL, Rfl: 1  •  tiZANidine (ZANAFLEX) 2 mg tablet, Take 1 tablet (2 mg total) by mouth daily at bedtime, Disp: 60 tablet, Rfl: 2  •  hydroquinone 4 % cream,  "Apply topically daily at bedtime Use three months on, then take 1 month break and can repeat cycles., Disp: 30 g, Rfl: 3  •  Nerve Stimulator (Standard TENS) ANA, Use in the morning, Disp: 1 each, Rfl: 0    No Known Allergies    Physical Exam:    /76 (BP Location: Left arm, Patient Position: Sitting, Cuff Size: Standard)   Pulse 75   Temp 98 °F (36.7 °C)   Ht 5' 5\" (1.651 m)   Wt 72.1 kg (159 lb)   BMI 26.46 kg/m²     Constitutional:normal, well developed, well nourished, alert, in no distress and non-toxic and no overt pain behavior.  Eyes:anicteric  HEENT:grossly intact  Neck:supple, symmetric, trachea midline and no masses   Pulmonary:even and unlabored  Cardiovascular:No edema or pitting edema present  Skin:Normal without rashes or lesions and well hydrated  Psychiatric:Mood and affect appropriate  Neurologic:Cranial Nerves II-XII grossly intact  Musculoskeletal: Gait is stable without the use of assistive devices, tender bilateral sacroiliac joints.  Full range of motion of the lumbar spine.      Imaging  MRI LUMBAR SPINE WITHOUT CONTRAST     INDICATION: M54.50: Low back pain, unspecified  G89.29: Other chronic pain.     COMPARISON: 7/14/2004     TECHNIQUE:  Multiplanar, multisequence imaging of the lumbar spine was performed. .        IMAGE QUALITY:  Diagnostic     FINDINGS:     VERTEBRAL BODIES:  There are 5 lumbar type vertebral bodies.  Normal alignment of the lumbar spine.  No spondylolysis or spondylolisthesis. No scoliosis.  No compression fracture.    Normal marrow signal is identified within the visualized bony   structures.  No discrete marrow lesion.     SACRUM:  Normal signal within the sacrum. No evidence of insufficiency or stress fracture.     DISTAL CORD AND CONUS:  Normal size and signal within the distal cord and conus.     PARASPINAL SOFT TISSUES:  Paraspinal soft tissues are unremarkable.     LOWER THORACIC DISC SPACES:  Normal disc height and signal.  No disc herniation, canal " stenosis or foraminal narrowing.     LUMBAR DISC SPACES:     L1-L2:  Normal.     L2-L3:  Normal.     L3-L4:  Normal.     L4-L5: Small left foraminal disc protrusion. No significant canal stenosis. Mild left foraminal encroachment.     L5-S1:  Normal.     OTHER FINDINGS:  None.     IMPRESSION:     Small left foraminal disc protrusion at L4-L5 with mild left foraminal narrowing. Otherwise no significant canal stenosis or foraminal narrowing identified.  No orders to display         Orders Placed This Encounter   Procedures   • Ambulatory Referral to Neurology

## 2024-02-09 ENCOUNTER — TELEPHONE (OUTPATIENT)
Age: 34
End: 2024-02-09

## 2024-02-09 NOTE — TELEPHONE ENCOUNTER
Caller: pt    Doctor: inna    Reason for call: pt is calling b/c she thinks she would like to get more injections.    Call back#: [phone]

## 2024-02-09 NOTE — TELEPHONE ENCOUNTER
S/w pt, stated that she is having relief today with the medrol dose pack - today is the 3rd day. Pt feels that possibly she should continue with the steroid injections as she is having relief now. Advised pt, the writer would recommend completing the medrol dose pack and calling back with an update on her pain to determine if it's one good day or if the steroids are helping. Advised pt, the writer will d/w MG and cb if there is anything different or additional. Pt verbalized understanding and appreciation. Will cb next week as directed.

## 2024-02-09 NOTE — TELEPHONE ENCOUNTER
Caller: patient    Doctor: everette    Reason for call: calling back, transfer to RN    Call back#:

## 2024-02-11 DIAGNOSIS — R00.2 PALPITATION: ICD-10-CM

## 2024-02-13 RX ORDER — PROPRANOLOL HYDROCHLORIDE 10 MG/1
10 TABLET ORAL 2 TIMES DAILY
Qty: 90 TABLET | Refills: 3 | Status: SHIPPED | OUTPATIENT
Start: 2024-02-13

## 2024-02-16 DIAGNOSIS — R63.5 WEIGHT GAIN: Primary | ICD-10-CM

## 2024-02-16 RX ORDER — PHENTERMINE HYDROCHLORIDE 15 MG/1
15 CAPSULE ORAL EVERY MORNING
Qty: 30 CAPSULE | Refills: 0 | Status: SHIPPED | OUTPATIENT
Start: 2024-02-16

## 2024-02-20 DIAGNOSIS — N64.4 BREAST PAIN IN FEMALE: Primary | ICD-10-CM

## 2024-02-29 ENCOUNTER — OFFICE VISIT (OUTPATIENT)
Dept: CARDIOLOGY CLINIC | Facility: CLINIC | Age: 34
End: 2024-02-29
Payer: COMMERCIAL

## 2024-02-29 VITALS
BODY MASS INDEX: 26.49 KG/M2 | SYSTOLIC BLOOD PRESSURE: 104 MMHG | DIASTOLIC BLOOD PRESSURE: 72 MMHG | OXYGEN SATURATION: 96 % | WEIGHT: 159 LBS | HEART RATE: 64 BPM | HEIGHT: 65 IN

## 2024-02-29 DIAGNOSIS — R00.2 PALPITATION: Primary | ICD-10-CM

## 2024-02-29 DIAGNOSIS — R07.9 CHEST PAIN, UNSPECIFIED TYPE: ICD-10-CM

## 2024-02-29 PROCEDURE — 99214 OFFICE O/P EST MOD 30 MIN: CPT | Performed by: INTERNAL MEDICINE

## 2024-02-29 NOTE — PROGRESS NOTES
Cardiology   Nell Baldwin 33 y.o. female MRN: [id number]  Unit/Bed#:  Encounter: [id number]        Reason for Consult / Principal Problem:PACs    Physician Requesting Consult:  Susan Garrido MD    PCP: Susan Garrido MD        Assessment/Plan:    >> PACs- 14% of all beats on Holter, echo is unremarkable  >> PCOS  >> Back pain  >> Fibromyalgia  >>Chest pain: Noncardiac, tenderness to palpation, positional changes      Plan:    -She is doing well on 10 mg of propranolol daily-will continue this.  Her symptoms are well-controlled with this dose.  - Follow up in 3 months  -Her chest pain sounds musculoskeletal/neuropathic.  Advised topical pain control and Tylenol, if symptoms change or progress can consider coronary CTA though at this time it is not indicated.       2/29/2024: Overall is doing well, feels like her palpitations are well-controlled with the propranolol, she is drinking 16 ounces of coffee a day.  Her thyroid function was normal.  On days she does not take the propranolol she does have the sensation of palpitations.  She also complains of chest pain that started about 2 months ago it is located in the left lower chest at the costal chondral joint.  It is worse with twisting movements and tender to palpation.  Sometimes it is worse when she coughs.  It is not associated with nausea vomiting diaphoresis or shortness of breath.    CC: Palpitations    HPI  33 y.o. female with palpitations for about a year. When she was having sinus surgery over the summer they noted PACs on her tele. She went to her PCP and had a holter and echo;. Echo was ok but her holter showed 14% burden of PACs.     She denies chest pain, shortness of breath, orthopnea, PND, pedal edema, syncope, presyncope, diaphoresis, nausea/vomiting     She is otherwise healthy and exercises on a Peloton for 20-30 mins  a day    Does not drink smoke or use drugs    No known drug allergies    No chance of pregnancy- undergone tubal  "ligation     Takes metformin for PCOS and Ozempic for weight loss and PCOS.     TSH was unremarkable in June.                     Physical exam  Objective   Vitals: Blood pressure 104/72, pulse 64, height 5' 5\" (1.651 m), weight 72.1 kg (159 lb), SpO2 96%, not currently breastfeeding.    General:  AO x3, no acute distress  Cardiac:  S1-S2 normal  No murmurs, rubs or gallops, extra heart beats noted JVP: none, tenderness to palpation over the costochondral joint in the lower left chest  Lungs:  Clear to auscultation bilaterally, no wheezing or crackles.  Abdomen:  Soft, nontender, nondistended.  Extremities:  Warm, well perfused, pulses palpable, no ulcers or rashes. Edema:none  Neuro: Grossly nonfocal        ======================================================  TREADMILL STRESS  No results found for this or any previous visit.     ----------------------------------------------------------------------------------------------  NUCLEAR STRESS TEST: No results found for this or any previous visit.    No results found for this or any previous visit.      --------------------------------------------------------------------------------  CATH:  No results found for this or any previous visit.    --------------------------------------------------------------------------------  ECHO:   9/2023:   Left Ventricle: Left ventricular cavity size is normal. Wall thickness is normal. The left ventricular ejection fraction is 55%. Systolic function is normal. Wall motion is normal. Diastolic function is normal.    Right Ventricle: Right ventricular cavity size is normal. Systolic function is normal.    Mitral Valve: There is trace regurgitation.    --------------------------------------------------------------------------------  HOLTER  No results found for this or any previous visit.    --------------------------------------------------------------------------------  CAROTIDS  No results found for this or any previous visit.   "     There are no diagnoses linked to this encounter.     ======================================================          Review of Systems  ROS as noted above, otherwise 12 point review of systems was performed and is negative.     Historical Information   Past Medical History:   Diagnosis Date    Amenorrhea     last assessed: 2016    Anxiety     Female infertility     seen by [facility] - seen for 1 year, 5 cycles IUI acheived pregnancy via IVF    Fibromyalgia     Irregular menstrual cycle     last assessed: 2016    Osteoarthritis of right acromioclavicular joint     last assessment: 2013    Polycystic ovary syndrome     Separation of right acromioclavicular joint     Last assessed: 2013; this is more consistent with ostiolysis of the distal clavicle vs. acromioclavicular joint arthritis, not acute shoulder seperation    Strain of muscle of right hip, initial encounter 2023    Varicella     vaccine     Past Surgical History:   Procedure Laterality Date     SECTION      DENTAL SURGERY      wisdom teeth    MD  DELIVERY ONLY N/A 2018    Procedure:  SECTION ();  Surgeon: Ismael Marquez MD;  Location: [facility];  Service: Obstetrics    MD  DELIVERY ONLY N/A 2020    Procedure:  SECTION ();  Surgeon: Ismael Marquez MD;  Location: [facility];  Service: Obstetrics    SHOULDER SURGERY      SINUS SURGERY  2023    TUBAL LIGATION Bilateral 2020    Procedure: LIGATION/COAGULATION TUBAL;  Surgeon: Ismael Marquez MD;  Location: [facility];  Service: Obstetrics     Social History     Substance and Sexual Activity   Alcohol Use Not Currently    Alcohol/week: 1.0 standard drink of alcohol    Types: 1 Cans of beer per week     Social History     Substance and Sexual Activity   Drug Use No     Social History     Tobacco Use   Smoking Status Never   Smokeless Tobacco Never     Family History   Problem Relation Age of Onset     "Fibromyalgia Mother     Hyperlipidemia Father     Hypertension Father     Diabetes Maternal Grandmother     Diabetes Paternal Grandfather     Cancer Maternal Grandfather        Meds/Allergies   Hospital Medications:   No current facility-administered medications for this visit.     Home Medications: (Not in a hospital admission)      No Known Allergies      Portions of the record may have been created with voice recognition software.  Occasional wrong words or \"sound a like\" substitutions may have occurred due to the inherent limitations of voice recognition software.  Read the chart carefully and recognize, using context, where substitutions have occurred.          I spent > 35 mins examining and interviewing the patient, coordinating care, reviewing the chart, reviewing testing and writing the note.                "

## 2024-02-29 NOTE — LETTER
February 29, 2024     Susan Garrido MD  [address]  [address]  [facility] [address]    Patient: Nell Baldwin   YOB: 1990   Date of Visit: 2/29/2024       Dear Dr. Garrido:    Thank you for referring Nell Baldwin to me for evaluation. Below are my notes for this consultation.    If you have questions, please do not hesitate to call me. I look forward to following your patient along with you.         Sincerely,        Nikolay Nelson MD        CC: No Recipients    Nikolay Nelson MD  2/29/2024 10:24 AM  Incomplete  Cardiology   Nell Baldwin 33 y.o. female MRN: [id number]  Unit/Bed#:  Encounter: [id number]        Reason for Consult / Principal Problem:PACs    Physician Requesting Consult:  Susan Garrido MD    PCP: Susan Garrido MD        Assessment/Plan:    >> PACs- 14% of all beats on Holter, echo is unremarkable  >> PCOS  >> Back pain  >> Fibromyalgia  >>Chest pain: Noncardiac, tenderness to palpation, positional changes      Plan:    -She is doing well on 10 mg of propranolol daily-will continue this.  Her symptoms are well-controlled with this dose.  - Follow up in 3 months  -Her chest pain sounds musculoskeletal/neuropathic.  Advised topical pain control and Tylenol, if symptoms change or progress can consider coronary CTA though at this time it is not indicated.       2/29/2024: Overall is doing well, feels like her palpitations are well-controlled with the propranolol, she is drinking 16 ounces of coffee a day.  Her thyroid function was normal.  On days she does not take the propranolol she does have the sensation of palpitations.  She also complains of chest pain that started about 2 months ago it is located in the left lower chest at the costal chondral joint.  It is worse with twisting movements and tender to palpation.  Sometimes it is worse when she coughs.  It is not associated with nausea vomiting diaphoresis or shortness of  "breath.    CC: Palpitations    HPI  33 y.o. female with palpitations for about a year. When she was having sinus surgery over the summer they noted PACs on her tele. She went to her PCP and had a holter and echo;. Echo was ok but her holter showed 14% burden of PACs.     She denies chest pain, shortness of breath, orthopnea, PND, pedal edema, syncope, presyncope, diaphoresis, nausea/vomiting     She is otherwise healthy and exercises on a Peloton for 20-30 mins  a day    Does not drink smoke or use drugs    No known drug allergies    No chance of pregnancy- undergone tubal ligation     Takes metformin for PCOS and Ozempic for weight loss and PCOS.     TSH was unremarkable in June.                     Physical exam  Objective  Vitals: Blood pressure 104/72, pulse 64, height 5' 5\" (1.651 m), weight 72.1 kg (159 lb), SpO2 96%, not currently breastfeeding.    General:  AO x3, no acute distress  Cardiac:  S1-S2 normal  No murmurs, rubs or gallops, extra heart beats noted JVP: none, tenderness to palpation over the costochondral joint in the lower left chest  Lungs:  Clear to auscultation bilaterally, no wheezing or crackles.  Abdomen:  Soft, nontender, nondistended.  Extremities:  Warm, well perfused, pulses palpable, no ulcers or rashes. Edema:none  Neuro: Grossly nonfocal        ======================================================  TREADMILL STRESS  No results found for this or any previous visit.     ----------------------------------------------------------------------------------------------  NUCLEAR STRESS TEST: No results found for this or any previous visit.    No results found for this or any previous visit.      --------------------------------------------------------------------------------  CATH:  No results found for this or any previous visit.    --------------------------------------------------------------------------------  ECHO:   9/2023: •  Left Ventricle: Left ventricular cavity size is normal. Wall " thickness is normal. The left ventricular ejection fraction is 55%. Systolic function is normal. Wall motion is normal. Diastolic function is normal.  •  Right Ventricle: Right ventricular cavity size is normal. Systolic function is normal.  •  Mitral Valve: There is trace regurgitation.    --------------------------------------------------------------------------------  HOLTER  No results found for this or any previous visit.    --------------------------------------------------------------------------------  CAROTIDS  No results found for this or any previous visit.       There are no diagnoses linked to this encounter.     ======================================================          Review of Systems  ROS as noted above, otherwise 12 point review of systems was performed and is negative.     Historical Information  Past Medical History:   Diagnosis Date   • Amenorrhea     last assessed: 2016   • Anxiety    • Female infertility     seen by [facility] - seen for 1 year, 5 cycles IUI acheived pregnancy via IVF   • Fibromyalgia    • Irregular menstrual cycle     last assessed: 2016   • Osteoarthritis of right acromioclavicular joint     last assessment: 2013   • Polycystic ovary syndrome    • Separation of right acromioclavicular joint     Last assessed: 2013; this is more consistent with ostiolysis of the distal clavicle vs. acromioclavicular joint arthritis, not acute shoulder seperation   • Strain of muscle of right hip, initial encounter 2023   • Varicella     vaccine     Past Surgical History:   Procedure Laterality Date   •  SECTION     • DENTAL SURGERY      wisdom teeth   • [place]  DELIVERY ONLY N/A 2018    Procedure:  SECTION ();  Surgeon: Ismael Marquez MD;  [facility];  Service: Obstetrics   • [place]  DELIVERY ONLY N/A 2020    Procedure:  SECTION ();  Surgeon: Ismael Marquez MD;  Location: [facility] ;   "Service: Obstetrics   • SHOULDER SURGERY     • SINUS SURGERY  07/2023   • TUBAL LIGATION Bilateral 09/17/2020    Procedure: LIGATION/COAGULATION TUBAL;  Surgeon: Ismael Marquez MD;  Location: [facility];  Service: Obstetrics     Social History     Substance and Sexual Activity   Alcohol Use Not Currently   • Alcohol/week: 1.0 standard drink of alcohol   • Types: 1 Cans of beer per week     Social History     Substance and Sexual Activity   Drug Use No     Social History     Tobacco Use   Smoking Status Never   Smokeless Tobacco Never     Family History   Problem Relation Age of Onset   • Fibromyalgia Mother    • Hyperlipidemia Father    • Hypertension Father    • Diabetes Maternal Grandmother    • Diabetes Paternal Grandfather    • Cancer Maternal Grandfather        Meds/Allergies  Hospital Medications:   No current facility-administered medications for this visit.     Home Medications: (Not in a hospital admission)      No Known Allergies      Portions of the record may have been created with voice recognition software.  Occasional wrong words or \"sound a like\" substitutions may have occurred due to the inherent limitations of voice recognition software.  Read the chart carefully and recognize, using context, where substitutions have occurred.          I spent > 35 mins examining and interviewing the patient, coordinating care, reviewing the chart, reviewing testing and writing the note.                "

## 2024-03-06 DIAGNOSIS — Z13.29 SCREENING FOR THYROID DISORDER: ICD-10-CM

## 2024-03-06 DIAGNOSIS — Z79.899 HIGH RISK MEDICATION USE: ICD-10-CM

## 2024-03-06 DIAGNOSIS — R53.83 FATIGUE, UNSPECIFIED TYPE: ICD-10-CM

## 2024-03-06 DIAGNOSIS — Z13.228 SCREENING FOR METABOLIC DISORDER: ICD-10-CM

## 2024-03-06 DIAGNOSIS — Z13.1 SCREENING FOR DIABETES MELLITUS: ICD-10-CM

## 2024-03-06 DIAGNOSIS — Z13.0 SCREENING FOR BLOOD DISEASE: Primary | ICD-10-CM

## 2024-03-09 DIAGNOSIS — F32.81 PMDD (PREMENSTRUAL DYSPHORIC DISORDER): ICD-10-CM

## 2024-03-11 RX ORDER — NORETHINDRONE ACETATE AND ETHINYL ESTRADIOL AND FERROUS FUMARATE 1MG-20(21)
1 KIT ORAL DAILY
Qty: 84 TABLET | Refills: 1 | Status: SHIPPED | OUTPATIENT
Start: 2024-03-11

## 2024-03-15 DIAGNOSIS — F43.23 ADJUSTMENT DISORDER WITH MIXED ANXIETY AND DEPRESSED MOOD: ICD-10-CM

## 2024-03-15 RX ORDER — FLUOXETINE 10 MG/1
TABLET, FILM COATED ORAL
Qty: 90 TABLET | Refills: 0 | Status: SHIPPED | OUTPATIENT
Start: 2024-03-15

## 2024-03-17 ENCOUNTER — APPOINTMENT (OUTPATIENT)
Dept: RADIOLOGY | Facility: CLINIC | Age: 34
End: 2024-03-17
Payer: COMMERCIAL

## 2024-03-17 ENCOUNTER — OFFICE VISIT (OUTPATIENT)
Dept: URGENT CARE | Facility: CLINIC | Age: 34
End: 2024-03-17
Payer: COMMERCIAL

## 2024-03-17 VITALS — SYSTOLIC BLOOD PRESSURE: 119 MMHG | DIASTOLIC BLOOD PRESSURE: 76 MMHG

## 2024-03-17 DIAGNOSIS — S16.1XXA STRAIN OF MUSCLE, FASCIA AND TENDON AT NECK LEVEL, INITIAL ENCOUNTER: ICD-10-CM

## 2024-03-17 DIAGNOSIS — M79.631 RIGHT FOREARM PAIN: ICD-10-CM
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
## 2024-03-17 DIAGNOSIS — M25.531 RIGHT WRIST PAIN: ICD-10-CM

## 2024-03-17 PROCEDURE — 73110 X-RAY EXAM OF WRIST: CPT

## 2024-03-17 PROCEDURE — 73090 X-RAY EXAM OF FOREARM: CPT

## 2024-03-17 PROCEDURE — 99213 OFFICE O/P EST LOW 20 MIN: CPT

## 2024-03-17 RX ORDER — CYCLOBENZAPRINE HCL 10 MG
10 TABLET ORAL 3 TIMES DAILY PRN
Qty: 20 TABLET | Refills: 0 | Status: SHIPPED | OUTPATIENT
Start: 2024-03-17

## 2024-03-17 NOTE — PATIENT INSTRUCTIONS
The final xray result will appear in your mychart; use the brace until you get the xray result, if the final xray shows a fracture, keep the brace on until you follow-up with orthopedics, if the xray shows no fracture, you can use the brace as needed; take off every 1-2 hours and can take it off to sleep and shower if there is no fracture.   Ice as needed.  Acetaminophen and/or ibuprofen for pain and inflammation.  Follow-up with orthopedics.  PCP follow-up in 3-5 days  Proceed to the ER if symptoms worsen.    If tests have been performed at [facility], our office will contact you with results if changes need to be made to the care plan discussed with you at the visit.  You can review your full results on [facility] MyChart.
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
## 2024-03-18 ENCOUNTER — TELEPHONE (OUTPATIENT)
Age: 34
End: 2024-03-18

## 2024-03-18 NOTE — TELEPHONE ENCOUNTER
Patient is being referred to a orthopedics. Please schedule accordingly.    [facility]   [phone]

## 2024-03-26 ENCOUNTER — HOSPITAL ENCOUNTER (OUTPATIENT)
Dept: ULTRASOUND IMAGING | Facility: CLINIC | Age: 34
Discharge: HOME | End: 2024-03-26
Payer: COMMERCIAL

## 2024-03-26 ENCOUNTER — HOSPITAL ENCOUNTER (OUTPATIENT)
Dept: MAMMOGRAPHY | Facility: CLINIC | Age: 34
Discharge: HOME | End: 2024-03-26
Payer: COMMERCIAL

## 2024-03-26 DIAGNOSIS — N64.4 BREAST PAIN IN FEMALE: ICD-10-CM

## 2024-03-26 PROCEDURE — 77066 DX MAMMO INCL CAD BI: CPT

## 2024-03-26 PROCEDURE — 76642 ULTRASOUND BREAST LIMITED: CPT

## 2024-03-26 PROCEDURE — G0279 TOMOSYNTHESIS, MAMMO: HCPCS

## 2024-04-01 ENCOUNTER — OFFICE VISIT (OUTPATIENT)
Dept: FAMILY MEDICINE CLINIC | Facility: CLINIC | Age: 34
End: 2024-04-01
Payer: COMMERCIAL

## 2024-04-01 VITALS
HEIGHT: 65 IN | BODY MASS INDEX: 26.29 KG/M2 | HEART RATE: 72 BPM | OXYGEN SATURATION: 98 % | RESPIRATION RATE: 16 BRPM | TEMPERATURE: 98 F | SYSTOLIC BLOOD PRESSURE: 110 MMHG | DIASTOLIC BLOOD PRESSURE: 70 MMHG | WEIGHT: 157.8 LBS

## 2024-04-01 DIAGNOSIS — F43.22 ADJUSTMENT DISORDER WITH ANXIOUS MOOD: ICD-10-CM

## 2024-04-01 DIAGNOSIS — Z00.00 ANNUAL PHYSICAL EXAM: Primary | ICD-10-CM

## 2024-04-01 PROCEDURE — 99395 PREV VISIT EST AGE 18-39: CPT | Performed by: FAMILY MEDICINE

## 2024-04-01 NOTE — PROGRESS NOTES
ADULT ANNUAL PHYSICAL  [facility]    NAME: Nell Baldwin  AGE: 33 y.o. SEX: female  : 1990     DATE: 2024     Assessment and Plan:     Annual physical exam  - follow up with Gyn for annual exam    Adjustment disorder with anxiety/ PMDD  - continue fluoxetine 10 mg daily, doing well      Immunizations and preventive care screenings were discussed with patient today. Appropriate education was printed on patient's after visit summary.    Counseling:  Alcohol/drug use: discussed moderation in alcohol intake, the recommendations for healthy alcohol use, and avoidance of illicit drug use.  Dental Health: discussed importance of regular tooth brushing, flossing, and dental visits.  Injury prevention: discussed safety/seat belts, safety helmets, smoke detectors, carbon dioxide detectors, and smoking near bedding or upholstery.  Exercise: the importance of regular exercise/physical activity was discussed. Recommend exercise 3-5 times per week for at least 30 minutes.        Return in about 6 months (around 10/1/2024) for Recheck.       Chief Complaint:     Chief Complaint   Patient presents with    Physical Exam      History of Present Illness:     Adult Annual Physical   Patient here for a comprehensive physical exam. The patient reports no problems.    Diet and Physical Activity  Diet/Nutrition: well balanced diet.   Exercise: walking.      Depression Screening  PHQ-2/9 Depression Screening    Little interest or pleasure in doing things: 1 - several days  Feeling down, depressed, or hopeless: 1 - several days  PHQ-2 Score: 2  PHQ-2 Interpretation: Negative depression screen       General Health  Sleep: sleeps well and gets 7-8 hours of sleep on average.   Hearing: normal - bilateral.  Vision: goes for regular eye exams and wears glasses.   Dental: regular dental visits.       /GYN Health  Follows with gynecology? yes    Last menstrual period:   Contraceptive method: oral contraceptives.        Review of Systems:     Review of Systems   Constitutional:  Negative for appetite change, chills, fatigue, fever and unexpected weight change.   HENT:  Negative for congestion, ear pain, rhinorrhea and sore throat.    Eyes:  Negative for pain, discharge, redness, itching and visual disturbance.   Respiratory:  Negative for cough, shortness of breath and wheezing.    Cardiovascular:  Negative for chest pain, palpitations and leg swelling.   Gastrointestinal:  Negative for abdominal pain, blood in stool, constipation, diarrhea, nausea and vomiting.   Endocrine: Negative for cold intolerance, heat intolerance, polydipsia and polyuria.   Genitourinary:  Negative for difficulty urinating, dysuria, flank pain, frequency, hematuria, pelvic pain and urgency.   Musculoskeletal:  Negative for arthralgias and myalgias.   Neurological:  Negative for dizziness, syncope, weakness, numbness and headaches.   Hematological:  Negative for adenopathy.   Psychiatric/Behavioral:  Negative for dysphoric mood and sleep disturbance. The patient is not nervous/anxious.       Past Medical History:     Past Medical History:   Diagnosis Date    Amenorrhea     last assessed: 08/22/2016    Anxiety     Female infertility     seen by [facility] - seen for 1 year, 5 cycles IUI acheived pregnancy via IVF    Fibromyalgia     Irregular menstrual cycle     last assessed: 09/26/2016    Osteoarthritis of right acromioclavicular joint     last assessment: 01/28/2013    Polycystic ovary syndrome     Separation of right acromioclavicular joint     Last assessed: 01/25/2013; this is more consistent with ostiolysis of the distal clavicle vs. acromioclavicular joint arthritis, not acute shoulder seperation    Strain of muscle of right hip, initial encounter 12/06/2023    Varicella     vaccine      Past Surgical History:     Past Surgical History:   Procedure Laterality Date      SECTION      DENTAL SURGERY      wisdom teeth    [place]  DELIVERY ONLY N/A 2018    Procedure:  SECTION ();  Surgeon: Ismael Marquez MD;  Location: [facility];  Service: Obstetrics    [place]  DELIVERY ONLY N/A 2020    Procedure:  SECTION ();  Surgeon: Ismael Marquez MD;  Location: [facility];  Service: Obstetrics    SHOULDER SURGERY      SINUS SURGERY  2023    TUBAL LIGATION Bilateral 2020    Procedure: LIGATION/COAGULATION TUBAL;  Surgeon: Ismael Marquez MD;  Location: [facility];  Service: Obstetrics      Social History:     Social History     Socioeconomic History    Marital status: /Civil Union     Spouse name: None    Number of children: None    Years of education: None    Highest education level: None   Occupational History    None   Tobacco Use    Smoking status: Never    Smokeless tobacco: Never   Vaping Use    Vaping status: Never Used   Substance and Sexual Activity    Alcohol use: Not Currently     Alcohol/week: 1.0 standard drink of alcohol    Drug use: No    Sexual activity: Yes     Partners: Male     Birth control/protection: Surgical     Comment: tubes tied   Other Topics Concern    None   Social History Narrative    History of  0    Caffeine use-1 cup of coffee/day    Has smoke detectors     Denied history of sun protection    Uses safety equipment- protective head gear     Social Determinants of Health     Financial Resource Strain: Not on file   Food Insecurity: Not on file   Transportation Needs: Not on file   Physical Activity: Not on file   Stress: Not on file   Social Connections: Not on file   Intimate Partner Violence: Not on file   Housing Stability: Not on file      Family History:     Family History   Problem Relation Age of Onset    Fibromyalgia Mother     Hyperlipidemia Father     Hypertension Father     Diabetes Maternal Grandmother     Colon cancer Maternal Grandfather 48    Cancer Maternal Grandfather      "Diabetes Paternal Grandfather     Breast cancer Neg Hx     Endometrial cancer Neg Hx     Ovarian cancer Neg Hx       Current Medications:     Current Outpatient Medications   Medication Sig Dispense Refill    cyclobenzaprine (FLEXERIL) 10 mg tablet Take 1 tablet (10 mg total) by mouth 3 (three) times a day as needed for muscle spasms 20 tablet 0    FLUoxetine (PROzac) 10 MG tablet TAKE 1 TABLET BY MOUTH EVERY DAY 90 tablet 0    hydroquinone 4 % cream Apply topically daily at bedtime Use three months on, then take 1 month break and can repeat cycles. 30 g 3    multivitamin (THERAGRAN) TABS Take 1 tablet by mouth daily      Nerve Stimulator (Standard TENS) ANA Use in the morning 1 each 0    norethindrone-ethinyl estradiol (Junel FE 1/20) 1-20 MG-MCG per tablet TAKE 1 TABLET BY MOUTH EVERY DAY 84 tablet 1    propranolol (INDERAL) 10 mg tablet Take 1 tablet (10 mg total) by mouth 2 (two) times a day For the first week take only one per day. 90 tablet 3    semaglutide, 1 mg/dose, (Ozempic) 4 mg/3 mL injection pen Inject 0.75 mL (1 mg total) under the skin once a week 9 mL 1    tiZANidine (ZANAFLEX) 2 mg tablet Take 1 tablet (2 mg total) by mouth daily at bedtime 60 tablet 2    metFORMIN (GLUCOPHAGE) 500 mg tablet Take 1 tablet (500 mg total) by mouth 2 (two) times a day with meals 180 tablet 3    methylPREDNISolone 4 MG tablet therapy pack Use as directed on package (Patient not taking: Reported on 2/29/2024) 1 each 0     No current facility-administered medications for this visit.      Allergies:     No Known Allergies   Physical Exam:     /70   Pulse 72   Temp 98 °F (36.7 °C)   Resp 16   Ht 5' 4.5\" (1.638 m)   Wt 71.6 kg (157 lb 12.8 oz)   LMP 01/15/2024 Comment: denies preg  SpO2 98%   BMI 26.67 kg/m²     Physical Exam  Constitutional:       General: She is not in acute distress.     Appearance: Normal appearance. She is well-developed.   HENT:      Right Ear: Tympanic membrane, ear canal and external " ear normal.      Left Ear: Tympanic membrane, ear canal and external ear normal.      Mouth/Throat:      Mouth: Mucous membranes are moist.      Pharynx: Oropharynx is clear.   Eyes:      General: No scleral icterus.     Extraocular Movements: Extraocular movements intact.      Conjunctiva/sclera: Conjunctivae normal.      Pupils: Pupils are equal, round, and reactive to light.   Neck:      Thyroid: No thyromegaly.      Vascular: No JVD.   Cardiovascular:      Rate and Rhythm: Normal rate and regular rhythm.      Heart sounds: Normal heart sounds. No murmur heard.  Pulmonary:      Effort: Pulmonary effort is normal. No respiratory distress.      Breath sounds: Normal breath sounds. No wheezing, rhonchi or rales.   Abdominal:      General: There is no distension.      Palpations: Abdomen is soft. There is no mass.      Tenderness: There is no abdominal tenderness.   Musculoskeletal:      Cervical back: Neck supple.      Right lower leg: No edema.      Left lower leg: No edema.   Lymphadenopathy:      Cervical: No cervical adenopathy.   Skin:     Findings: No rash.   Neurological:      General: No focal deficit present.      Mental Status: She is alert and oriented to person, place, and time.      Cranial Nerves: No cranial nerve deficit.   Psychiatric:         Mood and Affect: Mood normal.         Behavior: Behavior normal.         Thought Content: Thought content normal.         Judgment: Judgment normal.        Susan Garrido MD   [facility]

## 2024-04-02 ENCOUNTER — OFFICE VISIT (OUTPATIENT)
Dept: FAMILY MEDICINE CLINIC | Facility: CLINIC | Age: 34
End: 2024-04-02
Payer: COMMERCIAL

## 2024-04-02 VITALS
OXYGEN SATURATION: 98 % | DIASTOLIC BLOOD PRESSURE: 78 MMHG | WEIGHT: 158.6 LBS | SYSTOLIC BLOOD PRESSURE: 116 MMHG | HEIGHT: 65 IN | RESPIRATION RATE: 16 BRPM | BODY MASS INDEX: 26.42 KG/M2 | HEART RATE: 67 BPM | TEMPERATURE: 98.8 F

## 2024-04-02 DIAGNOSIS — H10.32 ACUTE BACTERIAL CONJUNCTIVITIS OF LEFT EYE: Primary | ICD-10-CM

## 2024-04-02 PROCEDURE — 99213 OFFICE O/P EST LOW 20 MIN: CPT | Performed by: FAMILY MEDICINE

## 2024-04-02 RX ORDER — TOBRAMYCIN 3 MG/ML
2 SOLUTION/ DROPS OPHTHALMIC 4 TIMES DAILY
Qty: 5 ML | Refills: 0 | Status: SHIPPED | OUTPATIENT
Start: 2024-04-02

## 2024-04-02 NOTE — PROGRESS NOTES
Assessment/Plan:    Acute bacterial conjunctivitis of left eye  - advised to start Tobrex eye drops, discussed eye hygiene, encouraged to contact the office or follow up with eye doctor for persistent or worsening symptoms  - tobramycin (TOBREX) 0.3 % SOLN; Administer 2 drops into the left eye 4 (four) times a day  Dispense: 5 mL; Refill: 0          Return as scheduled or sooner as needed. The treatment plan and possible side effects of new medications were reviewed with the patient today. The patient understands and agrees with the treatment plan.      Subjective:   Chief Complaint   Patient presents with    Conjunctivitis     Started this morning  Woke up with a watery, goopy eye  All her kids has pink eye      Patient ID: Nell Baldwin is a 33 y.o. female who presents today with c/o left eye redness, crusting and yellow discharge onset last night, her kids had pick eye last week. Patient denies eye pain, photophobia, runny nose, nasal congestion, eyelid redness/ swelling or contact lens use.         The following portions of the patient's history were reviewed and updated as appropriate: allergies, current medications, past family history, past medical history, past social history, past surgical history and problem list.    Past Medical History:   Diagnosis Date    Amenorrhea     last assessed: 08/22/2016    Anxiety     Female infertility     seen by [facility] - seen for 1 year, 5 cycles IUI acheived pregnancy via IVF    Fibromyalgia     Irregular menstrual cycle     last assessed: 09/26/2016    Osteoarthritis of right acromioclavicular joint     last assessment: 01/28/2013    Polycystic ovary syndrome     Separation of right acromioclavicular joint     Last assessed: 01/25/2013; this is more consistent with ostiolysis of the distal clavicle vs. acromioclavicular joint arthritis, not acute shoulder seperation    Strain of muscle of right hip, initial encounter 12/06/2023    Varicella      vaccine     Past Surgical History:   Procedure Laterality Date     SECTION      DENTAL SURGERY      wisdom teeth    [place]  DELIVERY ONLY N/A 2018    Procedure:  SECTION ();  Surgeon: Ismael Marquez MD;  Location: [facility];  Service: Obstetrics    [place]  DELIVERY ONLY N/A 2020    Procedure:  SECTION ();  Surgeon: Ismael Marquez MD;  Location: [facility];  Service: Obstetrics    SHOULDER SURGERY      SINUS SURGERY  2023    TUBAL LIGATION Bilateral 2020    Procedure: LIGATION/COAGULATION TUBAL;  Surgeon: Ismael Marquez MD;  Location: [facility];  Service: Obstetrics     Family History   Problem Relation Age of Onset    Fibromyalgia Mother     Hyperlipidemia Father     Hypertension Father     Diabetes Maternal Grandmother     Colon cancer Maternal Grandfather 48    Cancer Maternal Grandfather     Diabetes Paternal Grandfather     Breast cancer Neg Hx     Endometrial cancer Neg Hx     Ovarian cancer Neg Hx      Social History     Socioeconomic History    Marital status: /Civil Union     Spouse name: Not on file    Number of children: Not on file    Years of education: Not on file    Highest education level: Not on file   Occupational History    Not on file   Tobacco Use    Smoking status: Never    Smokeless tobacco: Never   Vaping Use    Vaping status: Never Used   Substance and Sexual Activity    Alcohol use: Not Currently     Alcohol/week: 1.0 standard drink of alcohol    Drug use: No    Sexual activity: Yes     Partners: Male     Birth control/protection: Surgical     Comment: tubes tied   Other Topics Concern    Not on file   Social History Narrative    History of  0    Caffeine use-1 cup of coffee/day    Has smoke detectors     Denied history of sun protection    Uses safety equipment- protective head gear     Social Determinants of Health     Financial Resource Strain: Not on file   Food Insecurity: Not on file   Transportation Needs: Not on  file   Physical Activity: Not on file   Stress: Not on file   Social Connections: Not on file   Intimate Partner Violence: Not on file   Housing Stability: Not on file       Current Outpatient Medications:     cyclobenzaprine (FLEXERIL) 10 mg tablet, Take 1 tablet (10 mg total) by mouth 3 (three) times a day as needed for muscle spasms, Disp: 20 tablet, Rfl: 0    FLUoxetine (PROzac) 10 MG tablet, TAKE 1 TABLET BY MOUTH EVERY DAY, Disp: 90 tablet, Rfl: 0    hydroquinone 4 % cream, Apply topically daily at bedtime Use three months on, then take 1 month break and can repeat cycles., Disp: 30 g, Rfl: 3    multivitamin (THERAGRAN) TABS, Take 1 tablet by mouth daily, Disp: , Rfl:     Nerve Stimulator (Standard TENS) ANA, Use in the morning, Disp: 1 each, Rfl: 0    norethindrone-ethinyl estradiol (Junel FE 1/20) 1-20 MG-MCG per tablet, TAKE 1 TABLET BY MOUTH EVERY DAY, Disp: 84 tablet, Rfl: 1    propranolol (INDERAL) 10 mg tablet, Take 1 tablet (10 mg total) by mouth 2 (two) times a day For the first week take only one per day., Disp: 90 tablet, Rfl: 3    semaglutide, 1 mg/dose, (Ozempic) 4 mg/3 mL injection pen, Inject 0.75 mL (1 mg total) under the skin once a week, Disp: 9 mL, Rfl: 1    tiZANidine (ZANAFLEX) 2 mg tablet, Take 1 tablet (2 mg total) by mouth daily at bedtime, Disp: 60 tablet, Rfl: 2    metFORMIN (GLUCOPHAGE) 500 mg tablet, Take 1 tablet (500 mg total) by mouth 2 (two) times a day with meals, Disp: 180 tablet, Rfl: 3    methylPREDNISolone 4 MG tablet therapy pack, Use as directed on package (Patient not taking: Reported on 2/29/2024), Disp: 1 each, Rfl: 0    Review of Systems   Constitutional:  Negative for chills, fatigue and fever.   HENT:  Negative for congestion, rhinorrhea, sneezing and sore throat.    Eyes:  Positive for discharge, redness and itching. Negative for photophobia, pain and visual disturbance.   Respiratory:  Negative for cough and shortness of breath.    Cardiovascular:  Negative for  "chest pain.   Neurological:  Negative for dizziness and headaches.   Hematological:  Negative for adenopathy.           Objective:    Vitals:    04/02/24 1037   BP: 116/78   Pulse: 67   Resp: 16   Temp: 98.8 °F (37.1 °C)   SpO2: 98%   Weight: 71.9 kg (158 lb 9.6 oz)   Height: 5' 4.5\" (1.638 m)        Physical Exam  Constitutional:       General: She is not in acute distress.  HENT:      Nose: No congestion.   Eyes:      General: Lids are normal.         Right eye: No discharge.         Left eye: Discharge present.     Extraocular Movements: Extraocular movements intact.      Conjunctiva/sclera:      Right eye: Right conjunctiva is not injected.      Left eye: Left conjunctiva is injected.      Pupils: Pupils are equal, round, and reactive to light.   Lymphadenopathy:      Cervical: No cervical adenopathy.   Neurological:      Mental Status: She is alert and oriented to person, place, and time.   Psychiatric:         Mood and Affect: Mood normal.         Behavior: Behavior normal.          "

## 2024-04-11 ENCOUNTER — TELEPHONE (OUTPATIENT)
Dept: OBGYN CLINIC | Facility: MEDICAL CENTER | Age: 34
End: 2024-04-11

## 2024-04-17 ENCOUNTER — OFFICE VISIT (OUTPATIENT)
Dept: FAMILY MEDICINE CLINIC | Facility: CLINIC | Age: 34
End: 2024-04-17
Payer: COMMERCIAL

## 2024-04-17 VITALS
RESPIRATION RATE: 16 BRPM | OXYGEN SATURATION: 99 % | DIASTOLIC BLOOD PRESSURE: 80 MMHG | HEIGHT: 65 IN | TEMPERATURE: 98.3 F | SYSTOLIC BLOOD PRESSURE: 118 MMHG | BODY MASS INDEX: 26.82 KG/M2 | HEART RATE: 79 BPM | WEIGHT: 161 LBS

## 2024-04-17 DIAGNOSIS — Z79.899 HIGH RISK MEDICATION USE: ICD-10-CM

## 2024-04-17 DIAGNOSIS — M25.50 MULTIPLE JOINT PAIN: Primary | ICD-10-CM

## 2024-04-17 PROCEDURE — 99213 OFFICE O/P EST LOW 20 MIN: CPT | Performed by: FAMILY MEDICINE

## 2024-04-17 RX ORDER — PREDNISONE 10 MG/1
TABLET ORAL
Qty: 20 TABLET | Refills: 0 | Status: SHIPPED | OUTPATIENT
Start: 2024-04-17

## 2024-04-17 NOTE — PROGRESS NOTES
Assessment/Plan:    Multiple joint pain  - advised to start prednisone taper and take Tylenol or Advil as needed, lab order placed if no symptomatic relief with prednisone, encouraged to contact the office for persistent or worsening symptoms  - Lyme Total AB W Reflex to IGM/IGG; Future  - RF Screen w/ Reflex to Titer; Future  - OLEGARIO Screen w/ Reflex to Titer/Pattern; Future  - Sedimentation rate, automated; Future  - Comprehensive metabolic panel; Future  - CBC and differential; Future  - prednisone 10 mg tablet; Take 4 tablets daily with food for 2 days, 3 tablets daily for 2 days, 2 tablets daily for 2 days, 1 tablet daily for 2 days  Dispense: 20 tablet; Refill: 0         Return as scheduled or sooner as needed. The treatment plan and possible side effects of new medications were reviewed with the patient today. The patient understands and agrees with the treatment plan.      Subjective:   Chief Complaint   Patient presents with    Joint Pain     Everywhere, started Sunday   Swelling, hands were numb last night  Wrist, shoulder, knee, elbow, ankle  Didn't have any strength  Mostly right sided, has progressed to left side  Worse at night      Patient ID: Nell Baldwin is a 33 y.o. female presents today with c/o joint pain, swelling and stiffness in her hands, wrists, right shoulder, right knee and ankles onset last week while on vacation with her family. She denies fever, chills, recent viral illnesses, skin rash, recent tick bites or unexplained weight changes.        The following portions of the patient's history were reviewed and updated as appropriate: allergies, current medications, past family history, past medical history, past social history, past surgical history and problem list.    Past Medical History:   Diagnosis Date    Amenorrhea     last assessed: 08/22/2016    Anxiety     Female infertility     seen by [facility] - seen for 1 year, 5 cycles IUI acheived pregnancy via IVF     Fibromyalgia     Irregular menstrual cycle     last assessed: 2016    Osteoarthritis of right acromioclavicular joint     last assessment: 2013    Polycystic ovary syndrome     Separation of right acromioclavicular joint     Last assessed: 2013; this is more consistent with ostiolysis of the distal clavicle vs. acromioclavicular joint arthritis, not acute shoulder seperation    Strain of muscle of right hip, initial encounter 2023    Varicella     vaccine     Past Surgical History:   Procedure Laterality Date     SECTION      DENTAL SURGERY      wisdom teeth    NM  DELIVERY ONLY N/A 2018    Procedure:  SECTION ();  Surgeon: Ismael Marquez MD;  Location: [facility];  Service: Obstetrics    NM  DELIVERY ONLY N/A 2020    Procedure:  SECTION ();  Surgeon: sImael Marquez MD;  Location: [facility];  Service: Obstetrics    SHOULDER SURGERY      SINUS SURGERY  2023    TUBAL LIGATION Bilateral 2020    Procedure: LIGATION/COAGULATION TUBAL;  Surgeon: Ismael Marquez MD;  Location: [facility];  Service: Obstetrics     Family History   Problem Relation Age of Onset    Fibromyalgia Mother     Hyperlipidemia Father     Hypertension Father     Diabetes Maternal Grandmother     Colon cancer Maternal Grandfather 48    Cancer Maternal Grandfather     Diabetes Paternal Grandfather     Breast cancer Neg Hx     Endometrial cancer Neg Hx     Ovarian cancer Neg Hx      Social History     Socioeconomic History    Marital status: /Civil Union     Spouse name: Not on file    Number of children: Not on file    Years of education: Not on file    Highest education level: Not on file   Occupational History    Not on file   Tobacco Use    Smoking status: Never    Smokeless tobacco: Never   Vaping Use    Vaping status: Never Used   Substance and Sexual Activity    Alcohol use: Not Currently     Alcohol/week: 1.0 standard drink of alcohol    Drug use: No     Sexual activity: Yes     Partners: Male     Birth control/protection: Surgical     Comment: tubes tied   Other Topics Concern    Not on file   Social History Narrative    History of  0    Caffeine use-1 cup of coffee/day    Has smoke detectors     Denied history of sun protection    Uses safety equipment- protective head gear     Social Determinants of Health     Financial Resource Strain: Not on file   Food Insecurity: Not on file   Transportation Needs: Not on file   Physical Activity: Not on file   Stress: Not on file   Social Connections: Not on file   Intimate Partner Violence: Not on file   Housing Stability: Not on file       Current Outpatient Medications:     cyclobenzaprine (FLEXERIL) 10 mg tablet, Take 1 tablet (10 mg total) by mouth 3 (three) times a day as needed for muscle spasms, Disp: 20 tablet, Rfl: 0    FLUoxetine (PROzac) 10 MG tablet, TAKE 1 TABLET BY MOUTH EVERY DAY, Disp: 90 tablet, Rfl: 0    hydroquinone 4 % cream, Apply topically daily at bedtime Use three months on, then take 1 month break and can repeat cycles., Disp: 30 g, Rfl: 3    multivitamin (THERAGRAN) TABS, Take 1 tablet by mouth daily, Disp: , Rfl:     Nerve Stimulator (Standard TENS) ANA, Use in the morning, Disp: 1 each, Rfl: 0    norethindrone-ethinyl estradiol (Junel FE ) 1-20 MG-MCG per tablet, TAKE 1 TABLET BY MOUTH EVERY DAY, Disp: 84 tablet, Rfl: 1    propranolol (INDERAL) 10 mg tablet, Take 1 tablet (10 mg total) by mouth 2 (two) times a day For the first week take only one per day., Disp: 90 tablet, Rfl: 3    semaglutide, 1 mg/dose, (Ozempic) 4 mg/3 mL injection pen, Inject 0.75 mL (1 mg total) under the skin once a week, Disp: 9 mL, Rfl: 1    tiZANidine (ZANAFLEX) 2 mg tablet, Take 1 tablet (2 mg total) by mouth daily at bedtime, Disp: 60 tablet, Rfl: 2    tobramycin (TOBREX) 0.3 % SOLN, Administer 2 drops into the left eye 4 (four) times a day, Disp: 5 mL, Rfl: 0    metFORMIN (GLUCOPHAGE) 500 mg tablet,  "Take 1 tablet (500 mg total) by mouth 2 (two) times a day with meals, Disp: 180 tablet, Rfl: 3    methylPREDNISolone 4 MG tablet therapy pack, Use as directed on package (Patient not taking: Reported on 2/29/2024), Disp: 1 each, Rfl: 0    Review of Systems   Constitutional:  Negative for chills, fatigue and fever.   HENT:  Negative for congestion, ear pain, rhinorrhea and sore throat.    Eyes:  Negative for visual disturbance.   Respiratory:  Negative for cough, shortness of breath and wheezing.    Cardiovascular:  Negative for chest pain and palpitations.   Gastrointestinal:  Negative for abdominal pain, blood in stool, diarrhea, nausea and vomiting.   Endocrine: Negative for cold intolerance, heat intolerance, polydipsia and polyuria.   Genitourinary:  Negative for difficulty urinating, dysuria, frequency, hematuria, pelvic pain and urgency.   Musculoskeletal:  Positive for arthralgias and joint swelling.   Skin:  Negative for rash.   Neurological:  Negative for dizziness, syncope and headaches.   Hematological:  Negative for adenopathy.           Objective:    Vitals:    04/17/24 1023   BP: 118/80   Pulse: 79   Resp: 16   Temp: 98.3 °F (36.8 °C)   SpO2: 99%   Weight: 73 kg (161 lb)   Height: 5' 4.5\" (1.638 m)        Physical Exam  Constitutional:       General: She is not in acute distress.     Appearance: She is well-developed.   Neck:      Thyroid: No thyromegaly.   Cardiovascular:      Rate and Rhythm: Normal rate and regular rhythm.      Heart sounds: Normal heart sounds. No murmur heard.  Pulmonary:      Effort: Pulmonary effort is normal. No respiratory distress.      Breath sounds: No wheezing, rhonchi or rales.   Abdominal:      General: There is no distension.      Palpations: Abdomen is soft. There is no mass.      Tenderness: There is no abdominal tenderness.   Musculoskeletal:      Cervical back: Neck supple.      Right lower leg: No edema.      Left lower leg: No edema.   Lymphadenopathy:      " Cervical: No cervical adenopathy.   Neurological:      Mental Status: She is alert and oriented to person, place, and time.   Psychiatric:         Mood and Affect: Mood normal.         Behavior: Behavior normal.

## 2024-04-20 LAB
ALBUMIN SERPL-MCNC: 4.1 G/DL (ref 3.6–5.1)
ALBUMIN/GLOB SERPL: 1.6 (CALC) (ref 1–2.5)
ALP SERPL-CCNC: 50 U/L (ref 31–125)
ALT SERPL-CCNC: 8 U/L (ref 6–29)
ANA SER QL IF: NEGATIVE
AST SERPL-CCNC: 12 U/L (ref 10–30)
B BURGDOR IGG+IGM SER QL IA: <=0.9 INDEX
BASOPHILS # BLD AUTO: 81 CELLS/UL (ref 0–200)
BASOPHILS NFR BLD AUTO: 1.3 %
BILIRUB SERPL-MCNC: 0.4 MG/DL (ref 0.2–1.2)
BUN SERPL-MCNC: 15 MG/DL (ref 7–25)
BUN/CREAT SERPL: NORMAL (CALC) (ref 6–22)
CALCIUM SERPL-MCNC: 8.8 MG/DL (ref 8.6–10.2)
CHLORIDE SERPL-SCNC: 103 MMOL/L (ref 98–110)
CO2 SERPL-SCNC: 29 MMOL/L (ref 20–32)
CREAT SERPL-MCNC: 0.66 MG/DL (ref 0.5–0.97)
EOSINOPHIL # BLD AUTO: 93 CELLS/UL (ref 15–500)
EOSINOPHIL NFR BLD AUTO: 1.5 %
ERYTHROCYTE [DISTWIDTH] IN BLOOD BY AUTOMATED COUNT: 12.3 % (ref 11–15)
ERYTHROCYTE [SEDIMENTATION RATE] IN BLOOD BY WESTERGREN METHOD: 17 MM/H
GFR/BSA.PRED SERPLBLD CYS-BASED-ARV: 118 ML/MIN/1.73M2
GLOBULIN SER CALC-MCNC: 2.6 G/DL (CALC) (ref 1.9–3.7)
GLUCOSE SERPL-MCNC: 81 MG/DL (ref 65–139)
HCT VFR BLD AUTO: 32.1 % (ref 35–45)
HGB BLD-MCNC: 11.1 G/DL (ref 11.7–15.5)
LYMPHOCYTES # BLD AUTO: 1531 CELLS/UL (ref 850–3900)
LYMPHOCYTES NFR BLD AUTO: 24.7 %
MCH RBC QN AUTO: 31.6 PG (ref 27–33)
MCHC RBC AUTO-ENTMCNC: 34.6 G/DL (ref 32–36)
MCV RBC AUTO: 91.5 FL (ref 80–100)
MONOCYTES # BLD AUTO: 508 CELLS/UL (ref 200–950)
MONOCYTES NFR BLD AUTO: 8.2 %
NEUTROPHILS # BLD AUTO: 3987 CELLS/UL (ref 1500–7800)
NEUTROPHILS NFR BLD AUTO: 64.3 %
PLATELET # BLD AUTO: 284 THOUSAND/UL (ref 140–400)
PMV BLD REES-ECKER: 9.9 FL (ref 7.5–12.5)
POTASSIUM SERPL-SCNC: 4.3 MMOL/L (ref 3.5–5.3)
PROT SERPL-MCNC: 6.7 G/DL (ref 6.1–8.1)
RBC # BLD AUTO: 3.51 MILLION/UL (ref 3.8–5.1)
RHEUMATOID FACT SERPL-ACNC: <10 IU/ML
SODIUM SERPL-SCNC: 137 MMOL/L (ref 135–146)
WBC # BLD AUTO: 6.2 THOUSAND/UL (ref 3.8–10.8)

## 2024-04-30 ENCOUNTER — OFFICE VISIT (OUTPATIENT)
Dept: OBGYN CLINIC | Facility: CLINIC | Age: 34
End: 2024-04-30
Payer: COMMERCIAL

## 2024-04-30 VITALS
DIASTOLIC BLOOD PRESSURE: 81 MMHG | WEIGHT: 161 LBS | HEART RATE: 88 BPM | BODY MASS INDEX: 26.82 KG/M2 | SYSTOLIC BLOOD PRESSURE: 129 MMHG | HEIGHT: 65 IN

## 2024-04-30 DIAGNOSIS — M25.531 RIGHT WRIST PAIN: Primary | ICD-10-CM

## 2024-04-30 DIAGNOSIS — S63.501D RIGHT WRIST SPRAIN, SUBSEQUENT ENCOUNTER: ICD-10-CM
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
## 2024-04-30 PROCEDURE — 99213 OFFICE O/P EST LOW 20 MIN: CPT | Performed by: PHYSICIAN ASSISTANT
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
## 2024-05-08 ENCOUNTER — EVALUATION (OUTPATIENT)
Dept: OCCUPATIONAL THERAPY | Facility: CLINIC | Age: 34
End: 2024-05-08
Payer: COMMERCIAL

## 2024-05-08 DIAGNOSIS — S63.501D RIGHT WRIST SPRAIN, SUBSEQUENT ENCOUNTER: ICD-10-CM

## 2024-05-08 DIAGNOSIS — M25.531 RIGHT WRIST PAIN: ICD-10-CM
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
## 2024-05-08 PROCEDURE — 97140 MANUAL THERAPY 1/> REGIONS: CPT | Performed by: OCCUPATIONAL THERAPIST

## 2024-05-08 PROCEDURE — L3906 WHO W/O JOINTS CF: HCPCS | Performed by: OCCUPATIONAL THERAPIST

## 2024-05-08 PROCEDURE — 97165 OT EVAL LOW COMPLEX 30 MIN: CPT | Performed by: OCCUPATIONAL THERAPIST
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
## 2024-05-13 ENCOUNTER — OFFICE VISIT (OUTPATIENT)
Dept: OCCUPATIONAL THERAPY | Facility: CLINIC | Age: 34
End: 2024-05-13
Payer: COMMERCIAL

## 2024-05-13 DIAGNOSIS — S63.501D RIGHT WRIST SPRAIN, SUBSEQUENT ENCOUNTER: ICD-10-CM

## 2024-05-13 DIAGNOSIS — M25.531 RIGHT WRIST PAIN: Primary | ICD-10-CM
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
## 2024-05-13 PROCEDURE — 97110 THERAPEUTIC EXERCISES: CPT | Performed by: OCCUPATIONAL THERAPIST

## 2024-05-13 PROCEDURE — 97140 MANUAL THERAPY 1/> REGIONS: CPT | Performed by: OCCUPATIONAL THERAPIST
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
## 2024-05-15 ENCOUNTER — OFFICE VISIT (OUTPATIENT)
Dept: OCCUPATIONAL THERAPY | Facility: CLINIC | Age: 34
End: 2024-05-15
Payer: COMMERCIAL

## 2024-05-15 DIAGNOSIS — S63.501D RIGHT WRIST SPRAIN, SUBSEQUENT ENCOUNTER: ICD-10-CM
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
## 2024-05-15 DIAGNOSIS — M25.531 RIGHT WRIST PAIN: Primary | ICD-10-CM

## 2024-05-15 PROCEDURE — 97110 THERAPEUTIC EXERCISES: CPT | Performed by: OCCUPATIONAL THERAPIST

## 2024-05-15 PROCEDURE — 97140 MANUAL THERAPY 1/> REGIONS: CPT | Performed by: OCCUPATIONAL THERAPIST
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
## 2024-05-17 DIAGNOSIS — R63.5 WEIGHT GAIN: Primary | ICD-10-CM

## 2024-05-17 RX ORDER — PHENTERMINE HYDROCHLORIDE 30 MG/1
30 CAPSULE ORAL EVERY MORNING
Qty: 30 CAPSULE | Refills: 0 | Status: SHIPPED | OUTPATIENT
Start: 2024-05-17

## 2024-05-20 ENCOUNTER — OFFICE VISIT (OUTPATIENT)
Dept: OCCUPATIONAL THERAPY | Facility: CLINIC | Age: 34
End: 2024-05-20
Payer: COMMERCIAL

## 2024-05-20 DIAGNOSIS — S63.501D RIGHT WRIST SPRAIN, SUBSEQUENT ENCOUNTER: ICD-10-CM

## 2024-05-20 DIAGNOSIS — M25.531 RIGHT WRIST PAIN: Primary | ICD-10-CM

## 2024-05-20 PROCEDURE — 97110 THERAPEUTIC EXERCISES: CPT | Performed by: OCCUPATIONAL THERAPIST

## 2024-05-20 PROCEDURE — 97140 MANUAL THERAPY 1/> REGIONS: CPT | Performed by: OCCUPATIONAL THERAPIST
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
## 2024-05-22 ENCOUNTER — OFFICE VISIT (OUTPATIENT)
Dept: OCCUPATIONAL THERAPY | Facility: CLINIC | Age: 34
End: 2024-05-22
Payer: COMMERCIAL

## 2024-05-22 DIAGNOSIS — S63.501D RIGHT WRIST SPRAIN, SUBSEQUENT ENCOUNTER: ICD-10-CM

## 2024-05-22 DIAGNOSIS — M25.531 RIGHT WRIST PAIN: Primary | ICD-10-CM

## 2024-05-22 PROCEDURE — 97140 MANUAL THERAPY 1/> REGIONS: CPT | Performed by: OCCUPATIONAL THERAPIST

## 2024-05-22 PROCEDURE — 97110 THERAPEUTIC EXERCISES: CPT | Performed by: OCCUPATIONAL THERAPIST
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
## 2024-05-28 ENCOUNTER — OFFICE VISIT (OUTPATIENT)
Dept: OBGYN CLINIC | Facility: CLINIC | Age: 34
End: 2024-05-28
Payer: COMMERCIAL

## 2024-05-28 VITALS
WEIGHT: 161 LBS | DIASTOLIC BLOOD PRESSURE: 90 MMHG | BODY MASS INDEX: 26.82 KG/M2 | HEART RATE: 76 BPM | HEIGHT: 65 IN | SYSTOLIC BLOOD PRESSURE: 136 MMHG

## 2024-05-28 DIAGNOSIS — S63.501D RIGHT WRIST SPRAIN, SUBSEQUENT ENCOUNTER: ICD-10-CM
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
## 2024-05-28 DIAGNOSIS — M25.531 RIGHT WRIST PAIN: ICD-10-CM

## 2024-05-28 PROCEDURE — 99213 OFFICE O/P EST LOW 20 MIN: CPT | Performed by: ORTHOPAEDIC SURGERY

## 2024-05-28 RX ORDER — METHYLPREDNISOLONE 4 MG/1
TABLET ORAL
Qty: 1 TABLET | Refills: 0 | Status: SHIPPED | OUTPATIENT
Start: 2024-05-28
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
## 2024-05-29 ENCOUNTER — OFFICE VISIT (OUTPATIENT)
Dept: OCCUPATIONAL THERAPY | Facility: CLINIC | Age: 34
End: 2024-05-29
Payer: COMMERCIAL

## 2024-05-29 ENCOUNTER — TELEPHONE (OUTPATIENT)
Age: 34
End: 2024-05-29

## 2024-05-29 DIAGNOSIS — M25.531 RIGHT WRIST PAIN: ICD-10-CM

## 2024-05-29 DIAGNOSIS — S63.501D RIGHT WRIST SPRAIN, SUBSEQUENT ENCOUNTER: Primary | ICD-10-CM

## 2024-05-29 PROCEDURE — 97110 THERAPEUTIC EXERCISES: CPT | Performed by: OCCUPATIONAL THERAPIST

## 2024-05-29 PROCEDURE — 97140 MANUAL THERAPY 1/> REGIONS: CPT | Performed by: OCCUPATIONAL THERAPIST

## 2024-05-29 NOTE — TELEPHONE ENCOUNTER
Caller: Patient    Doctor: Deb    Reason for call: Calling to schedule MRI Follow-up visit.  Could not find timeframe to fit patient schedule; at patient request transferred her back to Central Scheduling to see if there is any way to make MRI scheduling work better for patient timeframe.    Call back#: N/A
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
## 2024-06-03 NOTE — NURSING NOTE
Call placed to patient to discuss upcoming appointment at [facility] radiology department and complete consultation with patient. Patient is having a right wrist MRI arthrogram utilizing fluoroscopy guidance. Reviewed patient's allergies, no current anticoagulant medication present per patient, also discussed the pre and post procedure expectations.  Reminded patient of location and time expected for procedure, Patient expressed understanding by verbalizing and repeating instructions.

## 2024-06-07 ENCOUNTER — HOSPITAL ENCOUNTER (OUTPATIENT)
Dept: RADIOLOGY | Facility: HOSPITAL | Age: 34
Discharge: HOME | End: 2024-06-07
Attending: ORTHOPAEDIC SURGERY
Payer: COMMERCIAL

## 2024-06-07 ENCOUNTER — HOSPITAL ENCOUNTER (OUTPATIENT)
Dept: RADIOLOGY | Facility: HOSPITAL | Age: 34
Discharge: HOME | End: 2024-06-07
Attending: ORTHOPAEDIC SURGERY | Admitting: RADIOLOGY
Payer: COMMERCIAL

## 2024-06-07 DIAGNOSIS — M25.531 PAIN IN RIGHT WRIST: ICD-10-CM

## 2024-06-07 DIAGNOSIS — M25.531 RIGHT WRIST PAIN: ICD-10-CM

## 2024-06-07 PROCEDURE — A9585 GADOBUTROL INJECTION: HCPCS | Performed by: ORTHOPAEDIC SURGERY

## 2024-06-07 PROCEDURE — 25246 INJECTION FOR WRIST X-RAY: CPT

## 2024-06-07 PROCEDURE — 73222 MRI JOINT UPR EXTREM W/DYE: CPT

## 2024-06-07 PROCEDURE — 77002 NEEDLE LOCALIZATION BY XRAY: CPT

## 2024-06-07 RX ORDER — SODIUM CHLORIDE 9 MG/ML
50 INJECTION INTRAVENOUS
Status: COMPLETED | OUTPATIENT
Start: 2024-06-07 | End: 2024-06-07

## 2024-06-07 RX ORDER — LIDOCAINE HYDROCHLORIDE 10 MG/ML
5 INJECTION, SOLUTION EPIDURAL; INFILTRATION; INTRACAUDAL; PERINEURAL
Status: COMPLETED | OUTPATIENT
Start: 2024-06-07 | End: 2024-06-07

## 2024-06-07 RX ORDER — GADOBUTROL 604.72 MG/ML
80 INJECTION INTRAVENOUS
Status: COMPLETED | OUTPATIENT
Start: 2024-06-07 | End: 2024-06-07

## 2024-06-07 RX ADMIN — IOHEXOL 2 ML: 300 INJECTION, SOLUTION INTRAVENOUS at 13:15

## 2024-06-07 RX ADMIN — GADOBUTROL 0.2 ML: 604.72 INJECTION INTRAVENOUS at 13:30

## 2024-06-07 RX ADMIN — SODIUM CHLORIDE 1 ML: 9 INJECTION, SOLUTION INTRAMUSCULAR; INTRAVENOUS; SUBCUTANEOUS at 13:30

## 2024-06-07 RX ADMIN — LIDOCAINE HYDROCHLORIDE 1 ML: 10 INJECTION, SOLUTION EPIDURAL; INFILTRATION; INTRACAUDAL; PERINEURAL at 13:15

## 2024-06-13 ENCOUNTER — OFFICE VISIT (OUTPATIENT)
Dept: OBGYN CLINIC | Facility: CLINIC | Age: 34
End: 2024-06-13
Payer: COMMERCIAL

## 2024-06-13 VITALS
HEIGHT: 64 IN | WEIGHT: 166 LBS | BODY MASS INDEX: 28.34 KG/M2 | SYSTOLIC BLOOD PRESSURE: 130 MMHG | DIASTOLIC BLOOD PRESSURE: 85 MMHG | HEART RATE: 99 BPM

## 2024-06-13 DIAGNOSIS — S69.81XA INJURY OF TRIANGULAR FIBROCARTILAGE COMPLEX (TFCC) OF RIGHT WRIST, INITIAL ENCOUNTER: Primary | ICD-10-CM

## 2024-06-13 PROCEDURE — 99213 OFFICE O/P EST LOW 20 MIN: CPT | Performed by: ORTHOPAEDIC SURGERY
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
## 2024-06-14 DIAGNOSIS — F43.23 ADJUSTMENT DISORDER WITH MIXED ANXIETY AND DEPRESSED MOOD: ICD-10-CM

## 2024-06-14 DIAGNOSIS — R63.5 WEIGHT GAIN: Primary | ICD-10-CM

## 2024-06-14 RX ORDER — PHENTERMINE HYDROCHLORIDE 37.5 MG/1
37.5 TABLET ORAL DAILY
Qty: 30 TABLET | Refills: 0 | Status: SHIPPED | OUTPATIENT
Start: 2024-06-14

## 2024-06-14 RX ORDER — FLUOXETINE 10 MG/1
TABLET, FILM COATED ORAL
Qty: 90 TABLET | Refills: 1 | Status: SHIPPED | OUTPATIENT
Start: 2024-06-14

## 2024-07-01 ENCOUNTER — OFFICE VISIT (OUTPATIENT)
Dept: OBGYN CLINIC | Facility: CLINIC | Age: 34
End: 2024-07-01
Payer: COMMERCIAL

## 2024-07-01 VITALS — BODY MASS INDEX: 28.49 KG/M2 | HEIGHT: 64 IN

## 2024-07-01 DIAGNOSIS — S69.81XA INJURY OF TRIANGULAR FIBROCARTILAGE COMPLEX (TFCC) OF RIGHT WRIST, INITIAL ENCOUNTER: ICD-10-CM

## 2024-07-01 PROCEDURE — 99214 OFFICE O/P EST MOD 30 MIN: CPT | Performed by: ORTHOPAEDIC SURGERY
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
## 2024-07-22 ENCOUNTER — ANESTHESIA EVENT (OUTPATIENT)
Age: 34
End: 2024-07-22

## 2024-07-22 ENCOUNTER — ANESTHESIA (OUTPATIENT)
Age: 34
End: 2024-07-22

## 2024-07-24 DIAGNOSIS — R63.5 WEIGHT GAIN: ICD-10-CM

## 2024-07-24 RX ORDER — PHENTERMINE HYDROCHLORIDE 37.5 MG/1
37.5 TABLET ORAL DAILY
Qty: 30 TABLET | Refills: 0 | Status: SHIPPED | OUTPATIENT
Start: 2024-07-24

## 2024-07-30 NOTE — PRE-PROCEDURE INSTRUCTIONS
Pre-Surgery Instructions:   Medication Instructions    cyclobenzaprine (FLEXERIL) 10 mg tablet Uses PRN- OK to take day of surgery    FLUoxetine (PROzac) 10 MG tablet Take day of surgery.    hydroquinone 4 % cream Hold day of surgery.    metFORMIN (GLUCOPHAGE) 500 mg tablet Hold day of surgery.    multivitamin (THERAGRAN) TABS Stop taking 7 days prior to surgery.    norethindrone-ethinyl estradiol (Junel FE 1/20) 1-20 MG-MCG per tablet Take day of surgery.    phentermine (ADIPEX-P) 37.5 MG tablet Stop taking 5 days prior to surgery.     propranolol (INDERAL) 10 mg tablet Take day of surgery.    tiZANidine (ZANAFLEX) 2 mg tablet Uses PRN- OK to take day of surgery   Medication instructions for day surgery reviewed. Please use only a sip of water to take your instructed medications. Avoid all over the counter vitamins, supplements and NSAIDS for one week prior to surgery per anesthesia guidelines. Tylenol is ok to take as needed.     You will receive a call one business day prior to surgery with an arrival time and hospital directions. If your surgery is scheduled on a Monday, the hospital will be calling you on the Friday prior to your surgery. If you have not heard from anyone by 8pm, please call the hospital supervisor through the hospital  at [phone]. ([place] [phone] or [place] [phone]).    Do not eat or drink anything after midnight the night before your surgery, including candy, mints, lifesavers, or chewing gum. Do not drink alcohol 24hrs before your surgery. Try not to smoke at least 24hrs before your surgery.       Follow the pre surgery showering instructions as listed in the “My Surgical Experience Booklet” or otherwise provided by your surgeon's office. Do not use a blade to shave the surgical area 1 week before surgery. It is okay to use a clean electric clippers up to 24 hours before surgery. Do not apply any lotions, creams, including makeup, cologne, deodorant, or perfumes  after showering on the day of your surgery. Do not use dry shampoo, hair spray, hair gel, or any type of hair products.     No contact lenses, eye make-up, or artificial eyelashes. Remove nail polish, including gel polish, and any artificial, gel, or acrylic nails if possible. Remove all jewelry including rings and body piercing jewelry.     Wear causal clothing that is easy to take on and off. Consider your type of surgery.    Keep any valuables, jewelry, piercings at home. Please bring any specially ordered equipment (sling, braces) if indicated.    Arrange for a responsible person to drive you to and from the hospital on the day of your surgery. Please confirm the visitor policy for the day of your procedure when you receive your phone call with an arrival time.     Call the surgeon's office with any new illnesses, exposures, or additional questions prior to surgery.    Please reference your “My Surgical Experience Booklet” for additional information to prepare for your upcoming surgery.

## 2024-08-05 ENCOUNTER — ANESTHESIA EVENT (OUTPATIENT)
Age: 34
End: 2024-08-05
Payer: COMMERCIAL

## 2024-08-06 ENCOUNTER — ANESTHESIA (OUTPATIENT)
Age: 34
End: 2024-08-06
Payer: COMMERCIAL

## 2024-08-06 ENCOUNTER — HOSPITAL ENCOUNTER (OUTPATIENT)
Age: 34
Setting detail: OUTPATIENT SURGERY
Discharge: HOME | End: 2024-08-06
Attending: ORTHOPAEDIC SURGERY | Admitting: ORTHOPAEDIC SURGERY
Payer: COMMERCIAL

## 2024-08-06 VITALS
HEIGHT: 65 IN | SYSTOLIC BLOOD PRESSURE: 123 MMHG | RESPIRATION RATE: 21 BRPM | HEART RATE: 81 BPM | OXYGEN SATURATION: 98 % | WEIGHT: 164 LBS | DIASTOLIC BLOOD PRESSURE: 79 MMHG | TEMPERATURE: 97.2 F | BODY MASS INDEX: 27.32 KG/M2

## 2024-08-06 DIAGNOSIS — S63.591S TFCC (TRIANGULAR FIBROCARTILAGE COMPLEX) TEAR, RIGHT, SEQUELA: Primary | ICD-10-CM

## 2024-08-06 PROCEDURE — NC001 PR NO CHARGE: Performed by: ORTHOPAEDIC SURGERY

## 2024-08-06 PROCEDURE — 29846 WRIST ARTHROSCOPY/SURGERY: CPT | Performed by: ORTHOPAEDIC SURGERY

## 2024-08-06 RX ORDER — ONDANSETRON 2 MG/ML
INJECTION INTRAMUSCULAR; INTRAVENOUS AS NEEDED
Status: DISCONTINUED | OUTPATIENT
Start: 2024-08-06 | End: 2024-08-06

## 2024-08-06 RX ORDER — KETOROLAC TROMETHAMINE 30 MG/ML
INJECTION, SOLUTION INTRAMUSCULAR; INTRAVENOUS AS NEEDED
Status: DISCONTINUED | OUTPATIENT
Start: 2024-08-06 | End: 2024-08-06

## 2024-08-06 RX ORDER — ACETAMINOPHEN 325 MG/1
650 TABLET ORAL EVERY 6 HOURS PRN
Status: CANCELLED | OUTPATIENT
Start: 2024-08-06

## 2024-08-06 RX ORDER — SODIUM CHLORIDE, SODIUM LACTATE, POTASSIUM CHLORIDE, CALCIUM CHLORIDE 600; 310; 30; 20 MG/100ML; MG/100ML; MG/100ML; MG/100ML
125 INJECTION, SOLUTION INTRAVENOUS CONTINUOUS
Status: CANCELLED | OUTPATIENT
Start: 2024-08-06

## 2024-08-06 RX ORDER — LABETALOL HYDROCHLORIDE 5 MG/ML
5 INJECTION, SOLUTION INTRAVENOUS
Status: DISCONTINUED | OUTPATIENT
Start: 2024-08-06 | End: 2024-08-06 | Stop reason: HOSPADM

## 2024-08-06 RX ORDER — ONDANSETRON 2 MG/ML
4 INJECTION INTRAMUSCULAR; INTRAVENOUS ONCE AS NEEDED
Status: DISCONTINUED | OUTPATIENT
Start: 2024-08-06 | End: 2024-08-06 | Stop reason: HOSPADM

## 2024-08-06 RX ORDER — FENTANYL CITRATE/PF 50 MCG/ML
25 SYRINGE (ML) INJECTION
Status: DISCONTINUED | OUTPATIENT
Start: 2024-08-06 | End: 2024-08-06 | Stop reason: HOSPADM

## 2024-08-06 RX ORDER — HYDROMORPHONE HCL/PF 1 MG/ML
0.5 SYRINGE (ML) INJECTION
Status: DISCONTINUED | OUTPATIENT
Start: 2024-08-06 | End: 2024-08-06 | Stop reason: HOSPADM

## 2024-08-06 RX ORDER — MIDAZOLAM HYDROCHLORIDE 2 MG/2ML
INJECTION, SOLUTION INTRAMUSCULAR; INTRAVENOUS AS NEEDED
Status: DISCONTINUED | OUTPATIENT
Start: 2024-08-06 | End: 2024-08-06

## 2024-08-06 RX ORDER — TRAMADOL HYDROCHLORIDE 50 MG/1
50 TABLET ORAL EVERY 6 HOURS PRN
Qty: 7 TABLET | Refills: 0 | Status: SHIPPED | OUTPATIENT
Start: 2024-08-06 | End: 2024-08-12 | Stop reason: ALTCHOICE

## 2024-08-06 RX ORDER — MEPERIDINE HYDROCHLORIDE 50 MG/ML
12.5 INJECTION INTRAMUSCULAR; INTRAVENOUS; SUBCUTANEOUS ONCE
Status: DISCONTINUED | OUTPATIENT
Start: 2024-08-06 | End: 2024-08-06 | Stop reason: HOSPADM

## 2024-08-06 RX ORDER — CEFAZOLIN SODIUM 1 G/50ML
1000 SOLUTION INTRAVENOUS ONCE
Status: COMPLETED | OUTPATIENT
Start: 2024-08-06 | End: 2024-08-06

## 2024-08-06 RX ORDER — COVID-19 ANTIGEN TEST
220 KIT MISCELLANEOUS 2 TIMES DAILY
Qty: 60 CAPSULE | Refills: 0 | Status: SHIPPED | OUTPATIENT
Start: 2024-08-06 | End: 2024-09-05

## 2024-08-06 RX ORDER — MAGNESIUM HYDROXIDE 1200 MG/15ML
LIQUID ORAL AS NEEDED
Status: DISCONTINUED | OUTPATIENT
Start: 2024-08-06 | End: 2024-08-06 | Stop reason: HOSPADM

## 2024-08-06 RX ORDER — FENTANYL CITRATE 50 UG/ML
INJECTION, SOLUTION INTRAMUSCULAR; INTRAVENOUS AS NEEDED
Status: DISCONTINUED | OUTPATIENT
Start: 2024-08-06 | End: 2024-08-06

## 2024-08-06 RX ORDER — PROPOFOL 10 MG/ML
INJECTION, EMULSION INTRAVENOUS CONTINUOUS PRN
Status: DISCONTINUED | OUTPATIENT
Start: 2024-08-06 | End: 2024-08-06

## 2024-08-06 RX ORDER — ONDANSETRON 2 MG/ML
4 INJECTION INTRAMUSCULAR; INTRAVENOUS EVERY 6 HOURS PRN
Status: CANCELLED | OUTPATIENT
Start: 2024-08-06

## 2024-08-06 RX ORDER — PROMETHAZINE HYDROCHLORIDE 25 MG/ML
12.5 INJECTION, SOLUTION INTRAMUSCULAR; INTRAVENOUS ONCE AS NEEDED
Status: DISCONTINUED | OUTPATIENT
Start: 2024-08-06 | End: 2024-08-06 | Stop reason: HOSPADM

## 2024-08-06 RX ORDER — SODIUM CHLORIDE, SODIUM LACTATE, POTASSIUM CHLORIDE, CALCIUM CHLORIDE 600; 310; 30; 20 MG/100ML; MG/100ML; MG/100ML; MG/100ML
125 INJECTION, SOLUTION INTRAVENOUS CONTINUOUS
Status: DISCONTINUED | OUTPATIENT
Start: 2024-08-06 | End: 2024-08-06

## 2024-08-06 RX ORDER — SENNOSIDES 8.6 MG
650 CAPSULE ORAL EVERY 8 HOURS PRN
Qty: 30 TABLET | Refills: 0 | Status: SHIPPED | OUTPATIENT
Start: 2024-08-06

## 2024-08-06 RX ORDER — BUPIVACAINE HYDROCHLORIDE 5 MG/ML
INJECTION, SOLUTION EPIDURAL; INTRACAUDAL
Status: COMPLETED | OUTPATIENT
Start: 2024-08-06 | End: 2024-08-06

## 2024-08-06 RX ORDER — DEXAMETHASONE SODIUM PHOSPHATE 4 MG/ML
INJECTION, SOLUTION INTRA-ARTICULAR; INTRALESIONAL; INTRAMUSCULAR; INTRAVENOUS; SOFT TISSUE
Status: COMPLETED | OUTPATIENT
Start: 2024-08-06 | End: 2024-08-06

## 2024-08-06 RX ORDER — ALBUTEROL SULFATE 2.5 MG/3ML
2.5 SOLUTION RESPIRATORY (INHALATION) ONCE AS NEEDED
Status: DISCONTINUED | OUTPATIENT
Start: 2024-08-06 | End: 2024-08-06 | Stop reason: HOSPADM

## 2024-08-06 RX ORDER — PROPOFOL 10 MG/ML
INJECTION, EMULSION INTRAVENOUS AS NEEDED
Status: DISCONTINUED | OUTPATIENT
Start: 2024-08-06 | End: 2024-08-06

## 2024-08-06 RX ORDER — TRAMADOL HYDROCHLORIDE 50 MG/1
50 TABLET ORAL EVERY 6 HOURS PRN
Status: CANCELLED | OUTPATIENT
Start: 2024-08-06

## 2024-08-06 RX ADMIN — BUPIVACAINE HYDROCHLORIDE 30 ML: 5 INJECTION, SOLUTION EPIDURAL; INTRACAUDAL; PERINEURAL at 09:58

## 2024-08-06 RX ADMIN — SODIUM CHLORIDE, SODIUM LACTATE, POTASSIUM CHLORIDE, AND CALCIUM CHLORIDE: .6; .31; .03; .02 INJECTION, SOLUTION INTRAVENOUS at 08:30

## 2024-08-06 RX ADMIN — FENTANYL CITRATE 25 MCG: 50 INJECTION INTRAMUSCULAR; INTRAVENOUS at 11:05

## 2024-08-06 RX ADMIN — CEFAZOLIN SODIUM 1000 MG: 1 SOLUTION INTRAVENOUS at 10:50

## 2024-08-06 RX ADMIN — FENTANYL CITRATE 25 MCG: 50 INJECTION INTRAMUSCULAR; INTRAVENOUS at 10:53

## 2024-08-06 RX ADMIN — FENTANYL CITRATE 25 MCG: 50 INJECTION INTRAMUSCULAR; INTRAVENOUS at 11:24

## 2024-08-06 RX ADMIN — DEXAMETHASONE SODIUM PHOSPHATE 4 MG: 4 INJECTION INTRA-ARTICULAR; INTRALESIONAL; INTRAMUSCULAR; INTRAVENOUS; SOFT TISSUE at 09:58

## 2024-08-06 RX ADMIN — ONDANSETRON 4 MG: 2 INJECTION INTRAMUSCULAR; INTRAVENOUS at 10:55

## 2024-08-06 RX ADMIN — KETOROLAC TROMETHAMINE 15 MG: 30 INJECTION, SOLUTION INTRAMUSCULAR at 10:57

## 2024-08-06 RX ADMIN — PROPOFOL 50 MG: 10 INJECTION, EMULSION INTRAVENOUS at 10:53

## 2024-08-06 RX ADMIN — MIDAZOLAM 2 MG: 1 INJECTION INTRAMUSCULAR; INTRAVENOUS at 09:53

## 2024-08-06 RX ADMIN — PROPOFOL 100 MCG/KG/MIN: 10 INJECTION, EMULSION INTRAVENOUS at 10:53

## 2024-08-06 RX ADMIN — FENTANYL CITRATE 25 MCG: 50 INJECTION INTRAMUSCULAR; INTRAVENOUS at 10:55

## 2024-08-06 NOTE — ANESTHESIA PREPROCEDURE EVALUATION
Procedure:  Right wrist arthroscopy with TFCC debridement verse repair (Right: Wrist)    Relevant Problems   ANESTHESIA (within normal limits)      CARDIO (within normal limits)      ENDO (within normal limits)      MUSCULOSKELETAL   (+) Sacroiliitis (HCC)   (+) Strain of muscle of right hip, initial encounter      NEURO/PSYCH   (+) PMDD (premenstrual dysphoric disorder)      PULMONARY   (+) [place] (obstructive sleep apnea)   (+) Strep pharyngitis        Physical Exam    Airway    Mallampati score: II  TM Distance: >3 FB  Neck ROM: full     Dental   No notable dental hx     Cardiovascular  Rhythm: regular, Rate: normal, Cardiovascular exam normal    Pulmonary  Pulmonary exam normal     Other Findings  post-pubertal.      Anesthesia Plan  ASA Score- 2     Anesthesia Type- regional with ASA Monitors.         Additional Monitors:     Airway Plan:     Comment: Patient seen and examined.  History reviewed.  Patient to be done under TIVA and routine monitors.  Supraclavicular block to be performed preoperatively for post-op pain.  Risks discussed with the patient. Consent obtained.Patient seen and examined.  .       Plan Factors-Exercise tolerance (METS): >4 METS.    Chart reviewed.    Patient summary reviewed.                  Induction- intravenous.    Postoperative Plan-         Informed Consent- Anesthetic plan and risks discussed with patient.  I personally reviewed this patient with the CRNA. Discussed and agreed on the Anesthesia Plan with the CRNA..

## 2024-08-06 NOTE — OP NOTE
OPERATIVE REPORT  PATIENT NAME: Nell Baldwin  :  1990  MRN: [id number]  Pt Location: [facility]    SURGERY DATE: 24    Surgeons and Role:     * Alejo Middleton MD - Primary     * Lee Avalos MD - Assisting    Pre-Op Diagnosis:  Injury of triangular fibrocartilage complex (TFCC) of right wrist, initial encounter [S69.81XA]    Post-Op Diagnosis:  .Injury of triangular fibrocartilage complex (TFCC) of right wrist, initial encounter [S69.81XA]    Procedure(s) (LRB):  Right wrist arthroscopy with TFCC debridement and repair (Right)  Application of long arm sugar tong splint (Right)    Specimen(s):  No specimens collected during this procedure.    Estimated Blood Loss:   Minimal      Anesthesia Type:   Regional    Operative Indications:  The patient has a history of right wrist injury with TFCC tear and possible scapholunate interosseous ligament tear that was recalcitrant to conservative management.  The decision was made to bring the patient to the operating room for right wrist arthroscopy with debridement versus repair.  Risks of the procedure were explained which include, but are not limited to bleeding; infection; damage to nerves, arteries,veins, tendons; scar; pain; need for reoperation; failure to give desired result; and risks of anaesthesia.  All questions were answered to satisfaction and they were willing to proceed.         Operative Findings:  Synovitis right wrist  Dorsal wrist syndrome with infolding of the dorsal capsule to the right wrist  Partial radial sided TFCC tear  Ulnar triquetral ligament split tear  Intact scapholunate interosseous ligament    Complications:   None    Procedure and Technique:  After the patient, site, and procedure were identified, the patient was brought into the operating room in a supine position.  Regional anaesthesia and sedation were provided.  A well padded tourniquet was applied to the extremity, set at 250 mmHg.  The  right upper  extremity was then prepped and drapped in a normal, sterile, orthopedic fashion.    After the patient, site, and procedure identified attention was turned towards the right arm.  The arm was placed within an ARC arthroscopy tower and longitudinal traction was applied.  Esmarch bandage was used to exsanguinate the limb and the tourniquet was inflated to 250 mmHg.  Joint was insufflated with normal sterile saline solution.  Utilizing a nick and spread technique a 3-4 and a 4-5 working portal were then made.  Diagnostic arthroscopy was then performed.  We identified no significant articular damage to the scaphoid facet, lunate facet of the distal radius.  Proximal pole of the scaphoid, lunate, or triquetrum.  There was some synovitis noted to the dorsal radial dorsal central and dorsal ulnar aspect of the wrist which was debrided back with a Gator shaver.  Scapholunate and lunotriquetral ligaments were intact.  Volar radiocarpal ligaments were also intact.  There was some dorsal capsular infolding on the dorsal side of the wrist which was debrided back to healthy edges.  A small partial-thickness radial sided TFCC tear was also noted which was debrided back to stable edges and did not require repair.  Normal trampoline test was noted to the TFCC and there was no evidence of instability of the TFCC.  There was an ulnar triquetral ligament split tear localized on the ulnar side of the wrist with surrounding synovitis.  The shaver was used to remove the synovitis.  Utilizing an outside in arthroscopic repair, an additional incision was made along the ulnar aspect of the arm near the prestyloid recess.  We dissected down through skin and subcutaneous tissues maintaining hemostasis with care taken to protect the dorsal cutaneous branch of the ulnar nerve.  Utilizing the [employer] TFCC mender set, PDS suture was placed from outside to in and tied appropriately securing and closing the ulnar triquetral ligament split  tear.  Instruments were then removed.  Tourniquet was deflated demonstrating rapid restoration of blood supply to the wrist.  Patient tolerated the procedure well.    At the completion of the procedure, hemostasis was obtained with cautery and direct pressure.  The wounds were copiously irrigated with sterile solution.  The wounds were closed with Vicryl and Prolene.  Sterile dressings were applied, including Xeroform, gauze, tweeners, webril, ACE and Sugartong Splint.  Please note, all sponge, needle, and instrument counts were correct prior to closure.  Loupe magnification was utilized.  The patient tolerated the procedure well.     I was present for all critical portions of the procedure.    Patient Disposition:  PACU  and hemodynamically stable    SIGNATURE: Alejo Middleton MD  DATE: 08/06/24  TIME: 3:28 PM

## 2024-08-06 NOTE — H&P
H&P Exam - Orthopedics   Nell Baldwin 33 y.o. female MRN: [id number]  Unit/Bed#: [facility] 23    Assessment/Plan   Assessment:  Right wrist TFCC tear    Plan:  Right wrist arthroscopy with TFCC debridement versus repair with regional anesthesia    History of Present Illness   HPI:  Nell Baldwin is a 33 y.o. female who presents with right wrist pain.  She has tried conservative treatment relief of her symptoms.  She would like to proceed with surgical intervention..    Historical Information  Review Of Systems:   Skin: Normal  Neuro: See HPI  Musculoskeletal: See HPI  14 point review of systems negative except as stated above     Past Medical History:   Past Medical History:   Diagnosis Date    Amenorrhea     last assessed: 2016    Anxiety     Female infertility     seen by [facility] - seen for 1 year, 5 cycles IUI acheived pregnancy via IVF    Fibromyalgia     Irregular menstrual cycle     last assessed: 2016    Osteoarthritis of right acromioclavicular joint     last assessment: 2013    Polycystic ovary syndrome     Separation of right acromioclavicular joint     Last assessed: 2013; this is more consistent with ostiolysis of the distal clavicle vs. acromioclavicular joint arthritis, not acute shoulder seperation    Strain of muscle of right hip, initial encounter 2023    Varicella     vaccine       Past Surgical History:   Past Surgical History:   Procedure Laterality Date     SECTION      DENTAL SURGERY      wisdom teeth    FL INJECTION RIGHT WRIST (ARTHROGRAM)  2024    CT  DELIVERY ONLY N/A 2018    Procedure:  SECTION ();  Surgeon: Ismael Marquez MD;  Location: [facility];  Service: Obstetrics    CT  DELIVERY ONLY N/A 2020    Procedure:  SECTION ();  Surgeon: Ismael Marquez MD;  Location: [facility];  Service: Obstetrics    SHOULDER SURGERY      SINUS SURGERY  2023    TUBAL LIGATION  Bilateral 2020    Procedure: LIGATION/COAGULATION TUBAL;  Surgeon: Ismael Marquez MD;  Location: [facility];  Service: Obstetrics       Family History:  Family history reviewed and non-contributory  Family History   Problem Relation Age of Onset    Fibromyalgia Mother     Hyperlipidemia Father     Hypertension Father     Diabetes Maternal Grandmother     Colon cancer Maternal Grandfather 48    Cancer Maternal Grandfather     Diabetes Paternal Grandfather     Breast cancer Neg Hx     Endometrial cancer Neg Hx     Ovarian cancer Neg Hx        Social History:  Social History     Socioeconomic History    Marital status: /Civil Union     Spouse name: None    Number of children: None    Years of education: None    Highest education level: None   Occupational History    None   Tobacco Use    Smoking status: Never    Smokeless tobacco: Never   Vaping Use    Vaping status: Never Used   Substance and Sexual Activity    Alcohol use: Not Currently     Alcohol/week: 1.0 standard drink of alcohol    Drug use: Never    Sexual activity: Yes     Partners: Male     Birth control/protection: Surgical     Comment: tubes tied   Other Topics Concern    None   Social History Narrative    History of  0    Caffeine use-1 cup of coffee/day    Has smoke detectors     Denied history of sun protection    Uses safety equipment- protective head gear     Social Determinants of Health     Financial Resource Strain: Not on file   Food Insecurity: Not on file   Transportation Needs: Not on file   Physical Activity: Not on file   Stress: Not on file   Social Connections: Not on file   Intimate Partner Violence: Not on file   Housing Stability: Not on file       Allergies:   No Known Allergies        Labs:  0   Lab Value Date/Time    HCT 32.1 (L) 2024 1201    HCT 38.6 2022 0707    HCT 29.0 (L) 2020 0535    HCT 34.1 (L) 2020 1127    HCT 30.6 (L) 2020 1154    HCT 42.3 2016 0927    HCT 39.0 2013  "1520    HGB 11.1 (L) 04/18/2024 1201    HGB 13.1 01/21/2022 0707    HGB 9.5 (L) 09/18/2020 0535    HGB 11.4 (L) 09/17/2020 1127    HGB 9.8 (L) 09/14/2020 1154    HGB 13.6 04/11/2016 0927    HGB 13.4 08/29/2013 1520    WBC 6.2 04/18/2024 1201    WBC 5.3 01/21/2022 0707    WBC 9.30 09/18/2020 0535    WBC 8.51 09/17/2020 1127    WBC 7.73 09/14/2020 1154    WBC 6.7 04/11/2016 0927    WBC 7.65 08/29/2013 1520    ESR 17 04/18/2024 1201       Meds:    Current Facility-Administered Medications:     ceFAZolin (ANCEF) IVPB (premix in dextrose) 1,000 mg 50 mL, 1,000 mg, Intravenous, Once, Cody Howell PA-C    lactated ringers infusion, 125 mL/hr, Intravenous, Continuous, Alejo Middleton MD    lidocaine-epinephrine 1 %-1:100,000 10 mL in bupivacaine (PF) 0.5 % 10 mL infiltration, , Infiltration, Once, Alejo Middleton MD    Facility-Administered Medications Ordered in Other Encounters:     midazolam (VERSED) injection, , Intravenous, PRN, Cody Chance MD, 2 mg at 08/06/24 0953    Blood Culture:   No results found for: \"BLOODCX\"    Wound Culture:   No results found for: \"WOUNDCULT\"    Ins and Outs:  No intake/output data recorded.            Physical Exam  /89   Pulse 72   Temp 99.2 °F (37.3 °C) (Temporal)   Resp 17   Ht 5' 5\" (1.651 m)   Wt 74.4 kg (164 lb)   LMP  (LMP Unknown)   SpO2 98%   BMI 27.29 kg/m²   /89   Pulse 72   Temp 99.2 °F (37.3 °C) (Temporal)   Resp 17   Ht 5' 5\" (1.651 m)   Wt 74.4 kg (164 lb)   LMP  (LMP Unknown)   SpO2 98%   BMI 27.29 kg/m²   Gen: No acute distress, resting comfortably in bed  HEENT: Eyes clear, moist mucus membranes, hearing intact  Respiratory: No audible wheezing or stridor  Cardiovascular: Well Perfused peripherally, 2+ distal pulse  Abdomen: nondistended, no peritoneal signs  Ortho Exam: Right wrist  No erythema edema or ecchymosis noted  Tenderness to palpation over the second and third extensor compartment, tenderness over the prestyloid recess, " tenderness over the ulnar side by the ulnar triquetral ligament  Pain with DRUJ testing but is stable    Neuro Exam: Patient is neurovascularly intact from a median, radial and ulnar nerve distribution. Capillary refill less than 2 seconds.       Lab Results: Reviewed  Imaging: Reviewed

## 2024-08-06 NOTE — ANESTHESIA PROCEDURE NOTES
Peripheral Block    Patient location during procedure: holding area  Start time: 8/6/2024 9:50 AM  Reason for block: at surgeon's request and post-op pain management  Staffing  Performed by: Cody Chance MD  Authorized by: Cody Chance MD    Preanesthetic Checklist  Completed: patient identified, IV checked, site marked, risks and benefits discussed, surgical consent, monitors and equipment checked, pre-op evaluation and timeout performed  Peripheral Block  Patient position: sitting  Prep: ChloraPrep  Patient monitoring: frequent blood pressure checks, continuous pulse oximetry and heart rate  Block type: Supraclavicular  Laterality: right  Injection technique: single-shot  Procedures: ultrasound guided, Ultrasound guidance required for the procedure to increase accuracy and safety of medication placement and decrease risk of complications.  Ultrasound permanent image saved  bupivacaine (PF) (MARCAINE) 0.5 % injection 20 mL - Perineural   30 mL - 8/6/2024 9:58:00 AM  dexamethasone (DECADRON) injection 4 mg - Perineural   4 mg - 8/6/2024 9:58:00 AM  Needle  Needle type: Stimuplex   Needle gauge: 20 G  Needle length: 4 in  Needle localization: anatomical landmarks and ultrasound guidance  Needle insertion depth: 3 cm  Assessment  Injection assessment: incremental injection, frequent aspiration, injected with ease, negative aspiration, negative for heart rate change, no paresthesia on injection, no symptoms of intraneural/intravenous injection and needle tip visualized at all times  Paresthesia pain: none  Post-procedure:  site cleaned  patient tolerated the procedure well with no immediate complications

## 2024-08-06 NOTE — ANESTHESIA POSTPROCEDURE EVALUATION
Post-Op Assessment Note    CV Status:  Stable  Pain Score: 0    Pain management: adequate       Mental Status:  Alert and awake   Hydration Status:  Euvolemic   PONV Controlled:  Controlled   Airway Patency:  Patent     Post Op Vitals Reviewed: Yes    No anethesia notable event occurred.    Staff: CRNA               BP   96/51   Temp   Rn note   Pulse  67   Resp   16   SpO2   100% 6Lo2 mask

## 2024-08-07 ENCOUNTER — TELEPHONE (OUTPATIENT)
Age: 34
End: 2024-08-07

## 2024-08-07 DIAGNOSIS — F32.81 PMDD (PREMENSTRUAL DYSPHORIC DISORDER): ICD-10-CM

## 2024-08-07 NOTE — TELEPHONE ENCOUNTER
Patient can elevate the extremity.  She should also be taking the naproxen with the Tylenol to help with pain as well as swelling.  She can apply some ice over the area as well.  Thank you.

## 2024-08-07 NOTE — TELEPHONE ENCOUNTER
Caller: Patient    Doctor: Emi    Reason for call: Patient had procedure w/ Dr Middleton yesterday and is calling in to let us know that she is having difficulty with the pain.    The patient rates the pain 8/10 whilst taking the Tramadol and Tylenol.  She would like to know if we have any recommendations or are able to provide something stronger to help with the pain.    Please reach out to patient to advise.    Call back#: [phone]

## 2024-08-08 DIAGNOSIS — S63.591S TFCC (TRIANGULAR FIBROCARTILAGE COMPLEX) TEAR, RIGHT, SEQUELA: Primary | ICD-10-CM

## 2024-08-08 RX ORDER — HYDROCODONE BITARTRATE AND ACETAMINOPHEN 5; 325 MG/1; MG/1
1 TABLET ORAL EVERY 6 HOURS PRN
Qty: 10 TABLET | Refills: 0 | Status: SHIPPED | OUTPATIENT
Start: 2024-08-08 | End: 2024-08-09 | Stop reason: SDUPTHER

## 2024-08-08 RX ORDER — NORETHINDRONE ACETATE AND ETHINYL ESTRADIOL AND FERROUS FUMARATE 1MG-20(21)
1 KIT ORAL DAILY
Qty: 84 TABLET | Refills: 1 | Status: SHIPPED | OUTPATIENT
Start: 2024-08-08

## 2024-08-08 NOTE — TELEPHONE ENCOUNTER
Caller: Patient     Doctor: Emi     Reason for call: Message was relayed to patient and states no relief    Call back#: [phone]

## 2024-08-08 NOTE — TELEPHONE ENCOUNTER
CLM on VM for pt to return call and get update on how arm is doing after loosening ace wrap. Await CB.

## 2024-08-08 NOTE — TELEPHONE ENCOUNTER
Called and spoke w/pt and she states that she had loosened ace wrap and taken another pain med and is still having pain.  Pain level is still a 7-8/10 and throbbing.  She would really like to be able to get a good night's sleep.  Please review and advise on pain med.     Pharmacy:  [facility].

## 2024-08-08 NOTE — TELEPHONE ENCOUNTER
Caller: Patient     Doctor: Emi    Reason for call: Patient called stated [facility] stated medication called in Norco is delayed and that we need to contact [facility]. Please advise.    Call back#: [phone]  [facility]: [phone]

## 2024-08-08 NOTE — TELEPHONE ENCOUNTER
Called and spoke w/pt and had  on 8/6/24 Right wrist arthroscopy with TFCC debridement and repair (Right: Wrist)  Application of long arm sugar tong splint (Right) . Pt is stating having 7-8/10 consistent pain and taking the prescribed pain meds ATC w/out relief and icing and elevating.  ON the ulnar side has some numbness on the area just below the knuckles.  The block wore off in middle of night going into Wednesday. Pt is able to move fingers. Having difficultly moving ring and pinky finger w/increase pain noted and are warm to touch. States cap refill good. Fingers are swollen and more so on right side than left side. Keeping elevated hand above heart as much as she can. She did not sleep well and has 3 small kids to care for.  Is there something else that she can take for pain?  Please review and advise.

## 2024-08-08 NOTE — TELEPHONE ENCOUNTER
I called the patient earlier this morning.  Patient was instructed to remove the Ace wrap and loosen up the splint.  She was advised that if removing the Ace wrap and loosening the splint did not help to alleviate some of the pain, discomfort and swelling, additional pain medications can be sent to the pharmacy.  Thank you.

## 2024-08-08 NOTE — TELEPHONE ENCOUNTER
Please advise the patient I will send some [place] to the pharmacy for her.  She can pick this up at her convenience. Thank you

## 2024-08-08 NOTE — TELEPHONE ENCOUNTER
Caller: Patient     Doctor: Emi    Reason for call: Returning call from Nurse.    Call back#: [phone]

## 2024-08-09 DIAGNOSIS — S63.591S TFCC (TRIANGULAR FIBROCARTILAGE COMPLEX) TEAR, RIGHT, SEQUELA: ICD-10-CM

## 2024-08-09 RX ORDER — HYDROCODONE BITARTRATE AND ACETAMINOPHEN 5; 325 MG/1; MG/1
1 TABLET ORAL EVERY 6 HOURS PRN
Qty: 10 TABLET | Refills: 0 | Status: SHIPPED | OUTPATIENT
Start: 2024-08-09 | End: 2024-08-19

## 2024-08-09 NOTE — TELEPHONE ENCOUNTER
The pharmacy does not open until 9 AM.  I will have to call them at that time between patient's.  Thank you

## 2024-08-09 NOTE — TELEPHONE ENCOUNTER
Called and spoke w/pt and relayed CARLIE Howell's msg. States she has received msg from [facility] letting her know that med is there for .

## 2024-08-09 NOTE — TELEPHONE ENCOUNTER
I called [facility] pharmacy and they are on backorder for the [place].  I called the patient and she would like the medication sent to [facility].  I did send this over and she should be able to pick this up at her convenience.  Thank you.   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  -Plan therapeutic lovenox for now

## 2024-08-12 ENCOUNTER — OFFICE VISIT (OUTPATIENT)
Dept: FAMILY MEDICINE CLINIC | Facility: CLINIC | Age: 34
End: 2024-08-12
Payer: COMMERCIAL

## 2024-08-12 VITALS
OXYGEN SATURATION: 97 % | HEART RATE: 74 BPM | TEMPERATURE: 97.8 F | RESPIRATION RATE: 15 BRPM | SYSTOLIC BLOOD PRESSURE: 122 MMHG | BODY MASS INDEX: 28.71 KG/M2 | DIASTOLIC BLOOD PRESSURE: 90 MMHG | WEIGHT: 172.3 LBS | HEIGHT: 65 IN

## 2024-08-12 DIAGNOSIS — R05.1 ACUTE COUGH: Primary | ICD-10-CM

## 2024-08-12 PROCEDURE — 99213 OFFICE O/P EST LOW 20 MIN: CPT | Performed by: NURSE PRACTITIONER

## 2024-08-12 RX ORDER — ALBUTEROL SULFATE 90 UG/1
2 AEROSOL, METERED RESPIRATORY (INHALATION) EVERY 6 HOURS PRN
Qty: 8 G | Refills: 0 | Status: SHIPPED | OUTPATIENT
Start: 2024-08-12

## 2024-08-12 RX ORDER — BENZONATATE 200 MG/1
200 CAPSULE ORAL 3 TIMES DAILY PRN
Qty: 20 CAPSULE | Refills: 0 | Status: SHIPPED | OUTPATIENT
Start: 2024-08-12

## 2024-08-12 NOTE — PROGRESS NOTES
"Ambulatory Visit  Name: Nell Baldwin      : 1990      MRN: [id number]  Encounter Provider: EDWARDO Davenport  Encounter Date: 2024   Encounter department: [facility]    Assessment & Plan   1. Acute cough  -     benzonatate (TESSALON) 200 MG capsule; Take 1 capsule (200 mg total) by mouth 3 (three) times a day as needed for cough  -     albuterol (PROVENTIL HFA,VENTOLIN HFA) 90 mcg/act inhaler; Inhale 2 puffs every 6 (six) hours as needed for wheezing or shortness of breath    Pt will start Mucinex 1,200 mg BID, tessalon perles HS PRN and Albuterol PRN. Pt will rest and keep well hydrated. If not better by , consider Zpak. Patient is encouraged to call our office for any questions/concerns, persistent or worsening symptoms. Patient states they understand and agree with treatment plan.       Pt to f/u PRN.     History of Present Illness     Pt presents today for scratchy cough x 1.5 weeks ago.  She notes that last Wednesday, her cough became worse.  At night her cough is worse and she will get into coughing fit.  She denies fever, chills, body aches.  She does admit to chest congestion and non-productive cough.  Pt did recently have wrist surgery (twilight & nerve block, procedure about 1 hour per pt report) last week and is on pain medication and did not take anything OTC for fear of possible medication interaction.  Pt is on Norco, Ibuprofen and Tylenol PRN.          Review of Systems  As noted per HPI.    Objective     /90   Pulse 74   Temp 97.8 °F (36.6 °C)   Resp 15   Ht 5' 5\" (1.651 m)   Wt 78.2 kg (172 lb 4.8 oz)   LMP  (LMP Unknown)   SpO2 97%   BMI 28.67 kg/m²     Physical Exam  Vitals and nursing note reviewed.   Constitutional:       General: She is not in acute distress.     Appearance: Normal appearance. She is well-developed. She is not ill-appearing.   HENT:      Head: Normocephalic and atraumatic.     "  Right Ear: Tympanic membrane, ear canal and external ear normal.      Left Ear: Tympanic membrane, ear canal and external ear normal.      Nose: No congestion or rhinorrhea.      Mouth/Throat:      Pharynx: No oropharyngeal exudate or posterior oropharyngeal erythema.   Eyes:      Conjunctiva/sclera: Conjunctivae normal.   Cardiovascular:      Rate and Rhythm: Normal rate and regular rhythm.      Pulses: Normal pulses.      Heart sounds: Normal heart sounds. No murmur heard.  Pulmonary:      Effort: Pulmonary effort is normal. No respiratory distress.      Breath sounds: Normal breath sounds. No wheezing.   Musculoskeletal:      Cervical back: Normal range of motion and neck supple.   Lymphadenopathy:      Cervical: No cervical adenopathy.   Neurological:      Mental Status: She is alert and oriented to person, place, and time. Mental status is at baseline.   Psychiatric:         Mood and Affect: Mood normal.         Behavior: Behavior normal.         Thought Content: Thought content normal.         Judgment: Judgment normal.

## 2024-08-16 ENCOUNTER — TELEPHONE (OUTPATIENT)
Age: 34
End: 2024-08-16

## 2024-08-16 DIAGNOSIS — R05.1 ACUTE COUGH: Primary | ICD-10-CM

## 2024-08-16 RX ORDER — AZITHROMYCIN 250 MG/1
TABLET, FILM COATED ORAL
Qty: 6 TABLET | Refills: 0 | Status: SHIPPED | OUTPATIENT
Start: 2024-08-16 | End: 2024-08-21

## 2024-08-16 NOTE — TELEPHONE ENCOUNTER
Patient called and said her cough hasn't gotten any better.  She said it might've has gotten worse.  She asked if something could be called in?

## 2024-08-16 NOTE — TELEPHONE ENCOUNTER
Spoke with patient regarding that Glendy sent over Zpac to her pharmacy. If she is not feeling better, to call the office. Pt understood and did not have any more questions.

## 2024-08-19 ENCOUNTER — OFFICE VISIT (OUTPATIENT)
Dept: OBGYN CLINIC | Facility: CLINIC | Age: 34
End: 2024-08-19
Payer: COMMERCIAL

## 2024-08-19 VITALS — WEIGHT: 172 LBS | BODY MASS INDEX: 28.66 KG/M2 | HEIGHT: 65 IN

## 2024-08-19 DIAGNOSIS — S63.591S TFCC (TRIANGULAR FIBROCARTILAGE COMPLEX) TEAR, RIGHT, SEQUELA: Primary | ICD-10-CM

## 2024-08-19 PROCEDURE — 29065 APPL CST SHO TO HAND LNG ARM: CPT | Performed by: ORTHOPAEDIC SURGERY

## 2024-08-19 PROCEDURE — 99024 POSTOP FOLLOW-UP VISIT: CPT | Performed by: ORTHOPAEDIC SURGERY

## 2024-08-19 NOTE — PROGRESS NOTES
"Assessment:   S/P Right wrist arthroscopy with TFCC debridement and repair - Right and Application of long arm sugar tong splint - Right on 8/6/2024    Plan:   Patient was placed within a [place] cast today.  Immobilization, ice, elevation, 5 pound lifting restriction.    Follow Up:  Follow-up in 4-1/2 weeks time    To Do Next Visit:  Cast off right wrist.      CHIEF COMPLAINT:  Chief Complaint   Patient presents with   • Right Wrist - Post-op, Suture / Staple Removal     Arthoscopy with TFCC debridement + repair - 8/6/24         SUBJECTIVE:  Nell Baldwin is a 33 y.o. female who presents for follow up after Right wrist arthroscopy with TFCC debridement and repair - Right and Application of long arm sugar tong splint - Right on 8/6/2024.  Today patient has no significant complaints or concerns.  She does report some numbness to the dorsal aspect of the right hand in the dorsal cutaneous branch of the ulnar nerve distribution..       PHYSICAL EXAMINATION:  Vital signs: Ht 5' 5\" (1.651 m)   Wt 78 kg (172 lb)   LMP  (LMP Unknown)   BMI 28.62 kg/m²   General: well developed and well nourished, alert, oriented times 3, and appears comfortable  Psychiatric: Normal    MUSCULOSKELETAL EXAMINATION:  Incision: Clean, dry, intact and healed  Range of Motion: As expected  Neurovascular status: Neuro intact, good cap refill  Activity Restrictions: Cast/splint restrictions  Done today: Sutures out and Steri strips applied  Long-arm cast applied      STUDIES REVIEWED:  No Studies to review      PROCEDURES PERFORMED:  Cast application    Date/Time: 8/19/2024 11:00 AM    Performed by: Alejo Middleton MD  Authorized by: Alejo Middleton MD  Universal Protocol:  Consent: Verbal consent obtained.    Pre-procedure details:     Sensation:  Normal  Procedure details: Cast type:  Long arm        Supplies:  Cotton padding and fiberglass  Post-procedure details:     Pain:  Unchanged    Sensation:  Normal          Scribe " Attestation    I,:  Cruz Batista am acting as a scribe while in the presence of the attending physician.:       I,:  Alejo Middleton MD personally performed the services described in this documentation    as scribed in my presence.:

## 2024-08-23 ENCOUNTER — TELEPHONE (OUTPATIENT)
Age: 34
End: 2024-08-23

## 2024-08-23 NOTE — TELEPHONE ENCOUNTER
Caller: Patient    Doctor: Emi    Reason for call: Patient is asking if she can r/s her post op to 1pm on 9/16.    Call back#: [phone]

## 2024-08-27 ENCOUNTER — TELEPHONE (OUTPATIENT)
Age: 34
End: 2024-08-27

## 2024-08-27 NOTE — TELEPHONE ENCOUNTER
Please see Dr Middleton's msg and CARLIE Howell's msg and call and schedule pt for tomorrow as I am unable to schedule as there schedule is full. Thank you.

## 2024-08-27 NOTE — TELEPHONE ENCOUNTER
Caller: Patient    Doctor: Emi    Reason for call: Nell's cast is too big and she is able to twist it around. Stated it is hurting. Please advise.     Call back#: [phone]

## 2024-08-28 ENCOUNTER — OFFICE VISIT (OUTPATIENT)
Dept: OBGYN CLINIC | Facility: HOSPITAL | Age: 34
End: 2024-08-28
Payer: COMMERCIAL

## 2024-08-28 VITALS — HEIGHT: 65 IN | BODY MASS INDEX: 28.66 KG/M2 | WEIGHT: 172 LBS

## 2024-08-28 DIAGNOSIS — S63.591S TFCC (TRIANGULAR FIBROCARTILAGE COMPLEX) TEAR, RIGHT, SEQUELA: Primary | ICD-10-CM

## 2024-08-28 PROCEDURE — 99024 POSTOP FOLLOW-UP VISIT: CPT | Performed by: ORTHOPAEDIC SURGERY

## 2024-08-28 PROCEDURE — 29065 APPL CST SHO TO HAND LNG ARM: CPT | Performed by: PHYSICIAN ASSISTANT

## 2024-08-28 NOTE — PROGRESS NOTES
"Assessment:   S/P Right wrist arthroscopy with TFCC debridement and repair - Right and Application of long arm sugar tong splint - Right on 8/6/2024    Plan:   Patient's cast was evaluated today which was loose both proximally as well as distally  Cast was removed and a new [place] cast was applied  She was advised to continue with immobilization, ice, elevation and no lifting greater than 5 pounds  She will follow-up as scheduled for reevaluation and cast     Follow Up:  As scheduled     To Do Next Visit:  Cast off right wrist.      CHIEF COMPLAINT:  Chief Complaint   Patient presents with    Right Wrist - Post-op, Cast Check     Arthoscopy with TFCC debridement + repair - 8/6/24         SUBJECTIVE:  Nell Baldwin is a 33 y.o. female who presents for follow up after Right wrist arthroscopy with TFCC debridement and repair - Right and Application of long arm sugar tong splint - Right on 8/6/2024.  Today patient has no significant complaints or concerns.  She states that there is some movement within the cast and she is able to pronate and supinate her wrist.  She is here for cast evaluation      PHYSICAL EXAMINATION:  Vital signs: Ht 5' 5\" (1.651 m)   Wt 78 kg (172 lb)   LMP  (LMP Unknown)   BMI 28.62 kg/m²   General: well developed and well nourished, alert, oriented times 3, and appears comfortable  Psychiatric: Normal    MUSCULOSKELETAL EXAMINATION:  Incision: Clean, dry, intact and healed  Range of Motion: As expected, cast was mobile.  Neurovascular status: Neuro intact, good cap refill  Activity Restrictions: Cast/splint restrictions  Long-arm cast applied      STUDIES REVIEWED:  No Studies to review      PROCEDURES PERFORMED:  Cast application    Date/Time: 8/28/2024 10:00 AM    Performed by: Cody Howell PA-C  Authorized by: Cody Howell PA-C  Universal Protocol:  Consent: Verbal consent obtained.    Pre-procedure details:     Sensation:  Normal  Procedure details: Cast type:  Long arm "        Supplies:  Cotton padding and fiberglass  Post-procedure details:     Pain:  Unchanged    Sensation:  Normal

## 2024-08-30 ENCOUNTER — TELEPHONE (OUTPATIENT)
Dept: FAMILY MEDICINE CLINIC | Facility: CLINIC | Age: 34
End: 2024-08-30

## 2024-08-30 ENCOUNTER — ANNUAL EXAM (OUTPATIENT)
Dept: OBGYN CLINIC | Facility: MEDICAL CENTER | Age: 34
End: 2024-08-30
Payer: COMMERCIAL

## 2024-08-30 VITALS
SYSTOLIC BLOOD PRESSURE: 112 MMHG | BODY MASS INDEX: 25.83 KG/M2 | DIASTOLIC BLOOD PRESSURE: 74 MMHG | HEIGHT: 65 IN | WEIGHT: 155 LBS

## 2024-08-30 DIAGNOSIS — R63.5 WEIGHT GAIN: Primary | ICD-10-CM

## 2024-08-30 DIAGNOSIS — E66.3 OVERWEIGHT WITH BODY MASS INDEX (BMI) OF 29 TO 29.9 IN ADULT: ICD-10-CM

## 2024-08-30 DIAGNOSIS — Z01.419 WOMEN'S ANNUAL ROUTINE GYNECOLOGICAL EXAMINATION: Primary | ICD-10-CM

## 2024-08-30 PROCEDURE — S0612 ANNUAL GYNECOLOGICAL EXAMINA: HCPCS | Performed by: OBSTETRICS & GYNECOLOGY

## 2024-08-30 RX ORDER — TIRZEPATIDE 2.5 MG/.5ML
2.5 INJECTION, SOLUTION SUBCUTANEOUS WEEKLY
Qty: 2 ML | Refills: 1 | Status: SHIPPED | OUTPATIENT
Start: 2024-08-30

## 2024-08-30 NOTE — PROGRESS NOTES
A/P    1.  Annual exam    Last PAP 3/1/20- neg neg   Next due 2025   Scheduling of pap discussed in detail     2.  PMDD   Treated with prozac and OCP and welbutrin    Reports that she is a bit off but been a bad summer          33 y.o.,No LMP recorded (lmp unknown).  C/O no gyn concerns doing wll       Past medical / social / surgical / family history reviewed and updated   Medication and allergies discussed in detail and updated     Review of Systems - History obtained from chart review and the patient  General ROS: negative  Psychological ROS: negative  Ophthalmic ROS: negative  ENT ROS: negative  Allergy and Immunology ROS: negative  Hematological and Lymphatic ROS: negative  Endocrine ROS: negative  Breast ROS: negative for breast lumps  Respiratory ROS: no cough, shortness of breath, or wheezing  Cardiovascular ROS: no chest pain or dyspnea on exertion  Gastrointestinal ROS: no abdominal pain, change in bowel habits, or black or bloody stools  Genito-Urinary ROS: no dysuria, trouble voiding, or hematuria  Musculoskeletal ROS: negative  Neurological ROS: no TIA or stroke symptoms  Dermatological ROS: negative        Physical Exam  Vitals reviewed. Exam conducted with a chaperone present.   Constitutional:       Appearance: She is well-developed.   Neck:      Thyroid: No thyromegaly.   Cardiovascular:      Rate and Rhythm: Normal rate.      Heart sounds: Normal heart sounds.   Pulmonary:      Effort: Pulmonary effort is normal. No accessory muscle usage or respiratory distress.      Breath sounds: Normal air entry.   Chest:      Chest wall: No tenderness.   Breasts:     Breasts are symmetrical.      Right: No inverted nipple, mass or tenderness.      Left: No inverted nipple, mass or tenderness.   Abdominal:      General: There is no distension.      Palpations: Abdomen is soft.      Tenderness: There is no abdominal tenderness. There is no right CVA tenderness, left CVA tenderness, guarding or rebound.    Genitourinary:     General: Normal vulva.      Exam position: Lithotomy position.      Labia:         Right: No rash, tenderness, lesion or injury.         Left: No rash, tenderness, lesion or injury.       Vagina: Normal. No foreign body. No vaginal discharge or bleeding.      Cervix: Normal.      Uterus: Normal. Not enlarged and not fixed.       Adnexa: Right adnexa normal and left adnexa normal.        Right: No mass, tenderness or fullness.          Left: No mass, tenderness or fullness.        Rectum: No external hemorrhoid.   Musculoskeletal:      Cervical back: Normal range of motion.   Lymphadenopathy:      Cervical: No cervical adenopathy.      Upper Body:      Right upper body: No supraclavicular adenopathy.      Left upper body: No supraclavicular adenopathy.   Neurological:      Mental Status: She is alert and oriented to person, place, and time.   Psychiatric:         Speech: Speech normal.         Behavior: Behavior normal.         Thought Content: Thought content normal.         Judgment: Judgment normal.

## 2024-09-03 DIAGNOSIS — R05.1 ACUTE COUGH: ICD-10-CM

## 2024-09-04 RX ORDER — ALBUTEROL SULFATE 90 UG/1
AEROSOL, METERED RESPIRATORY (INHALATION)
Qty: 8 G | Refills: 0 | Status: SHIPPED | OUTPATIENT
Start: 2024-09-04

## 2024-09-05 ENCOUNTER — TELEPHONE (OUTPATIENT)
Age: 34
End: 2024-09-05

## 2024-09-05 NOTE — TELEPHONE ENCOUNTER
Caller: Patient     Doctor: herson     Reason for call: Patient has been developing pain on her wrist. Patient states she has a lot of movement and wanted to know if she should be seen sooner     Call back#: [phone]   
Scheduled for tomorrow.  
pain

## 2024-09-06 ENCOUNTER — OFFICE VISIT (OUTPATIENT)
Dept: OBGYN CLINIC | Facility: HOSPITAL | Age: 34
End: 2024-09-06
Payer: COMMERCIAL

## 2024-09-06 VITALS — BODY MASS INDEX: 25.83 KG/M2 | HEIGHT: 65 IN | WEIGHT: 155 LBS

## 2024-09-06 DIAGNOSIS — S63.591S TFCC (TRIANGULAR FIBROCARTILAGE COMPLEX) TEAR, RIGHT, SEQUELA: Primary | ICD-10-CM

## 2024-09-06 PROCEDURE — 29065 APPL CST SHO TO HAND LNG ARM: CPT | Performed by: PHYSICIAN ASSISTANT

## 2024-09-06 PROCEDURE — 99024 POSTOP FOLLOW-UP VISIT: CPT | Performed by: PHYSICIAN ASSISTANT

## 2024-09-06 NOTE — PROGRESS NOTES
"Assessment:   S/P Right wrist arthroscopy with TFCC debridement and repair - Right and Application of long arm sugar tong splint - Right on 8/6/2024    Plan:   Patient's cast was evaluated today which had some padding that was compressed and allowed for mild rotation at the wrist.   Cast was removed and a new [place] cast was applied  She was advised to continue with immobilization, ice, elevation and no lifting greater than 5 pounds  She will follow-up as scheduled for reevaluation and cast     Follow Up:  As scheduled     To Do Next Visit:  Cast off right wrist.      CHIEF COMPLAINT:  Chief Complaint   Patient presents with    Right Wrist - Post-op, Cast Check         SUBJECTIVE:  Nell Baldwin is a 33 y.o. female who presents for follow up after Right wrist arthroscopy with TFCC debridement and repair - Right and Application of long arm sugar tong splint - Right on 8/6/2024.  Today patient has no significant complaints or concerns.  She states that there is some movement within the cast and she is able to pronate and supinate her wrist.  She is here for cast evaluation      PHYSICAL EXAMINATION:  Vital signs: Ht 5' 5\" (1.651 m)   Wt 70.3 kg (155 lb)   BMI 25.79 kg/m²   General: well developed and well nourished, alert, oriented times 3, and appears comfortable  Psychiatric: Normal    MUSCULOSKELETAL EXAMINATION:  Incision: Clean, dry, intact and healed  Range of Motion: As expected, cast was mobile.  Neurovascular status: Neuro intact, good cap refill  Activity Restrictions: Cast/splint restrictions  Long-arm cast applied      STUDIES REVIEWED:  No Studies to review      PROCEDURES PERFORMED:  Cast application    Date/Time: 9/6/2024 11:00 AM    Performed by: Cody Howell PA-C  Authorized by: Cody Howell PA-C  Universal Protocol:  Consent: Verbal consent obtained.    Pre-procedure details:     Sensation:  Normal  Procedure details: Cast type:  Long arm        Supplies:  Cotton padding and " fiberglass  Post-procedure details:     Pain:  Unchanged    Sensation:  Normal

## 2024-09-10 ENCOUNTER — TELEPHONE (OUTPATIENT)
Dept: ENDOCRINOLOGY | Facility: CLINIC | Age: 34
End: 2024-09-10

## 2024-09-16 ENCOUNTER — OFFICE VISIT (OUTPATIENT)
Dept: OBGYN CLINIC | Facility: CLINIC | Age: 34
End: 2024-09-16

## 2024-09-16 DIAGNOSIS — Z98.890 S/P MUSCULOSKELETAL SYSTEM SURGERY: Primary | ICD-10-CM

## 2024-09-16 PROCEDURE — 99024 POSTOP FOLLOW-UP VISIT: CPT | Performed by: ORTHOPAEDIC SURGERY

## 2024-09-16 NOTE — PROGRESS NOTES
Assessment:   6 weeks S/P Right wrist arthroscopy with TFCC debridement and repair - Right and Application of long arm sugar tong splint - Right on 8/6/2024    Plan:   Cast was removed today.  She was fit with a cock up wrist brace, to be worn until 45 degrees has been reached, at which time she may d/c bracing.   We discussed elbow tucked in at side for supination and pronation exercises.  She will start OT for flexion, extension, supination and pronation exercises with progression to strengthening exercises. OT will also work on edema control.      Follow Up:  6  week(s)    To Do Next Visit:  Re-evaluation       CHIEF COMPLAINT:  Chief Complaint   Patient presents with    Right Wrist - Post-op, Cast Check     Arthoscopy with TFCC debridement + repair - 8/6/24         SUBJECTIVE:  Nell Baldwin is a 33 y.o. female who presents for follow up after Right wrist arthroscopy with TFCC debridement and repair - Right and Application of long arm sugar tong splint - Right on 8/6/2024.  Today patient has numbness to the dorsal aspect of the small finger.        PHYSICAL EXAMINATION:  Vital signs: There were no vitals taken for this visit.  General: well developed and well nourished, alert, oriented times 3, and appears comfortable  Psychiatric: Normal    MUSCULOSKELETAL EXAMINATION:  Incision: healed  Range of Motion: As expected and full composite fist possible  Neurovascular status: Neuro intact, good cap refill  Activity Restrictions: Cast/splint restrictions  Done today: Cast removed      STUDIES REVIEWED:  No Studies to review      PROCEDURES PERFORMED:  Procedures  No Procedures performed today    Scribe Attestation      I,:  Audrey Delacruz MA am acting as a scribe while in the presence of the attending physician.:       I,:  Alejo Middleton MD personally performed the services described in this documentation    as scribed in my presence.:

## 2024-09-19 DIAGNOSIS — S63.591S TFCC (TRIANGULAR FIBROCARTILAGE COMPLEX) TEAR, RIGHT, SEQUELA: Primary | ICD-10-CM

## 2024-09-23 ENCOUNTER — EVALUATION (OUTPATIENT)
Dept: OCCUPATIONAL THERAPY | Facility: CLINIC | Age: 34
End: 2024-09-23
Payer: COMMERCIAL

## 2024-09-23 DIAGNOSIS — Z98.890 S/P MUSCULOSKELETAL SYSTEM SURGERY: ICD-10-CM

## 2024-09-23 PROCEDURE — 97110 THERAPEUTIC EXERCISES: CPT | Performed by: OCCUPATIONAL THERAPIST

## 2024-09-23 PROCEDURE — 97165 OT EVAL LOW COMPLEX 30 MIN: CPT | Performed by: OCCUPATIONAL THERAPIST

## 2024-09-23 PROCEDURE — 97140 MANUAL THERAPY 1/> REGIONS: CPT | Performed by: OCCUPATIONAL THERAPIST

## 2024-09-23 NOTE — PROGRESS NOTES
OT Evaluation     Today's date: 2024  Patient name: Nell Baldwin  : 1990  MRN: [id number]  Referring provider: Alejo Middleton MD  Dx:   Encounter Diagnosis     ICD-10-CM    1. S/P musculoskeletal system surgery  Z98.890 Ambulatory Referral to PT/OT Hand Therapy                     Assessment  Impairments: abnormal or restricted ROM, lacks appropriate home exercise program and pain with function  Functional limitations: Pt. is using her R hand for very basic ADLs with no resistance.  Lifting less than 1lb.  She is wearing her OTC wrist brace at all times, removing for hygiene and ROM exercises.  Symptom irritability: moderate    Assessment details: Pt. Presents today for evaluation of the R wrist s/p TFCC tear and arthroscopy.   Pt. Has now transitioned to an OTC wrist brace s/p long arm immobilization.  Pt. Has mild/moderate pain of her wrist and limited ROM in all planes.  Pt. Is not lifting or carrying with her R dominant hand.  Pt. Would benefit from skilled hand therapy per protocol to return to pain free baseline function of her R wrist/hand.  Understanding of Dx/Px/POC: good     Prognosis: good    Goals  STG( 6 visits)  1. Compliant with HEP/splint  2. Increase R wrist AROM by 5-10 degrees without pain  LTG( 12 visits or discharge)  1. R wrist AROM WNLS without pain,  2. R  strength >25lbs for ADLs  3. Improve FOTO score to predicted outcome or greater.    Plan  Patient would benefit from: skilled occupational therapy  Planned modality interventions: cryotherapy and thermotherapy: hydrocollator packs    Planned therapy interventions: IASTM, joint mobilization, manual therapy, functional ROM exercises, therapeutic exercise, therapeutic activities, orthotic fitting/training and home exercise program    Frequency: 2x week  Duration in weeks: 12  Plan of Care beginning date: 2024  Plan of Care expiration date: 2024  Treatment plan discussed with: patient        Subjective  Evaluation    History of Present Illness  Mechanism of injury: 6 weeks S/P Right wrist arthroscopy with TFCC debridement and repair - Right and Application of long arm sugar tong splint - Right on 2024.   Cast was removed on 24 and is now wearing an OTC wrist support.  Quality of life: good    Patient Goals  Patient goals for therapy: decreased pain, increased motion, return to sport/leisure activities, independence with ADLs/IADLs, increased strength and decreased edema    Pain  Current pain ratin  At best pain ratin  At worst pain ratin  Quality: discomfort and dull ache  Relieving factors: ice  Progression: improved    Social Support  Lives in: multiple-level home  Lives with: spouse    Employment status: working  Hand dominance: right    Treatments  Previous treatment: occupational therapy  Current treatment: occupational therapy        Objective     Tenderness     Right Wrist/Hand   Tenderness in the TFCC.     Additional Tenderness Details  TTP over incisional scars    Active Range of Motion     Right Elbow   Forearm supination: 55 degrees with pain  Forearm pronation: 70 degrees with pain    Right Wrist   Wrist flexion: 15 degrees with pain  Wrist extension: 30 degrees with pain  Radial deviation: 10 degrees   Ulnar deviation: 15 degrees     Right Thumb   Opposition: Full opposition    Additional Active Range of Motion Details  Loose composite fist  + intrinsic/extrinsic tightness    Strength/Myotome Testing     Left Wrist/Hand   Normal wrist strength     (2nd hand position)     Trial 1: 75    Thumb Strength  Key/Lateral Pinch     Trial 1: 16  Tip/Two-Point Pinch     Trial 1: 8  Palmar/Three-Point Pinch     Trial 1: 15    Right Wrist/Hand   Wrist extension: 3-  Wrist flexion: 3-  Radial deviation: 3-  Ulnar deviation: 3-     (2nd hand position)     Trial 1: 0    Thumb Strength   Key/Lateral Pinch     Trial 1: 0  Tip/Two-Point Pinch     Trial 1: 0  Palmar/Three-Point Pinch     Trial  1: 0    Swelling     Left Wrist/Hand   Circumference MCP: 18 cm  Circumference wrist: 15.5 cm    Right Wrist/Hand   Circumference MCP: 18 cm  Circumference wrist: 15.1 cm             Precautions: SX 8/6/24-  Ween from brace once 45 degrees is achieved.  ROM ONLY      Manuals 9/23             IE 30'            Scar mob 3m            retrograde 3m            Graston FA mass 4m            Neuro Re-Ed                          HEP- AROM wrist all planes reviewed                                                                             Ther Ex             AROM Wrist 2x10            AROM P/S Dowel 2x10                                                                                          Ther Activity             Peg  x30                         Gait Training                                       Modalities             MH R 8m            CP post

## 2024-09-27 ENCOUNTER — OFFICE VISIT (OUTPATIENT)
Dept: OCCUPATIONAL THERAPY | Facility: CLINIC | Age: 34
End: 2024-09-27
Payer: COMMERCIAL

## 2024-09-27 DIAGNOSIS — Z98.890 S/P MUSCULOSKELETAL SYSTEM SURGERY: Primary | ICD-10-CM

## 2024-09-27 PROCEDURE — 97110 THERAPEUTIC EXERCISES: CPT | Performed by: OCCUPATIONAL THERAPIST

## 2024-09-27 PROCEDURE — 97140 MANUAL THERAPY 1/> REGIONS: CPT | Performed by: OCCUPATIONAL THERAPIST

## 2024-09-27 NOTE — PROGRESS NOTES
Daily Note     Today's date: 2024  Patient name: Nell Baldwin  : 1990  MRN: [id number]  Referring provider: Alejo Middleton MD  Dx:   Encounter Diagnosis     ICD-10-CM    1. S/P musculoskeletal system surgery  Z98.890                      Subjective: It is sore.      Objective: See treatment diary below      Assessment: Tolerated treatment fair. Patient  is compliant with ROM HEP and brace.  Making steady progress with her ROM.  AROM of the wrist today was 34 degrees extension, 28 degrees flexion.        Plan: Continue per plan of care.      Precautions: SX 24-  Ween from brace once 45 degrees is achieved.  ROM ONLY      Manuals             IE 30'            Scar mob 3m 3m           retrograde 3m 3m           Graston FA mass 4m 4m           Neuro Re-Ed                          HEP- AROM wrist all planes reviewed                                                                             Ther Ex             AROM Wrist 2x10 Cone  2x10           AROM P/S Dowel 2x10 [id number]           Wrist maze  x10           Foam roll flex/ext stretch  2x10                                                               Ther Activity             Peg - neutral, supination x30 x30                        Gait Training                                       Modalities             MH R 8m 8m           CP post  5m

## 2024-09-30 ENCOUNTER — OFFICE VISIT (OUTPATIENT)
Dept: OCCUPATIONAL THERAPY | Facility: CLINIC | Age: 34
End: 2024-09-30
Payer: COMMERCIAL

## 2024-09-30 DIAGNOSIS — Z98.890 S/P MUSCULOSKELETAL SYSTEM SURGERY: Primary | ICD-10-CM

## 2024-09-30 PROCEDURE — 97110 THERAPEUTIC EXERCISES: CPT | Performed by: OCCUPATIONAL THERAPIST

## 2024-09-30 PROCEDURE — 97140 MANUAL THERAPY 1/> REGIONS: CPT | Performed by: OCCUPATIONAL THERAPIST

## 2024-09-30 NOTE — PROGRESS NOTES
Nell Baldwin   33 y.o. Female MRN:[id number]  Encounter: [id number]    New Patient Consult Note    CC: Polycystic ovarian syndrome    Referring Provider:  No referring provider defined for this encounter.      ASSESSMENT AND PLAN  Assessment:  This is a 33 y.o. female who is overweight with a current BMI of 28.56 with polycystic ovarian syndrome and fatigue.    Plan:  1. PCOS (polycystic ovarian syndrome)  Overview:  Diagnosed 2015 at [facility] via lab work  2 pregnancies via IVF (2018, 2020)  TVUS (9/26/23) demonstrated multiple small follicles in left ovary   Clinically has noted hirsutism involving upper lip and weight gain/difficulty losing weight  Assessment & Plan:  Will restart Zepbound 2.5 mg weekly. She has requested the vials through [employer] as she will be pursuing self-pay for this.  Discussed side effects of Zepbound including nausea, vomiting, abdominal pain, constipation, and diarrhea. Discussed risk for pancreatitis and advised to go to ED if she has symptoms suggestive of it.   Discussed effect of Zepbound on birth control  Given its cost and that the highest dose Zepbound dose available currently through [employer] is 5 mg weekly, advised patient to continue Zepbound at 2.5 mg weekly until she no longer sees any weight benefits at which point will increase the dose  Continue dietary modifications and exercise  RTC in 3-4 months for a follow up visit  Orders:  -     Tirzepatide-Weight Management (Zepbound) 2.5 MG/0.5ML SOLN; Inject 2.5 mg under the skin once a week  2. Other fatigue  Assessment & Plan:  Will check an iron panel, 25-OH vitamin D, and vitamin B12 to exclude these deficiencies as causes of her fatigue  Reocmmend completing a sleep study as well  Orders:  -     Iron Panel (Includes Ferritin, Iron Sat%, Iron, and TIBC); Future  -     Vitamin B12; Future  -     Vitamin B12  3. Menorrhagia with irregular cycle  Comments:  Will check an iron panel given  history of menorrhagia  4. Vitamin D deficiency  Comments:  Will check a 25-OH vitamin D to exclude deficiency of this as contributing to her fatigue  Orders:  -     Vitamin D 25 hydroxy; Future  -     Vitamin D 25 hydroxy  5. High risk medication use  Comments:  Will check a vitamin B12 given history of metformin use  Orders:  -     Vitamin B12; Future  -     Vitamin B12  6. Adult BMI 28.0-28.9 kg/sq m  Comments:  Initiate Zepbound 2.5 mg weekly and discussed side effects and proper administration of this drug  Orders:  -     Tirzepatide-Weight Management (Zepbound) 2.5 MG/0.5ML SOLN; Inject 2.5 mg under the skin once a week      HISTORY OF PRESENT ILLNESS  HPI:  Nell Baldwin is a 33 y.o. female with a past medical history signficant for PCOS, amenorrhea, [place], and sacroilitis who presents for initial consultation for her PCOS. She is self-referred.    Diagnosed: , had lab work at reproductive endocrinology  Menarche: 11 years   Cycles: Oligomenorrhea since menarche, now with heavy cycles  Pelvic imagin23 pelvic ultrasound showing multiple small follicles in the left ovary   Sexually active: Yes  Seeking fertility: S/P tubal ligation  Problems with conception/pregnancy in past: Required IVF for pregnancy in  and  (twin pregnancy) via [facility]   OCP use: Aurovela daily  FDLMP: 2023, no more since stopping Ozempic     Acne: Little, along face  Hirsutism: Upper lip   Grooming    Shaving: Infrequently   Previous additional therapy    Medications: Metformin, Ozempic (not on new formulary), phentermine, Zepbound (script never filled)  Body odor: Underarm and genital odors   Muscle mass: Denies     Ethnicity: [place]   Feels like she has more hair than other women in her family: Yes    Weight: Gain started during teenage years    Highest weight: 195-200 lbs ()    Lowest weight: 148 lbs ()    Current weight: 171 lbs     Goal weight: 140s-150s     Pregnancies:  Gained 50-60 lbs after the first pregnancy and 80-90 lbs after the second pregnancy.     Lost weight with Ozempic, up to 60 lbs, while taking it for 4-5 months. Gained nearly 20 lbs back after stopping it over the past 9 months.    Received steroid injection in her back in February 2024.     Diet:    Breakfast: Protein shake/bar, yogurt, fruit, cottage cheese    Lunch: Leftovers from breakfast, turkey and cheese wrap, salad, rotisserie chicken with vegetables and rice    Dinner: Protein, vegetables, rice  Physical activity: Was using [employer] and lifting weights but currently walking due to wrist injury   Supplement use: Denies     Family history of reproductive disorders: Denies   Family history is otherwise strong for diabetes on both sides of family; maternal aunt and paternal grandfather with thyroid disorder.     Tobacco: Denies  Alcohol: Denies   Illicit drugs: Denies   Occupation: Marketing supervisor       Review of Systems   Constitutional:  Positive for activity change, fatigue and unexpected weight change. Negative for chills and fever.   HENT:  Negative for ear pain and sore throat.    Eyes:  Negative for pain and visual disturbance.   Respiratory:  Negative for cough and shortness of breath.    Cardiovascular:  Negative for chest pain and palpitations.   Gastrointestinal:  Negative for abdominal pain, constipation, diarrhea, nausea and vomiting.   Genitourinary:  Positive for menstrual problem. Negative for dysuria and hematuria.   Musculoskeletal:  Negative for arthralgias and back pain.   Skin:  Negative for color change and rash.        Increased upper lip hair   Neurological:  Positive for headaches. Negative for seizures and syncope.   Psychiatric/Behavioral:  Negative for sleep disturbance.    All other systems reviewed and are negative.      Historical Information   Past Medical History:   Diagnosis Date    Amenorrhea     last assessed: 08/22/2016    Anxiety     Female infertility     seen by  [facility] - seen for 1 year, 5 cycles IUI acheived pregnancy via IVF    Fibromyalgia     Irregular menstrual cycle     last assessed: 2016    Osteoarthritis of right acromioclavicular joint     last assessment: 2013    Polycystic ovary syndrome     Separation of right acromioclavicular joint     Last assessed: 2013; this is more consistent with ostiolysis of the distal clavicle vs. acromioclavicular joint arthritis, not acute shoulder seperation    Strain of muscle of right hip, initial encounter 2023    Varicella     vaccine     Past Surgical History:   Procedure Laterality Date     SECTION      DENTAL SURGERY      wisdom teeth    FL INJECTION RIGHT WRIST (ARTHROGRAM)  2024    HI APPLICATION LONG ARM SPLINT SHOULDER HAND Right 2024    Procedure: Application of long arm sugar tong splint;  Surgeon: Alejo Middleton MD;  Location:  MAIN OR;  Service: Orthopedics    HI ARTHRS WRST EXC&/RPR TRIANG FIBROCART&/JOINT Right 2024    Procedure: Right wrist arthroscopy with TFCC debridement and repair;  Surgeon: Alejo Middleton MD;  Location:  MAIN OR;  Service: Orthopedics    HI  DELIVERY ONLY N/A 2018    Procedure:  SECTION ();  Surgeon: Ismael Marquez MD;  Location: [facility];  Service: Obstetrics    HI  DELIVERY ONLY N/A 2020    Procedure:  SECTION ();  Surgeon: Ismael Marquez MD;  Location: [facility];  Service: Obstetrics    SHOULDER SURGERY      SINUS SURGERY  2023    TUBAL LIGATION Bilateral 2020    Procedure: LIGATION/COAGULATION TUBAL;  Surgeon: Ismael Marquez MD;  Location: [facility];  Service: Obstetrics     Social History   Social History     Substance and Sexual Activity   Alcohol Use Not Currently    Alcohol/week: 1.0 standard drink of alcohol     Social History     Substance and Sexual Activity   Drug Use Never     Social History     Tobacco Use   Smoking Status Never   Smokeless Tobacco  Never     Family History:   Family History   Problem Relation Age of Onset    Fibromyalgia Mother     Hyperlipidemia Father     Hypertension Father     Diabetes Maternal Grandmother     Colon cancer Maternal Grandfather 48    Cancer Maternal Grandfather     Diabetes Paternal Grandfather     Breast cancer Neg Hx     Endometrial cancer Neg Hx     Ovarian cancer Neg Hx      Meds/Allergies   Current Outpatient Medications   Medication Sig Dispense Refill    acetaminophen (TYLENOL) 650 mg CR tablet Take 1 tablet (650 mg total) by mouth every 8 (eight) hours as needed for mild pain (Patient not taking: Reported on 8/30/2024) 30 tablet 0    albuterol (PROVENTIL HFA,VENTOLIN HFA) 90 mcg/act inhaler TAKE 2 PUFFS BY MOUTH EVERY 6 HOURS AS NEEDED FOR WHEEZE OR FOR SHORTNESS OF BREATH 8 g 0    Aurovela FE 1/20 1-20 MG-MCG per tablet TAKE 1 TABLET BY MOUTH EVERY DAY 84 tablet 1    benzonatate (TESSALON) 200 MG capsule Take 1 capsule (200 mg total) by mouth 3 (three) times a day as needed for cough (Patient not taking: Reported on 8/30/2024) 20 capsule 0    cyclobenzaprine (FLEXERIL) 10 mg tablet Take 1 tablet (10 mg total) by mouth 3 (three) times a day as needed for muscle spasms (Patient not taking: Reported on 8/30/2024) 20 tablet 0    FLUoxetine (PROzac) 10 MG tablet TAKE 1 TABLET BY MOUTH EVERY DAY 90 tablet 1    hydroquinone 4 % cream Apply topically daily at bedtime Use three months on, then take 1 month break and can repeat cycles. 30 g 3    metFORMIN (GLUCOPHAGE) 500 mg tablet Take 1 tablet (500 mg total) by mouth 2 (two) times a day with meals 180 tablet 3    multivitamin (THERAGRAN) TABS Take 1 tablet by mouth daily      Naproxen Sodium 220 MG CAPS Take 1 capsule (220 mg total) by mouth 2 (two) times a day (Patient not taking: Reported on 8/19/2024) 60 capsule 0    Nerve Stimulator (Standard TENS) ANA Use in the morning (Patient taking differently: Use if needed) 1 each 0    phentermine (ADIPEX-P) 37.5 MG tablet Take  "1 tablet (37.5 mg total) by mouth daily (Patient not taking: Reported on 8/12/2024) 30 tablet 0    propranolol (INDERAL) 10 mg tablet Take 1 tablet (10 mg total) by mouth 2 (two) times a day For the first week take only one per day. 90 tablet 3    tirzepatide (Zepbound) 2.5 mg/0.5 mL auto-injector Inject 0.5 mL (2.5 mg total) under the skin once a week (Patient not taking: Reported on 8/30/2024) 2 mL 1    tiZANidine (ZANAFLEX) 2 mg tablet Take 1 tablet (2 mg total) by mouth daily at bedtime (Patient not taking: Reported on 8/19/2024) 60 tablet 2     No current facility-administered medications for this visit.     No Known Allergies      OBJECTIVE  Visit Vitals  /82   Pulse 72   Ht 5' 5\" (1.651 m)   Wt 77.8 kg (171 lb 9.6 oz)   SpO2 99%   BMI 28.56 kg/m²   OB Status Unknown   Smoking Status Never   BSA 1.85 m²       Physical Exam  Constitutional:       Appearance: Normal appearance.   HENT:      Head: Normocephalic and atraumatic.      Mouth/Throat:      Mouth: Mucous membranes are moist.      Pharynx: Oropharynx is clear.   Eyes:      Extraocular Movements: Extraocular movements intact.      Pupils: Pupils are equal, round, and reactive to light.   Cardiovascular:      Rate and Rhythm: Normal rate and regular rhythm.      Pulses: Normal pulses.      Heart sounds: Normal heart sounds.   Pulmonary:      Effort: Pulmonary effort is normal. No respiratory distress.      Breath sounds: Normal breath sounds. No wheezing, rhonchi or rales.   Abdominal:      General: There is no distension.      Tenderness: There is no abdominal tenderness. There is no guarding or rebound.   Musculoskeletal:         General: No swelling or tenderness. Normal range of motion.      Cervical back: Normal range of motion and neck supple. No tenderness.      Right lower leg: No edema.      Left lower leg: No edema.   Skin:     General: Skin is warm and dry.      Findings: No abrasion or wound.      Comments: No upper hair lip appreciated " (recently shaved); no violaceous striae or facial plethora appreciated   Neurological:      General: No focal deficit present.      Mental Status: She is alert and oriented to person, place, and time.   Psychiatric:         Mood and Affect: Mood normal.         Behavior: Behavior normal.         Lab Results:    Latest Reference Range & Units 04/18/24 12:01   Sodium 135 - 146 mmol/L 137   Potassium 3.5 - 5.3 mmol/L 4.3   Chloride 98 - 110 mmol/L 103   Carbon Dioxide 20 - 32 mmol/L 29   BUN 7 - 25 mg/dL 15   Creatinine 0.50 - 0.97 mg/dL 0.66   SL AMB BUN/CREATININE RATIO 6 - 22 (calc) SEE NOTE:   GLUCOSE 65 - 139 mg/dL 81   Calcium 8.6 - 10.2 mg/dL 8.8   AST 10 - 30 U/L 12   ALT 6 - 29 U/L 8   ALK PHOS 31 - 125 U/L 50   Total Protein 6.1 - 8.1 g/dL 6.7   Albumin 3.6 - 5.1 g/dL 4.1   Total Bilirubin 0.2 - 1.2 mg/dL 0.4   GFR, Calculated > OR = 60 mL/min/1.73m2 118   Albumin/Globulin Ratio 1.0 - 2.5 (calc) 1.6   Globulin 1.9 - 3.7 g/dL (calc) 2.6   Lymphocytes (Absolute) 850 - 3,900 cells/uL 1,531   Sed Rate < OR = 20 mm/h 17   RHEUMATOID FACTOR <14 IU/mL <10   White Blood Cell Count 3.8 - 10.8 Thousand/uL 6.2   Red Blood Cell Count 3.80 - 5.10 Million/uL 3.51 (L)   Hemoglobin 11.7 - 15.5 g/dL 11.1 (L)   HCT 35.0 - 45.0 % 32.1 (L)   MCV 80.0 - 100.0 fL 91.5   MCH 27.0 - 33.0 pg 31.6   MCHC. 32.0 - 36.0 g/dL 34.6   RDW 11.0 - 15.0 % 12.3   Platelet Count 140 - 400 Thousand/uL 284   Neutrophils % 64.3   Lymphocytes % 24.7   Monocytes % 8.2   Eosinophils % 1.5   Basophils PCT % 1.3   Neutrophils (Absolute) 1,500 - 7,800 cells/uL 3,987   Monocytes (Absolute) 200 - 950 cells/uL 508   Eosinophils (Absolute) 15 - 500 cells/uL 93   Basophils ABS 0 - 200 cells/uL 81   LYME TOTAL ANTIBODY EIA <=0.90 Index <=0.90   OLEGARIO Screen, IFA NEGATIVE  NEGATIVE   SL AMB MPV 7.5 - 12.5 fL 9.9   (L): Data is abnormally low      Imaging Studies:   Study Result    Narrative & Impression   PELVIC ULTRASOUND, COMPLETE     INDICATION:  The patient  is 32 years old.  R10.2: Pelvic and perineal pain.     COMPARISON: Ultrasound 10/8/2016     TECHNIQUE:   Transabdominal pelvic ultrasound was performed in sagittal and transverse planes with a curvilinear transducer.  Additional transvaginal imaging was performed to better evaluate the endometrium and ovaries.  Imaging included volumetric   sweeps as well as traditional still imaging technique.     FINDINGS:     UTERUS:  The uterus is anteverted in position, measuring 6.3 x 2.9 x 5.0 cm.  The uterus has a normal contour and echotexture.  The cervix appears within normal limits.     ENDOMETRIUM:  The endometrial echo complex has an AP caliber of 3.0 mm.  Its appearance is within normal limits for age and cycle and shows no filling defects.     OVARIES/ADNEXA:  Right ovary:  3.0 x 2.1 x 1.9 cm. 6.4 mL.  Left ovary:  3.8 x 2.7 x 2.4 cm. 12.7 mL.  Ovarian Doppler flow is within normal limits. Multiple small follicles are noted in the left ovary as seen previously. Only a few small follicles are noted in the right ovary, without right ovarian enlargement.  Otherwise no suspicious ovarian or adnexal abnormality.     OTHER:  No free fluid or loculated fluid collections.        IMPRESSION:  1. Unremarkable uterus.  2. Multiple small follicles are noted in the left ovary as seen previously. Correlate clinically for underlying polycystic ovarian syndrome. Only a few small follicles are noted in the right ovary, without right ovarian enlargement.       Workstation performed: [id number]       Discussed with the patient and all questioned fully answered. She will call me if any problems arise.

## 2024-09-30 NOTE — PROGRESS NOTES
Daily Note     Today's date: 2024  Patient name: Nell Baldwin  : 1990  MRN: [id number]  Referring provider: Alejo Middleton MD  Dx:   Encounter Diagnosis     ICD-10-CM    1. S/P musculoskeletal system surgery  Z98.890                      Subjective: I feel sore.      Objective: See treatment diary below      Assessment: Tolerated treatment fair. Patient pain is controlled, soreness with HEP.  Her AROM is improving.    Plan: Continue per plan of care.      Precautions: SX 24-  Ween from brace once 45 degrees is achieved.  ROM ONLY      Manuals            IE 30'            Scar mob 3m 3m 3m          retrograde 3m 3m 3m          Graston FA mass 4m 4m 4m          Neuro Re-Ed                          HEP- AROM wrist all planes reviewed                                                                             Ther Ex             AROM Wrist 2x10 Cone  2x10 Cone 3x10          AROM P/S Dowel 2x10 [id number] Dowel 3x10          Wrist maze  x10 x10          Foam roll flex/ext stretch  2x10 1x15                                                              Ther Activity             Peg - neutral, supination x30 x30 x30                       Gait Training                                       Modalities             MH R 8m 8m 8m          CP post  5m 5m

## 2024-10-01 ENCOUNTER — OFFICE VISIT (OUTPATIENT)
Dept: ENDOCRINOLOGY | Facility: CLINIC | Age: 34
End: 2024-10-01
Payer: COMMERCIAL

## 2024-10-01 VITALS
WEIGHT: 171.6 LBS | BODY MASS INDEX: 28.59 KG/M2 | DIASTOLIC BLOOD PRESSURE: 82 MMHG | HEIGHT: 65 IN | HEART RATE: 72 BPM | SYSTOLIC BLOOD PRESSURE: 122 MMHG | OXYGEN SATURATION: 99 %

## 2024-10-01 DIAGNOSIS — R53.83 OTHER FATIGUE: ICD-10-CM

## 2024-10-01 DIAGNOSIS — E28.2 PCOS (POLYCYSTIC OVARIAN SYNDROME): Primary | Chronic | ICD-10-CM

## 2024-10-01 DIAGNOSIS — E55.9 VITAMIN D DEFICIENCY: ICD-10-CM

## 2024-10-01 DIAGNOSIS — Z79.899 HIGH RISK MEDICATION USE: ICD-10-CM

## 2024-10-01 DIAGNOSIS — N92.1 MENORRHAGIA WITH IRREGULAR CYCLE: ICD-10-CM

## 2024-10-01 PROCEDURE — 99204 OFFICE O/P NEW MOD 45 MIN: CPT | Performed by: STUDENT IN AN ORGANIZED HEALTH CARE EDUCATION/TRAINING PROGRAM

## 2024-10-01 RX ORDER — TIRZEPATIDE 2.5 MG/.5ML
2.5 INJECTION, SOLUTION SUBCUTANEOUS WEEKLY
Qty: 2 ML | Refills: 1 | Status: SHIPPED | OUTPATIENT
Start: 2024-10-01

## 2024-10-01 NOTE — ASSESSMENT & PLAN NOTE
Will restart Zepbound 2.5 mg weekly. She has requested the vials through [employer] as she will be pursuing self-pay for this.  Discussed side effects of Zepbound including nausea, vomiting, abdominal pain, constipation, and diarrhea. Discussed risk for pancreatitis and advised to go to ED if she has symptoms suggestive of it.   Discussed effect of Zepbound on birth control  Given its cost and that the highest dose Zepbound dose available currently through [employer] is 5 mg weekly, advised patient to continue Zepbound at 2.5 mg weekly until she no longer sees any weight benefits at which point will increase the dose  Continue dietary modifications and exercise  RTC in 3-4 months for a follow up visit

## 2024-10-01 NOTE — PATIENT INSTRUCTIONS
Start taking Zepbound 2.5 mg weekly once you have the vials. If you are tolerating this drug and noticing improvement in your weight, you can continue this dose until you notice no improvement in your weight.    Side effects of Zepbound include nausea, vomiting, diarrhea, or constipation. Keep an eye on your heart rate while on Zepbound. If you resting heart rate is greater than 100 beats per minutes, please notify me. If you develop severe abdominal pain, stop Zepbound and go to the emergency room, as that could be a sign of pancreatitis.      Please notify me if you have surgery, upper endoscopy, or colonoscopy scheduled, as we typically hold Zepbound for one week prior to the procedure.     Zepbound can reduce the effectiveness of oral hormonal birth control (birth control pills). Recommend a barrier backup method such as condoms to prevent pregnancy.       Continue metformin    Please have the lab work done at your earliest convenience while fasting.

## 2024-10-01 NOTE — ASSESSMENT & PLAN NOTE
Will check an iron panel, 25-OH vitamin D, and vitamin B12 to exclude these deficiencies as causes of her fatigue  Reocmmend completing a sleep study as well

## 2024-10-04 ENCOUNTER — OFFICE VISIT (OUTPATIENT)
Dept: OCCUPATIONAL THERAPY | Facility: CLINIC | Age: 34
End: 2024-10-04
Payer: COMMERCIAL

## 2024-10-04 DIAGNOSIS — Z98.890 S/P MUSCULOSKELETAL SYSTEM SURGERY: Primary | ICD-10-CM

## 2024-10-04 PROCEDURE — 97110 THERAPEUTIC EXERCISES: CPT | Performed by: OCCUPATIONAL THERAPIST

## 2024-10-04 PROCEDURE — 97140 MANUAL THERAPY 1/> REGIONS: CPT | Performed by: OCCUPATIONAL THERAPIST

## 2024-10-04 NOTE — PROGRESS NOTES
Daily Note     Today's date: 10/4/2024  Patient name: Nell Baldwin  : 1990  MRN: [id number]  Referring provider: Alejo Middleton MD  Dx:   Encounter Diagnosis     ICD-10-CM    1. S/P musculoskeletal system surgery  Z98.890                      Subjective: I feel sore.      Objective: See treatment diary below      Assessment: Tolerated treatment fair. Patient pain is controlled, soreness with HEP.  Her AROM is improving.    Plan: Continue per plan of care.      Precautions: SX 24-  Ween from brace once 45 degrees is achieved.  ROM ONLY  Auto/ 2nd ins.- 23 visits.      Manuals 9/23 9/27 9/30 10/4          IE 30'            Scar mob 3m 3m 3m 3m         retrograde 3m 3m 3m 3m         Graston FA mass 4m 4m 4m 4m         Neuro Re-Ed                          HEP- AROM wrist all planes reviewed                                                                             Ther Ex             AROM Wrist 2x10 Cone  2x10 Cone 3x10 Cone 3x10         AROM P/S Dowel 2x10 [id number] Dowel 3x10 Dowel 3x10         Wrist maze  x10 x10 x10         Foam roll flex/ext stretch  2x10 1x15 1x15         Clips w/ rotation    R 3 sets                                                Ther Activity             Peg - neutral, supination x30 x30 x30                       Gait Training                                       Modalities             MH R 8m 8m 8m 8m         CP post  5m 5m 5m

## 2024-10-07 ENCOUNTER — OFFICE VISIT (OUTPATIENT)
Dept: OCCUPATIONAL THERAPY | Facility: CLINIC | Age: 34
End: 2024-10-07
Payer: COMMERCIAL

## 2024-10-07 DIAGNOSIS — Z98.890 S/P MUSCULOSKELETAL SYSTEM SURGERY: Primary | ICD-10-CM

## 2024-10-07 PROCEDURE — 97110 THERAPEUTIC EXERCISES: CPT | Performed by: OCCUPATIONAL THERAPIST

## 2024-10-07 PROCEDURE — 97140 MANUAL THERAPY 1/> REGIONS: CPT | Performed by: OCCUPATIONAL THERAPIST

## 2024-10-07 NOTE — PROGRESS NOTES
Daily Note     Today's date: 10/7/2024  Patient name: Nell Baldwin  : 1990  MRN: [id number]  Referring provider: Alejo Middleton MD  Dx:   Encounter Diagnosis     ICD-10-CM    1. S/P musculoskeletal system surgery  Z98.890                      Subjective: It is moving better.        Objective: See treatment diary below      Assessment: Tolerated treatment fair. Patient has 50 degrees extension, 40 degrees flexion of the wrist, with soreness/tightness complaint.  She is making AROM gains weekly.    Plan: Continue per plan of care.      Precautions: SX 24-  Ween from brace once 45 degrees is achieved.  ROM ONLY  Auto/ 2nd ins.- 23 visits.      Manuals 9/23 9/27 9/30 10/4 10/7         IE 30'            Scar mob 3m 3m 3m 3m 3m        retrograde 3m 3m 3m 3m 3m        Graston FA mass 4m 4m 4m 4m 4m        Neuro Re-Ed                          HEP- AROM wrist all planes reviewed                                                                             Ther Ex             AROM Wrist 2x10 Cone  2x10 Cone 3x10 Cone 3x10 Cone 3x10        AROM P/S Dowel 2x10 [id number] Dowel 3x10 Dowel 3x10 Dowel 3x10        Wrist maze  x10 x10 x10 x10        Foam roll flex/ext stretch  2x10 1x15 1x15 1x15        Clips w/ rotation    R 3 sets R 3 sets                                               Ther Activity             Peg - neutral, supination x30 x30 x30                       Gait Training                                       Modalities             MH R 8m 8m 8m 8m 8m        CP post  5m 5m 5m 5m

## 2024-10-09 ENCOUNTER — OFFICE VISIT (OUTPATIENT)
Dept: OCCUPATIONAL THERAPY | Facility: CLINIC | Age: 34
End: 2024-10-09
Payer: COMMERCIAL

## 2024-10-09 DIAGNOSIS — Z98.890 S/P MUSCULOSKELETAL SYSTEM SURGERY: Primary | ICD-10-CM

## 2024-10-09 PROCEDURE — 97140 MANUAL THERAPY 1/> REGIONS: CPT | Performed by: OCCUPATIONAL THERAPIST

## 2024-10-09 PROCEDURE — 97110 THERAPEUTIC EXERCISES: CPT | Performed by: OCCUPATIONAL THERAPIST

## 2024-10-09 NOTE — PROGRESS NOTES
Daily Note     Today's date: 10/9/2024  Patient name: Nell Baldwin  : 1990  MRN: [id number]  Referring provider: Alejo Middleton MD  Dx:   Encounter Diagnosis     ICD-10-CM    1. S/P musculoskeletal system surgery  Z98.890                      Subjective: No new complaints       Objective: See treatment diary below      Assessment: Tolerated treatment fair. Patient has 50 degrees extension, 40 degrees flexion of the wrist, with soreness/tightness complaint.  She is making AROM gains weekly.  Progressing well with tx, plan to start weening from brace next week.     Plan: Continue per plan of care.      Precautions: SX 24-  Ween from brace once 45 degrees is achieved.  ROM ONLY  Auto/ 2nd ins.- 23 visits.      Manuals 9/23 9/27 9/30 10/4 10/7 10/9        IE 30'            Scar mob 3m 3m 3m 3m 3m 3m       retrograde 3m 3m 3m 3m 3m 3m       Graston FA mass 4m 4m 4m 4m 4m 4m       Neuro Re-Ed                          HEP- AROM wrist all planes reviewed                                                                             Ther Ex             AROM Wrist 2x10 Cone  2x10 Cone 3x10 Cone 3x10 Cone 3x10 Cone 3x10       AROM P/S Dowel 2x10 [id number] Dowel 3x10 Dowel 3x10 Dowel 3x10 Dowel 3x10       Wrist maze  x10 x10 x10 x10 x10       Foam roll flex/ext stretch  2x10 1x15 1x15 1x15 1x15       Clips w/ rotation    R 3 sets R 3 sets R 3 sets       Light gripping- neutral wrist      YPW 2x30                                 Ther Activity             Peg - neutral, supination x30 x30 x30                       Gait Training                                       Modalities             MH R 8m 8m 8m 8m 8m 8m       CP post  5m 5m 5m 5m 5m

## 2024-10-10 DIAGNOSIS — E61.1 IRON DEFICIENCY: Primary | ICD-10-CM

## 2024-10-10 LAB
25(OH)D3 SERPL-MCNC: 41 NG/ML (ref 30–100)
FERRITIN SERPL-MCNC: 28 NG/ML (ref 16–154)
IRON SATN MFR SERPL: 34 % (CALC) (ref 16–45)
IRON SERPL-MCNC: 96 MCG/DL (ref 40–190)
TIBC SERPL-MCNC: 285 MCG/DL (CALC) (ref 250–450)
VIT B12 SERPL-MCNC: 335 PG/ML (ref 200–1100)

## 2024-10-14 ENCOUNTER — OFFICE VISIT (OUTPATIENT)
Dept: OCCUPATIONAL THERAPY | Facility: CLINIC | Age: 34
End: 2024-10-14
Payer: COMMERCIAL

## 2024-10-14 DIAGNOSIS — Z98.890 S/P MUSCULOSKELETAL SYSTEM SURGERY: Primary | ICD-10-CM

## 2024-10-14 PROCEDURE — 97140 MANUAL THERAPY 1/> REGIONS: CPT | Performed by: OCCUPATIONAL THERAPIST

## 2024-10-14 PROCEDURE — 97110 THERAPEUTIC EXERCISES: CPT | Performed by: OCCUPATIONAL THERAPIST

## 2024-10-14 NOTE — PROGRESS NOTES
Daily Note     Today's date: 10/14/2024  Patient name: Nell Baldwin  : 1990  MRN: [id number]  Referring provider: Alejo Middleton MD  Dx:   Encounter Diagnosis     ICD-10-CM    1. S/P musculoskeletal system surgery  Z98.890                      Subjective: No new complaints       Objective: See treatment diary below      Assessment: Tolerated treatment fair. Patient has 50 degrees extension, 40 degrees flexion of the wrist.  She will slowly ween from splint a this point.  Her pain is mild.  She is progressing well with her function.    Plan: Continue per plan of care.      Precautions: SX 24-  Ween from brace once 45 degrees is achieved.  ROM ONLY  Auto/ 2nd ins.- 23 visits.      Manuals 9/23 9/27 9/30 10/4 10/7 10/9 10/14       IE 30'            Scar mob 3m 3m 3m 3m 3m 3m 3m      retrograde 3m 3m 3m 3m 3m 3m 3m      Graston FA mass 4m 4m 4m 4m 4m 4m 4m      Neuro Re-Ed                          HEP- AROM wrist all planes reviewed                                                                             Ther Ex             AROM Wrist 2x10 Cone  2x10 Cone 3x10 Cone 3x10 Cone 3x10 Cone 3x10 #1 3x10      AROM P/S Dowel 2x10 [id number] Dowel 3x10 Dowel 3x10 Dowel 3x10 Dowel 3x10       Wrist maze  x10 x10 x10 x10 x10 x10      Foam roll flex/ext stretch  2x10 1x15 1x15 1x15 1x15 Ball push      Clips w/ rotation    R 3 sets R 3 sets R 3 sets G 3 sets      Light gripping- neutral wrist      YPW 2x30 RPW 2x30                                Ther Activity             Peg - neutral, supination x30 x30 x30                       Gait Training                                       Modalities             MH R 8m 8m 8m 8m 8m 8m 8m      CP post  5m 5m 5m 5m 5m 5m           
None Known

## 2024-10-18 ENCOUNTER — OFFICE VISIT (OUTPATIENT)
Dept: OCCUPATIONAL THERAPY | Facility: CLINIC | Age: 34
End: 2024-10-18
Payer: COMMERCIAL

## 2024-10-18 DIAGNOSIS — Z98.890 S/P MUSCULOSKELETAL SYSTEM SURGERY: Primary | ICD-10-CM

## 2024-10-18 PROCEDURE — 97110 THERAPEUTIC EXERCISES: CPT | Performed by: OCCUPATIONAL THERAPIST

## 2024-10-18 PROCEDURE — 97140 MANUAL THERAPY 1/> REGIONS: CPT | Performed by: OCCUPATIONAL THERAPIST

## 2024-10-18 NOTE — PROGRESS NOTES
Daily Note     Today's date: 10/18/2024  Patient name: Nell Baldwin  : 1990  MRN: [id number]  Referring provider: Alejo Middleton MD  Dx:   Encounter Diagnosis     ICD-10-CM    1. S/P musculoskeletal system surgery  Z98.890                      Subjective: I feel like I am gaining more motion from my wrist.       Objective: See treatment diary below      Assessment: Tolerated treatment fair. Patient has improved wrist flexion to 45 degrees today pre stretch.  She continues with ulna wrist pain, localized to one small area over the middle of the ulnar incision.  She is slowly weening out of her splint during the day.  Will continue to wear brace at night.    Plan: Continue per plan of care.      Precautions: SX 24-  Ween from brace once 45 degrees is achieved.  ROM ONLY  Auto/ 2nd ins.- 23 visits.      Manuals 9/23 9/27 9/30 10/4 10/7 10/9 10/14 10/18      IE 30'            Scar mob 3m 3m 3m 3m 3m 3m 3m 3m     retrograde 3m 3m 3m 3m 3m 3m 3m 3m     Graston FA mass 4m 4m 4m 4m 4m 4m 4m 4m     Neuro Re-Ed                          HEP- AROM wrist all planes reviewed                                                                             Ther Ex             AROM Wrist 2x10 Cone  2x10 Cone 3x10 Cone 3x10 Cone 3x10 Cone 3x10 #1 3x10 #1 3x10     AROM P/S Dowel 2x10 [id number] Dowel 3x10 Dowel 3x10 Dowel 3x10 Dowel 3x10  SM 3x10     Wrist maze  x10 x10 x10 x10 x10 x10 x10     Foam roll flex/ext stretch  2x10 1x15 1x15 1x15 1x15 Ball push Ball push      Clips w/ rotation    R 3 sets R 3 sets R 3 sets G 3 sets G 3 sets     Light gripping- neutral wrist      YPW 2x30 RPW 2x30 RPW 2x30                               Ther Activity             Peg - neutral, supination x30 x30 x30                       Gait Training                                       Modalities             MH R 8m 8m 8m 8m 8m 8m 8m 8m     CP post  5m 5m 5m 5m 5m 5m 5m

## 2024-10-21 ENCOUNTER — OFFICE VISIT (OUTPATIENT)
Dept: OCCUPATIONAL THERAPY | Facility: CLINIC | Age: 34
End: 2024-10-21
Payer: COMMERCIAL

## 2024-10-21 DIAGNOSIS — Z98.890 S/P MUSCULOSKELETAL SYSTEM SURGERY: Primary | ICD-10-CM

## 2024-10-21 PROCEDURE — 97110 THERAPEUTIC EXERCISES: CPT | Performed by: OCCUPATIONAL THERAPIST

## 2024-10-21 PROCEDURE — 97140 MANUAL THERAPY 1/> REGIONS: CPT | Performed by: OCCUPATIONAL THERAPIST

## 2024-10-21 NOTE — PROGRESS NOTES
Daily Note     Today's date: 10/21/2024  Patient name: Nell Baldwin  : 1990  MRN: [id number]  Referring provider: Alejo Middleton MD  Dx:   Encounter Diagnosis     ICD-10-CM    1. S/P musculoskeletal system surgery  Z98.890                      Subjective: I just get pain in that one spot.      Objective: See treatment diary below      Assessment: Tolerated treatment fair. Patient localized to the ulnar border mid incision.      Plan: Continue per plan of care.      Precautions: SX 24-  Ween from brace once 45 degrees is achieved.  ROM ONLY  Auto/ 2nd ins.- 23 visits.      Manuals 9/23 9/27 9/30 10/4 10/7 10/9 10/14 10/18 10/21     IE 30'            Scar mob 3m 3m 3m 3m 3m 3m 3m 3m 3m    retrograde 3m 3m 3m 3m 3m 3m 3m 3m 3m    Graston FA mass 4m 4m 4m 4m 4m 4m 4m 4m 4m    Neuro Re-Ed                          HEP- AROM wrist all planes reviewed                                                                             Ther Ex             AROM Wrist 2x10 Cone  2x10 Cone 3x10 Cone 3x10 Cone 3x10 Cone 3x10 #1 3x10 #1 3x10 #1 3x10    AROM P/S Dowel 2x10 [id number] Dowel 3x10 Dowel 3x10 Dowel 3x10 Dowel 3x10  SM 3x10 SM 3x10    Wrist maze  x10 x10 x10 x10 x10 x10 x10     Foam roll flex/ext stretch  2x10 1x15 1x15 1x15 1x15 Ball push Ball push      Clips w/ rotation    R 3 sets R 3 sets R 3 sets G 3 sets G 3 sets G 3 sets    Light gripping- neutral wrist      YPW 2x30 RPW 2x30 RPW 2x30 GPW 2x30                              Ther Activity             Peg - neutral, supination x30 x30 x30                       Gait Training                                       Modalities             MH R 8m 8m 8m 8m 8m 8m 8m 8m 8m    CP post  5m 5m 5m 5m 5m 5m 5m 5m

## 2024-10-23 ENCOUNTER — HOSPITAL ENCOUNTER (OUTPATIENT)
Facility: HOSPITAL | Age: 34
Discharge: HOME | End: 2024-10-23
Attending: INTERNAL MEDICINE
Payer: COMMERCIAL

## 2024-10-23 DIAGNOSIS — G47.19 EXCESSIVE DAYTIME SLEEPINESS: ICD-10-CM

## 2024-10-23 DIAGNOSIS — G47.8 NON-RESTORATIVE SLEEP: ICD-10-CM

## 2024-10-23 DIAGNOSIS — R06.89 GASPING FOR BREATH: ICD-10-CM

## 2024-10-23 PROCEDURE — 95810 POLYSOM 6/> YRS 4/> PARAM: CPT

## 2024-10-23 PROCEDURE — 95810 POLYSOM 6/> YRS 4/> PARAM: CPT | Performed by: INTERNAL MEDICINE

## 2024-10-24 PROBLEM — G47.19 EXCESSIVE DAYTIME SLEEPINESS: Status: ACTIVE | Noted: 2024-10-24

## 2024-10-24 PROBLEM — G47.8 NON-RESTORATIVE SLEEP: Status: ACTIVE | Noted: 2024-10-24

## 2024-10-24 PROBLEM — R06.89 GASPING FOR BREATH: Status: ACTIVE | Noted: 2024-10-24

## 2024-10-24 NOTE — PROGRESS NOTES
Sleep Study Documentation    Pre-Sleep Study       Sleep testing procedure explained to patient:YES    Patient napped prior to study:NO    Caffeine:Dayshift worker after 12PM.  Caffeine use:NO    Alcohol:Dayshift workers after 5PM: Alcohol use:NO    Typical day for patient:YES       Study Documentation    Sleep Study Indications: Snoring, BMI > 30, EDS, and unrefreshing sleep    Sleep Study: Diagnostic   Snore:None  Supplemental O2: no    O2 flow rate (L/min) range NA  O2 flow rate (L/min) final NA  Minimum SaO2 90%  Baseline SaO2 100%    EKG abnormalities: yes:  EPOCH example and comments: PACs epoch 356, 361, 777 etc, PVCs epoch 777    EEG abnormalities: no    Were abnormal behaviors in sleep observed:NO    Is Total Sleep Study Recording Time < 2 hours: N/A    Is Total Sleep Study Recording Time > 2 hours but study is incomplete: N/A    Is Total Sleep Study Recording Time 6 hours or more but sleep was not obtained: NO    Patient classification: employed       Post-Sleep Study    Medication used at bedtime or during sleep study:NO    Patient reports time it took to fall asleep:less than 20 minutes    Patient reports waking up during study:3 or more times.  Patient reports returning to sleep without difficulty.    Patient reports sleeping 4 to 6 hours without dreaming.    Does the Patient feel this is a typical night of sleep:worse than usual    Patient rated sleepiness: Somewhat sleepy or tired    PAP treatment:no.

## 2024-10-25 ENCOUNTER — OFFICE VISIT (OUTPATIENT)
Dept: OCCUPATIONAL THERAPY | Facility: CLINIC | Age: 34
End: 2024-10-25
Payer: COMMERCIAL

## 2024-10-25 DIAGNOSIS — Z98.890 S/P MUSCULOSKELETAL SYSTEM SURGERY: Primary | ICD-10-CM

## 2024-10-25 PROCEDURE — 97110 THERAPEUTIC EXERCISES: CPT | Performed by: OCCUPATIONAL THERAPIST

## 2024-10-25 PROCEDURE — 97140 MANUAL THERAPY 1/> REGIONS: CPT | Performed by: OCCUPATIONAL THERAPIST

## 2024-10-25 NOTE — PROGRESS NOTES
Daily Note     Today's date: 10/25/2024  Patient name: Nell Baldwin  : 1990  MRN: [id number]  Referring provider: Alejo Middleton MD  Dx:   Encounter Diagnosis     ICD-10-CM    1. S/P musculoskeletal system surgery  Z98.890                      Subjective: It is sore today.        Objective: See treatment diary below      Assessment: Tolerated treatment fair. Patient pain localized to the ulnar border mid incision.  Her pain is 3/4/10 with function on the ulnar side of the wrist.  She has weened herself out of the brace now.  Wrist AROM has been achieved to 50 degrees both flexion and extension.   strength imrpoved to 30lbR, 60lbs L.  Pt. Is left hand dominant.  Overall her functional use of the L hand is better.  Pt. Would benefit from continued OT to increase functional strength and achieve goals.        Goals  STG( 6 visits)  1. Compliant with HEP/splint- met  2. Increase R wrist AROM by 5-10 degrees without pain- met  LTG( 12 visits or discharge)  1. R wrist AROM WNLS without pain,- not met  2. R  strength >25lbs for ADLs- met  3. Improve FOTO score to predicted outcome or greater.- porgress    Plan: Continue per plan of care.      Precautions: SX 24-  Ween from brace once 45 degrees is achieved.  ROM ONLY  Auto/ 2nd ins.- 23 visits.      Manuals 9/23 9/27 9/30 10/4 10/7 10/9 10/14 10/18 10/21 10/25    IE 30'            Scar mob 3m 3m 3m 3m 3m 3m 3m 3m 3m 3m   retrograde 3m 3m 3m 3m 3m 3m 3m 3m 3m 3m   Graston FA mass 4m 4m 4m 4m 4m 4m 4m 4m 4m 4m   Neuro Re-Ed                          HEP- AROM wrist all planes reviewed                                                                             Ther Ex             AROM Wrist 2x10 Cone  2x10 Cone 3x10 Cone 3x10 Cone 3x10 Cone 3x10 #1 3x10 #1 3x10 #1 3x10 #3 3x10   AROM P/S Dowel 2x10 [id number] Dowel 3x10 Dowel 3x10 Dowel 3x10 Dowel 3x10  SM 3x10 SM 3x10    Wrist maze  x10 x10 x10 x10 x10 x10 x10     Foam roll flex/ext stretch   2x10 1x15 1x15 1x15 1x15 Ball push Ball push   R - lift  3x10   Clips w/ rotation    R 3 sets R 3 sets R 3 sets G 3 sets G 3 sets G 3 sets G 3 sets   Light gripping- neutral wrist      YPW 2x30 RPW 2x30 RPW 2x30 GPW 2x30 BPW 2x30                             Ther Activity             Peg - neutral, supination x30 x30 x30                       Gait Training                                       Modalities              R 8m 8m 8m 8m 8m 8m 8m 8m 8m 8m   CP post  5m 5m 5m 5m 5m 5m 5m 5m 5m

## 2024-10-28 ENCOUNTER — OFFICE VISIT (OUTPATIENT)
Dept: OBGYN CLINIC | Facility: CLINIC | Age: 34
End: 2024-10-28
Payer: COMMERCIAL

## 2024-10-28 VITALS — HEIGHT: 65 IN | BODY MASS INDEX: 28.49 KG/M2 | WEIGHT: 171 LBS

## 2024-10-28 DIAGNOSIS — M25.531 PAIN IN RIGHT WRIST: Primary | ICD-10-CM

## 2024-10-28 DIAGNOSIS — Z98.890 S/P MUSCULOSKELETAL SYSTEM SURGERY: ICD-10-CM

## 2024-10-28 DIAGNOSIS — M65.4 DE QUERVAIN'S TENOSYNOVITIS, RIGHT: ICD-10-CM

## 2024-10-28 DIAGNOSIS — S63.591S TFCC (TRIANGULAR FIBROCARTILAGE COMPLEX) TEAR, RIGHT, SEQUELA: ICD-10-CM

## 2024-10-28 PROCEDURE — 20526 THER INJECTION CARP TUNNEL: CPT | Performed by: ORTHOPAEDIC SURGERY

## 2024-10-28 PROCEDURE — 99024 POSTOP FOLLOW-UP VISIT: CPT | Performed by: ORTHOPAEDIC SURGERY

## 2024-10-28 RX ORDER — BETAMETHASONE SODIUM PHOSPHATE AND BETAMETHASONE ACETATE 3; 3 MG/ML; MG/ML
3 INJECTION, SUSPENSION INTRA-ARTICULAR; INTRALESIONAL; INTRAMUSCULAR; SOFT TISSUE
Status: COMPLETED | OUTPATIENT
Start: 2024-10-28 | End: 2024-10-28

## 2024-10-28 RX ORDER — LIDOCAINE HYDROCHLORIDE 10 MG/ML
0.5 INJECTION, SOLUTION INFILTRATION; PERINEURAL
Status: COMPLETED | OUTPATIENT
Start: 2024-10-28 | End: 2024-10-28

## 2024-10-28 RX ADMIN — LIDOCAINE HYDROCHLORIDE 0.5 ML: 10 INJECTION, SOLUTION INFILTRATION; PERINEURAL at 14:15

## 2024-10-28 RX ADMIN — BETAMETHASONE SODIUM PHOSPHATE AND BETAMETHASONE ACETATE 3 MG: 3; 3 INJECTION, SUSPENSION INTRA-ARTICULAR; INTRALESIONAL; INTRAMUSCULAR; SOFT TISSUE at 14:15

## 2024-10-28 NOTE — PROGRESS NOTES
Assessment:   6 weeks S/P Right wrist arthroscopy with TFCC debridement and repair - Right and Application of long arm sugar tong splint - Right on 8/6/2024     Plan:   Nell upon exam and review of her therapy notes does appear to have developed subsequent de Quervain's tenosynovitis.  I would like therapy to address this in addition to her postoperative recovery.  With regards to the pain that she is experiencing at the ulnar aspect of the wrist.  I did note that there is a stitch that was utilized for the TFCC repair.  This can be irritating the nerve along the ulnar aspect of the wrist causing her pain.  She may simply monitor her symptoms and continue with therapy with modifications of avoiding any scar manipulation or Graston techniques.  A steroid injection subdermally could be considered today with the hopes of relieving some of her painful symptoms.  I did note that if at 4 months postoperatively she has not improved with her symptoms surgical intervention to remove the stitch for the TFCC repair can be considered.  Nell wished to pursue a steroid injection and modify her therapy.  He tolerated the injection well without complication.  She will follow-up in 6 weeks for repeat clinical evaluation.      Follow Up:  6  week(s)    To Do Next Visit:  Re-evlaution      CHIEF COMPLAINT:  Chief Complaint   Patient presents with    Right Wrist - Post-op     Arthoscopy with TFCC debridement + repair - 8/6/24         SUBJECTIVE:  Nell Baldwin is a 33 y.o. year old female who presents for follow up after right wrist arthroscopy with TFCC debridement and repair right on 8/6/2024.  She states that she has developed radial sided wrist pain over the past week.  She notes that she has progressed into a strengthening phase of the recovery.  She also notes that the fascial modalities such as Graston at the ulnar aspect of the wrist over the scar is quite painful.  She does not report significant relief with  this modality.  She also notes that she has experienced some tingling into the small finger and ring finger with increased activity.      PHYSICAL EXAMINATION:  General: well developed and well nourished, alert, oriented times 3, and appears comfortable  Psychiatric: Normal    MUSCULOSKELETAL EXAMINATION:  Incision: Clean, dry, intact  Range of Motion: Limited due to pain  Neurovascular status: Neuro intact, good cap refill  Activity Restrictions: No restrictions  TTP: 1st dorsal compartment  Finkelstein: positive        STUDIES REVIEWED:  No Studies to review      PROCEDURES PERFORMED:  Hand/upper extremity injection  Universal Protocol:  procedure performed by consultantConsent: Verbal consent obtained.  Risks and benefits: risks, benefits and alternatives were discussed  Consent given by: patient  Timeout called at: 10/28/2024 2:55 PM.  Patient understanding: patient states understanding of the procedure being performed  Patient identity confirmed: verbally with patient  Supporting Documentation  Indications: pain and therapeutic   Procedure Details  Condition comment: Subcutaneous ulnar carpal   Needle size: 25 G  Approach: ulnar  Medications administered: 0.5 mL lidocaine 1 %; 3 mg betamethasone acetate-betamethasone sodium phosphate 6 (3-3) mg/mL  Patient tolerance: patient tolerated the procedure well with no immediate complications  Dressing:  Sterile dressing applied     No Procedures performed today   Scribe Attestation      I,:   am acting as a scribe while in the presence of the attending physician.:       I,:   personally performed the services described in this documentation    as scribed in my presence.:

## 2024-10-29 ENCOUNTER — OFFICE VISIT (OUTPATIENT)
Dept: SLEEP CENTER | Facility: HOSPITAL | Age: 34
End: 2024-10-29
Payer: COMMERCIAL

## 2024-10-29 ENCOUNTER — PREP FOR PROCEDURE (OUTPATIENT)
Dept: OBGYN CLINIC | Facility: HOSPITAL | Age: 34
End: 2024-10-29

## 2024-10-29 VITALS
DIASTOLIC BLOOD PRESSURE: 78 MMHG | SYSTOLIC BLOOD PRESSURE: 120 MMHG | WEIGHT: 166 LBS | HEIGHT: 65 IN | BODY MASS INDEX: 27.66 KG/M2

## 2024-10-29 DIAGNOSIS — E28.2 PCOS (POLYCYSTIC OVARIAN SYNDROME): ICD-10-CM

## 2024-10-29 DIAGNOSIS — E66.3 OVERWEIGHT: ICD-10-CM

## 2024-10-29 DIAGNOSIS — F32.81 PMDD (PREMENSTRUAL DYSPHORIC DISORDER): ICD-10-CM

## 2024-10-29 DIAGNOSIS — M54.50 CHRONIC MIDLINE LOW BACK PAIN WITHOUT SCIATICA: ICD-10-CM

## 2024-10-29 DIAGNOSIS — G47.19 EXCESSIVE DAYTIME SLEEPINESS: ICD-10-CM

## 2024-10-29 DIAGNOSIS — M79.5 FOREIGN BODY (FB) IN SOFT TISSUE: Primary | ICD-10-CM

## 2024-10-29 DIAGNOSIS — G47.8 NON-RESTORATIVE SLEEP: Primary | ICD-10-CM

## 2024-10-29 DIAGNOSIS — G89.29 CHRONIC MIDLINE LOW BACK PAIN WITHOUT SCIATICA: ICD-10-CM

## 2024-10-29 DIAGNOSIS — G89.4 CHRONIC PAIN DISORDER: ICD-10-CM

## 2024-10-29 PROCEDURE — 99214 OFFICE O/P EST MOD 30 MIN: CPT | Performed by: INTERNAL MEDICINE

## 2024-10-29 RX ORDER — CEFAZOLIN SODIUM 2 G/50ML
2000 SOLUTION INTRAVENOUS ONCE
OUTPATIENT
Start: 2024-10-29 | End: 2024-10-29

## 2024-10-29 NOTE — PROGRESS NOTES
"   Follow-Up Note - Sleep Center   Nell Baldwin  33 y.o. female  :1990  MRN:[id number]  DOS:10/29/2024    CC: I saw this patient for follow-up in clinic today for Sleep and Medical Problems. Interval changes: Patient recently had a diagnostic sleep study and is here to review results / treatment options.      The study demonstrated no evidence for obstructive sleep apnea: AHI 1.3 /hour.  No snoring  was noted.  Minimum oxygen saturation 90%    sleep and REM latencies were short at 8.4 and 78 minutes respectively.  Sleep efficiency was 94.5%.  There was a normal distribution of sleep stages but there was moderate sleep fragmentation.  ECG demonstrated an irregular rhythm with sinus pauses of less than 3 seconds duration of, occasional PACs and PVCs    Prior home sleep testing was equivocal    PFSH, Problem List, Medications & Allergies were reviewed in EMR.   She  has a past medical history of Amenorrhea, Anxiety, Female infertility, Fibromyalgia, Irregular menstrual cycle, Osteoarthritis of right acromioclavicular joint, Polycystic ovary syndrome, Separation of right acromioclavicular joint, Strain of muscle of right hip, initial encounter (2023), and Varicella.    She has a current medication list which includes the following prescription(s): aurovela fe , fluoxetine, hydroquinone, multivitamin, propranolol, zepbound, acetaminophen, albuterol, benzonatate, cyclobenzaprine, metformin, naproxen sodium, standard tens, phentermine, and tizanidine.    HPI: Continues to report frequent interruptions of sleep and \"I am just exhausted during the day \"  Sleep Routine: Nell reports getting > hrs sleep; she has no difficulty initiating, but reports diffculty maintaining sleep .  Sleep is disrupted 4-6 times because of pain or is not always short of because and is able to fall back asleep She arises needing an alarm and never feels rested.Nell reports constant fatigue,  and daytime " "sleepiness, feels like napping but does not have the opportunity.  She rated herself at Total score: 12 /24 on the [place] Sleepiness Scale.   Habits: Reports that she has never smoked. She has never used smokeless tobacco.,  Reports that she does not currently use alcohol after a past usage of about 1.0 standard drink of alcohol per week.,  Reports no history of drug use., Caffeine use: limited, Exercise routine: none.     ROS: reviewed significant for some weight reduction since starting Zepbound..   She is reporting no nasal, respiratory or cardiac symptoms.     EXAM: /78   Ht 5' 5\" (1.651 m)   Wt 75.3 kg (166 lb)   BMI 27.62 kg/m²     Wt Readings from Last 3 Encounters:   10/29/24 75.3 kg (166 lb)   10/28/24 77.6 kg (171 lb)   10/01/24 77.8 kg (171 lb 9.6 oz)     Patient is well groomed; well appearing.  CNS: Alert, orientated, clear & coherent speech.  Psych: cooperative and in no distress. Mental state: Appears normal.  H&N: EOMI; NC/AT: No facial pressure marks, no rashes.    Skin/Extrem: col & hydration normal; no edema  Resp: Respiratory effort is normal  Physical findings otherwise essentially unchanged from previous.    IMPRESSION: Diagnoses, Problem List Items & Comorbidities Addressed this Visit   1. Non-restorative sleep        2. Excessive daytime sleepiness        3. PCOS (polycystic ovarian syndrome)        4. PMDD (premenstrual dysphoric disorder)        5. Chronic pain disorder        6. Chronic midline low back pain without sciatica        7. Overweight            PLAN:  1. I reviewed results of the Sleep studies with the patient.   2. With respect to above conditions, I counseled on pathophysiology, diagnosis, treatment options, risks and benefits; inter-relationship and effects on symptoms and comorbidities; risks of no treatment; costs and insurance aspects.   3.  Encouraged to persist with efforts at weight reduction.  4.  Multi component Cognitive behavioral therapy for Insomnia " "undertaken - Sleep Restriction, Stimulus control, Relaxation techniques and Sleep hygiene were discussed.  5. Will need to have her pain adequately controlled through nonpharmacologic and perhaps pharmacologic measures.  6. She may benefit from duloxetine in place of fluoxetine.  She is using muscle relaxants for pain and sleep.  Gabapentin may also be an option.  7.  I have not scheduled any follow-up in sleep clinic at this time.  She may call as needed.    Thank you for allowing me to participate  in the care of this patient.     Sincerely,     Authenticated electronically on 10/29/24   Board Certified Specialist     Portions of the record may have been created with voice recognition software. Occasional wrong word or \"sound a like\" substitutions may have occurred due to the inherent limitations of voice recognition software. There may also be notations and random deletions of words or characters from malfunctioning software. Read the chart carefully and recognize, using context, where substitutions/deletions have occurred.  "

## 2024-10-30 ENCOUNTER — OFFICE VISIT (OUTPATIENT)
Dept: OCCUPATIONAL THERAPY | Facility: CLINIC | Age: 34
End: 2024-10-30
Payer: COMMERCIAL

## 2024-10-30 DIAGNOSIS — Z98.890 S/P MUSCULOSKELETAL SYSTEM SURGERY: Primary | ICD-10-CM

## 2024-10-30 PROCEDURE — 97140 MANUAL THERAPY 1/> REGIONS: CPT | Performed by: OCCUPATIONAL THERAPIST

## 2024-10-30 PROCEDURE — 97110 THERAPEUTIC EXERCISES: CPT | Performed by: OCCUPATIONAL THERAPIST

## 2024-10-30 NOTE — PROGRESS NOTES
Daily Note     Today's date: 10/30/2024  Patient name: Nell Baldwin  : 1990  MRN: [id number]  Referring provider: Alejo Middleton MD  Dx:   Encounter Diagnosis     ICD-10-CM    1. S/P musculoskeletal system surgery  Z98.890                      Subjective: I am having the stitch removed in December.      Objective: See treatment diary below      Assessment: Tolerated treatment fair. Patient with new onset of DeQeurvain's tenosynvitis per MD.  Symptoms address with modifications made to treatment plan.  Trial of KT tape to radial wrist for support.  Pt. Scheduled to have irritated internal stitch on the ulna side of wrist surgically removed in December.  Will hold off on modalities to her sx scars at this point.        Plan: Continue per plan of care.      Precautions: SX 24-  Ween from brace once 45 degrees is achieved.  ROM ONLY  Auto/ 2nd ins.- 23 visits.  Manuals                        Scar mob            retrograde 3m           Graston FA mass/ 1st DC 4m           Neuro Re-Ed             KT taping radial wrist.           HEP- AROM wrist all planes                                                                        Ther Ex            AROM Wrist #2 3x10           AROM P/S T bar R 2x10           Wrist maze            Foam roll flex/ext stretch            Clips w/ rotation            Light gripping- neutral wrist GPW 2x30           UBE 6m                        Ther Activity            Peg - neutral, supination                        Gait Training                                    Modalities            MH R 8m 8m 8m 8m 8m 8m 8m 8m 8m   CP post 5m 5m 5m 5m 5m 5m 5m 5m 5m         Manuals  10/4 10/7 10/9 10/14 10/18 10/21 10/25    IE 30'            Scar mob 3m 3m 3m 3m 3m 3m 3m 3m 3m 3m   retrograde 3m 3m 3m 3m 3m 3m 3m 3m 3m 3m   Graston FA mass 4m 4m 4m 4m 4m 4m 4m 4m 4m 4m   Neuro Re-Ed                          HEP- AROM wrist all planes reviewed                                                                              Ther Ex             AROM Wrist 2x10 Cone  2x10 Cone 3x10 Cone 3x10 Cone 3x10 Cone 3x10 #1 3x10 #1 3x10 #1 3x10 #3 3x10   AROM P/S Dowel 2x10 [id number] Dowel 3x10 Dowel 3x10 Dowel 3x10 Dowel 3x10  SM 3x10 SM 3x10    Wrist maze  x10 x10 x10 x10 x10 x10 x10     Foam roll flex/ext stretch  2x10 1x15 1x15 1x15 1x15 Ball push Ball push   R - lift  3x10   Clips w/ rotation    R 3 sets R 3 sets R 3 sets G 3 sets G 3 sets G 3 sets G 3 sets   Light gripping- neutral wrist      YPW 2x30 RPW 2x30 RPW 2x30 GPW 2x30 BPW 2x30                             Ther Activity             Peg - neutral, supination x30 x30 x30                       Gait Training                                       Modalities             MH R 8m 8m 8m 8m 8m 8m 8m 8m 8m 8m   CP post  5m 5m 5m 5m 5m 5m 5m 5m 5m

## 2024-11-04 ENCOUNTER — OFFICE VISIT (OUTPATIENT)
Dept: OCCUPATIONAL THERAPY | Facility: CLINIC | Age: 34
End: 2024-11-04
Payer: COMMERCIAL

## 2024-11-04 DIAGNOSIS — Z98.890 S/P MUSCULOSKELETAL SYSTEM SURGERY: Primary | ICD-10-CM

## 2024-11-04 PROCEDURE — 97140 MANUAL THERAPY 1/> REGIONS: CPT | Performed by: OCCUPATIONAL THERAPIST

## 2024-11-04 PROCEDURE — 97110 THERAPEUTIC EXERCISES: CPT | Performed by: OCCUPATIONAL THERAPIST

## 2024-11-04 NOTE — PROGRESS NOTES
Daily Note     Today's date: 2024  Patient name: Nell Baldwin  : 1990  MRN: [id number]  Referring provider: Alejo Middleton MD  Dx:   Encounter Diagnosis     ICD-10-CM    1. S/P musculoskeletal system surgery  Z98.890                      Subjective: I am a little sore.      Objective: See treatment diary below      Assessment: Tolerated treatment fair. Patient continues with tenderness over the 1st dorsal compartment.  Mild relief with KT taping to radial wrist.      Plan: Continue per plan of care.      Precautions: SX 24    INSURANCE: Auto/ 2nd ins.- 23 visits,   30 PT/ST/OT  PCY( 7 used)  Manuals 10/30 11/4           11 12          Scar mob            retrograde 3m 3m          Graston FA mass/ 1st DC 4m 4m          Neuro Re-Ed             KT taping radial wrist. KT tape          HEP- AROM wrist all planes                                                                        Ther Ex            AROM Wrist #2 3x10 #2 3x10          AROM P/S T bar R 2x10 R 3x10          Wrist maze            Foam roll flex/ext stretch            Clips w/ rotation            Light gripping- neutral wrist GPW 2x30 Gpw 2x30          UBE 6m                        Ther Activity            Peg - neutral, supination                        Gait Training                                    Modalities            MH R 8m 8m 8m 8m 8m 8m 8m 8m 8m   CP post 5m 5m 5m 5m 5m 5m 5m 5m 5m         Manuals 9/23 9/27 9/30 10/4 10/7 10/9 10/14 10/18 10/21 10/25    IE 30'            Scar mob 3m 3m 3m 3m 3m 3m 3m 3m 3m 3m   retrograde 3m 3m 3m 3m 3m 3m 3m 3m 3m 3m   Graston FA mass 4m 4m 4m 4m 4m 4m 4m 4m 4m 4m   Neuro Re-Ed                          HEP- AROM wrist all planes reviewed                                                                             Ther Ex             AROM Wrist 2x10 Cone  2x10 Cone 3x10 Cone 3x10 Cone 3x10 Cone 3x10 #1 3x10 #1 3x10 #1 3x10 #3 3x10   AROM P/S Dowel 2x10 [id number] Dowel 3x10  Dowel 3x10 Dowel 3x10 Dowel 3x10  SM 3x10 SM 3x10    Wrist maze  x10 x10 x10 x10 x10 x10 x10     Foam roll flex/ext stretch  2x10 1x15 1x15 1x15 1x15 Ball push Ball push   R - lift  3x10   Clips w/ rotation    R 3 sets R 3 sets R 3 sets G 3 sets G 3 sets G 3 sets G 3 sets   Light gripping- neutral wrist      YPW 2x30 RPW 2x30 RPW 2x30 GPW 2x30 BPW 2x30                             Ther Activity             Peg - neutral, supination x30 x30 x30                       Gait Training                                       Modalities             MH R 8m 8m 8m 8m 8m 8m 8m 8m 8m 8m   CP post  5m 5m 5m 5m 5m 5m 5m 5m 5m

## 2024-11-08 ENCOUNTER — APPOINTMENT (OUTPATIENT)
Dept: OCCUPATIONAL THERAPY | Facility: CLINIC | Age: 34
End: 2024-11-08
Payer: COMMERCIAL

## 2024-11-11 ENCOUNTER — OFFICE VISIT (OUTPATIENT)
Dept: OCCUPATIONAL THERAPY | Facility: CLINIC | Age: 34
End: 2024-11-11
Payer: COMMERCIAL

## 2024-11-11 DIAGNOSIS — Z98.890 S/P MUSCULOSKELETAL SYSTEM SURGERY: Primary | ICD-10-CM

## 2024-11-11 PROCEDURE — 97140 MANUAL THERAPY 1/> REGIONS: CPT | Performed by: OCCUPATIONAL THERAPIST

## 2024-11-11 PROCEDURE — 97110 THERAPEUTIC EXERCISES: CPT | Performed by: OCCUPATIONAL THERAPIST

## 2024-11-11 NOTE — PROGRESS NOTES
Daily Note     Today's date: 2024  Patient name: Nell Baldwin  : 1990  MRN: [id number]  Referring provider: Alejo Middleton MD  Dx:   Encounter Diagnosis     ICD-10-CM    1. S/P musculoskeletal system surgery  Z98.890                      Subjective: I am a little sore.      Objective: See treatment diary below      Assessment: Tolerated treatment fair. Patient continues with tenderness over the 1st dorsal compartment.  Mild relief with KT taping to radial wrist.      Plan: Continue per plan of care.      Precautions: SX 24    INSURANCE: Auto/ 2nd ins.- 23 visits,   30 PT/ST/OT  PCY( 7 used)  Manuals 10/30 11/4 11/11          11 12 13         Scar mob            retrograde 3m 3m TPF flexor mass         Graston FA mass/ 1st DC 4m 4m 4m         Neuro Re-Ed             KT taping radial wrist. KT tape KT tape         HEP- AROM wrist all planes                                                                        Ther Ex            AROM Wrist #2 3x10 #2 3x10 #2 3x10         AROM P/S T bar R 2x10 R 3x10 R 3x10         Wrist maze            Foam roll flex/ext stretch            Clips w/ rotation            Light gripping- neutral wrist GPW 2x30 Gpw 2x30 RPW 2x30         UBE 6m                        Ther Activity            Peg - neutral, supination                        Gait Training                                    Modalities            MH R 8m 8m 8m 8m 8m 8m 8m 8m 8m   CP post 5m 5m 5m 5m 5m 5m 5m 5m 5m         Manuals 9/23 9/27 9/30 10/4 10/7 10/9 10/14 10/18 10/21 10/25    IE 30'            Scar mob 3m 3m 3m 3m 3m 3m 3m 3m 3m 3m   retrograde 3m 3m 3m 3m 3m 3m 3m 3m 3m 3m   Graston FA mass 4m 4m 4m 4m 4m 4m 4m 4m 4m 4m   Neuro Re-Ed                          HEP- AROM wrist all planes reviewed                                                                             Ther Ex             AROM Wrist 2x10 Cone  2x10 Cone 3x10 Cone 3x10 Cone 3x10 Cone 3x10 #1 3x10 #1 3x10 #1 3x10  #3 3x10   AROM P/S Dowel 2x10 [id number] Dowel 3x10 Dowel 3x10 Dowel 3x10 Dowel 3x10  SM 3x10 SM 3x10    Wrist maze  x10 x10 x10 x10 x10 x10 x10     Foam roll flex/ext stretch  2x10 1x15 1x15 1x15 1x15 Ball push Ball push   R - lift  3x10   Clips w/ rotation    R 3 sets R 3 sets R 3 sets G 3 sets G 3 sets G 3 sets G 3 sets   Light gripping- neutral wrist      YPW 2x30 RPW 2x30 RPW 2x30 GPW 2x30 BPW 2x30                             Ther Activity             Peg - neutral, supination x30 x30 x30                       Gait Training                                       Modalities             MH R 8m 8m 8m 8m 8m 8m 8m 8m 8m 8m   CP post  5m 5m 5m 5m 5m 5m 5m 5m 5m

## 2024-11-13 ENCOUNTER — APPOINTMENT (OUTPATIENT)
Dept: OCCUPATIONAL THERAPY | Facility: CLINIC | Age: 34
End: 2024-11-13
Payer: COMMERCIAL

## 2024-11-14 DIAGNOSIS — E28.2 PCOS (POLYCYSTIC OVARIAN SYNDROME): Chronic | ICD-10-CM

## 2024-11-15 RX ORDER — TIRZEPATIDE 2.5 MG/.5ML
2.5 INJECTION, SOLUTION SUBCUTANEOUS WEEKLY
Qty: 2 ML | Refills: 1 | Status: SHIPPED | OUTPATIENT
Start: 2024-11-15

## 2024-11-18 ENCOUNTER — OFFICE VISIT (OUTPATIENT)
Dept: OCCUPATIONAL THERAPY | Facility: CLINIC | Age: 34
End: 2024-11-18
Payer: COMMERCIAL

## 2024-11-18 ENCOUNTER — OFFICE VISIT (OUTPATIENT)
Dept: OBGYN CLINIC | Facility: CLINIC | Age: 34
End: 2024-11-18
Payer: COMMERCIAL

## 2024-11-18 VITALS — BODY MASS INDEX: 27.66 KG/M2 | WEIGHT: 166 LBS | HEIGHT: 65 IN

## 2024-11-18 DIAGNOSIS — Z98.890 S/P MUSCULOSKELETAL SYSTEM SURGERY: Primary | ICD-10-CM

## 2024-11-18 DIAGNOSIS — M65.4 DE QUERVAIN'S TENOSYNOVITIS, RIGHT: ICD-10-CM

## 2024-11-18 PROCEDURE — 97110 THERAPEUTIC EXERCISES: CPT | Performed by: OCCUPATIONAL THERAPIST

## 2024-11-18 PROCEDURE — 99213 OFFICE O/P EST LOW 20 MIN: CPT | Performed by: ORTHOPAEDIC SURGERY

## 2024-11-18 PROCEDURE — 20550 NJX 1 TENDON SHEATH/LIGAMENT: CPT | Performed by: ORTHOPAEDIC SURGERY

## 2024-11-18 PROCEDURE — 97140 MANUAL THERAPY 1/> REGIONS: CPT | Performed by: OCCUPATIONAL THERAPIST

## 2024-11-18 RX ADMIN — BETAMETHASONE SODIUM PHOSPHATE AND BETAMETHASONE ACETATE 6 MG: 3; 3 INJECTION, SUSPENSION INTRA-ARTICULAR; INTRALESIONAL; INTRAMUSCULAR; SOFT TISSUE at 15:15

## 2024-11-18 RX ADMIN — LIDOCAINE HYDROCHLORIDE 1 ML: 10 INJECTION, SOLUTION INFILTRATION; PERINEURAL at 15:15

## 2024-11-18 NOTE — PROGRESS NOTES
Daily Note     Today's date: 2024  Patient name: Nell Baldwin  : 1990  MRN: [id number]  Referring provider: Alejo Middleton MD  Dx:   Encounter Diagnosis     ICD-10-CM    1. S/P musculoskeletal system surgery  Z98.890                      Subjective: I am getting a cortisone injection later today.      Objective: See treatment diary below      Assessment: Tolerated treatment fair. Patient reports her symptoms of DeQuervain's is progressing.  Pt. To f/u with MD later today for possible cortisone injection for pain relief.      Plan: Continue per plan of care.      Precautions: SX 24    INSURANCE: Auto/ 2nd ins.- 23 visits,   30 PT/ST/OT  PCY( 7 used)  Manuals 10/30 11/4 11/11 11/18         11 12 13 14        Scar mob            retrograde 3m 3m TPF flexor mass 4m        Graston FA mass/ 1st DC 4m 4m 4m 4m        Neuro Re-Ed             KT taping radial wrist. KT tape KT tape         HEP- AROM wrist all planes                                                                        Ther Ex            AROM Wrist #2 3x10 #2 3x10 #2 3x10 #2 3x10        AROM P/S T bar R 2x10 R 3x10 R 3x10 SM 3x10        Wrist maze            Foam roll flex/ext stretch            Clips w/ rotation            Light gripping- neutral wrist GPW 2x30 Gpw 2x30 RPW 2x30 RpW 2x30        UBE 6m                        Ther Activity            Peg - neutral, supination                        Gait Training                                    Modalities            MH R 8m 8m 8m 8m 8m 8m 8m 8m 8m   CP post 5m 5m 5m 5m 5m 5m 5m 5m 5m         Manuals 9/23 9/27 9/30 10/4 10/7 10/9 10/14 10/18 10/21 10/25    IE 30'            Scar mob 3m 3m 3m 3m 3m 3m 3m 3m 3m 3m   retrograde 3m 3m 3m 3m 3m 3m 3m 3m 3m 3m   Graston FA mass 4m 4m 4m 4m 4m 4m 4m 4m 4m 4m   Neuro Re-Ed                          HEP- AROM wrist all planes reviewed                                                                             Ther Ex              AROM Wrist 2x10 Cone  2x10 Cone 3x10 Cone 3x10 Cone 3x10 Cone 3x10 #1 3x10 #1 3x10 #1 3x10 #3 3x10   AROM P/S Dowel 2x10 [id number] Dowel 3x10 Dowel 3x10 Dowel 3x10 Dowel 3x10  SM 3x10 SM 3x10    Wrist maze  x10 x10 x10 x10 x10 x10 x10     Foam roll flex/ext stretch  2x10 1x15 1x15 1x15 1x15 Ball push Ball push   R - lift  3x10   Clips w/ rotation    R 3 sets R 3 sets R 3 sets G 3 sets G 3 sets G 3 sets G 3 sets   Light gripping- neutral wrist      YPW 2x30 RPW 2x30 RPW 2x30 GPW 2x30 BPW 2x30                             Ther Activity             Peg - neutral, supination x30 x30 x30                       Gait Training                                       Modalities             MH R 8m 8m 8m 8m 8m 8m 8m 8m 8m 8m   CP post  5m 5m 5m 5m 5m 5m 5m 5m 5m

## 2024-11-18 NOTE — H&P (VIEW-ONLY)
ASSESSMENT/PLAN:    Assessment:   de Quervain stenosis tenosynovitis  right    3-month S/P Right wrist arthroscopy with TFCC debridement and repair - Right and Application of long arm sugar tong splint - Right on 8/6/2024     Plan:   Right de Quervain tenosynovitis CSI was offered and administered at today's visit.  Patient tolerated this well.  Postinjection instructions were provided.  Patient has right wrist movable foreign body (stitch) scheduled for 12/17/2024.  I discussed with patient risks and benefits procedure.  Postoperative questions were answered.  Continue OTC medications as needed.  Discussed with patient that she may follow-up in 8 weeks for reevaluation of right de Quervain's tenosynovitis.      Follow Up:  After Surgery      _____________________________________________________  CHIEF COMPLAINT:  Chief Complaint   Patient presents with    Right Wrist - Post-op     Arthoscopy with TFCC debridement + repair - 8/6/24         SUBJECTIVE:  Nell Baldwin is a 34 y.o. female who presents for follow up regarding right de Quervain's tenosynovitis and 3-month status post right wrist arthroscopy with TFCC debridement and repair.  Patient states today that she continues to have pain within her right thumb area and is hard for her to perform activities of daily living.  Patient states she has 3 kids and sometimes has difficulty with picking them up.  Patient is interested in right de Quervain's tenosynovitis CSI.  PAST MEDICAL HISTORY:  Past Medical History:   Diagnosis Date    Amenorrhea     last assessed: 08/22/2016    Anxiety     Female infertility     seen by [facility] - seen for 1 year, 5 cycles IUI acheived pregnancy via IVF    Fibromyalgia     Irregular menstrual cycle     last assessed: 09/26/2016    Osteoarthritis of right acromioclavicular joint     last assessment: 01/28/2013    Polycystic ovary syndrome     Separation of right acromioclavicular joint     Last assessed:  2013; this is more consistent with ostiolysis of the distal clavicle vs. acromioclavicular joint arthritis, not acute shoulder seperation    Strain of muscle of right hip, initial encounter 2023    Varicella     vaccine       PAST SURGICAL HISTORY:  Past Surgical History:   Procedure Laterality Date     SECTION      DENTAL SURGERY      wisdom teeth    FL INJECTION RIGHT WRIST (ARTHROGRAM)  2024    NC APPLICATION LONG ARM SPLINT SHOULDER HAND Right 2024    Procedure: Application of long arm sugar tong splint;  Surgeon: Alejo Middleton MD;  Location: [facility];  Service: Orthopedics    NC ARTHRS WRST EXC&/RPR TRIANG FIBROCART&/JOINT Right 2024    Procedure: Right wrist arthroscopy with TFCC debridement and repair;  Surgeon: Alejo Middleton MD;  Location: [facility];  Service: Orthopedics    NC  DELIVERY ONLY N/A 2018    Procedure:  SECTION ();  Surgeon: Ismael Marquez MD;  Location: [facility];  Service: Obstetrics    NC  DELIVERY ONLY N/A 2020    Procedure:  SECTION ();  Surgeon: Ismael Marquez MD;  Location: [facility];  Service: Obstetrics    SHOULDER SURGERY      SINUS SURGERY  2023    TUBAL LIGATION Bilateral 2020    Procedure: LIGATION/COAGULATION TUBAL;  Surgeon: Ismael Marquez MD;  Location: [facility];  Service: Obstetrics       FAMILY HISTORY:  Family History   Problem Relation Age of Onset    Fibromyalgia Mother     Hyperlipidemia Father     Hypertension Father     Diabetes Maternal Grandmother     Colon cancer Maternal Grandfather 48    Cancer Maternal Grandfather     Diabetes Paternal Grandfather     Breast cancer Neg Hx     Endometrial cancer Neg Hx     Ovarian cancer Neg Hx        SOCIAL HISTORY:  Social History     Tobacco Use    Smoking status: Never    Smokeless tobacco: Never   Vaping Use    Vaping status: Never Used   Substance Use Topics    Alcohol use: Not Currently     Alcohol/week: 1.0 standard drink  of alcohol    Drug use: Never       MEDICATIONS:    Current Outpatient Medications:     Aurovela FE 1/20 1-20 MG-MCG per tablet, TAKE 1 TABLET BY MOUTH EVERY DAY, Disp: 84 tablet, Rfl: 1    FLUoxetine (PROzac) 10 MG tablet, TAKE 1 TABLET BY MOUTH EVERY DAY, Disp: 90 tablet, Rfl: 1    hydroquinone 4 % cream, Apply topically daily at bedtime Use three months on, then take 1 month break and can repeat cycles., Disp: 30 g, Rfl: 3    multivitamin (THERAGRAN) TABS, Take 1 tablet by mouth daily, Disp: , Rfl:     Zepbound 2.5 MG/0.5ML SOLN, INJECT 2.5 MG UNDER THE SKIN ONCE A WEEK, Disp: 2 mL, Rfl: 1    acetaminophen (TYLENOL) 650 mg CR tablet, Take 1 tablet (650 mg total) by mouth every 8 (eight) hours as needed for mild pain (Patient not taking: Reported on 8/30/2024), Disp: 30 tablet, Rfl: 0    albuterol (PROVENTIL HFA,VENTOLIN HFA) 90 mcg/act inhaler, TAKE 2 PUFFS BY MOUTH EVERY 6 HOURS AS NEEDED FOR WHEEZE OR FOR SHORTNESS OF BREATH, Disp: 8 g, Rfl: 0    benzonatate (TESSALON) 200 MG capsule, Take 1 capsule (200 mg total) by mouth 3 (three) times a day as needed for cough (Patient not taking: Reported on 8/30/2024), Disp: 20 capsule, Rfl: 0    cyclobenzaprine (FLEXERIL) 10 mg tablet, Take 1 tablet (10 mg total) by mouth 3 (three) times a day as needed for muscle spasms (Patient not taking: Reported on 8/30/2024), Disp: 20 tablet, Rfl: 0    metFORMIN (GLUCOPHAGE) 500 mg tablet, Take 1 tablet (500 mg total) by mouth 2 (two) times a day with meals, Disp: 180 tablet, Rfl: 3    Naproxen Sodium 220 MG CAPS, Take 1 capsule (220 mg total) by mouth 2 (two) times a day (Patient not taking: Reported on 8/19/2024), Disp: 60 capsule, Rfl: 0    Nerve Stimulator (Standard TENS) ANA, Use in the morning (Patient taking differently: Use if needed), Disp: 1 each, Rfl: 0    phentermine (ADIPEX-P) 37.5 MG tablet, Take 1 tablet (37.5 mg total) by mouth daily (Patient not taking: Reported on 8/12/2024), Disp: 30 tablet, Rfl: 0     "propranolol (INDERAL) 10 mg tablet, Take 1 tablet (10 mg total) by mouth 2 (two) times a day For the first week take only one per day., Disp: 90 tablet, Rfl: 3    tiZANidine (ZANAFLEX) 2 mg tablet, Take 1 tablet (2 mg total) by mouth daily at bedtime (Patient not taking: Reported on 8/19/2024), Disp: 60 tablet, Rfl: 2    ALLERGIES:  No Known Allergies    REVIEW OF SYSTEMS:  Pertinent items are noted in HPI.  A comprehensive review of systems was negative.    LABS:  HgA1c:   Lab Results   Component Value Date    HGBA1C 5.0 03/31/2020     BMP:   Lab Results   Component Value Date    GLUCOSE 84 08/29/2013    CALCIUM 8.8 04/18/2024     04/11/2016    K 4.3 04/18/2024    CO2 29 04/18/2024     04/18/2024    BUN 15 04/18/2024    CREATININE 0.66 04/18/2024           _____________________________________________________  PHYSICAL EXAMINATION:  Vital signs: Ht 5' 5\" (1.651 m)   Wt 75.3 kg (166 lb)   BMI 27.62 kg/m²   General: well developed and well nourished, alert, oriented times 3, and appears comfortable  Psychiatric: Normal  HEENT: Trachea Midline, No torticollis  Cardiovascular: No discernable arrhythmia  Pulmonary: No wheezing or stridor  Abdomen: No rebound or guarding  Extremities: No peripheral edema  Skin: No masses, erythema, lacerations, fluctation, ulcerations  Neurovascular: Sensation Intact to the Median, Ulnar, Radial Nerve, Motor Intact to the Median, Ulnar, Radial Nerve, and Pulses Intact    MUSCULOSKELETAL EXAMINATION:  RIGHT SIDE:  De Quervain:  Positive Finkelstein's Test and Positive Eickhoff Test   There was swelling and tenderness along the tendons of the first dorsal wrist compartment. Resisted thumb extension produced discomfort. There was a break in strength.       _____________________________________________________  STUDIES REVIEWED:  No Studies to review      PROCEDURES PERFORMED:  Hand/upper extremity injection: R extensor compartment 1  Universal Protocol:  procedure performed by " consultantConsent: Verbal consent obtained.  Risks and benefits: risks, benefits and alternatives were discussed  Consent given by: patient  Patient understanding: patient states understanding of the procedure being performed  Site marked: the operative site was marked  Patient identity confirmed: verbally with patient  Supporting Documentation  Indications: diagnostic   Procedure Details  Condition:de Quervain's tenosynovitis Site: R extensor compartment 1   Needle size: 25 G  Ultrasound guidance: no  Approach: volar  Medications administered: 1 mL lidocaine 1 %; 6 mg betamethasone acetate-betamethasone sodium phosphate 6 (3-3) mg/mL  Patient tolerance: patient tolerated the procedure well with no immediate complications  Dressing:  Sterile dressing applied              Scribe Attestation      I,:  Maximino Suarez am acting as a scribe while in the presence of the attending physician.:       I,:  Alejo Middleton MD personally performed the services described in this documentation    as scribed in my presence.:

## 2024-11-18 NOTE — PROGRESS NOTES
ASSESSMENT/PLAN:    Assessment:   de Quervain stenosis tenosynovitis  right    3-month S/P Right wrist arthroscopy with TFCC debridement and repair - Right and Application of long arm sugar tong splint - Right on 8/6/2024     Plan:   Right de Quervain tenosynovitis CSI was offered and administered at today's visit.  Patient tolerated this well.  Postinjection instructions were provided.  Patient has right wrist movable foreign body (stitch) scheduled for 12/17/2024.  I discussed with patient risks and benefits procedure.  Postoperative questions were answered.  Continue OTC medications as needed.  Discussed with patient that she may follow-up in 8 weeks for reevaluation of right de Quervain's tenosynovitis.      Follow Up:  After Surgery      _____________________________________________________  CHIEF COMPLAINT:  Chief Complaint   Patient presents with    Right Wrist - Post-op     Arthoscopy with TFCC debridement + repair - 8/6/24         SUBJECTIVE:  Nell Baldwin is a 34 y.o. female who presents for follow up regarding right de Quervain's tenosynovitis and 3-month status post right wrist arthroscopy with TFCC debridement and repair.  Patient states today that she continues to have pain within her right thumb area and is hard for her to perform activities of daily living.  Patient states she has 3 kids and sometimes has difficulty with picking them up.  Patient is interested in right de Quervain's tenosynovitis CSI.  PAST MEDICAL HISTORY:  Past Medical History:   Diagnosis Date    Amenorrhea     last assessed: 08/22/2016    Anxiety     Female infertility     seen by [facility] - seen for 1 year, 5 cycles IUI acheived pregnancy via IVF    Fibromyalgia     Irregular menstrual cycle     last assessed: 09/26/2016    Osteoarthritis of right acromioclavicular joint     last assessment: 01/28/2013    Polycystic ovary syndrome     Separation of right acromioclavicular joint     Last assessed:  2013; this is more consistent with ostiolysis of the distal clavicle vs. acromioclavicular joint arthritis, not acute shoulder seperation    Strain of muscle of right hip, initial encounter 2023    Varicella     vaccine       PAST SURGICAL HISTORY:  Past Surgical History:   Procedure Laterality Date     SECTION      DENTAL SURGERY      wisdom teeth    FL INJECTION RIGHT WRIST (ARTHROGRAM)  2024    FL APPLICATION LONG ARM SPLINT SHOULDER HAND Right 2024    Procedure: Application of long arm sugar tong splint;  Surgeon: Alejo Middleton MD;  Location: [facility];  Service: Orthopedics    FL ARTHRS WRST EXC&/RPR TRIANG FIBROCART&/JOINT Right 2024    Procedure: Right wrist arthroscopy with TFCC debridement and repair;  Surgeon: Alejo Middleton MD;  Location: [facility];  Service: Orthopedics    FL  DELIVERY ONLY N/A 2018    Procedure:  SECTION ();  Surgeon: Ismael Marquez MD;  Location: [facility];  Service: Obstetrics    FL  DELIVERY ONLY N/A 2020    Procedure:  SECTION ();  Surgeon: Ismael Marqeuz MD;  Location: [facility];  Service: Obstetrics    SHOULDER SURGERY      SINUS SURGERY  2023    TUBAL LIGATION Bilateral 2020    Procedure: LIGATION/COAGULATION TUBAL;  Surgeon: Ismael Marquez MD;  Location: [facility];  Service: Obstetrics       FAMILY HISTORY:  Family History   Problem Relation Age of Onset    Fibromyalgia Mother     Hyperlipidemia Father     Hypertension Father     Diabetes Maternal Grandmother     Colon cancer Maternal Grandfather 48    Cancer Maternal Grandfather     Diabetes Paternal Grandfather     Breast cancer Neg Hx     Endometrial cancer Neg Hx     Ovarian cancer Neg Hx        SOCIAL HISTORY:  Social History     Tobacco Use    Smoking status: Never    Smokeless tobacco: Never   Vaping Use    Vaping status: Never Used   Substance Use Topics    Alcohol use: Not Currently     Alcohol/week: 1.0 standard drink  of alcohol    Drug use: Never       MEDICATIONS:    Current Outpatient Medications:     Aurovela FE 1/20 1-20 MG-MCG per tablet, TAKE 1 TABLET BY MOUTH EVERY DAY, Disp: 84 tablet, Rfl: 1    FLUoxetine (PROzac) 10 MG tablet, TAKE 1 TABLET BY MOUTH EVERY DAY, Disp: 90 tablet, Rfl: 1    hydroquinone 4 % cream, Apply topically daily at bedtime Use three months on, then take 1 month break and can repeat cycles., Disp: 30 g, Rfl: 3    multivitamin (THERAGRAN) TABS, Take 1 tablet by mouth daily, Disp: , Rfl:     Zepbound 2.5 MG/0.5ML SOLN, INJECT 2.5 MG UNDER THE SKIN ONCE A WEEK, Disp: 2 mL, Rfl: 1    acetaminophen (TYLENOL) 650 mg CR tablet, Take 1 tablet (650 mg total) by mouth every 8 (eight) hours as needed for mild pain (Patient not taking: Reported on 8/30/2024), Disp: 30 tablet, Rfl: 0    albuterol (PROVENTIL HFA,VENTOLIN HFA) 90 mcg/act inhaler, TAKE 2 PUFFS BY MOUTH EVERY 6 HOURS AS NEEDED FOR WHEEZE OR FOR SHORTNESS OF BREATH, Disp: 8 g, Rfl: 0    benzonatate (TESSALON) 200 MG capsule, Take 1 capsule (200 mg total) by mouth 3 (three) times a day as needed for cough (Patient not taking: Reported on 8/30/2024), Disp: 20 capsule, Rfl: 0    cyclobenzaprine (FLEXERIL) 10 mg tablet, Take 1 tablet (10 mg total) by mouth 3 (three) times a day as needed for muscle spasms (Patient not taking: Reported on 8/30/2024), Disp: 20 tablet, Rfl: 0    metFORMIN (GLUCOPHAGE) 500 mg tablet, Take 1 tablet (500 mg total) by mouth 2 (two) times a day with meals, Disp: 180 tablet, Rfl: 3    Naproxen Sodium 220 MG CAPS, Take 1 capsule (220 mg total) by mouth 2 (two) times a day (Patient not taking: Reported on 8/19/2024), Disp: 60 capsule, Rfl: 0    Nerve Stimulator (Standard TENS) ANA, Use in the morning (Patient taking differently: Use if needed), Disp: 1 each, Rfl: 0    phentermine (ADIPEX-P) 37.5 MG tablet, Take 1 tablet (37.5 mg total) by mouth daily (Patient not taking: Reported on 8/12/2024), Disp: 30 tablet, Rfl: 0     "propranolol (INDERAL) 10 mg tablet, Take 1 tablet (10 mg total) by mouth 2 (two) times a day For the first week take only one per day., Disp: 90 tablet, Rfl: 3    tiZANidine (ZANAFLEX) 2 mg tablet, Take 1 tablet (2 mg total) by mouth daily at bedtime (Patient not taking: Reported on 8/19/2024), Disp: 60 tablet, Rfl: 2    ALLERGIES:  No Known Allergies    REVIEW OF SYSTEMS:  Pertinent items are noted in HPI.  A comprehensive review of systems was negative.    LABS:  HgA1c:   Lab Results   Component Value Date    HGBA1C 5.0 03/31/2020     BMP:   Lab Results   Component Value Date    GLUCOSE 84 08/29/2013    CALCIUM 8.8 04/18/2024     04/11/2016    K 4.3 04/18/2024    CO2 29 04/18/2024     04/18/2024    BUN 15 04/18/2024    CREATININE 0.66 04/18/2024           _____________________________________________________  PHYSICAL EXAMINATION:  Vital signs: Ht 5' 5\" (1.651 m)   Wt 75.3 kg (166 lb)   BMI 27.62 kg/m²   General: well developed and well nourished, alert, oriented times 3, and appears comfortable  Psychiatric: Normal  HEENT: Trachea Midline, No torticollis  Cardiovascular: No discernable arrhythmia  Pulmonary: No wheezing or stridor  Abdomen: No rebound or guarding  Extremities: No peripheral edema  Skin: No masses, erythema, lacerations, fluctation, ulcerations  Neurovascular: Sensation Intact to the Median, Ulnar, Radial Nerve, Motor Intact to the Median, Ulnar, Radial Nerve, and Pulses Intact    MUSCULOSKELETAL EXAMINATION:  RIGHT SIDE:  De Quervain:  Positive Finkelstein's Test and Positive Eickhoff Test   There was swelling and tenderness along the tendons of the first dorsal wrist compartment. Resisted thumb extension produced discomfort. There was a break in strength.       _____________________________________________________  STUDIES REVIEWED:  No Studies to review      PROCEDURES PERFORMED:  Hand/upper extremity injection: R extensor compartment 1  Universal Protocol:  procedure performed by " consultantConsent: Verbal consent obtained.  Risks and benefits: risks, benefits and alternatives were discussed  Consent given by: patient  Patient understanding: patient states understanding of the procedure being performed  Site marked: the operative site was marked  Patient identity confirmed: verbally with patient  Supporting Documentation  Indications: diagnostic   Procedure Details  Condition:de Quervain's tenosynovitis Site: R extensor compartment 1   Needle size: 25 G  Ultrasound guidance: no  Approach: volar  Medications administered: 1 mL lidocaine 1 %; 6 mg betamethasone acetate-betamethasone sodium phosphate 6 (3-3) mg/mL  Patient tolerance: patient tolerated the procedure well with no immediate complications  Dressing:  Sterile dressing applied              Scribe Attestation      I,:  Maximino Suarez am acting as a scribe while in the presence of the attending physician.:       I,:  Alejo Middleton MD personally performed the services described in this documentation    as scribed in my presence.:

## 2024-11-20 ENCOUNTER — APPOINTMENT (OUTPATIENT)
Dept: OCCUPATIONAL THERAPY | Facility: CLINIC | Age: 34
End: 2024-11-20
Payer: COMMERCIAL

## 2024-11-21 RX ORDER — BETAMETHASONE SODIUM PHOSPHATE AND BETAMETHASONE ACETATE 3; 3 MG/ML; MG/ML
6 INJECTION, SUSPENSION INTRA-ARTICULAR; INTRALESIONAL; INTRAMUSCULAR; SOFT TISSUE
Status: COMPLETED | OUTPATIENT
Start: 2024-11-18 | End: 2024-11-18

## 2024-11-21 RX ORDER — LIDOCAINE HYDROCHLORIDE 10 MG/ML
1 INJECTION, SOLUTION INFILTRATION; PERINEURAL
Status: COMPLETED | OUTPATIENT
Start: 2024-11-18 | End: 2024-11-18

## 2024-11-25 ENCOUNTER — OFFICE VISIT (OUTPATIENT)
Dept: OCCUPATIONAL THERAPY | Facility: CLINIC | Age: 34
End: 2024-11-25
Payer: COMMERCIAL

## 2024-11-25 DIAGNOSIS — Z98.890 S/P MUSCULOSKELETAL SYSTEM SURGERY: Primary | ICD-10-CM

## 2024-11-25 PROCEDURE — 97110 THERAPEUTIC EXERCISES: CPT | Performed by: OCCUPATIONAL THERAPIST

## 2024-11-25 PROCEDURE — 97140 MANUAL THERAPY 1/> REGIONS: CPT | Performed by: OCCUPATIONAL THERAPIST

## 2024-11-25 NOTE — PROGRESS NOTES
Daily Note     Today's date: 2024  Patient name: Nell Baldwin  : 1990  MRN: [id number]  Referring provider: Alejo Middleton MD  Dx:   Encounter Diagnosis     ICD-10-CM    1. S/P musculoskeletal system surgery  Z98.890                      Subjective: Cortisone injection took the edge off.      Objective: See treatment diary below      Assessment: Tolerated treatment fair. Patient s/p cortisone injection with little relief.  Pain is 4/10 in 1st DC.        Plan: Continue per plan of care.      Precautions: SX 24    INSURANCE: Auto/ 2nd ins.- 23 visits,   30 PT/ST/OT  PCY( 7 used)  Manuals 10/30 11/4 11/11 11/18 11/25        11 12 13 14 15       Scar mob            retrograde 3m 3m TPF flexor mass 4m 4m       Graston FA mass/ 1st DC 4m 4m 4m 4m 4m       Neuro Re-Ed             KT taping radial wrist. KT tape KT tape         HEP- AROM wrist all planes                                                                        Ther Ex            AROM Wrist #2 3x10 #2 3x10 #2 3x10 #2 3x10 #2 3x10       AROM P/S T bar R 2x10 R 3x10 R 3x10 SM 3x10 SM 3x10       Wrist maze            Foam roll flex/ext stretch            Clips w/ rotation            Light gripping- neutral wrist GPW 2x30 Gpw 2x30 RPW 2x30 RpW 2x30 RPW 2x30       UBE 6m                        Ther Activity            Peg - neutral, supination                        Gait Training                                    Modalities            MH R 8m 8m 8m 8m 8m 8m 8m 8m 8m   CP post 5m 5m 5m 5m 5m 5m 5m 5m 5m         Manuals 9/23 9/27 9/30 10/4 10/7 10/9 10/14 10/18 10/21 10/25    IE 30'            Scar mob 3m 3m 3m 3m 3m 3m 3m 3m 3m 3m   retrograde 3m 3m 3m 3m 3m 3m 3m 3m 3m 3m   Graston FA mass 4m 4m 4m 4m 4m 4m 4m 4m 4m 4m   Neuro Re-Ed                          HEP- AROM wrist all planes reviewed                                                                             Ther Ex             AROM Wrist 2x10 Cone  2x10 Cone 3x10  Cone 3x10 Cone 3x10 Cone 3x10 #1 3x10 #1 3x10 #1 3x10 #3 3x10   AROM P/S Dowel 2x10 [id number] Dowel 3x10 Dowel 3x10 Dowel 3x10 Dowel 3x10  SM 3x10 SM 3x10    Wrist maze  x10 x10 x10 x10 x10 x10 x10     Foam roll flex/ext stretch  2x10 1x15 1x15 1x15 1x15 Ball push Ball push   R - lift  3x10   Clips w/ rotation    R 3 sets R 3 sets R 3 sets G 3 sets G 3 sets G 3 sets G 3 sets   Light gripping- neutral wrist      YPW 2x30 RPW 2x30 RPW 2x30 GPW 2x30 BPW 2x30                             Ther Activity             Peg - neutral, supination x30 x30 x30                       Gait Training                                       Modalities             MH R 8m 8m 8m 8m 8m 8m 8m 8m 8m 8m   CP post  5m 5m 5m 5m 5m 5m 5m 5m 5m

## 2024-11-27 ENCOUNTER — APPOINTMENT (OUTPATIENT)
Dept: OCCUPATIONAL THERAPY | Facility: CLINIC | Age: 34
End: 2024-11-27
Payer: COMMERCIAL

## 2024-12-02 ENCOUNTER — OFFICE VISIT (OUTPATIENT)
Dept: OCCUPATIONAL THERAPY | Facility: CLINIC | Age: 34
End: 2024-12-02
Payer: COMMERCIAL

## 2024-12-02 DIAGNOSIS — Z98.890 S/P MUSCULOSKELETAL SYSTEM SURGERY: Primary | ICD-10-CM

## 2024-12-02 PROCEDURE — 97140 MANUAL THERAPY 1/> REGIONS: CPT | Performed by: OCCUPATIONAL THERAPIST

## 2024-12-02 PROCEDURE — 97110 THERAPEUTIC EXERCISES: CPT | Performed by: OCCUPATIONAL THERAPIST

## 2024-12-02 NOTE — PROGRESS NOTES
Daily Note     Today's date: 2024  Patient name: Nell Baldwin  : 1990  MRN: [id number]  Referring provider: Alejo Middleton MD  Dx:   Encounter Diagnosis     ICD-10-CM    1. S/P musculoskeletal system surgery  Z98.890                      Subjective: My wrist is sore today.      Objective: See treatment diary below      Assessment: Tolerated treatment fair. Patient has continued soreness of the wrist.  Some relief with cortisone injection.      Plan: Continue per plan of care.      Precautions: SX 24    INSURANCE: Auto/ 2nd ins.- 23 visits,   30 PT/ST/OT  PCY( 7 used)  Manuals 10/30 11/4 11/11 11/18 11/25 12/2       11 12 13 14 15 14      Scar mob            retrograde 3m 3m TPF flexor mass 4m 4m 4m      Graston FA mass/ 1st DC 4m 4m 4m 4m 4m 4m      Neuro Re-Ed             KT taping radial wrist. KT tape KT tape         HEP- AROM wrist all planes                                                                        Ther Ex            AROM Wrist #2 3x10 #2 3x10 #2 3x10 #2 3x10 #2 3x10 #2 3x10      AROM P/S T bar R 2x10 R 3x10 R 3x10 SM 3x10 SM 3x10 SM 3x10      Wrist maze            Foam roll flex/ext stretch            Clips w/ rotation            Light gripping- neutral wrist GPW 2x30 Gpw 2x30 RPW 2x30 RpW 2x30 RPW 2x30 Gpw 2x30      UBE 6m                        Ther Activity            Peg - neutral, supination                        Gait Training                                    Modalities            MH R 8m 8m 8m 8m 8m 8m 8m 8m 8m   CP post 5m 5m 5m 5m 5m 5m 5m 5m 5m         Manuals 9/23 9/27 9/30 10/4 10/7 10/9 10/14 10/18 10/21 10/25    IE 30'            Scar mob 3m 3m 3m 3m 3m 3m 3m 3m 3m 3m   retrograde 3m 3m 3m 3m 3m 3m 3m 3m 3m 3m   Graston FA mass 4m 4m 4m 4m 4m 4m 4m 4m 4m 4m   Neuro Re-Ed                          HEP- AROM wrist all planes reviewed                                                                             Ther Ex             AROM Wrist 2x10  Cone  2x10 Cone 3x10 Cone 3x10 Cone 3x10 Cone 3x10 #1 3x10 #1 3x10 #1 3x10 #3 3x10   AROM P/S Dowel 2x10 [id number] Dowel 3x10 Dowel 3x10 Dowel 3x10 Dowel 3x10  SM 3x10 SM 3x10    Wrist maze  x10 x10 x10 x10 x10 x10 x10     Foam roll flex/ext stretch  2x10 1x15 1x15 1x15 1x15 Ball push Ball push   R - lift  3x10   Clips w/ rotation    R 3 sets R 3 sets R 3 sets G 3 sets G 3 sets G 3 sets G 3 sets   Light gripping- neutral wrist      YPW 2x30 RPW 2x30 RPW 2x30 GPW 2x30 BPW 2x30                             Ther Activity             Peg - neutral, supination x30 x30 x30                       Gait Training                                       Modalities             MH R 8m 8m 8m 8m 8m 8m 8m 8m 8m 8m   CP post  5m 5m 5m 5m 5m 5m 5m 5m 5m

## 2024-12-03 ENCOUNTER — ANESTHESIA EVENT (OUTPATIENT)
Age: 34
End: 2024-12-03
Payer: COMMERCIAL

## 2024-12-05 ENCOUNTER — PREP FOR PROCEDURE (OUTPATIENT)
Dept: OBGYN CLINIC | Facility: HOSPITAL | Age: 34
End: 2024-12-05

## 2024-12-06 DIAGNOSIS — E28.2 PCOS (POLYCYSTIC OVARIAN SYNDROME): ICD-10-CM

## 2024-12-06 DIAGNOSIS — E66.3 OVERWEIGHT: Primary | ICD-10-CM

## 2024-12-06 RX ORDER — TIRZEPATIDE 5 MG/.5ML
5 INJECTION, SOLUTION SUBCUTANEOUS WEEKLY
Qty: 2 ML | Refills: 3 | Status: SHIPPED | OUTPATIENT
Start: 2024-12-06 | End: 2024-12-09

## 2024-12-09 ENCOUNTER — APPOINTMENT (OUTPATIENT)
Dept: OCCUPATIONAL THERAPY | Facility: CLINIC | Age: 34
End: 2024-12-09
Payer: COMMERCIAL

## 2024-12-09 ENCOUNTER — TELEPHONE (OUTPATIENT)
Dept: ENDOCRINOLOGY | Facility: CLINIC | Age: 34
End: 2024-12-09

## 2024-12-09 DIAGNOSIS — E66.3 OVERWEIGHT: Primary | ICD-10-CM

## 2024-12-09 RX ORDER — TIRZEPATIDE 5 MG/.5ML
INJECTION, SOLUTION SUBCUTANEOUS
Qty: 2 ML | Refills: 1 | Status: SHIPPED | OUTPATIENT
Start: 2024-12-09

## 2024-12-10 NOTE — PRE-PROCEDURE INSTRUCTIONS
Pre-Surgery Instructions:   Medication Instructions    Aurovela FE 1/20 1-20 MG-MCG per tablet Take day of surgery.    FLUoxetine (PROzac) 10 MG tablet Take day of surgery.    metFORMIN (GLUCOPHAGE) 500 mg tablet Hold day of surgery.    multivitamin (THERAGRAN) TABS Stop taking 7 days prior to surgery.    propranolol (INDERAL) 10 mg tablet Take day of surgery.    Tirzepatide-Weight Management (Zepbound) 5 MG/0.5ML SOLN Stop taking 7 days prior to surgery.    tiZANidine (ZANAFLEX) 2 mg tablet Uses PRN- OK to take day of surgery   Medication instructions for day surgery reviewed. Please use only a sip of water to take your instructed medications. Avoid all over the counter vitamins, supplements and NSAIDS for one week prior to surgery per anesthesia guidelines. Tylenol is ok to take as needed.     You will receive a call one business day prior to surgery with an arrival time and hospital directions. If your surgery is scheduled on a Monday, the hospital will be calling you on the Friday prior to your surgery. If you have not heard from anyone by 8pm, please call the hospital supervisor through the hospital  at [phone]. ([place] [phone] or [place] [phone]).    Do not eat or drink anything after midnight the night before your surgery, including candy, mints, lifesavers, or chewing gum. Do not drink alcohol 24hrs before your surgery. Try not to smoke at least 24hrs before your surgery.       Follow the pre surgery showering instructions as listed in the “My Surgical Experience Booklet” or otherwise provided by your surgeon's office. Do not use a blade to shave the surgical area 1 week before surgery. It is okay to use a clean electric clippers up to 24 hours before surgery. Do not apply any lotions, creams, including makeup, cologne, deodorant, or perfumes after showering on the day of your surgery. Do not use dry shampoo, hair spray, hair gel, or any type of hair products.     No contact lenses,  eye make-up, or artificial eyelashes. Remove nail polish, including gel polish, and any artificial, gel, or acrylic nails if possible. Remove all jewelry including rings and body piercing jewelry.     Wear causal clothing that is easy to take on and off. Consider your type of surgery.    Keep any valuables, jewelry, piercings at home. Please bring any specially ordered equipment (sling, braces) if indicated.    Arrange for a responsible person to drive you to and from the hospital on the day of your surgery. Please confirm the visitor policy for the day of your procedure when you receive your phone call with an arrival time.     Call the surgeon's office with any new illnesses, exposures, or additional questions prior to surgery.    Please reference your “My Surgical Experience Booklet” for additional information to prepare for your upcoming surgery.

## 2024-12-13 ENCOUNTER — OFFICE VISIT (OUTPATIENT)
Dept: OCCUPATIONAL THERAPY | Facility: CLINIC | Age: 34
End: 2024-12-13
Payer: COMMERCIAL

## 2024-12-13 DIAGNOSIS — Z98.890 S/P MUSCULOSKELETAL SYSTEM SURGERY: Primary | ICD-10-CM

## 2024-12-13 PROCEDURE — 97110 THERAPEUTIC EXERCISES: CPT | Performed by: OCCUPATIONAL THERAPIST

## 2024-12-13 PROCEDURE — 97140 MANUAL THERAPY 1/> REGIONS: CPT | Performed by: OCCUPATIONAL THERAPIST

## 2024-12-13 NOTE — PROGRESS NOTES
Daily Note     Today's date: 2024  Patient name: Nell Baldwin  : 1990  MRN: [id number]  Referring provider: Alejo Middleton MD  Dx:   Encounter Diagnosis     ICD-10-CM    1. S/P musculoskeletal system surgery  Z98.890                      Subjective: My thumb still hurts.      Objective: See treatment diary below      Assessment: Tolerated treatment fair. Patient has continued pain 5-7/10 pain radial wrist s/p cortisone injection.  Pt. Having ulnar side wrist suture removed on , will continue once able to return following procedure.    Plan: Continue per plan of care.      Precautions: SX 24    INSURANCE: Auto/ 2nd ins.- 23 visits,   30 PT/ST/OT  PCY( 7 used)  Manuals 10/30 11/4 11/11 11/18 11/25 12/2 12/13      11 12 13 14 15 14 13     Scar mob            retrograde 3m 3m TPF flexor mass 4m 4m 4m 4m     Graston FA mass/ 1st DC 4m 4m 4m 4m 4m 4m 4m     Neuro Re-Ed             KT taping radial wrist. KT tape KT tape         HEP- AROM wrist all planes                                                                        Ther Ex            AROM Wrist #2 3x10 #2 3x10 #2 3x10 #2 3x10 #2 3x10 #2 3x10 #2 3x10     AROM P/S T bar R 2x10 R 3x10 R 3x10 SM 3x10 SM 3x10 SM 3x10 SM 3x10     Wrist maze            Foam roll flex/ext stretch            Clips w/ rotation            Light gripping- neutral wrist GPW 2x30 Gpw 2x30 RPW 2x30 RpW 2x30 RPW 2x30 Gpw 2x30 GPW 2x30     UBE 6m                        Ther Activity            Peg - neutral, supination                        Gait Training                                    Modalities            MH R 8m 8m 8m 8m 8m 8m 8m 8m 8m   CP post 5m 5m 5m 5m 5m 5m 5m 5m 5m         Manuals 9/23 9/27 9/30 10/4 10/7 10/9 10/14 10/18 10/21 10/25    IE 30'            Scar mob 3m 3m 3m 3m 3m 3m 3m 3m 3m 3m   retrograde 3m 3m 3m 3m 3m 3m 3m 3m 3m 3m   Graston FA mass 4m 4m 4m 4m 4m 4m 4m 4m 4m 4m   Neuro Re-Ed                          HEP- AROM wrist  all planes reviewed                                                                             Ther Ex             AROM Wrist 2x10 Cone  2x10 Cone 3x10 Cone 3x10 Cone 3x10 Cone 3x10 #1 3x10 #1 3x10 #1 3x10 #3 3x10   AROM P/S Dowel 2x10 [id number] Dowel 3x10 Dowel 3x10 Dowel 3x10 Dowel 3x10  SM 3x10 SM 3x10    Wrist maze  x10 x10 x10 x10 x10 x10 x10     Foam roll flex/ext stretch  2x10 1x15 1x15 1x15 1x15 Ball push Ball push   R - lift  3x10   Clips w/ rotation    R 3 sets R 3 sets R 3 sets G 3 sets G 3 sets G 3 sets G 3 sets   Light gripping- neutral wrist      YPW 2x30 RPW 2x30 RPW 2x30 GPW 2x30 BPW 2x30                             Ther Activity             Peg - neutral, supination x30 x30 x30                       Gait Training                                       Modalities             MH R 8m 8m 8m 8m 8m 8m 8m 8m 8m 8m   CP post  5m 5m 5m 5m 5m 5m 5m 5m 5m

## 2024-12-16 ENCOUNTER — APPOINTMENT (OUTPATIENT)
Dept: OCCUPATIONAL THERAPY | Facility: CLINIC | Age: 34
End: 2024-12-16
Payer: COMMERCIAL

## 2024-12-16 DIAGNOSIS — F43.23 ADJUSTMENT DISORDER WITH MIXED ANXIETY AND DEPRESSED MOOD: ICD-10-CM

## 2024-12-16 DIAGNOSIS — E28.2 PCOS (POLYCYSTIC OVARIAN SYNDROME): ICD-10-CM

## 2024-12-17 ENCOUNTER — HOSPITAL ENCOUNTER (OUTPATIENT)
Age: 34
Setting detail: OUTPATIENT SURGERY
Discharge: HOME | End: 2024-12-17
Attending: ORTHOPAEDIC SURGERY | Admitting: ORTHOPAEDIC SURGERY
Payer: COMMERCIAL

## 2024-12-17 ENCOUNTER — ANESTHESIA (OUTPATIENT)
Age: 34
End: 2024-12-17
Payer: COMMERCIAL

## 2024-12-17 VITALS
RESPIRATION RATE: 14 BRPM | HEIGHT: 65 IN | OXYGEN SATURATION: 100 % | SYSTOLIC BLOOD PRESSURE: 100 MMHG | DIASTOLIC BLOOD PRESSURE: 67 MMHG | HEART RATE: 65 BPM | WEIGHT: 169.2 LBS | BODY MASS INDEX: 28.19 KG/M2 | TEMPERATURE: 97.5 F

## 2024-12-17 DIAGNOSIS — M79.5 FOREIGN BODY (FB) IN SOFT TISSUE: Primary | ICD-10-CM

## 2024-12-17 LAB
EXT PREGNANCY TEST URINE: NEGATIVE
EXT. CONTROL: NORMAL

## 2024-12-17 PROCEDURE — 64719 REVISE ULNAR NERVE AT WRIST: CPT | Performed by: ORTHOPAEDIC SURGERY

## 2024-12-17 PROCEDURE — 25248 REMOVE FOREARM FOREIGN BODY: CPT | Performed by: ORTHOPAEDIC SURGERY

## 2024-12-17 PROCEDURE — 64719 REVISE ULNAR NERVE AT WRIST: CPT | Performed by: PHYSICIAN ASSISTANT

## 2024-12-17 PROCEDURE — 25248 REMOVE FOREARM FOREIGN BODY: CPT | Performed by: PHYSICIAN ASSISTANT

## 2024-12-17 PROCEDURE — 81025 URINE PREGNANCY TEST: CPT | Performed by: ORTHOPAEDIC SURGERY

## 2024-12-17 RX ORDER — METOCLOPRAMIDE HYDROCHLORIDE 5 MG/ML
10 INJECTION INTRAMUSCULAR; INTRAVENOUS ONCE AS NEEDED
Status: DISCONTINUED | OUTPATIENT
Start: 2024-12-17 | End: 2024-12-17 | Stop reason: HOSPADM

## 2024-12-17 RX ORDER — ONDANSETRON 2 MG/ML
4 INJECTION INTRAMUSCULAR; INTRAVENOUS ONCE AS NEEDED
Status: DISCONTINUED | OUTPATIENT
Start: 2024-12-17 | End: 2024-12-17 | Stop reason: HOSPADM

## 2024-12-17 RX ORDER — HYDROMORPHONE HCL IN WATER/PF 6 MG/30 ML
0.2 PATIENT CONTROLLED ANALGESIA SYRINGE INTRAVENOUS
Status: DISCONTINUED | OUTPATIENT
Start: 2024-12-17 | End: 2024-12-17 | Stop reason: HOSPADM

## 2024-12-17 RX ORDER — ONDANSETRON 2 MG/ML
4 INJECTION INTRAMUSCULAR; INTRAVENOUS EVERY 6 HOURS PRN
Status: DISCONTINUED | OUTPATIENT
Start: 2024-12-17 | End: 2024-12-17 | Stop reason: HOSPADM

## 2024-12-17 RX ORDER — COVID-19 ANTIGEN TEST
220 KIT MISCELLANEOUS 2 TIMES DAILY
Qty: 60 CAPSULE | Refills: 0 | Status: SHIPPED | OUTPATIENT
Start: 2024-12-17 | End: 2025-01-16

## 2024-12-17 RX ORDER — PROPOFOL 10 MG/ML
INJECTION, EMULSION INTRAVENOUS AS NEEDED
Status: DISCONTINUED | OUTPATIENT
Start: 2024-12-17 | End: 2024-12-17

## 2024-12-17 RX ORDER — SODIUM CHLORIDE, SODIUM LACTATE, POTASSIUM CHLORIDE, CALCIUM CHLORIDE 600; 310; 30; 20 MG/100ML; MG/100ML; MG/100ML; MG/100ML
125 INJECTION, SOLUTION INTRAVENOUS CONTINUOUS
Status: DISCONTINUED | OUTPATIENT
Start: 2024-12-17 | End: 2024-12-17 | Stop reason: HOSPADM

## 2024-12-17 RX ORDER — FENTANYL CITRATE/PF 50 MCG/ML
50 SYRINGE (ML) INJECTION
Status: DISCONTINUED | OUTPATIENT
Start: 2024-12-17 | End: 2024-12-17 | Stop reason: HOSPADM

## 2024-12-17 RX ORDER — LIDOCAINE HYDROCHLORIDE AND EPINEPHRINE 10; 10 MG/ML; UG/ML
INJECTION, SOLUTION INFILTRATION; PERINEURAL AS NEEDED
Status: DISCONTINUED | OUTPATIENT
Start: 2024-12-17 | End: 2024-12-17 | Stop reason: HOSPADM

## 2024-12-17 RX ORDER — ONDANSETRON 2 MG/ML
INJECTION INTRAMUSCULAR; INTRAVENOUS AS NEEDED
Status: DISCONTINUED | OUTPATIENT
Start: 2024-12-17 | End: 2024-12-17

## 2024-12-17 RX ORDER — DIPHENHYDRAMINE HYDROCHLORIDE 50 MG/ML
12.5 INJECTION INTRAMUSCULAR; INTRAVENOUS ONCE AS NEEDED
Status: DISCONTINUED | OUTPATIENT
Start: 2024-12-17 | End: 2024-12-17 | Stop reason: HOSPADM

## 2024-12-17 RX ORDER — MIDAZOLAM HYDROCHLORIDE 2 MG/2ML
INJECTION, SOLUTION INTRAMUSCULAR; INTRAVENOUS AS NEEDED
Status: DISCONTINUED | OUTPATIENT
Start: 2024-12-17 | End: 2024-12-17

## 2024-12-17 RX ORDER — CEFAZOLIN SODIUM 2 G/50ML
2000 SOLUTION INTRAVENOUS ONCE
Status: COMPLETED | OUTPATIENT
Start: 2024-12-17 | End: 2024-12-17

## 2024-12-17 RX ORDER — SODIUM CHLORIDE, SODIUM LACTATE, POTASSIUM CHLORIDE, CALCIUM CHLORIDE 600; 310; 30; 20 MG/100ML; MG/100ML; MG/100ML; MG/100ML
INJECTION, SOLUTION INTRAVENOUS CONTINUOUS PRN
Status: DISCONTINUED | OUTPATIENT
Start: 2024-12-17 | End: 2024-12-17

## 2024-12-17 RX ORDER — KETOROLAC TROMETHAMINE 30 MG/ML
INJECTION, SOLUTION INTRAMUSCULAR; INTRAVENOUS AS NEEDED
Status: DISCONTINUED | OUTPATIENT
Start: 2024-12-17 | End: 2024-12-17

## 2024-12-17 RX ORDER — DEXAMETHASONE SODIUM PHOSPHATE 10 MG/ML
INJECTION, SOLUTION INTRAMUSCULAR; INTRAVENOUS AS NEEDED
Status: DISCONTINUED | OUTPATIENT
Start: 2024-12-17 | End: 2024-12-17

## 2024-12-17 RX ORDER — TRAMADOL HYDROCHLORIDE 50 MG/1
50 TABLET ORAL EVERY 6 HOURS PRN
Status: DISCONTINUED | OUTPATIENT
Start: 2024-12-17 | End: 2024-12-17 | Stop reason: HOSPADM

## 2024-12-17 RX ORDER — FLUOXETINE 10 MG/1
10 TABLET, FILM COATED ORAL DAILY
Qty: 90 TABLET | Refills: 1 | Status: SHIPPED | OUTPATIENT
Start: 2024-12-17

## 2024-12-17 RX ORDER — FENTANYL CITRATE 50 UG/ML
INJECTION, SOLUTION INTRAMUSCULAR; INTRAVENOUS AS NEEDED
Status: DISCONTINUED | OUTPATIENT
Start: 2024-12-17 | End: 2024-12-17

## 2024-12-17 RX ORDER — LIDOCAINE HYDROCHLORIDE 10 MG/ML
0.5 INJECTION, SOLUTION EPIDURAL; INFILTRATION; INTRACAUDAL; PERINEURAL ONCE AS NEEDED
Status: DISCONTINUED | OUTPATIENT
Start: 2024-12-17 | End: 2024-12-17 | Stop reason: HOSPADM

## 2024-12-17 RX ORDER — HYDROMORPHONE HCL/PF 1 MG/ML
0.5 SYRINGE (ML) INJECTION
Status: DISCONTINUED | OUTPATIENT
Start: 2024-12-17 | End: 2024-12-17 | Stop reason: HOSPADM

## 2024-12-17 RX ORDER — SENNOSIDES 8.6 MG
650 CAPSULE ORAL EVERY 8 HOURS PRN
Qty: 30 TABLET | Refills: 0 | Status: SHIPPED | OUTPATIENT
Start: 2024-12-17

## 2024-12-17 RX ORDER — TRAMADOL HYDROCHLORIDE 50 MG/1
50 TABLET ORAL EVERY 6 HOURS PRN
Qty: 5 TABLET | Refills: 0 | Status: SHIPPED | OUTPATIENT
Start: 2024-12-17

## 2024-12-17 RX ORDER — LIDOCAINE HYDROCHLORIDE 10 MG/ML
INJECTION, SOLUTION EPIDURAL; INFILTRATION; INTRACAUDAL; PERINEURAL AS NEEDED
Status: DISCONTINUED | OUTPATIENT
Start: 2024-12-17 | End: 2024-12-17

## 2024-12-17 RX ORDER — ACETAMINOPHEN 325 MG/1
650 TABLET ORAL EVERY 6 HOURS PRN
Status: DISCONTINUED | OUTPATIENT
Start: 2024-12-17 | End: 2024-12-17 | Stop reason: HOSPADM

## 2024-12-17 RX ADMIN — KETOROLAC TROMETHAMINE 30 MG: 30 INJECTION, SOLUTION INTRAMUSCULAR at 10:19

## 2024-12-17 RX ADMIN — SODIUM CHLORIDE, SODIUM LACTATE, POTASSIUM CHLORIDE, AND CALCIUM CHLORIDE: .6; .31; .03; .02 INJECTION, SOLUTION INTRAVENOUS at 09:42

## 2024-12-17 RX ADMIN — FENTANYL CITRATE 50 MCG: 50 INJECTION INTRAMUSCULAR; INTRAVENOUS at 09:49

## 2024-12-17 RX ADMIN — TRAMADOL HYDROCHLORIDE 50 MG: 50 TABLET, COATED ORAL at 10:58

## 2024-12-17 RX ADMIN — CEFAZOLIN SODIUM 2000 MG: 2 SOLUTION INTRAVENOUS at 09:42

## 2024-12-17 RX ADMIN — PROPOFOL 200 MG: 10 INJECTION, EMULSION INTRAVENOUS at 09:46

## 2024-12-17 RX ADMIN — DEXAMETHASONE SODIUM PHOSPHATE 10 MG: 10 INJECTION, SOLUTION INTRAMUSCULAR; INTRAVENOUS at 09:47

## 2024-12-17 RX ADMIN — MIDAZOLAM 2 MG: 1 INJECTION INTRAMUSCULAR; INTRAVENOUS at 09:42

## 2024-12-17 RX ADMIN — FENTANYL CITRATE 50 MCG: 50 INJECTION INTRAMUSCULAR; INTRAVENOUS at 10:44

## 2024-12-17 RX ADMIN — LIDOCAINE HYDROCHLORIDE 50 MG: 10 INJECTION, SOLUTION EPIDURAL; INFILTRATION; INTRACAUDAL; PERINEURAL at 09:46

## 2024-12-17 RX ADMIN — ONDANSETRON 4 MG: 2 INJECTION INTRAMUSCULAR; INTRAVENOUS at 09:46

## 2024-12-17 NOTE — ANESTHESIA PREPROCEDURE EVALUATION
Procedure:  Right wrist removal of foreign body (stitch) (Right: Wrist)    Relevant Problems   ANESTHESIA (within normal limits)      CARDIO (within normal limits)      ENDO (within normal limits)      GI/HEPATIC (within normal limits)      /RENAL (within normal limits)      GYN (within normal limits)      HEMATOLOGY (within normal limits)      MUSCULOSKELETAL   (+) Strain of muscle of right hip, initial encounter      NEURO/PSYCH   (+) PMDD (premenstrual dysphoric disorder)      PULMONARY   (+) Gasping for breath   (+) [place] (obstructive sleep apnea)   (+) Strep pharyngitis      Obstetrics/Gynecology   (+) PCOS (polycystic ovarian syndrome)      Orthopedic/Musculoskeletal   (+) Sacroiliitis (HCC)        Physical Exam    Airway    Mallampati score: II  TM Distance: >3 FB  Neck ROM: full     Dental   No notable dental hx     Cardiovascular  Rhythm: regular, Rate: normal, Cardiovascular exam normal    Pulmonary  Pulmonary exam normal Breath sounds clear to auscultation    Other Findings  post-pubertal.      Anesthesia Plan  ASA Score- 2     Anesthesia Type- general with ASA Monitors.         Additional Monitors:     Airway Plan: LMA.           Plan Factors-Exercise tolerance (METS): >4 METS.    Chart reviewed. EKG reviewed. Imaging results reviewed. Existing labs reviewed. Patient summary reviewed.                  Induction- intravenous.    Postoperative Plan- Plan for postoperative opioid use.     Perioperative Resuscitation Plan - Level 1 - Full Code.       Informed Consent- Anesthetic plan and risks discussed with patient.  I personally reviewed this patient with the CRNA. Discussed and agreed on the Anesthesia Plan with the CRNA..      Recent labs personally reviewed:  Lab Results   Component Value Date    WBC 6.2 04/18/2024    HGB 11.1 (L) 04/18/2024     04/18/2024     Lab Results   Component Value Date     04/11/2016    K 4.3 04/18/2024    BUN 15 04/18/2024    CREATININE 0.66 04/18/2024    GLUCOSE  "84 08/29/2013     No results found for: \"PTT\"   No results found for: \"INR\"    Blood type O    Lab Results   Component Value Date    HGBA1C 5.0 03/31/2020       I, Karl Carcamo MD, have personally seen and evaluated the patient prior to anesthetic care.  I have reviewed the pre-anesthetic record, and other medical records if appropriate to the anesthetic care.  If a CRNA is involved in the case, I have reviewed the CRNA assessment, if present, and agree. Risks/benefits and alternatives discussed with patient including possible PONV, sore throat, and possibility of rare anesthetic and surgical emergencies.      "

## 2024-12-17 NOTE — ANESTHESIA POSTPROCEDURE EVALUATION
Post-Op Assessment Note    CV Status:  Stable  Pain Score: 0    Pain management: adequate       Mental Status:  Alert and awake   Hydration Status:  Euvolemic   PONV Controlled:  Controlled   Airway Patency:  Patent     Post Op Vitals Reviewed: Yes    No anethesia notable event occurred.    Staff: Anesthesiologist, with CRNAs           Last Filed PACU Vitals:  Vitals Value Taken Time   Temp 97.4 °F (36.3 °C) 12/17/24 1055   Pulse 82 12/17/24 1055   /63 12/17/24 1055   Resp 17 12/17/24 1055   SpO2 96 % 12/17/24 1055       Modified Kristin:  Activity: 2 (12/17/2024 10:55 AM)  Respiration: 2 (12/17/2024 10:55 AM)  Circulation: 2 (12/17/2024 10:55 AM)  Consciousness: 2 (12/17/2024 10:55 AM)  Oxygen Saturation: 2 (12/17/2024 10:55 AM)  Modified Kristin Score: 10 (12/17/2024 10:55 AM)

## 2024-12-17 NOTE — OP NOTE
OPERATIVE REPORT  PATIENT NAME: Nell Baldwin  :  1990  MRN: [id number]  Pt Location: [facility]    SURGERY DATE: 24    Surgeons and Role:     * Alejo Middleton MD - Primary     * Cody Howell PA-C - Assisting    Pre-Op Diagnosis:  Foreign body (FB) in soft tissue [M79.5]    Post-Op Diagnosis:  .Foreign body (FB) in soft tissue [M79.5]    Procedure(s) (LRB):  Right wrist removal of foreign body (stitch) and Neurolysis of ulnar nerve (Right)    Specimen(s):  No specimens collected during this procedure.    Estimated Blood Loss:   Minimal      Anesthesia Type:   General    Operative Indications:  The patient has a history of foreign object localized into the right wrist from previous TFCC repair that was recalcitrant to conservative management.  The decision was made to bring the patient to the operating room for excision suture right wrist.  Risks of the procedure were explained which include, but are not limited to bleeding; infection; damage to nerves, arteries,veins, tendons; scar; pain; need for reoperation; failure to give desired result; and risks of anaesthesia.  All questions were answered to satisfaction and they were willing to proceed.         Operative Findings:  Scar tissue around the dorsal cutaneous branch of the ulnar nerve as well as stitch ulnar aspect right wrist    Complications:   None    Procedure and Technique:  After the patient, site, and procedure were identified, the patient was brought into the operating room in a supine position.  General anaesthesia and local medication were provided.  A well padded tourniquet was applied to the extremity, set at 250 mmHg.  The  right upper extremity was then prepped and drapped in a normal, sterile, orthopedic fashion.    After the patient, site, and procedure identified attention was turned towards the right wrist.  An Esmarch bandage was used to exsanguinate the limb and the tourniquet was inflated to 250 mmHg.  Previous  incision was then opened.  We dissected down through skin subcutaneous tissues maintaining hemostasis.  As we opened up around the area we identified the dorsal cutaneous branch of the ulnar nerve which did have some scarring around it.  Neurolysis of this was then performed.  We found the deeper PDS suture which had dissolved about 80% of the way but had a remnant of a knot that was excised.  This was near the area of the nerve.  Remaining tissues appeared intact, ECU was evaluated, no significant tenosynovitis was identified, previous repair was intact.    At the completion of the procedure, hemostasis was obtained with cautery and direct pressure.  The wounds were copiously irrigated with sterile solution.  The wounds were closed with Vicryl and Prolene.  Sterile dressings were applied, including Xeroform, gauze, tweeners, webril, ACE.  Please note, all sponge, needle, and instrument counts were correct prior to closure.  Loupe magnification was utilized.  The patient tolerated the procedure well.     I was present for all critical portions of the procedure., A qualified resident physician was not available., and A physician assistant was required during the procedure for retraction, tissue handling, dissection and suturing.    Patient Disposition:  PACU , hemodynamically stable, and extubated and stable    SIGNATURE: Alejo Middleton MD  DATE: 12/17/24  TIME: 1:09 PM

## 2024-12-17 NOTE — INTERVAL H&P NOTE
H&P reviewed. After examining the patient I find no changes in the patients condition since the H&P had been written.    Vitals:    12/17/24 0742   BP: 99/54   Pulse: 67   Resp: 16   Temp: 97.9 °F (36.6 °C)   SpO2: 97%

## 2024-12-20 ENCOUNTER — APPOINTMENT (OUTPATIENT)
Dept: OCCUPATIONAL THERAPY | Facility: CLINIC | Age: 34
End: 2024-12-20
Payer: COMMERCIAL

## 2024-12-23 ENCOUNTER — APPOINTMENT (OUTPATIENT)
Dept: OCCUPATIONAL THERAPY | Facility: CLINIC | Age: 34
End: 2024-12-23
Payer: COMMERCIAL

## 2024-12-26 ENCOUNTER — OFFICE VISIT (OUTPATIENT)
Dept: OBGYN CLINIC | Facility: HOSPITAL | Age: 34
End: 2024-12-26

## 2024-12-26 VITALS — BODY MASS INDEX: 28.16 KG/M2 | HEIGHT: 65 IN | WEIGHT: 169 LBS

## 2024-12-26 DIAGNOSIS — S63.591S TFCC (TRIANGULAR FIBROCARTILAGE COMPLEX) TEAR, RIGHT, SEQUELA: Primary | ICD-10-CM

## 2024-12-26 PROCEDURE — 99024 POSTOP FOLLOW-UP VISIT: CPT | Performed by: PHYSICIAN ASSISTANT

## 2024-12-26 NOTE — PROGRESS NOTES
"Assessment:   S/P Right wrist removal of foreign body (stitch) and Neurolysis of ulnar nerve - Right on 12/17/2024    Right de Quervain's tendinitis    Plan:   It was discussed with the patient that she can use her hand and wrist as tolerated for activities of daily living  She was advised to use heat massage for scar tissue mobilization  She does have a follow-up appointment in the middle of January for reevaluation for her right de Quervain's tendinitis of which she may want a second injection for  She will follow-up as scheduled for reevaluation    Follow Up:  As scheduled    To Do Next Visit:  Reevaluation      CHIEF COMPLAINT:  Chief Complaint   Patient presents with    Right Wrist - Post-op, Suture / Staple Removal     Removal of Foreign Body - 12/17         SUBJECTIVE:  Nell Baldwin is a 34 y.o. female who presents for follow up after Right wrist removal of foreign body (stitch) and Neurolysis of ulnar nerve - Right on 12/17/2024.  Today patient has some discomfort noted over the ulnar aspect of her wrist.       PHYSICAL EXAMINATION:  Vital signs: Ht 5' 5\" (1.651 m)   Wt 76.7 kg (169 lb)   LMP 11/18/2024 (Exact Date)   BMI 28.12 kg/m²   General: well developed and well nourished, alert, oriented times 3, and appears comfortable  Psychiatric: Normal    MUSCULOSKELETAL EXAMINATION:  Incision: Clean, dry, intact  Range of Motion: As expected  Neurovascular status: Neuro intact, good cap refill  Activity Restrictions: No restrictions  Done today: Sutures out      STUDIES REVIEWED:  No Studies to review      PROCEDURES PERFORMED:  Procedures  No Procedures performed today    "

## 2025-01-03 DIAGNOSIS — E28.2 PCOS (POLYCYSTIC OVARIAN SYNDROME): Primary | ICD-10-CM

## 2025-01-03 DIAGNOSIS — E66.3 OVERWEIGHT: ICD-10-CM

## 2025-01-08 ENCOUNTER — OFFICE VISIT (OUTPATIENT)
Dept: OCCUPATIONAL THERAPY | Facility: CLINIC | Age: 35
End: 2025-01-08
Payer: COMMERCIAL

## 2025-01-08 DIAGNOSIS — Z98.890 S/P MUSCULOSKELETAL SYSTEM SURGERY: Primary | ICD-10-CM

## 2025-01-08 PROCEDURE — 97140 MANUAL THERAPY 1/> REGIONS: CPT | Performed by: OCCUPATIONAL THERAPIST

## 2025-01-08 PROCEDURE — 97110 THERAPEUTIC EXERCISES: CPT | Performed by: OCCUPATIONAL THERAPIST

## 2025-01-08 NOTE — PROGRESS NOTES
Daily Note     Today's date: 2025  Patient name: Nell Baldwin  : 1990  MRN: [id number]  Referring provider: Alejo Middleton MD  Dx:   Encounter Diagnosis     ICD-10-CM    1. S/P musculoskeletal system surgery  Z98.890                      Subjective: I have soreness.      Objective: See treatment diary below      Assessment: Tolerated treatment fair. Patient s/p suture surgically removed 2 weeks ago with residual tenderness and soreness with activity.  DeQuervain's symptoms remain painful and limiting with her ADLs.  Plan: Continue per plan of care.      Precautions: SX 24    INSURANCE: Auto/ 2nd ins.- 23 visits,   30 PT/ST/OT  PCY( 7 used)  Manuals 10/30 11/4 11/11 11/18 11/25 12/2 12/13 1/8     11 12 13 14 15 14 13 18    Scar mob        3m    retrograde 3m 3m TPF flexor mass 4m 4m 4m 4m 4m    Graston FA mass/ 1st DC 4m 4m 4m 4m 4m 4m 4m 4m    Neuro Re-Ed             KT taping radial wrist. KT tape KT tape         HEP- AROM wrist all planes                                                                        Ther Ex            AROM Wrist #2 3x10 #2 3x10 #2 3x10 #2 3x10 #2 3x10 #2 3x10 #2 3x10 #2 3x10    AROM P/S T bar R 2x10 R 3x10 R 3x10 SM 3x10 SM 3x10 SM 3x10 SM 3x10 SM 3x10    Wrist maze            Foam roll flex/ext stretch            Clips w/ rotation            Light gripping- neutral wrist GPW 2x30 Gpw 2x30 RPW 2x30 RpW 2x30 RPW 2x30 Gpw 2x30 GPW 2x30 RPW 2x30    Ball circles        Y 2x10                Ther Activity            Peg - neutral, supination                        Gait Training                                    Modalities            MH R 8m 8m 8m 8m 8m 8m 8m 8m 8m   CP post 5m 5m 5m 5m 5m 5m 5m 5m 5m         Manuals 9/23 9/27 9/30 10/4 10/7 10/9 10/14 10/18 10/21 10/25    IE 30'            Scar mob 3m 3m 3m 3m 3m 3m 3m 3m 3m 3m   retrograde 3m 3m 3m 3m 3m 3m 3m 3m 3m 3m   Graston FA mass 4m 4m 4m 4m 4m 4m 4m 4m 4m 4m   Neuro Re-Ed                           HEP- AROM wrist all planes reviewed                                                                             Ther Ex             AROM Wrist 2x10 Cone  2x10 Cone 3x10 Cone 3x10 Cone 3x10 Cone 3x10 #1 3x10 #1 3x10 #1 3x10 #3 3x10   AROM P/S Dowel 2x10 [id number] Dowel 3x10 Dowel 3x10 Dowel 3x10 Dowel 3x10  SM 3x10 SM 3x10    Wrist maze  x10 x10 x10 x10 x10 x10 x10     Foam roll flex/ext stretch  2x10 1x15 1x15 1x15 1x15 Ball push Ball push   R - lift  3x10   Clips w/ rotation    R 3 sets R 3 sets R 3 sets G 3 sets G 3 sets G 3 sets G 3 sets   Light gripping- neutral wrist      YPW 2x30 RPW 2x30 RPW 2x30 GPW 2x30 BPW 2x30                             Ther Activity             Peg - neutral, supination x30 x30 x30                       Gait Training                                       Modalities             MH R 8m 8m 8m 8m 8m 8m 8m 8m 8m 8m   CP post  5m 5m 5m 5m 5m 5m 5m 5m 5m

## 2025-01-13 ENCOUNTER — OFFICE VISIT (OUTPATIENT)
Dept: OCCUPATIONAL THERAPY | Facility: CLINIC | Age: 35
End: 2025-01-13
Payer: COMMERCIAL

## 2025-01-13 DIAGNOSIS — Z98.890 S/P MUSCULOSKELETAL SYSTEM SURGERY: Primary | ICD-10-CM

## 2025-01-13 PROCEDURE — 97110 THERAPEUTIC EXERCISES: CPT | Performed by: OCCUPATIONAL THERAPIST

## 2025-01-13 PROCEDURE — 97140 MANUAL THERAPY 1/> REGIONS: CPT | Performed by: OCCUPATIONAL THERAPIST

## 2025-01-13 NOTE — PROGRESS NOTES
Daily Note     Today's date: 2025  Patient name: Nell Baldwin  : 1990  MRN: [id number]  Referring provider: Alejo Middleton MD  Dx:   Encounter Diagnosis     ICD-10-CM    1. S/P musculoskeletal system surgery  Z98.890                      Subjective: I have a lot of pain still, it hurts.      Objective: See treatment diary below      Assessment: Tolerated treatment fair. Patient continues with irritated Dequervain's symptoms impeding function.  Pt. To discuss with MD on Wed.  Recommend hold on therapy at this point if sx is scheduled.  Symptoms irritated with tx.    Plan: Continue per plan of care.      Precautions: SX 24    INSURANCE: Auto/ 2nd ins.- 23 visits,   30 PT/ST/OT  PCY( 7 used)  Manuals 10/30 11/4 11/11 11/18 11/25 12/2 12/13 1/8 1/13    11 12 13 14 15 14 13 18 19   Scar mob        3m 3m   retrograde 3m 3m TPF flexor mass 4m 4m 4m 4m 4m 4m   Graston FA mass/ 1st DC 4m 4m 4m 4m 4m 4m 4m 4m 4m   Neuro Re-Ed             KT taping radial wrist. KT tape KT tape         HEP- AROM wrist all planes                                                                        Ther Ex            AROM Wrist #2 3x10 #2 3x10 #2 3x10 #2 3x10 #2 3x10 #2 3x10 #2 3x10 #2 3x10 #1 3x10   AROM P/S T bar R 2x10 R 3x10 R 3x10 SM 3x10 SM 3x10 SM 3x10 SM 3x10 SM 3x10    Wrist maze            Foam roll flex/ext stretch            Clips w/ rotation            Light gripping- neutral wrist GPW 2x30 Gpw 2x30 RPW 2x30 RpW 2x30 RPW 2x30 Gpw 2x30 GPW 2x30 RPW 2x30 GPW 2x30   Ball circles        Y 2x10 Y 2x10               Ther Activity            Peg - neutral, supination         x30               Gait Training                                    Modalities            MH R 8m 8m 8m 8m 8m 8m 8m 8m 8m   CP post 5m 5m 5m 5m 5m 5m 5m 5m 5m         Manuals 9/23 9/27 9/30 10/4 10/7 10/9 10/14 10/18 10/21 10/25    IE 30'            Scar mob 3m 3m 3m 3m 3m 3m 3m 3m 3m 3m   retrograde 3m 3m 3m 3m 3m 3m 3m 3m 3m  3m   Graston FA mass 4m 4m 4m 4m 4m 4m 4m 4m 4m 4m   Neuro Re-Ed                          HEP- AROM wrist all planes reviewed                                                                             Ther Ex             AROM Wrist 2x10 Cone  2x10 Cone 3x10 Cone 3x10 Cone 3x10 Cone 3x10 #1 3x10 #1 3x10 #1 3x10 #3 3x10   AROM P/S Dowel 2x10 [id number] Dowel 3x10 Dowel 3x10 Dowel 3x10 Dowel 3x10  SM 3x10 SM 3x10    Wrist maze  x10 x10 x10 x10 x10 x10 x10     Foam roll flex/ext stretch  2x10 1x15 1x15 1x15 1x15 Ball push Ball push   R - lift  3x10   Clips w/ rotation    R 3 sets R 3 sets R 3 sets G 3 sets G 3 sets G 3 sets G 3 sets   Light gripping- neutral wrist      YPW 2x30 RPW 2x30 RPW 2x30 GPW 2x30 BPW 2x30                             Ther Activity             Peg - neutral, supination x30 x30 x30                       Gait Training                                       Modalities             MH R 8m 8m 8m 8m 8m 8m 8m 8m 8m 8m   CP post  5m 5m 5m 5m 5m 5m 5m 5m 5m

## 2025-01-15 ENCOUNTER — OFFICE VISIT (OUTPATIENT)
Dept: OBGYN CLINIC | Facility: HOSPITAL | Age: 35
End: 2025-01-15
Payer: COMMERCIAL

## 2025-01-15 ENCOUNTER — TELEPHONE (OUTPATIENT)
Dept: OBGYN CLINIC | Facility: HOSPITAL | Age: 35
End: 2025-01-15

## 2025-01-15 VITALS — WEIGHT: 170.4 LBS | BODY MASS INDEX: 28.39 KG/M2 | HEIGHT: 65 IN

## 2025-01-15 DIAGNOSIS — M65.4 DE QUERVAIN'S TENOSYNOVITIS, RIGHT: Primary | ICD-10-CM

## 2025-01-15 DIAGNOSIS — Z00.6 ENCOUNTER FOR EXAMINATION FOR NORMAL COMPARISON OR CONTROL IN CLINICAL RESEARCH PROGRAM: ICD-10-CM

## 2025-01-15 DIAGNOSIS — M65.831 EXTENSOR INTERSECTION SYNDROME OF RIGHT WRIST: ICD-10-CM

## 2025-01-15 DIAGNOSIS — S63.591S TFCC (TRIANGULAR FIBROCARTILAGE COMPLEX) TEAR, RIGHT, SEQUELA: ICD-10-CM

## 2025-01-15 PROCEDURE — 20605 DRAIN/INJ JOINT/BURSA W/O US: CPT | Performed by: ORTHOPAEDIC SURGERY

## 2025-01-15 PROCEDURE — 99213 OFFICE O/P EST LOW 20 MIN: CPT | Performed by: ORTHOPAEDIC SURGERY

## 2025-01-15 PROCEDURE — 20550 NJX 1 TENDON SHEATH/LIGAMENT: CPT | Performed by: ORTHOPAEDIC SURGERY

## 2025-01-15 RX ADMIN — LIDOCAINE HYDROCHLORIDE 1 ML: 10 INJECTION, SOLUTION INFILTRATION; PERINEURAL at 09:45

## 2025-01-15 RX ADMIN — BETAMETHASONE SODIUM PHOSPHATE AND BETAMETHASONE ACETATE 6 MG: 3; 3 INJECTION, SUSPENSION INTRA-ARTICULAR; INTRALESIONAL; INTRAMUSCULAR; SOFT TISSUE at 09:45

## 2025-01-15 NOTE — ASSESSMENT & PLAN NOTE
Patient was given a cortisone injection for this issue. She tolerated it well   She was advised that if it were to be continued pain, we would discuss MRI for evaluation  She will follow up in 6 weeks for re-evaluation     Orders:  •  Ambulatory Referral to PT/OT Hand Therapy; Future  •  Hand/upper extremity injection: R extensor compartment 2  •  lidocaine (XYLOCAINE) 1 % injection 1 mL  •  betamethasone acetate-betamethasone sodium phosphate (CELESTONE) injection 6 mg

## 2025-01-15 NOTE — ASSESSMENT & PLAN NOTE
Patient was given her second injection today. She tolerated it well  She was advised that if it were to return, we would discuss surgery and MRI  She will follow up in 6 weeks for re-evaluation     Orders:  •  Ambulatory Referral to PT/OT Hand Therapy; Future  •  Hand/upper extremity injection: R extensor compartment 1  •  lidocaine (XYLOCAINE) 1 % injection 1 mL  •  betamethasone acetate-betamethasone sodium phosphate (CELESTONE) injection 6 mg

## 2025-01-15 NOTE — ASSESSMENT & PLAN NOTE
S/p right wrist removal of foreign body and neurolysis performed 12/17/2024  She was advised to continue with therapy for her range of motion  She was advised that it can take time for the nerve to calm down as she still has some numbness and discomfort over the wrist and into the hand.  She will follow-up in 6 weeks for reevaluation  Orders:  •  Ambulatory Referral to PT/OT Hand Therapy; Future

## 2025-01-15 NOTE — PROGRESS NOTES
ORTHOPAEDIC HAND, WRIST, AND ELBOW OFFICE  VISIT     Name: Nell Baldwin      : 1990      MRN: [id number]  Encounter Provider: Alejo Middleton MD  Encounter Date: 1/15/2025   Encounter department: [facility] [place]  :  Assessment & Plan  De Quervain's tenosynovitis, right  Patient was given her second injection today. She tolerated it well  She was advised that if it were to return, we would discuss surgery and MRI  She will follow up in 6 weeks for re-evaluation     Orders:  •  Ambulatory Referral to PT/OT Hand Therapy; Future  •  Hand/upper extremity injection: R extensor compartment 1  •  lidocaine (XYLOCAINE) 1 % injection 1 mL  •  betamethasone acetate-betamethasone sodium phosphate (CELESTONE) injection 6 mg    TFCC (triangular fibrocartilage complex) tear, right, sequela  S/p right wrist removal of foreign body and neurolysis performed 2024  She was advised to continue with therapy for her range of motion  She was advised that it can take time for the nerve to calm down as she still has some numbness and discomfort over the wrist and into the hand.  She will follow-up in 6 weeks for reevaluation  Orders:  •  Ambulatory Referral to PT/OT Hand Therapy; Future    Extensor intersection syndrome of right wrist  Patient was given a cortisone injection for this issue. She tolerated it well   She was advised that if it were to be continued pain, we would discuss MRI for evaluation  She will follow up in 6 weeks for re-evaluation     Orders:  •  Ambulatory Referral to PT/OT Hand Therapy; Future  •  Hand/upper extremity injection: R extensor compartment 2  •  lidocaine (XYLOCAINE) 1 % injection 1 mL  •  betamethasone acetate-betamethasone sodium phosphate (CELESTONE) injection 6 mg            History of Present Illness   HPI  Chief Complaint   Patient presents with   • Right Wrist - Post-op     Arthoscopy with TFCC debridement + repair - 24  removal of  "foreign body (stitch) and Neurolysis of ulnar nerve- 12/17/24  De Quervain's- 1st injection 11/18/24       Nell Baldwin is a 34 y.o. female who presents for follow-up regarding right wrist removal of foreign body and neurolysis of the ulnar nerve performed 12/17/2024.  She states she continues to have discomfort over the ulnar aspect of her wrist as well as some numbness into the hand.  She also is complaining of discomfort in the first extensor compartment but she had a previous cortisone injection without relief of her symptoms.  She is also noting pain into the top of her forearm by her wrist.  She will note pain with certain motions especially when working with therapy.      REVIEW OF SYSTEMS:  General: no fever, no chills  HEENT:  No loss of hearing or eyesight problems  Eyes:  No red eyes  Respiratory:  No coughing, shortness of breath or wheezing  Cardiovascular:  No chest pain, no palpitations  GI:  Abdomen soft nontender, denies nausea  Endocrine:  No muscle weakness, no frequent urination, no excessive thirst  Urinary:  No dysuria, no incontinence  Musculoskeletal: see HPI and PE  SKIN:  No skin rash, no dry skin  Neurological:  No headaches, no confusion  Psychiatric:  No suicide thoughts, no anxiety, no depression  Review of all other systems is negative    Objective   Ht 5' 5\" (1.651 m)   Wt 77.3 kg (170 lb 6.4 oz)   LMP 11/18/2024 (Exact Date)   BMI 28.36 kg/m²      General: well developed and well nourished, alert, oriented times 3, and appears comfortable  Psychiatric: Normal  HEENT: Trachea Midline, No torticollis  Cardiovascular: No discernable arrhythmia  Pulmonary: No wheezing or stridor  Abdomen: No rebound or guarding  Extremities: No peripheral edema  Skin: No masses, erythema, lacerations, fluctation, ulcerations  Neurovascular: Sensation Intact to the Median, Ulnar, Radial Nerve, Motor Intact to the Median, Ulnar, Radial Nerve, and Pulses Intact    Musculoskeletal exam:  Right " wrist  No erythema edema or ecchymosis noted, skin is warm to touch  Incisions are well-healed no signs of infection  She has full range of motion with wrist flexion and extension compared to the contralateral side  Tenderness to palpation over the first extensor compartment as well as over the second extensor compartment just proximal to her wrist  Positive Finkelstein's test  Pain with resisted wrist extension  Sensation is intact to light touch over the median nerve  Capillary refill less than 2 seconds      STUDIES REVIEWED:  No Studies to review      PROCEDURES PERFORMED:  Hand/upper extremity injection: R extensor compartment 1  Universal Protocol:  Consent: Verbal consent obtained.  Risks and benefits: risks, benefits and alternatives were discussed  Consent given by: patient  Patient understanding: patient states understanding of the procedure being performed  Patient consent: the patient's understanding of the procedure matches consent given  Site marked: the operative site was marked  Patient identity confirmed: verbally with patient  Supporting Documentation  Indications: pain   Procedure Details  Condition:de Quervain's tenosynovitis Site: R extensor compartment 1   Preparation: Patient was prepped and draped in the usual sterile fashion  Needle size: 25 G  Approach: dorsal  Medications administered: 1 mL lidocaine 1 %; 6 mg betamethasone acetate-betamethasone sodium phosphate 6 (3-3) mg/mL  Patient tolerance: patient tolerated the procedure well with no immediate complications  Dressing:  Sterile dressing applied       Hand/upper extremity injection: R extensor compartment 2  Universal Protocol:  Consent: Verbal consent obtained.  Risks and benefits: risks, benefits and alternatives were discussed  Consent given by: patient  Patient understanding: patient states understanding of the procedure being performed  Patient consent: the patient's understanding of the procedure matches consent given  Site  marked: the operative site was marked  Patient identity confirmed: verbally with patient  Procedure Details  Condition:intersection syndrome Site: R extensor compartment 2   Preparation: Patient was prepped and draped in the usual sterile fashion  Needle size: 25 G  Approach: dorsal  Medications administered: 1 mL lidocaine 1 %; 6 mg betamethasone acetate-betamethasone sodium phosphate 6 (3-3) mg/mL                Scribe Attestation    I,:  Cody Howell PA-C am acting as a scribe while in the presence of the attending physician.:       I,:  Alejo Middleton MD personally performed the services described in this documentation    as scribed in my presence.:

## 2025-01-15 NOTE — TELEPHONE ENCOUNTER
Patient saw Dr Middleton today. He would like her back in 6 weeks. Please call patient and help with an appointment.    Thank you

## 2025-01-16 ENCOUNTER — APPOINTMENT (OUTPATIENT)
Dept: LAB | Facility: CLINIC | Age: 35
End: 2025-01-16

## 2025-01-16 DIAGNOSIS — Z13.29 SCREENING FOR THYROID DISORDER: ICD-10-CM

## 2025-01-16 DIAGNOSIS — M25.50 MULTIPLE JOINT PAIN: ICD-10-CM

## 2025-01-16 DIAGNOSIS — R53.83 FATIGUE, UNSPECIFIED TYPE: ICD-10-CM

## 2025-01-16 DIAGNOSIS — E61.1 IRON DEFICIENCY: ICD-10-CM

## 2025-01-16 DIAGNOSIS — R53.83 OTHER FATIGUE: ICD-10-CM

## 2025-01-16 DIAGNOSIS — Z00.6 ENCOUNTER FOR EXAMINATION FOR NORMAL COMPARISON OR CONTROL IN CLINICAL RESEARCH PROGRAM: ICD-10-CM

## 2025-01-16 PROCEDURE — 36415 COLL VENOUS BLD VENIPUNCTURE: CPT

## 2025-01-17 ENCOUNTER — APPOINTMENT (OUTPATIENT)
Dept: OCCUPATIONAL THERAPY | Facility: CLINIC | Age: 35
End: 2025-01-17
Payer: COMMERCIAL

## 2025-01-17 RX ORDER — BETAMETHASONE SODIUM PHOSPHATE AND BETAMETHASONE ACETATE 3; 3 MG/ML; MG/ML
6 INJECTION, SUSPENSION INTRA-ARTICULAR; INTRALESIONAL; INTRAMUSCULAR; SOFT TISSUE
Status: COMPLETED | OUTPATIENT
Start: 2025-01-15 | End: 2025-01-15

## 2025-01-17 RX ORDER — LIDOCAINE HYDROCHLORIDE 10 MG/ML
1 INJECTION, SOLUTION INFILTRATION; PERINEURAL
Status: COMPLETED | OUTPATIENT
Start: 2025-01-15 | End: 2025-01-15

## 2025-01-21 ENCOUNTER — TELEPHONE (OUTPATIENT)
Dept: OBGYN CLINIC | Facility: HOSPITAL | Age: 35
End: 2025-01-21

## 2025-01-21 NOTE — TELEPHONE ENCOUNTER
Caller: Patient    Doctor: Emi    Reason for call: Patient had injection in her wrist 1/15/25. She said the pain is worse now than before and not sure if this is normal. It is a sharp shooting pain and pain level is a 6. Please call patient. Thank you.    Call back#: [phone]

## 2025-01-21 NOTE — TELEPHONE ENCOUNTER
Received transfer call from pt, she had a CSI 1/15/25, she has had no relief and complains of worsening pain. She has a dull pain all the time and the when she moves her hand she gets sharp, breathtaking pains. She has been taking tylenol and ibuprofen and has used ice and heat with no relief. She also has clicking in her wrist now. Advised pt that CSI can take some time to fully work but she states the pain is worse instead of better. Please advise, thanks!

## 2025-01-22 ENCOUNTER — OFFICE VISIT (OUTPATIENT)
Dept: OBGYN CLINIC | Facility: HOSPITAL | Age: 35
End: 2025-01-22
Payer: COMMERCIAL

## 2025-01-22 ENCOUNTER — OFFICE VISIT (OUTPATIENT)
Dept: OCCUPATIONAL THERAPY | Facility: CLINIC | Age: 35
End: 2025-01-22
Payer: COMMERCIAL

## 2025-01-22 DIAGNOSIS — M65.831 EXTENSOR INTERSECTION SYNDROME OF RIGHT WRIST: ICD-10-CM

## 2025-01-22 DIAGNOSIS — M65.4 DE QUERVAIN'S TENOSYNOVITIS, RIGHT: Primary | ICD-10-CM

## 2025-01-22 DIAGNOSIS — Z98.890 S/P MUSCULOSKELETAL SYSTEM SURGERY: Primary | ICD-10-CM

## 2025-01-22 PROCEDURE — 97760 ORTHOTIC MGMT&TRAING 1ST ENC: CPT | Performed by: OCCUPATIONAL THERAPIST

## 2025-01-22 PROCEDURE — 99213 OFFICE O/P EST LOW 20 MIN: CPT | Performed by: ORTHOPAEDIC SURGERY

## 2025-01-22 RX ORDER — METHYLPREDNISOLONE 4 MG/1
TABLET ORAL
Qty: 1 EACH | Refills: 0 | Status: SHIPPED | OUTPATIENT
Start: 2025-01-22

## 2025-01-22 NOTE — PROGRESS NOTES
Daily Note     Today's date: 2025  Patient name: Nell Baldwin  : 1990  MRN: [id number]  Referring provider: Alejo Middleton MD  Dx:   Encounter Diagnosis     ICD-10-CM    1. S/P musculoskeletal system surgery  Z98.890                      Subjective: The doctor wants me in a splint, I am getting an MRI.      Objective: See treatment diary below      Assessment: Tolerated treatment fair. Patient was placed in a radial thumb spica splint for DeQuervain's symptoms.  Splint order placed by MD today.  Will f/u with pt. After MRI.    Plan: Continue per plan of care.      Precautions: SX 24    Manuals             20           Scar mob            retrograde            Graston FA mass/ 1st DC            Neuro Re-Ed                        HEP- AROM wrist all planes                         Splint radial thumb spica                                               Ther Ex            AROM Wrist            AROM P/S            Wrist maze            Foam roll flex/ext stretch            Clips w/ rotation            Light gripping- neutral wrist            Ball circles                        Ther Activity            Peg - neutral, supination                        Gait Training                                    Modalities            MH R 8m 8m 8m 8m 8m 8m 8m 8m 8m   CP post 5m 5m 5m 5m 5m 5m 5m 5m 5m       INSURANCE: Auto/ 2nd ins.- 23 visits,   30 PT/ST/OT  PCY( 7 used)  Manuals 10/30 11/4 11/11 11/18 11/25 12/2 12/13 1/8 1/13    11 12 13 14 15 14 13 18 19   Scar mob        3m 3m   retrograde 3m 3m TPF flexor mass 4m 4m 4m 4m 4m 4m   Graston FA mass/ 1st DC 4m 4m 4m 4m 4m 4m 4m 4m 4m   Neuro Re-Ed             KT taping radial wrist. KT tape KT tape         HEP- AROM wrist all planes                                                                        Ther Ex            AROM Wrist #2 3x10 #2 3x10 #2 3x10 #2 3x10 #2 3x10 #2 3x10 #2 3x10 #2 3x10 #1 3x10   AROM P/S T bar R 2x10 R 3x10 R 3x10  SM 3x10 SM 3x10 SM 3x10 SM 3x10 SM 3x10    Wrist maze            Foam roll flex/ext stretch            Clips w/ rotation            Light gripping- neutral wrist GPW 2x30 Gpw 2x30 RPW 2x30 RpW 2x30 RPW 2x30 Gpw 2x30 GPW 2x30 RPW 2x30 GPW 2x30   Ball circles        Y 2x10 Y 2x10               Ther Activity            Peg - neutral, supination         x30               Gait Training                                    Modalities             R 8m 8m 8m 8m 8m 8m 8m 8m 8m   CP post 5m 5m 5m 5m 5m 5m 5m 5m 5m         Manuals 9/23 9/27 9/30 10/4 10/7 10/9 10/14 10/18 10/21 10/25    IE 30'            Scar mob 3m 3m 3m 3m 3m 3m 3m 3m 3m 3m   retrograde 3m 3m 3m 3m 3m 3m 3m 3m 3m 3m   Graston FA mass 4m 4m 4m 4m 4m 4m 4m 4m 4m 4m   Neuro Re-Ed                          HEP- AROM wrist all planes reviewed                                                                             Ther Ex             AROM Wrist 2x10 Cone  2x10 Cone 3x10 Cone 3x10 Cone 3x10 Cone 3x10 #1 3x10 #1 3x10 #1 3x10 #3 3x10   AROM P/S Dowel 2x10 [id number] Dowel 3x10 Dowel 3x10 Dowel 3x10 Dowel 3x10  SM 3x10 SM 3x10    Wrist maze  x10 x10 x10 x10 x10 x10 x10     Foam roll flex/ext stretch  2x10 1x15 1x15 1x15 1x15 Ball push Ball push   R - lift  3x10   Clips w/ rotation    R 3 sets R 3 sets R 3 sets G 3 sets G 3 sets G 3 sets G 3 sets   Light gripping- neutral wrist      YPW 2x30 RPW 2x30 RPW 2x30 GPW 2x30 BPW 2x30                             Ther Activity             Peg - neutral, supination x30 x30 x30                       Gait Training                                       Modalities              R 8m 8m 8m 8m 8m 8m 8m 8m 8m 8m   CP post  5m 5m 5m 5m 5m 5m 5m 5m 5m

## 2025-01-22 NOTE — ASSESSMENT & PLAN NOTE
Still has some pain after the cortisone injection   We discussed the aspect of wearing a splint and allow the tendon to calm down  She was advised about casting if needed  We discussed a medrol dose pack to help with pain and inflammation  We discussed that if this does not work we could do an MRI or surgery   Orders:  •  methylPREDNISolone 4 MG tablet therapy pack; Use as directed on package  •  MRI wrist right wo contrast; Future  •  Ambulatory Referral to PT/OT Hand Therapy; Future

## 2025-01-22 NOTE — PROGRESS NOTES
ORTHOPAEDIC HAND, WRIST, AND ELBOW OFFICE  VISIT     Name: Nell Baldwin      : 1990      MRN: [id number]  Encounter Provider: Alejo Middleton MD  Encounter Date: 2025   Encounter department: [facility] [place]  :  Assessment & Plan  De Quervain's tenosynovitis, right  Still has some pain after the cortisone injection   We discussed the aspect of wearing a splint and allow the tendon to calm down  She was advised about casting if needed  We discussed a medrol dose pack to help with pain and inflammation  We discussed that if this does not work we could do an MRI or surgery   Orders:  •  methylPREDNISolone 4 MG tablet therapy pack; Use as directed on package  •  MRI wrist right wo contrast; Future  •  Ambulatory Referral to PT/OT Hand Therapy; Future    Extensor intersection syndrome of right wrist  Still has some pain after the cortisone injection   We discussed the aspect of wearing a splint and allow the tendon to calm down  She was advised about casting if needed  We discussed a medrol dose pack to help with pain and inflammation  We discussed that if this does not work we could do an MRI or surgery   Orders:  •  methylPREDNISolone 4 MG tablet therapy pack; Use as directed on package  •  MRI wrist right wo contrast; Future  •  Ambulatory Referral to PT/OT Hand Therapy; Future            History of Present Illness   HPI  Chief Complaint   Patient presents with   • Right Wrist - Post-op     Arthoscopy with TFCC debridement + repair - 24  removal of foreign body (stitch) and Neurolysis of ulnar nerve- 24  De Quervain's- 2nd injection 1/15/25       Nell Baldwin is a 34 y.o. female who presents for follow-up regarding right wrist removal of foreign body and neurolysis of the ulnar nerve performed 2024.  She states she continues to have discomfort over the ulnar aspect of her wrist as well as some numbness into the hand.  She also  is complaining of discomfort in the first extensor compartment but she had a previous cortisone injection without relief of her symptoms.  She is also noting pain into the top of her forearm by her wrist.  She will note pain with certain motions especially when working with therapy. She previously was given a cortisone injection for dequervains and intersection syndrome. She continues to have pain in those areas.       REVIEW OF SYSTEMS:  General: no fever, no chills  HEENT:  No loss of hearing or eyesight problems  Eyes:  No red eyes  Respiratory:  No coughing, shortness of breath or wheezing  Cardiovascular:  No chest pain, no palpitations  GI:  Abdomen soft nontender, denies nausea  Endocrine:  No muscle weakness, no frequent urination, no excessive thirst  Urinary:  No dysuria, no incontinence  Musculoskeletal: see HPI and PE  SKIN:  No skin rash, no dry skin  Neurological:  No headaches, no confusion  Psychiatric:  No suicide thoughts, no anxiety, no depression  Review of all other systems is negative    Objective   There were no vitals taken for this visit.     General: well developed and well nourished, alert, oriented times 3, and appears comfortable  Psychiatric: Normal  HEENT: Trachea Midline, No torticollis  Cardiovascular: No discernable arrhythmia  Pulmonary: No wheezing or stridor  Abdomen: No rebound or guarding  Extremities: No peripheral edema  Skin: No masses, erythema, lacerations, fluctation, ulcerations  Neurovascular: Sensation Intact to the Median, Ulnar, Radial Nerve, Motor Intact to the Median, Ulnar, Radial Nerve, and Pulses Intact    Musculoskeletal exam:  Right wrist  No erythema edema or ecchymosis noted, skin is warm to touch  Incisions are well-healed no signs of infection  She has full range of motion with wrist flexion and extension compared to the contralateral side  Tenderness to palpation over the first extensor compartment as well as over the second extensor compartment just  proximal to her wrist  Positive Finkelstein's test  Pain with resisted wrist extension  Sensation is intact to light touch over the median nerve  Capillary refill less than 2 seconds      STUDIES REVIEWED:  No Studies to review      PROCEDURES PERFORMED:  Procedures   No additional procedures were performed at today's visit      Scribe Attestation    I,:  Cody Howell PA-C am acting as a scribe while in the presence of the attending physician.:       I,:  Alejo Middleton MD personally performed the services described in this documentation    as scribed in my presence.:

## 2025-01-27 LAB
APOB+LDLR+PCSK9 GENE MUT ANL BLD/T: NOT DETECTED
BRCA1+BRCA2 DEL+DUP + FULL MUT ANL BLD/T: NOT DETECTED
MLH1+MSH2+MSH6+PMS2 GN DEL+DUP+FUL M: NOT DETECTED

## 2025-01-28 ENCOUNTER — TELEPHONE (OUTPATIENT)
Age: 35
End: 2025-01-28

## 2025-01-28 DIAGNOSIS — E66.3 OVERWEIGHT: ICD-10-CM

## 2025-01-28 DIAGNOSIS — E28.2 PCOS (POLYCYSTIC OVARIAN SYNDROME): ICD-10-CM

## 2025-01-28 NOTE — TELEPHONE ENCOUNTER
Pt called  her insurance changed. They now require a 90 day rx for the ozempic in order to pay for it. Please do a 90 day.

## 2025-01-29 ENCOUNTER — NURSE TRIAGE (OUTPATIENT)
Age: 35
End: 2025-01-29

## 2025-01-29 DIAGNOSIS — E28.2 PCOS (POLYCYSTIC OVARIAN SYNDROME): ICD-10-CM

## 2025-01-29 DIAGNOSIS — E66.3 OVERWEIGHT: ICD-10-CM

## 2025-01-29 RX ORDER — SEMAGLUTIDE 0.68 MG/ML
INJECTION, SOLUTION SUBCUTANEOUS
Qty: 3 ML | Refills: 1 | Status: SHIPPED | OUTPATIENT
Start: 2025-01-29 | End: 2025-01-29 | Stop reason: SDUPTHER

## 2025-01-29 NOTE — TELEPHONE ENCOUNTER
Patient asking if Ozempic script can be changed to a 90 day supply and resent to [place] [facility]?    Reason for Disposition   Negative: Did you page the on call provider?    Protocols used: SANDY NO TRIAGE REQUIRED

## 2025-01-29 NOTE — TELEPHONE ENCOUNTER
Regarding: medication problem  ----- Message from Dionne BERGMAN sent at 1/29/2025  9:16 AM EST -----  The pt called stating the Ozempic was sent to the wrong pharmacy and for the wrong qty.   She needs it to be sent to the [facility] in [place] for a 90 day supply so her insurance will cover it.

## 2025-01-30 DIAGNOSIS — E28.2 PCOS (POLYCYSTIC OVARIAN SYNDROME): ICD-10-CM

## 2025-01-30 DIAGNOSIS — E66.3 OVERWEIGHT: ICD-10-CM

## 2025-01-30 RX ORDER — SEMAGLUTIDE 0.68 MG/ML
INJECTION, SOLUTION SUBCUTANEOUS
Refills: 1 | OUTPATIENT
Start: 2025-01-30

## 2025-01-30 NOTE — TELEPHONE ENCOUNTER
Duplicate request   Past Medical History:   Diagnosis Date    Anticoagulant long-term use     Coronary artery disease     Hypercholesteremia     Hypertension     MI (myocardial infarction)        Past Surgical History:   Procedure Laterality Date    blood clots      cardiac stents      COLONOSCOPY N/A 2/1/2019    Performed by Blanca Fenton MD at [facility] ENDO    Left heart cath Left 5/17/2018    Performed by Alfred Cordero MD at [facility] CATH LAB       Review of patient's allergies indicates:  No Known Allergies    Family History     None          Tobacco Use    Smoking status: Current Some Day Smoker     Packs/day: 0.50     Types: Cigarettes    Smokeless tobacco: Never Used   Substance and Sexual Activity    Alcohol use: Yes     Comment: occasionally     Drug use: No    Sexual activity: Not Currently       Review of Systems   Constitutional: Negative for chills and fever.   HENT: Negative for hearing loss, sore throat and trouble swallowing.    Eyes: Negative.    Respiratory: Negative for cough and shortness of breath.    Cardiovascular: Negative for chest pain.   Gastrointestinal: Negative for abdominal distention, abdominal pain, constipation, diarrhea, nausea and vomiting.   Genitourinary: Positive for frequency. Negative for difficulty urinating and hematuria.   Musculoskeletal: Negative for arthralgias and neck pain.   Skin: Negative for color change, pallor and rash.   Neurological: Negative for dizziness and seizures.   Hematological: Negative for adenopathy.   Psychiatric/Behavioral: Negative for confusion.   All other systems reviewed and are negative.      Objective:     Temp:  [97.6 °F (36.4 °C)-98.4 °F (36.9 °C)] 97.6 °F (36.4 °C)  Pulse:  [53-62] 62  Resp:  [18] 18  SpO2:  [98 %-100 %] 98 %  BP: (122-135)/(72-81) 135/79     Body mass index is 25.84 kg/m².           Drains          None          Physical Exam   Vitals reviewed.  Constitutional: He is oriented to person, place, and time. He appears well-developed  and well-nourished. No distress.   HENT:   Head: Normocephalic and atraumatic.   Eyes: Right eye exhibits no discharge. Left eye exhibits no discharge. No scleral icterus.   Neck: Normal range of motion. Neck supple.   Cardiovascular: Normal rate and regular rhythm.    Pulmonary/Chest: Effort normal and breath sounds normal. No respiratory distress.   Abdominal: Soft. Bowel sounds are normal. He exhibits no distension. There is no tenderness. There is no rebound and no guarding.   Genitourinary:   Genitourinary Comments: Unable to palpate bladder   Musculoskeletal: Normal range of motion.   Neurological: He is alert and oriented to person, place, and time.   Skin: Skin is warm and dry. He is not diaphoretic. No erythema.         Significant Labs:    BMP:  Recent Labs   Lab 04/23/19  1222      K 3.8      CO2 28   BUN 13   CREATININE 0.9   CALCIUM 9.1       CBC:  Recent Labs   Lab 04/23/19  1222   WBC 6.40   HGB 12.8*   HCT 40.4          Blood Culture: No results for input(s): LABBLOO in the last 168 hours.  Urine Culture: No results for input(s): LABURIN in the last 168 hours.  Urine Studies: No results for input(s): COLORU, APPEARANCEUA, PHUR, SPECGRAV, PROTEINUA, GLUCUA, KETONESU, BILIRUBINUA, OCCULTUA, NITRITE, UROBILINOGEN, LEUKOCYTESUR, RBCUA, WBCUA, BACTERIA, SQUAMEPITHEL, HYALINECASTS in the last 168 hours.    Invalid input(s): WRIGHTSUR    Significant Imaging:  All pertinent imaging results/findings from the past 24 hours have been reviewed.

## 2025-01-31 ENCOUNTER — HOSPITAL ENCOUNTER (OUTPATIENT)
Dept: RADIOLOGY | Facility: HOSPITAL | Age: 35
Discharge: HOME | End: 2025-01-31
Payer: COMMERCIAL

## 2025-01-31 DIAGNOSIS — M65.831 EXTENSOR INTERSECTION SYNDROME OF RIGHT WRIST: ICD-10-CM

## 2025-01-31 DIAGNOSIS — M65.4 DE QUERVAIN'S TENOSYNOVITIS, RIGHT: ICD-10-CM

## 2025-01-31 PROCEDURE — 73221 MRI JOINT UPR EXTREM W/O DYE: CPT

## 2025-02-03 ENCOUNTER — PATIENT MESSAGE (OUTPATIENT)
Dept: ENDOCRINOLOGY | Facility: CLINIC | Age: 35
End: 2025-02-03

## 2025-02-03 ENCOUNTER — OFFICE VISIT (OUTPATIENT)
Dept: OBGYN CLINIC | Facility: CLINIC | Age: 35
End: 2025-02-03
Payer: COMMERCIAL

## 2025-02-03 ENCOUNTER — TELEPHONE (OUTPATIENT)
Age: 35
End: 2025-02-03

## 2025-02-03 VITALS — BODY MASS INDEX: 28.32 KG/M2 | WEIGHT: 170 LBS | HEIGHT: 65 IN

## 2025-02-03 DIAGNOSIS — E66.3 OVERWEIGHT: ICD-10-CM

## 2025-02-03 DIAGNOSIS — M65.831 EXTENSOR INTERSECTION SYNDROME OF RIGHT WRIST: Primary | ICD-10-CM

## 2025-02-03 DIAGNOSIS — E28.2 PCOS (POLYCYSTIC OVARIAN SYNDROME): Primary | ICD-10-CM

## 2025-02-03 PROCEDURE — 99213 OFFICE O/P EST LOW 20 MIN: CPT | Performed by: ORTHOPAEDIC SURGERY

## 2025-02-03 NOTE — TELEPHONE ENCOUNTER
Ozempic will not be covered with or without a prior authorization, Ozempic is not FDA approved for obesity, prediabetes, etc. Ozempic is only FDA Approved for Type 2 Diabetes and will only be Approved via a Prior Authorization if patient has a diagnosis of Type 2 Diabetes.

## 2025-02-06 ENCOUNTER — TELEPHONE (OUTPATIENT)
Age: 35
End: 2025-02-06

## 2025-02-06 DIAGNOSIS — R00.2 PALPITATION: ICD-10-CM

## 2025-02-06 NOTE — PROGRESS NOTES
ORTHOPAEDIC HAND, WRIST, AND ELBOW OFFICE  VISIT     Name: Nell Baldwin      : 1990      MRN: [id number]  Encounter Provider: Alejo Middleton MD  Encounter Date: 2/3/2025   Encounter department: [facility] [place]  :  Assessment & Plan  Extensor intersection syndrome of right wrist  At this point in time, her first extensor compartment has improved.  She continues to have pain over the intersection area and over the second dorsal extensor compartment.  We will continue with therapy, over-the-counter anti-inflammatory medications.  MRI was reviewed in detail.  Follow-up in 4 weeks time.  We counseled about avoidance of activity that would be a problem, patient is requesting short arm cast application today, we will do this to calm her wrist down as she states she does not feel she can be compliant with bracing.  Cast off at that point with transition to splinting.             General Discussions:       Operative Discussions:       History of Present Illness   HPI  Chief Complaint   Patient presents with   • Right Wrist - Post-op     Arthoscopy with TFCC debridement + repair - 24  removal of foreign body (stitch) and Neurolysis of ulnar nerve- 24  De Quervain's- 2nd injection 1/15/25       Nell Baldwin is a 34 y.o. female who presents for evaluation regarding right wrist pain.  She did get her MRI.  She continues to report pain to the dorsal aspect of the wrist over the second extensor compartment.  She denies numbness or tingling, fevers or chills.      REVIEW OF SYSTEMS:  General: no fever, no chills  HEENT:  No loss of hearing or eyesight problems  Eyes:  No red eyes  Respiratory:  No coughing, shortness of breath or wheezing  Cardiovascular:  No chest pain, no palpitations  GI:  Abdomen soft nontender, denies nausea  Endocrine:  No muscle weakness, no frequent urination, no excessive thirst  Urinary:  No dysuria, no  "incontinence  Musculoskeletal: see HPI and PE  SKIN:  No skin rash, no dry skin  Neurological:  No headaches, no confusion  Psychiatric:  No suicide thoughts, no anxiety, no depression  Review of all other systems is negative    Objective   Ht 5' 5\" (1.651 m)   Wt 77.1 kg (170 lb)   BMI 28.29 kg/m²      General: well developed and well nourished, alert, oriented times 3, and appears comfortable  Psychiatric: Normal  HEENT: Trachea Midline, No torticollis  Cardiovascular: No discernable arrhythmia  Pulmonary: No wheezing or stridor  Abdomen: No rebound or guarding  Extremities: No peripheral edema  Skin: No masses, erythema, lacerations, fluctation, ulcerations  Neurovascular: Sensation Intact to the Median, Ulnar, Radial Nerve, Motor Intact to the Median, Ulnar, Radial Nerve, and Pulses Intact    Musculoskeletal exam:  Right side today has no significant tenderness directly over the intersection area, she does have tenderness to palpation over the distal aspect of the second extensor compartment as it crosses over the wrist.  Decreased range of motion in extension secondary to pain lacking 20 degrees, full flexion.  No tenderness over the first dorsal extensor compartment, minimal tenderness over the sixth extensor compartment, negative Synergy test.      STUDIES REVIEWED:  MRI of the wrist was reviewed today and demonstrates no evidence of de Quervain's stenosing tenosynovitis, she does have some swelling around the second and sixth extensor compartments.  No evidence of intra-articular pathology.  Small central TFCC tear previously noted and debrided is reidentified as expected.      PROCEDURES PERFORMED:  Procedures  No Procedures performed today    "

## 2025-02-06 NOTE — TELEPHONE ENCOUNTER
PA for WEGOVY 0.25 MG/0.5ML SUBMITTED to [facility]    via      [x][employer]-Case ID # [id number]    [x]PA sent as URGENT    All office notes, labs and other pertaining documents and studies sent. Clinical questions answered. Awaiting determination from insurance company.     Turnaround time for your insurance to make a decision on your Prior Authorization can take 7-21 business days.

## 2025-02-06 NOTE — ASSESSMENT & PLAN NOTE
At this point in time, her first extensor compartment has improved.  She continues to have pain over the intersection area and over the second dorsal extensor compartment.  We will continue with therapy, over-the-counter anti-inflammatory medications.  MRI was reviewed in detail.  Follow-up in 4 weeks time.  We counseled about avoidance of activity that would be a problem, patient is requesting short arm cast application today, we will do this to calm her wrist down as she states she does not feel she can be compliant with bracing.  Cast off at that point with transition to splinting.

## 2025-02-07 RX ORDER — PROPRANOLOL HYDROCHLORIDE 10 MG/1
10 TABLET ORAL 2 TIMES DAILY
Qty: 60 TABLET | Refills: 0 | Status: SHIPPED | OUTPATIENT
Start: 2025-02-07

## 2025-02-07 NOTE — TELEPHONE ENCOUNTER
Refill must be reviewed and completed by the office or provider. The refill is unable to be approved or denied by the medication management team.    Last seen 02.2024 and was to return in , no appt - Please review to see if the refill is appropriate.

## 2025-02-07 NOTE — TELEPHONE ENCOUNTER
PA for WEGOVY 0.25 MG/0.5ML  EXCLUDED from plan       Reason:        Message sent to office clinical pool Yes    Appeal is not warranted unless the following is met:    DX of [place] and the below cardiac events:   You have established cardiovascular disease as evidenced by one of the following:    (1) Prior myocardial infarction    (2) Prior ischemic or hemorrhagic stroke    (3) Symptomatic peripheral arterial disease evidenced by one of the following:     (A) Intermittent claudication with ankle-brachial index less than 0.85 (at rest)     (B) Peripheral arterial revascularization procedure     (C) Amputation due to atherosclerotic disease

## 2025-02-11 ENCOUNTER — OFFICE VISIT (OUTPATIENT)
Dept: FAMILY MEDICINE CLINIC | Facility: CLINIC | Age: 35
End: 2025-02-11
Payer: COMMERCIAL

## 2025-02-11 VITALS
SYSTOLIC BLOOD PRESSURE: 122 MMHG | TEMPERATURE: 97.9 F | DIASTOLIC BLOOD PRESSURE: 86 MMHG | OXYGEN SATURATION: 100 % | HEIGHT: 65 IN | RESPIRATION RATE: 16 BRPM | BODY MASS INDEX: 28.36 KG/M2 | HEART RATE: 72 BPM | WEIGHT: 170.2 LBS

## 2025-02-11 DIAGNOSIS — R30.0 DYSURIA: ICD-10-CM

## 2025-02-11 DIAGNOSIS — B34.9 VIRAL ILLNESS: Primary | ICD-10-CM

## 2025-02-11 LAB
SL AMB  POCT GLUCOSE, UA: ABNORMAL
SL AMB LEUKOCYTE ESTERASE,UA: ABNORMAL
SL AMB POCT BILIRUBIN,UA: ABNORMAL
SL AMB POCT BLOOD,UA: ABNORMAL
SL AMB POCT CLARITY,UA: CLEAR
SL AMB POCT COLOR,UA: CLEAR
SL AMB POCT KETONES,UA: ABNORMAL
SL AMB POCT NITRITE,UA: ABNORMAL
SL AMB POCT PH,UA: 6.5
SL AMB POCT RAPID FLU A: NEGATIVE
SL AMB POCT RAPID FLU B: NEGATIVE
SL AMB POCT SPECIFIC GRAVITY,UA: 1
SL AMB POCT URINE PROTEIN: ABNORMAL
SL AMB POCT UROBILINOGEN: 0.2

## 2025-02-11 PROCEDURE — 87804 INFLUENZA ASSAY W/OPTIC: CPT | Performed by: FAMILY MEDICINE

## 2025-02-11 PROCEDURE — 99213 OFFICE O/P EST LOW 20 MIN: CPT | Performed by: FAMILY MEDICINE

## 2025-02-11 PROCEDURE — 81002 URINALYSIS NONAUTO W/O SCOPE: CPT | Performed by: FAMILY MEDICINE

## 2025-02-11 RX ORDER — OSELTAMIVIR PHOSPHATE 75 MG/1
75 CAPSULE ORAL 2 TIMES DAILY
Qty: 10 CAPSULE | Refills: 0 | Status: SHIPPED | OUTPATIENT
Start: 2025-02-11 | End: 2025-02-16

## 2025-02-11 RX ORDER — NORETHINDRONE ACETATE AND ETHINYL ESTRADIOL .03; 1.5 MG/1; MG/1
TABLET ORAL
COMMUNITY
End: 2025-02-11

## 2025-02-11 NOTE — PROGRESS NOTES
Assessment/Plan:    Viral illness (Primary)  - Tamiflu prescribed, side effects reviewed, advised rest, plenty of fluids, Tylenol as needed for body aches or fever, Mucinex as needed for cough/ congestion. Patient was encouraged to contact the office for persistent or worsening symptoms    Dysuria  - urine dip positive for moderate leukocytes and otherwise negative, will send for urine culture and call with the results, encouraged to contact the office for persistent or worsening symptoms       - Urine culture          Return for annual physical in April or sooner as needed.   The patient understands and agrees with the treatment plan.      Subjective:   Chief Complaint   Patient presents with    Cough    Nasal Congestion    Fever    Chills     Started last night   tested positive for flu a  last week    Possible UTI     Abdominal pain  Burning with urination      Patient ID: Nell Baldwin is a 34 y.o. female who presents today with c/o fever, chills, body aches, fatigue, headache, nasal congestion and non productive cough onset yesterday, her temp today was 99.8. her  has been sick since last week and tested positive for flu.   Patient also reports urinary frequency and pressure with urination on and off for the past 2 weeks, no pain with urination, no flank pain, no vaginal discharge.       The following portions of the patient's history were reviewed and updated as appropriate: allergies, current medications, past family history, past medical history, past social history, past surgical history and problem list.    Past Medical History:   Diagnosis Date    Amenorrhea     last assessed: 08/22/2016    Anxiety     Female infertility     seen by [facility] - seen for 1 year, 5 cycles IUI acheived pregnancy via IVF    Fibromyalgia     Irregular menstrual cycle     last assessed: 09/26/2016    Osteoarthritis of right acromioclavicular joint     last assessment: 01/28/2013    Polycystic  ovary syndrome     Separation of right acromioclavicular joint     Last assessed: 2013; this is more consistent with ostiolysis of the distal clavicle vs. acromioclavicular joint arthritis, not acute shoulder seperation    Strain of muscle of right hip, initial encounter 2023    Varicella     vaccine     Past Surgical History:   Procedure Laterality Date     SECTION      DENTAL SURGERY      wisdom teeth    FL INJECTION RIGHT WRIST (ARTHROGRAM)  2024    WA APPLICATION LONG ARM SPLINT SHOULDER HAND Right 2024    Procedure: Application of long arm sugar tong splint;  Surgeon: Alejo Middleton MD;  [facility];  Service: Orthopedics    WA ARTHRS WRST EXC&/RPR TRIANG FIBROCART&/JOINT Right 2024    Procedure: Right wrist arthroscopy with TFCC debridement and repair;  Surgeon: Alejo Middleton MD;  [facility];  Service: Orthopedics    WA  DELIVERY ONLY N/A 2018    Procedure:  SECTION ();  Surgeon: Ismael Marquez MD;  [facility];  Service: Obstetrics    WA  DELIVERY ONLY N/A 2020    Procedure:  SECTION ();  Surgeon: Ismael Marquez MD;  [facility];  Service: Obstetrics    WA INCISION & REMOVAL FOREIGN BODY SUBQ TISS SIMPLE Right 2024    Procedure: Right wrist removal of foreign body (stitch) and Neurolysis of ulnar nerve;  Surgeon: Alejo Middleton MD;  [facility];  Service: Orthopedics    SHOULDER SURGERY      SINUS SURGERY  2023    TUBAL LIGATION Bilateral 2020    Procedure: LIGATION/COAGULATION TUBAL;  Surgeon: Ismael Marquez MD;  [facility];  Service: Obstetrics     Family History   Problem Relation Age of Onset    Fibromyalgia Mother     Hyperlipidemia Father     Hypertension Father     Diabetes Maternal Grandmother     Colon cancer Maternal Grandfather 48    Cancer Maternal Grandfather     Diabetes Paternal Grandfather     Breast cancer Neg Hx     Endometrial  cancer Neg Hx     Ovarian cancer Neg Hx      Social History     Socioeconomic History    Marital status: /Civil Union     Spouse name: Not on file    Number of children: Not on file    Years of education: Not on file    Highest education level: Not on file   Occupational History    Not on file   Tobacco Use    Smoking status: Never    Smokeless tobacco: Never   Vaping Use    Vaping status: Never Used   Substance and Sexual Activity    Alcohol use: Not Currently     Alcohol/week: 1.0 standard drink of alcohol    Drug use: Never    Sexual activity: Yes     Partners: Male     Birth control/protection: Surgical     Comment: tubes tied   Other Topics Concern    Not on file   Social History Narrative    History of  0    Caffeine use-1 cup of coffee/day    Has smoke detectors     Denied history of sun protection    Uses safety equipment- protective head gear     Social Drivers of Health     Financial Resource Strain: Not on file   Food Insecurity: Not on file   Transportation Needs: Not on file   Physical Activity: Not on file   Stress: Not on file   Social Connections: Not on file   Intimate Partner Violence: Not on file   Housing Stability: Not on file       Current Outpatient Medications:     acetaminophen (TYLENOL) 650 mg CR tablet, Take 1 tablet (650 mg total) by mouth every 8 (eight) hours as needed for mild pain, Disp: 30 tablet, Rfl: 0    cyclobenzaprine (FLEXERIL) 10 mg tablet, Take 1 tablet (10 mg total) by mouth 3 (three) times a day as needed for muscle spasms, Disp: 20 tablet, Rfl: 0    FLUoxetine 10 MG tablet, TAKE 1 TABLET BY MOUTH EVERY DAY, Disp: 90 tablet, Rfl: 1    metFORMIN (GLUCOPHAGE) 500 mg tablet, TAKE 1 TABLET BY MOUTH TWICE A DAY WITH MEALS, Disp: 180 tablet, Rfl: 3    multivitamin (THERAGRAN) TABS, Take 1 tablet by mouth daily, Disp: , Rfl:     propranolol (INDERAL) 10 mg tablet, Take 1 tablet (10 mg total) by mouth 2 (two) times a day, Disp: 60 tablet, Rfl: 0    Tirzepatide-Weight  Management (Zepbound) 5 MG/0.5ML SOLN, 5 mg weekly, Disp: 2 mL, Rfl: 1    Aurovela FE 1/20 1-20 MG-MCG per tablet, TAKE 1 TABLET BY MOUTH EVERY DAY (Patient not taking: Reported on 2/11/2025), Disp: 84 tablet, Rfl: 1    hydroquinone 4 % cream, Apply topically daily at bedtime Use three months on, then take 1 month break and can repeat cycles. (Patient not taking: Reported on 2/11/2025), Disp: 30 g, Rfl: 3    methylPREDNISolone 4 MG tablet therapy pack, Use as directed on package (Patient not taking: Reported on 2/11/2025), Disp: 1 each, Rfl: 0    Naproxen Sodium 220 MG CAPS, Take 1 capsule (220 mg total) by mouth 2 (two) times a day, Disp: 60 capsule, Rfl: 0    Norethindrone Acet-Ethinyl Est (Loestrin 1.5/30, 21,) 1.5-30 MG-MCG TABS, Take by mouth, Disp: , Rfl:     Semaglutide-Weight Management (WEGOVY) 0.25 MG/0.5ML, Inject 0.5 mL (0.25 mg total) under the skin once a week (Patient not taking: Reported on 2/11/2025), Disp: 6 mL, Rfl: 1    tiZANidine (ZANAFLEX) 2 mg tablet, Take 1 tablet (2 mg total) by mouth daily at bedtime (Patient not taking: Reported on 2/11/2025), Disp: 60 tablet, Rfl: 2    traMADol (Ultram) 50 mg tablet, Take 1 tablet (50 mg total) by mouth every 6 (six) hours as needed for moderate pain (Patient not taking: Reported on 2/11/2025), Disp: 5 tablet, Rfl: 0    Review of Systems   Constitutional:  Positive for appetite change, chills, fatigue and fever.   HENT:  Positive for congestion. Negative for ear pain and sore throat.    Respiratory:  Positive for cough. Negative for shortness of breath, wheezing and stridor.    Cardiovascular:  Negative for chest pain, palpitations and leg swelling.   Gastrointestinal:  Negative for abdominal pain, diarrhea, nausea and vomiting.   Genitourinary:  Positive for dysuria and frequency. Negative for difficulty urinating, flank pain, urgency and vaginal discharge.   Musculoskeletal:  Positive for myalgias.   Neurological:  Negative for dizziness and headaches.  "  Hematological:  Negative for adenopathy.          Objective:    Vitals:    02/11/25 1213   BP: 122/86   Pulse: 72   Resp: 16   Temp: 97.9 °F (36.6 °C)   SpO2: 100%   Weight: 77.2 kg (170 lb 3.2 oz)   Height: 5' 5\" (1.651 m)        Physical Exam  Constitutional:       General: She is not in acute distress.     Appearance: She is well-developed. She is not toxic-appearing.   HENT:      Right Ear: Tympanic membrane and ear canal normal.      Left Ear: Tympanic membrane and ear canal normal.      Nose: Mucosal edema present.      Mouth/Throat:      Pharynx: No oropharyngeal exudate or posterior oropharyngeal erythema.   Cardiovascular:      Rate and Rhythm: Normal rate and regular rhythm.      Heart sounds: Normal heart sounds. No murmur heard.  Pulmonary:      Effort: Pulmonary effort is normal. No respiratory distress.      Breath sounds: Normal breath sounds. No wheezing, rhonchi or rales.   Musculoskeletal:      Cervical back: Neck supple.   Lymphadenopathy:      Cervical: No cervical adenopathy.   Neurological:      Mental Status: She is alert and oriented to person, place, and time.   Psychiatric:         Mood and Affect: Mood normal.         Behavior: Behavior normal.         Thought Content: Thought content normal.          Office Visit on 02/11/2025   Component Date Value Ref Range Status    LEUKOCYTE ESTERASE,UA 02/11/2025 Mod ++   Final    NITRITE,UA 02/11/2025 Neg   Final    SL AMB POCT UROBILINOGEN 02/11/2025 0.2   Final    POCT URINE PROTEIN 02/11/2025 Neg   Final     PH,UA 02/11/2025 6.5   Final    BLOOD,UA 02/11/2025 Neg   Final    SPECIFIC GRAVITY,UA 02/11/2025 1.005   Final    KETONES,UA 02/11/2025 Neg   Final    BILIRUBIN,UA 02/11/2025 Neg   Final    GLUCOSE, UA 02/11/2025 Neg   Final     COLOR,UA 02/11/2025 Clear   Final    CLARITY,UA 02/11/2025 Clear   Final    RAPID FLU A 02/11/2025 negative   Final    RAPID FLU B 02/11/2025 negative   Final       "

## 2025-02-12 DIAGNOSIS — F32.81 PMDD (PREMENSTRUAL DYSPHORIC DISORDER): ICD-10-CM

## 2025-02-12 RX ORDER — NORETHINDRONE ACETATE AND ETHINYL ESTRADIOL AND FERROUS FUMARATE 1MG-20(21)
1 KIT ORAL DAILY
Qty: 84 TABLET | Refills: 1 | Status: SHIPPED | OUTPATIENT
Start: 2025-02-12

## 2025-02-13 ENCOUNTER — RESULTS FOLLOW-UP (OUTPATIENT)
Dept: FAMILY MEDICINE CLINIC | Facility: CLINIC | Age: 35
End: 2025-02-13

## 2025-02-21 ENCOUNTER — TELEPHONE (OUTPATIENT)
Dept: ENDOCRINOLOGY | Facility: CLINIC | Age: 35
End: 2025-02-21

## 2025-02-21 DIAGNOSIS — G47.33 OSA (OBSTRUCTIVE SLEEP APNEA): ICD-10-CM

## 2025-02-21 DIAGNOSIS — E28.2 PCOS (POLYCYSTIC OVARIAN SYNDROME): ICD-10-CM

## 2025-02-21 RX ORDER — TIRZEPATIDE 2.5 MG/.5ML
INJECTION, SOLUTION SUBCUTANEOUS
Refills: 1 | OUTPATIENT
Start: 2025-02-21

## 2025-02-21 RX ORDER — TIRZEPATIDE 2.5 MG/.5ML
2.5 INJECTION, SOLUTION SUBCUTANEOUS WEEKLY
Qty: 2 ML | Refills: 1 | Status: SHIPPED | OUTPATIENT
Start: 2025-02-21

## 2025-02-21 NOTE — TELEPHONE ENCOUNTER
Pharmacy comment: Alternative Requested:PRIOR AUTH.     Reason for call:   [] Refill   [x] Prior Auth  [] Other:     Office:   [] PCP/Provider -   [x] Specialty/Provider - Endo    Medication:   tirzepatide (Zepbound) 2.5 mg/0.5 mL auto-injector     Dose/Frequency:  Inject 0.5 mL (2.5 mg total) under the skin once a week     Quantity: 2 ml    Pharmacy: [facility]/pharmacy #[id number] - [place] - [address]     Does the patient have enough for 3 days?   [] Yes   [] No - Send as HP to POD

## 2025-02-24 ENCOUNTER — OFFICE VISIT (OUTPATIENT)
Dept: OBGYN CLINIC | Facility: CLINIC | Age: 35
End: 2025-02-24
Payer: COMMERCIAL

## 2025-02-24 ENCOUNTER — PREP FOR PROCEDURE (OUTPATIENT)
Dept: OBGYN CLINIC | Facility: CLINIC | Age: 35
End: 2025-02-24

## 2025-02-24 DIAGNOSIS — M65.4 DE QUERVAIN'S TENOSYNOVITIS, RIGHT: Primary | ICD-10-CM

## 2025-02-24 DIAGNOSIS — M65.831 EXTENSOR INTERSECTION SYNDROME OF RIGHT WRIST: ICD-10-CM

## 2025-02-24 PROCEDURE — 99213 OFFICE O/P EST LOW 20 MIN: CPT | Performed by: PHYSICIAN ASSISTANT

## 2025-02-24 NOTE — ASSESSMENT & PLAN NOTE
Conservative and operative treatments were discussed with the patient at length  She would like to proceed with surgical intervention as she noticed that conservative treatment did not help with casting, splinting, anti-inflammatories and injections  Detailed consent was obtained for right de Quervain's release and right intersection syndrome release with general anesthesia  Risks and benefits of surgery were discussed  She will follow-up after surgery for suture removal  Orders:    Case request operating room: Right dequervains release, Right hand intersection syndrome release; Standing

## 2025-02-24 NOTE — PROGRESS NOTES
ORTHOPAEDIC HAND, WRIST, AND ELBOW OFFICE  VISIT     Name: Nell Baldwin      : 1990      MRN: [id number]  Encounter Provider: Cody Howell PA-C  Encounter Date: 2025   Encounter department: [facility] [place]  :  Assessment & Plan  De Quervain's tenosynovitis, right  Conservative and operative treatments were discussed with the patient at length  She would like to proceed with surgical intervention as she noticed that conservative treatment did not help with casting, splinting, anti-inflammatories and injections  Detailed consent was obtained for right de Quervain's release and right intersection syndrome release with general anesthesia  Risks and benefits of surgery were discussed  She will follow-up after surgery for suture removal  Orders:  •  Case request operating room: Right dequervains release, Right hand intersection syndrome release; Standing    Extensor intersection syndrome of right wrist  Conservative and operative treatments were discussed with the patient at length  She would like to proceed with surgical intervention as she noticed that conservative treatment did not help with casting, splinting, anti-inflammatories and injections  Detailed consent was obtained for right de Quervain's release and right intersection syndrome release with general anesthesia  Risks and benefits of surgery were discussed  She will follow-up after surgery for suture removal  Orders:  •  Case request operating room: Right dequervains release, Right hand intersection syndrome release; Standing        History of Present Illness   HPI  Chief Complaint   Patient presents with   • Right Wrist - Post-op       Nell Baldwin is a 34 y.o. female who presents for follow-up after right wrist removal of foreign body and neurolysis performed 2024.  She also has right de Quervain's tendinitis and right intersection syndrome.  She states that she continues to  have pain over the dorsal radial aspect of the wrist as well as over the dorsum of the wrist.  She was in a cast for the last 3 weeks to help shut down her wrist and the use of their hand to allow it to heal.  She states there is still pain in the areas.  She also still notes some numbness to the dorsum of her hand.      REVIEW OF SYSTEMS:  General: no fever, no chills  HEENT:  No loss of hearing or eyesight problems  Eyes:  No red eyes  Respiratory:  No coughing, shortness of breath or wheezing  Cardiovascular:  No chest pain, no palpitations  GI:  Abdomen soft nontender, denies nausea  Endocrine:  No muscle weakness, no frequent urination, no excessive thirst  Urinary:  No dysuria, no incontinence  Musculoskeletal: see HPI and PE  SKIN:  No skin rash, no dry skin  Neurological:  No headaches, no confusion  Psychiatric:  No suicide thoughts, no anxiety, no depression  Review of all other systems is negative    Objective   There were no vitals taken for this visit.     General: well developed and well nourished, alert, oriented times 3, and appears comfortable  Psychiatric: Normal  HEENT: Trachea Midline, No torticollis  Cardiovascular: No discernable arrhythmia  Pulmonary: No wheezing or stridor  Abdomen: No rebound or guarding  Extremities: No peripheral edema  Skin: No masses, erythema, lacerations, fluctation, ulcerations  Neurovascular: Sensation Intact to the Median, Ulnar, Radial Nerve, Motor Intact to the Median, Ulnar, Radial Nerve, and Pulses Intact    Musculoskeletal exam:  De Quervain's Tenosynovitis Exam:  right side    Positive tender to palpation over 1st dorsal extensor compartment   Positive palpable nodule  Positive crepitus over 1st dorsal extensor compartment   Positive Finkelstein's test    Positive pain with resisted abduction of the thumb  Tenderness to palpation over the intersection of the first and second compartment  Pain with wrist range of motion secondary to stiffness from being in a  cast  Decree sensation over the dorsal ulnar aspect of the hand from previous neurolysis        STUDIES REVIEWED:  No Studies to review      PROCEDURES PERFORMED:  Procedures  No Procedures performed today

## 2025-02-24 NOTE — TELEPHONE ENCOUNTER
PA for (Zepbound) 2.5 mg/0.5 mLSUBMITTED to [employer]    via      [x]Surescripts-Case ID #       [x]PA sent as URGENT    All office notes, labs and other pertaining documents and studies sent. Clinical questions answered. Awaiting determination from insurance company.     Turnaround time for your insurance to make a decision on your Prior Authorization can take 7-21 business days.

## 2025-02-25 NOTE — TELEPHONE ENCOUNTER
PA for (Zepbound) 2.5 mg/0.5 mL EXCLUDED from plan       Reason:(Screenshot if applicable)        Message sent to office clinical pool Yes

## 2025-03-04 ENCOUNTER — RESULTS FOLLOW-UP (OUTPATIENT)
Dept: ENDOCRINOLOGY | Facility: CLINIC | Age: 35
End: 2025-03-04

## 2025-03-04 LAB
FERRITIN SERPL-MCNC: 27 NG/ML (ref 16–154)
IRON SATN MFR SERPL: 37 % (CALC) (ref 16–45)
IRON SERPL-MCNC: 109 MCG/DL (ref 40–190)
TIBC SERPL-MCNC: 295 MCG/DL (CALC) (ref 250–450)

## 2025-03-07 ENCOUNTER — DOCUMENTATION (OUTPATIENT)
Dept: HEMATOLOGY ONCOLOGY | Facility: CLINIC | Age: 35
End: 2025-03-07

## 2025-03-07 ENCOUNTER — TELEPHONE (OUTPATIENT)
Dept: ENDOCRINOLOGY | Facility: CLINIC | Age: 35
End: 2025-03-07

## 2025-03-07 ENCOUNTER — OFFICE VISIT (OUTPATIENT)
Dept: ENDOCRINOLOGY | Facility: CLINIC | Age: 35
End: 2025-03-07
Payer: COMMERCIAL

## 2025-03-07 VITALS
HEART RATE: 73 BPM | DIASTOLIC BLOOD PRESSURE: 80 MMHG | HEIGHT: 65 IN | WEIGHT: 170 LBS | SYSTOLIC BLOOD PRESSURE: 120 MMHG | OXYGEN SATURATION: 98 % | BODY MASS INDEX: 28.32 KG/M2

## 2025-03-07 DIAGNOSIS — E61.1 IRON DEFICIENCY: ICD-10-CM

## 2025-03-07 DIAGNOSIS — G47.33 OSA (OBSTRUCTIVE SLEEP APNEA): ICD-10-CM

## 2025-03-07 DIAGNOSIS — E28.2 PCOS (POLYCYSTIC OVARIAN SYNDROME): ICD-10-CM

## 2025-03-07 DIAGNOSIS — E66.3 OVERWEIGHT: ICD-10-CM

## 2025-03-07 DIAGNOSIS — R53.83 OTHER FATIGUE: Primary | ICD-10-CM

## 2025-03-07 DIAGNOSIS — E61.1 IRON DEFICIENCY: Primary | ICD-10-CM

## 2025-03-07 PROCEDURE — 99214 OFFICE O/P EST MOD 30 MIN: CPT

## 2025-03-07 RX ORDER — LIRAGLUTIDE 6 MG/ML
INJECTION, SOLUTION SUBCUTANEOUS
Refills: 0 | OUTPATIENT
Start: 2025-03-07

## 2025-03-07 NOTE — ASSESSMENT & PLAN NOTE
Orders:    liraglutide (SAXENDA) injection; Inject 0.1 mL (0.6 mg total) under the skin daily    liraglutide (SAXENDA) injection; Inject 0.2 mL (1.2 mg total) under the skin daily

## 2025-03-07 NOTE — TELEPHONE ENCOUNTER
Reason for call:   [x] Prior Auth  [] Other:     Caller:  [] Patient  [x] Pharmacy  Name: [facility]  Address:[address] [place] [address]    Callback Number: [phone]     Medication: liraglutide (SAXENDA) injection     Dose/Frequency: Inject 0.1 mL (0.6 mg total) under the skin daily     Inject 0.2 mL (1.2 mg total) under the skin daily     Quantity: 3 ml    Ordering Provider:   [] PCP/Provider -   [x] Speciality/Provider -

## 2025-03-07 NOTE — ASSESSMENT & PLAN NOTE
Check TSH  Increase iron tablet to twice daily. Repeat iron panel in 3 months  Orders:    TSH + Free T4; Future    Ambulatory Referral to Hematology / Oncology; Future

## 2025-03-07 NOTE — PROGRESS NOTES
Chart was clinically reviewed.  At this time the referral to hematology-oncology will be closed, as there is limited work-up for the specified referral diagnosis. Please place an order for “Amb e-consult to hematology “ and reason for referral in comment section to obtain recommendations for additional testing/work-up.? The e-consult will consist of one of our providers reviewing the patient's chart and sending any recommendations to referring provider to complete.? Once there is definitive information, patient can be referred back for in-person visit if appropriate.? This will be completed within 7 business days. Please notify patient. Thank you.

## 2025-03-07 NOTE — ASSESSMENT & PLAN NOTE
Zepbound, Wegovy not covered  Will send for Saxenda to be used daily. Side effects reviewed.  Advise she continue healthy diet and incorporate exercise routine to help with weight loss   Orders:    liraglutide (SAXENDA) injection; Inject 0.1 mL (0.6 mg total) under the skin daily    liraglutide (SAXENDA) injection; Inject 0.2 mL (1.2 mg total) under the skin daily    Hemoglobin A1C; Future    Comprehensive metabolic panel; Future    Lipid panel; Future

## 2025-03-07 NOTE — ASSESSMENT & PLAN NOTE
See above   Orders:    liraglutide (SAXENDA) injection; Inject 0.1 mL (0.6 mg total) under the skin daily    liraglutide (SAXENDA) injection; Inject 0.2 mL (1.2 mg total) under the skin daily

## 2025-03-07 NOTE — PROGRESS NOTES
Name: Nell Baldwin      : 1990      MRN: [id number]  Encounter Provider: EDWARDO Zhong  Encounter Date: 3/7/2025   Encounter department: [facility] [place]    Chief Complaint   Patient presents with    PCOS   :  Assessment & Plan  Other fatigue  Check TSH  Increase iron tablet to twice daily. Repeat iron panel in 3 months  Orders:    TSH + Free T4; Future    Ambulatory Referral to Hematology / Oncology; Future    Iron deficiency  See above  Orders:    Iron Panel (Includes Ferritin, Iron Sat%, Iron, and TIBC); Future    PCOS (polycystic ovarian syndrome)  Zepbound, Wegovy not covered  Will send for Saxenda to be used daily. Side effects reviewed.  Advise she continue healthy diet and incorporate exercise routine to help with weight loss   Orders:    liraglutide (SAXENDA) injection; Inject 0.1 mL (0.6 mg total) under the skin daily    liraglutide (SAXENDA) injection; Inject 0.2 mL (1.2 mg total) under the skin daily    Hemoglobin A1C; Future    Comprehensive metabolic panel; Future    Lipid panel; Future    Adult BMI 28.0-28.9 kg/sq m  See above  Orders:    liraglutide (SAXENDA) injection; Inject 0.1 mL (0.6 mg total) under the skin daily    liraglutide (SAXENDA) injection; Inject 0.2 mL (1.2 mg total) under the skin daily    Overweight  See above   Orders:    liraglutide (SAXENDA) injection; Inject 0.1 mL (0.6 mg total) under the skin daily    liraglutide (SAXENDA) injection; Inject 0.2 mL (1.2 mg total) under the skin daily    [place] (obstructive sleep apnea)    Orders:    liraglutide (SAXENDA) injection; Inject 0.1 mL (0.6 mg total) under the skin daily    liraglutide (SAXENDA) injection; Inject 0.2 mL (1.2 mg total) under the skin daily        History of Present Illness     Nell Baldwin is a 34 y.o. female with a history of PCOS, [place], fatigue.    +palpitations- on propranolol   Has peloton bike at home but unable to use due to wrist  "pain. Needs another wrist surgery in the future.  Eats healthy at home. Has 3 young children (boys)    Does not have issues sleeping  Reports sleep study normal  Sleeps 7-8 hours per night  Feels exhausted, could sleep at any time  Takes iron supplement 65 mg once daily  Has seen weight management in the past    Pertinent Medical History   Zepbound, Wegovy denied  Did cash pay option for Zepbound through [employer] for a few months, did not notice improvement in weight with 5 mg/week dose  Previously on phentermine- did not notice any weight loss      Review of Systems   Constitutional:  Positive for fatigue. Negative for chills and fever.   HENT:  Negative for ear pain and sore throat.    Eyes:  Negative for pain and visual disturbance.   Respiratory:  Negative for cough and shortness of breath.    Cardiovascular:  Negative for chest pain and palpitations.   Gastrointestinal:  Negative for abdominal pain and vomiting.   Genitourinary:  Negative for dysuria and hematuria.   Musculoskeletal:  Negative for arthralgias and back pain.   Skin:  Negative for color change and rash.   Neurological:  Negative for seizures and syncope.   All other systems reviewed and are negative.   as per HPI       Medical History Reviewed by provider this encounter:     .    Objective   /80   Pulse 73   Ht 5' 5\" (1.651 m)   Wt 77.1 kg (170 lb)   SpO2 98%   BMI 28.29 kg/m²      Body mass index is 28.29 kg/m².  Wt Readings from Last 3 Encounters:   03/07/25 77.1 kg (170 lb)   02/11/25 77.2 kg (170 lb 3.2 oz)   02/03/25 77.1 kg (170 lb)     Physical Exam  Vitals reviewed.   HENT:      Head: Normocephalic and atraumatic.   Cardiovascular:      Rate and Rhythm: Normal rate. Rhythm irregular.   Pulmonary:      Effort: Pulmonary effort is normal.   Neurological:      Mental Status: She is alert.         Labs:   Lab Results   Component Value Date    HGBA1C 5.0 03/31/2020     Lab Results   Component Value Date    CREATININE 0.66 04/18/2024    " "CREATININE 0.72 06/13/2023    CREATININE 0.70 10/04/2022    BUN 15 04/18/2024     04/11/2016    K 4.3 04/18/2024     04/18/2024    CO2 29 04/18/2024     eGFRcr   Date Value Ref Range Status   06/13/2023 113 > OR = 60 mL/min/1.73m2 Final     Comment:     The eGFR is based on the CKD-EPI 2021 equation. To calculate   the new eGFR from a previous Creatinine or Cystatin C  result, go to https://www.kidney.org/professionals/  kdoqi/gfr%5Fcalculator     eGFR   Date Value Ref Range Status   04/18/2024 118 > OR = 60 mL/min/1.73m2 Final     Lab Results   Component Value Date    HDL 53 06/22/2023    TRIG 58 06/22/2023     Lab Results   Component Value Date    ALT 8 04/18/2024    AST 12 04/18/2024    ALKPHOS 50 04/18/2024    BILITOT 0.5 04/11/2016     Lab Results   Component Value Date    TMS7FAWUXVHH 1.530 10/11/2016    AEE6NPOUOBYM 1.820 08/22/2016    AOD4VOMNNNDZ 1.43 04/11/2016     No results found for: \"FREET4\", \"TSI\"    There are no Patient Instructions on file for this visit.    Discussed with the patient and all questioned fully answered. She will call me if any problems arise.      "

## 2025-03-10 NOTE — TELEPHONE ENCOUNTER
PA for Saxenda Injection SUBMITTED to [facility]    via    []CMM-KEY:   [x]Surescripts-Case ID #   []Availity-Auth ID # NDC #   []Faxed to plan   []Other website   []Phone call Case ID #     [x]PA sent as URGENT    All office notes, labs and other pertaining documents and studies sent. Clinical questions answered. Awaiting determination from insurance company.     Turnaround time for your insurance to make a decision on your Prior Authorization can take 7-21 business days.

## 2025-03-11 ENCOUNTER — RESULTS FOLLOW-UP (OUTPATIENT)
Dept: ENDOCRINOLOGY | Facility: CLINIC | Age: 35
End: 2025-03-11

## 2025-03-11 ENCOUNTER — TELEPHONE (OUTPATIENT)
Dept: ENDOCRINOLOGY | Facility: CLINIC | Age: 35
End: 2025-03-11

## 2025-03-11 LAB
ALBUMIN SERPL-MCNC: 4 G/DL (ref 3.6–5.1)
ALBUMIN/GLOB SERPL: 1.7 (CALC) (ref 1–2.5)
ALP SERPL-CCNC: 40 U/L (ref 31–125)
ALT SERPL-CCNC: 9 U/L (ref 6–29)
AST SERPL-CCNC: 12 U/L (ref 10–30)
BILIRUB SERPL-MCNC: 0.5 MG/DL (ref 0.2–1.2)
BUN SERPL-MCNC: 12 MG/DL (ref 7–25)
BUN/CREAT SERPL: NORMAL (CALC) (ref 6–22)
CALCIUM SERPL-MCNC: 8.8 MG/DL (ref 8.6–10.2)
CHLORIDE SERPL-SCNC: 104 MMOL/L (ref 98–110)
CHOLEST SERPL-MCNC: 153 MG/DL
CHOLEST/HDLC SERPL: 3.2 (CALC)
CO2 SERPL-SCNC: 30 MMOL/L (ref 20–32)
CREAT SERPL-MCNC: 0.71 MG/DL (ref 0.5–0.97)
GFR/BSA.PRED SERPLBLD CYS-BASED-ARV: 114 ML/MIN/1.73M2
GLOBULIN SER CALC-MCNC: 2.3 G/DL (CALC) (ref 1.9–3.7)
GLUCOSE SERPL-MCNC: 83 MG/DL (ref 65–99)
HBA1C MFR BLD: 5.1 % OF TOTAL HGB
HDLC SERPL-MCNC: 48 MG/DL
LDLC SERPL CALC-MCNC: 88 MG/DL (CALC)
NONHDLC SERPL-MCNC: 105 MG/DL (CALC)
POTASSIUM SERPL-SCNC: 4.4 MMOL/L (ref 3.5–5.3)
PROT SERPL-MCNC: 6.3 G/DL (ref 6.1–8.1)
SODIUM SERPL-SCNC: 138 MMOL/L (ref 135–146)
T4 FREE SERPL-MCNC: 1.1 NG/DL (ref 0.8–1.8)
TRIGL SERPL-MCNC: 77 MG/DL
TSH SERPL-ACNC: 1.67 MIU/L

## 2025-03-11 NOTE — TELEPHONE ENCOUNTER
Please provide clinical information to be added to appeal letter    Just FYI - looks like weight loss injections are excluded from plan. Will still send appeal letter

## 2025-03-13 NOTE — TELEPHONE ENCOUNTER
PA for Saxenda Injection APPEALED via     []CMM  []SS  [x]Letter sent to insurance via fax [phone]  []Other site or means     All necessary records sent. Will await response from insurance company    Turnaround time for a decision to be made on an appeal could take up to 30 business days

## 2025-03-14 ENCOUNTER — TELEPHONE (OUTPATIENT)
Dept: ENDOCRINOLOGY | Facility: CLINIC | Age: 35
End: 2025-03-14

## 2025-03-14 ENCOUNTER — E-CONSULT (OUTPATIENT)
Dept: HEMATOLOGY ONCOLOGY | Facility: MEDICAL CENTER | Age: 35
End: 2025-03-14
Payer: COMMERCIAL

## 2025-03-14 DIAGNOSIS — R53.83 OTHER FATIGUE: Primary | ICD-10-CM

## 2025-03-14 DIAGNOSIS — E61.1 IRON DEFICIENCY: Primary | ICD-10-CM

## 2025-03-14 PROCEDURE — 99446 NTRPROF PH1/NTRNET/EHR 5-10: CPT | Performed by: PHYSICIAN ASSISTANT

## 2025-03-14 NOTE — PROGRESS NOTES
E-Consult  Nell Baldwin 34 y.o. female MRN: [id number]  Encounter Date: 03/14/25      Reason for Consult / Principal Problem:   low ferritin despite supplementation         Consulting Provider: Lindsey Garrett PA-C    Requesting Provider: Eric Shannon CRNP       ASSESSMENT:  Patient is a 34 year-old who we were asked to evaluate regarding low ferritin.    Her past medical history is significant for fatigue, PCOS, obstructive sleep apnea.  She has 3 young sons at home.  She was previously taking oral iron 1 tablet daily.  She was recently seen by endocrinology and asked to increase to twice daily.    RECOMMENDATIONS:  The last CBC that I see in this patient's charting is from 2020. Recommend to check CBC.    Iron studies on 3/3/2025 look fine. Total iron is 109. Ferritin is 27. Iron saturation 37%.  This would not be consistent with iron deficiency.  I do not feel that she needs any more iron supplementation.    Total time spent 5-10 minutes, >50% of the total time devoted to medical consultative verbal/EMR discussion between providers. Written report will be generated in the EMR.

## 2025-03-14 NOTE — TELEPHONE ENCOUNTER
Left detailed message for patient        ----- Message from EDWARDO Larson sent at 3/14/2025 10:03 AM EDT -----  Please contact patient. Needs CBC completed before hematology will see her. Looks like lab missed it  ----- Message -----  From: Shonna Myers RN  Sent: 3/14/2025   9:57 AM EDT  To: EDWARDO Zhogn    All of her labs got drawn but the CBC  ----- Message -----  From: EDWARDO Zhong  Sent: 3/7/2025   2:23 PM EDT  To: Shonna Myers RN    Hello,    I just added a CBC. Will you reach out to patient once completed? Thank you  ----- Message -----  From: Susan Garrido MD  Sent: 3/7/2025   1:54 PM EST  To: EDWARDO Zhong      ----- Message -----  From: Shonna Myers RN  Sent: 3/7/2025   1:35 PM EST  To: Susan Garrido MD    Pt needs labs that were ordered today done prior to scheduling a consult.  Please add CBC.  Deferring referral until labs are completed to evaluate.    Thank you!

## 2025-03-15 LAB
BASOPHILS # BLD AUTO: 23 CELLS/UL (ref 0–200)
BASOPHILS NFR BLD AUTO: 0.3 %
EOSINOPHIL # BLD AUTO: 98 CELLS/UL (ref 15–500)
EOSINOPHIL NFR BLD AUTO: 1.3 %
ERYTHROCYTE [DISTWIDTH] IN BLOOD BY AUTOMATED COUNT: 11.8 % (ref 11–15)
HCT VFR BLD AUTO: 36.4 % (ref 35–45)
HGB BLD-MCNC: 12.3 G/DL (ref 11.7–15.5)
LYMPHOCYTES # BLD AUTO: 2205 CELLS/UL (ref 850–3900)
LYMPHOCYTES NFR BLD AUTO: 29.4 %
MCH RBC QN AUTO: 31.9 PG (ref 27–33)
MCHC RBC AUTO-ENTMCNC: 33.8 G/DL (ref 32–36)
MCV RBC AUTO: 94.5 FL (ref 80–100)
MONOCYTES # BLD AUTO: 540 CELLS/UL (ref 200–950)
MONOCYTES NFR BLD AUTO: 7.2 %
NEUTROPHILS # BLD AUTO: 4635 CELLS/UL (ref 1500–7800)
NEUTROPHILS NFR BLD AUTO: 61.8 %
PLATELET # BLD AUTO: 250 THOUSAND/UL (ref 140–400)
PMV BLD REES-ECKER: 10.2 FL (ref 7.5–12.5)
RBC # BLD AUTO: 3.85 MILLION/UL (ref 3.8–5.1)
WBC # BLD AUTO: 7.5 THOUSAND/UL (ref 3.8–10.8)

## 2025-03-17 ENCOUNTER — TELEPHONE (OUTPATIENT)
Dept: BARIATRICS | Facility: CLINIC | Age: 35
End: 2025-03-17

## 2025-03-18 ENCOUNTER — RESULTS FOLLOW-UP (OUTPATIENT)
Dept: ENDOCRINOLOGY | Facility: CLINIC | Age: 35
End: 2025-03-18

## 2025-03-19 ENCOUNTER — TELEPHONE (OUTPATIENT)
Dept: ENDOCRINOLOGY | Facility: CLINIC | Age: 35
End: 2025-03-19

## 2025-03-25 ENCOUNTER — OFFICE VISIT (OUTPATIENT)
Dept: FAMILY MEDICINE CLINIC | Facility: CLINIC | Age: 35
End: 2025-03-25
Payer: COMMERCIAL

## 2025-03-25 VITALS
OXYGEN SATURATION: 99 % | DIASTOLIC BLOOD PRESSURE: 80 MMHG | TEMPERATURE: 98.1 F | SYSTOLIC BLOOD PRESSURE: 130 MMHG | HEIGHT: 65 IN | WEIGHT: 172 LBS | HEART RATE: 85 BPM | BODY MASS INDEX: 28.66 KG/M2 | RESPIRATION RATE: 15 BRPM

## 2025-03-25 DIAGNOSIS — N76.0 ACUTE VAGINITIS: ICD-10-CM

## 2025-03-25 DIAGNOSIS — J01.00 ACUTE NON-RECURRENT MAXILLARY SINUSITIS: Primary | ICD-10-CM

## 2025-03-25 PROCEDURE — 99213 OFFICE O/P EST LOW 20 MIN: CPT | Performed by: NURSE PRACTITIONER

## 2025-03-25 RX ORDER — PREDNISONE 10 MG/1
TABLET ORAL
Qty: 20 TABLET | Refills: 0 | Status: SHIPPED | OUTPATIENT
Start: 2025-03-25

## 2025-03-25 RX ORDER — BUPROPION HYDROCHLORIDE 150 MG/1
150 TABLET ORAL EVERY MORNING
COMMUNITY
Start: 2025-03-11

## 2025-03-25 RX ORDER — FLUCONAZOLE 150 MG/1
150 TABLET ORAL ONCE
Qty: 1 TABLET | Refills: 0 | Status: SHIPPED | OUTPATIENT
Start: 2025-03-25 | End: 2025-03-25

## 2025-03-25 NOTE — PROGRESS NOTES
"Name: Nell Baldwin      : 1990      MRN: [id number]  Encounter Provider: EDWARDO Davenport  Encounter Date: 3/25/2025   Encounter department: [facility]  :  Assessment & Plan  Acute non-recurrent maxillary sinusitis    Orders:    amoxicillin-clavulanate (AUGMENTIN) 875-125 mg per tablet; Take 1 tablet by mouth every 12 (twelve) hours for 14 days    predniSONE 10 mg tablet; Take 4 tablets with food on days 1 & 2. Then take 3 tablets with food on days 3 &4. Then take 2 tablets with food on days 5 & 6. Then take 1 tablet with food on days 7 & 8.    Augmentin and Prednisone taper prescribed. Medication and s/e reviewed. Rest, fluids, vitamin C,D,zinc encouraged. Patient is encouraged to call our office for any questions/concerns, persistent or worsening symptoms. Patient states they understand and agree with treatment plan.           Pt to f/u PRN.      Depression Screening and Follow-up Plan: Patient was screened for depression during today's encounter. They screened negative with a PHQ-2 score of 0.        History of Present Illness   Pt presents today for 2 weeks of sinus pressure, nasal congestion, cough, green rhinorrhea.  Pt has been using Mucinex and Tylenol RTC without relief.  Pt previously had flu 1 month ago.          Review of Systems  As noted per HPI.  Objective   /80   Pulse 85   Temp 98.1 °F (36.7 °C)   Resp 15   Ht 5' 5\" (1.651 m)   Wt 78 kg (172 lb)   SpO2 99%   BMI 28.62 kg/m²      Physical Exam  Vitals reviewed.   Constitutional:       Appearance: Normal appearance.   HENT:      Right Ear: Tympanic membrane, ear canal and external ear normal.      Left Ear: Tympanic membrane, ear canal and external ear normal.      Nose: Congestion and rhinorrhea (thick green) present.      Right Sinus: Maxillary sinus tenderness and frontal sinus tenderness present.      Left Sinus: Maxillary sinus tenderness and frontal sinus " tenderness present.      Mouth/Throat:      Pharynx: No oropharyngeal exudate or posterior oropharyngeal erythema.   Cardiovascular:      Pulses: Normal pulses.      Heart sounds: Normal heart sounds.   Pulmonary:      Effort: Pulmonary effort is normal.      Breath sounds: Normal breath sounds.   Lymphadenopathy:      Cervical: No cervical adenopathy.   Neurological:      Mental Status: She is alert.

## 2025-03-26 ENCOUNTER — TELEPHONE (OUTPATIENT)
Dept: OBGYN CLINIC | Facility: HOSPITAL | Age: 35
End: 2025-03-26

## 2025-03-26 NOTE — TELEPHONE ENCOUNTER
Caller: Patient    Doctor: Dr. Middleton    Reason for call: Patient called back returning missed call from Ortho Pod Nurse. Please advise.    Call back#: [phone]

## 2025-03-26 NOTE — TELEPHONE ENCOUNTER
Caller: Patient    Doctor: Dr. Middleton    Reason for call: Patient is having pain & discomfort on the ulnar side of right wrist (opposite side of her operation on 4/15/25 is. She would like to know if she could get a steroid injection on that opposite side and if so could she be forced on to schedule in [place] Friday 3/28/25 before she leaves for [place]. Please call patient. Thank you.    Call back#: [phone]

## 2025-03-26 NOTE — TELEPHONE ENCOUNTER
Please advise the patient that if we perform the injection, we would have to postpone her surgery on 4/15/2025 due to the steroid injection.  The steroid injection would increase the risk for wound healing issues as well as infection. Thank you.

## 2025-03-27 NOTE — TELEPHONE ENCOUNTER
Called and spoke with pt and relayed CARLIE Howell's message, she stated understanding, no further questions.

## 2025-03-31 ENCOUNTER — ANESTHESIA EVENT (OUTPATIENT)
Age: 35
End: 2025-03-31
Payer: COMMERCIAL

## 2025-04-01 ENCOUNTER — TELEPHONE (OUTPATIENT)
Age: 35
End: 2025-04-01

## 2025-04-01 NOTE — TELEPHONE ENCOUNTER
Caller: Nell    Doctor: Dr. Cohen / Cody Howell     Reason for call: Returning a call, wanted to let you know she is feeling much better, the medication helped. She is currently at [place] and may not hear her phone if you need to call back please leave a     Call back#: [phone]

## 2025-04-07 RX ORDER — FERROUS SULFATE 325(65) MG
325 TABLET ORAL
COMMUNITY

## 2025-04-07 NOTE — PRE-PROCEDURE INSTRUCTIONS
[facility]  History & Physical    Patient Name: Nickie Segura  MRN: [id number]  Patient Class: OP- Observation  Admission Date: 5/17/2022  Attending Physician: Adrianna Childs*   Primary Care Provider: Perla Hopper MD         Patient information was obtained from patient, relative(s), past medical records and ER records.     Subjective:     Principal Problem:Neutropenia    Chief Complaint:   Chief Complaint   Patient presents with    MD referral     RBC, WBC, H&H was lower today than yesterday at Dr. Peña office and sent for recheck. Patient is awake and alert. Over weekend was lightheaded        HPI: Nickie Segura is a 85-year-old female with a history of hypothyroid, iron deficiency anemia, anxiety, history of stroke with residual right-sided weakness, and macular degeneration who presented to the ED for complaints of  occasional lightheadedness and anemia and leukopenia on outpatient labs. Sister at bedside. Patient has been having occasional lightheadedness for the last 3 days, but is not persistent. Patient is mostly bedbound and has history of anemia requiring blood transfusions, but no history of leukopenia/neutropenia. Patient is followed by Dr. Denton Hematology and Oncology as an outpatient for iron deficiency anemia and recently discontinued oral iron due to constipation. Patient has received 2 doses of IV iron outpatient. Patient denies any chest pain, palpitations, worsening weakness, dysuria, abdominal pain, nausea/vomiting, black/bloody stools.    In ED labs remarkable for WBC 1.75, RBC 2.66, Hgb 5.8, Alb 3.1, , , AG 5, (5/16/22 iron 14, TIBC 540). CXR: No acute cardiopulmonary abnormality. Admitted for observation to [facility] for further care.      Past Medical History:   Diagnosis Date    LORNA positive     Anxiety     Bilateral cataracts     Colitis     Colon polyp     Depression     Elevated IgE level     Hepatomegaly   Pre-Surgery Instructions:   Medication Instructions    Aurovela FE 1/20 1-20 MG-MCG per tablet Take day of surgery.    buPROPion (WELLBUTRIN XL) 150 mg 24 hr tablet Take day of surgery.    ferrous sulfate 325 (65 Fe) mg tablet Stop taking 7 days prior to surgery.    FLUoxetine 10 MG tablet Take day of surgery.    metFORMIN (GLUCOPHAGE) 500 mg tablet Hold day of surgery.    multivitamin (THERAGRAN) TABS Stop taking 7 days prior to surgery.    propranolol (INDERAL) 10 mg tablet Take day of surgery.       Semaglutide, 1 MG/DOSE, SC    stop taking 7 days prior to surgery.    Medication instructions for day of surgery reviewed. Please take all instructed medications with only a sip of water.       You will receive a call one business day prior to surgery with an arrival time and hospital directions. If your surgery is scheduled on a Monday, the hospital will be calling you on the Friday prior to your surgery. If you have not heard from anyone by 8pm, please call the hospital supervisor through the hospital  at [phone]. ([place] [phone] or [place] [phone]).    Do not eat or drink anything after midnight the night before your surgery, including candy, mints, lifesavers, or chewing gum. Do not drink alcohol 24hrs before your surgery. Try not to smoke at least 24hrs before your surgery.       Follow the pre surgery showering instructions as listed in the “My Surgical Experience Booklet” or otherwise provided by your surgeon's office. Do not use a blade to shave the surgical area 1 week before surgery. It is okay to use a clean electric clippers up to 24 hours before surgery. Do not apply any lotions, creams, including makeup, cologne, deodorant, or perfumes after showering on the day of your surgery. Do not use dry shampoo, hair spray, hair gel, or any type of hair products.     No contact lenses, eye make-up, or artificial eyelashes. Remove nail polish, including gel polish, and any artificial, gel,     Hiatal hernia     Hypothyroidism     IFG (impaired fasting glucose)     Iron deficiency anemia     Macular degeneration     Nicotine dependence     Osteoarthritis     Recurrent UTI     Renal cyst     Skin cancer     Tuberculosis of bladder     Urinary incontinence     Vitamin B12 deficiency     Wheezing        Past Surgical History:   Procedure Laterality Date    CHOLECYSTECTOMY         Review of patient's allergies indicates:   Allergen Reactions    Penicillins      rash       No current facility-administered medications on file prior to encounter.     Current Outpatient Medications on File Prior to Encounter   Medication Sig    Al hyd-Mg tr-alg ac-sod bicarb (GAVISCON) 80-14.2 mg Chew Take 1 tablet by mouth once daily.    albuterol (PROVENTIL/VENTOLIN HFA) 90 mcg/actuation inhaler Inhale 2 puffs into the lungs every 6 (six) hours as needed. Rescue    aspirin (ECOTRIN) 81 MG EC tablet Take 81 mg by mouth once daily.    busPIRone (BUSPAR) 10 MG tablet 1 po BID    cetirizine (ZYRTEC) 10 MG tablet 1 tablet DAILY (route: oral)    citalopram (CELEXA) 20 MG tablet TAKE 1 AND 1/2 TABLETS BY MOUTH DAILY    doxycycline (VIBRAMYCIN) 100 MG Cap Take 1 capsule (100 mg total) by mouth every 12 (twelve) hours.    erythromycin (ROMYCIN) ophthalmic ointment SMARTSI Inch(es) Left Eye Every Night    estradiol (ESTRACE) 0.01 % (0.1 mg/gram) vaginal cream Place 1 g vaginally 3 (three) times a week.    fluticasone propionate (FLONASE ALLERGY RELIEF NASL) by Each Nostril route as needed.    levothyroxine (SYNTHROID) 100 MCG tablet Take 1 tablet (100 mcg total) by mouth once daily.    LORazepam (ATIVAN) 0.5 MG tablet TAKE 1 TABLET BY MOUTH EVERY DAY AS NEEDED ANXIETY    mag/aluminum/sod bicarb/alginc (GAVISCON ORAL) Take 1 tablet by mouth daily as needed.    MYRBETRIQ 50 mg Tb24 Take 1 tablet (50 mg total) by mouth once daily.    neomycin-polymyxin-dexamethasone (DEXACINE) 3.5 mg/g-10,000 unit/g-0.1 %  or acrylic nails if possible. Remove all jewelry including rings and body piercing jewelry.     Wear causal clothing that is easy to take on and off. Consider your type of surgery.    Keep any valuables, jewelry, piercings at home. Please bring any specially ordered equipment (sling, braces) if indicated.    Arrange for a responsible person to drive you to and from the hospital on the day of your surgery. Please confirm the visitor policy for the day of your procedure when you receive your phone call with an arrival time.     Call the surgeon's office with any new illnesses, exposures, or additional questions prior to surgery.    Please reference your “My Surgical Experience Booklet” for additional information to prepare for your upcoming surgery.    Oint APPLY A SMALL AMOUNT IN BOTH EYES TWICE DAILY    rosuvastatin (CRESTOR) 20 MG tablet Take 1 tablet (20 mg total) by mouth once daily.    sodium chloride (OCEAN) 0.65 % nasal spray 1 spray DAILY (route: nasal)    TOVIAZ 8 mg Tb24 Take 1 tablet by mouth once daily.    vit C/E/zinc ox/amy/lut/zeax (ICAPS AREDS2 ORAL) Take by mouth Daily.     Family History       Problem Relation (Age of Onset)    Atrial fibrillation Sister    Diabetes Mother    Heart disease Mother, Father    Polycystic kidney disease Father          Tobacco Use    Smoking status: Former Smoker     Types: Cigarettes, Vaping with nicotine    Smokeless tobacco: Current User   Substance and Sexual Activity    Alcohol use: Yes    Drug use: Never    Sexual activity: Not Currently     Review of Systems   Constitutional:  Positive for fatigue. Negative for activity change, appetite change, chills, diaphoresis, fever and unexpected weight change.   HENT: Negative.     Eyes: Negative.    Respiratory: Negative.     Cardiovascular: Negative.    Gastrointestinal: Negative.    Genitourinary: Negative.    Musculoskeletal: Negative.    Skin: Negative.    Neurological:  Positive for weakness and light-headedness.   Objective:     Vital Signs (Most Recent):  Temp: 96.8 °F (36 °C) (05/17/22 2324)  Pulse: 74 (05/18/22 0000)  Resp: 18 (05/17/22 2324)  BP: (!) 114/56 (05/17/22 2324)  SpO2: 96 % (05/17/22 2354)   Vital Signs (24h Range):  Temp:  [96.8 °F (36 °C)-98.6 °F (37 °C)] 96.8 °F (36 °C)  Pulse:  [70-99] 74  Resp:  [17-25] 18  SpO2:  [96 %-98 %] 96 %  BP: (111-131)/(52-58) 114/56     Weight: 77.1 kg (170 lb)  Body mass index is 28.29 kg/m².    Physical Exam  Vitals and nursing note reviewed.   Constitutional:       General: She is not in acute distress.     Appearance: She is not toxic-appearing.      Comments: Frail, coherent   HENT:      Head: Normocephalic and atraumatic.      Mouth/Throat:      Mouth: Mucous membranes are moist.   Eyes:       Pupils: Pupils are equal, round, and reactive to light.   Cardiovascular:      Rate and Rhythm: Normal rate and regular rhythm.      Heart sounds: Murmur heard.   Pulmonary:      Effort: Pulmonary effort is normal. No respiratory distress.      Breath sounds: Normal breath sounds.   Abdominal:      General: Bowel sounds are normal. There is no distension.      Palpations: Abdomen is soft.      Tenderness: There is no abdominal tenderness.   Musculoskeletal:         General: No swelling.      Cervical back: Normal range of motion.   Skin:     General: Skin is warm and dry.      Capillary Refill: Capillary refill takes 2 to 3 seconds.   Neurological:      General: No focal deficit present.      Mental Status: She is alert and oriented to person, place, and time. Mental status is at baseline.   Psychiatric:         Mood and Affect: Mood normal.         Behavior: Behavior normal.         Thought Content: Thought content normal.         Judgment: Judgment normal.         CRANIAL NERVES     CN III, IV, VI   Pupils are equal, round, and reactive to light.     Significant Labs: All pertinent labs within the past 24 hours have been reviewed.  Recent Lab Results         05/17/22 2025 05/17/22  1436        Unit Blood Type Code 7300  [P]          7300  [P]         Unit Expiration [id number]  [P]          [id number]  [P]         Unit Blood Type B POS  [P]          B POS  [P]         Albumin   3.1       Alkaline Phosphatase   134       ALT   208       Anion Gap   5       Aniso   Slight       AST   282       Baso #   0.04       Basophil %   2.3       BILIRUBIN TOTAL   0.3  Comment: For infants and newborns, interpretation of results should be based  on gestational age, weight and in agreement with clinical  observations.    Premature Infant recommended reference ranges:  Up to 24 hours.............<8.0 mg/dL  Up to 48 hours............<12.0 mg/dL  3-5 days..................<15.0 mg/dL  6-29 days.................<15.0  mg/dL         BUN   18       Calcium   8.8       Chloride   106       CO2   29       CODING SYSTEM [id number]  [P]          [id number]  [P]         Creatinine   0.8       Differential Method   Automated       DISPENSE STATUS ISSUED  [P]          CROSSMATCHED  [P]         eGFR if    >60       eGFR if non    >60  Comment: Calculation used to obtain the estimated glomerular filtration  rate (eGFR) is the CKD-EPI equation.          Eos #   0.1       Eosinophil %   5.7       Glucose   108       Gran # (ANC)   0.5       Gran %   25.7       Group & Rh B POS   B POS       Hematocrit   21.1       Hemoglobin   5.8  Comment: WBC and Hemoglobin critical result(s) called and verbal readback   obtained from  Edith Major RN. at 15:30 on 05/17/2022 by VDM   05/17/2022 16:16         Hypo   Moderate       Immature Grans (Abs)   0.00  Comment: Mild elevation in immature granulocytes is non specific and   can be seen in a variety of conditions including stress response,   acute inflammation, trauma and pregnancy. Correlation with other   laboratory and clinical findings is essential.         Immature Granulocytes   0.0       INDIRECT BRIAN NEG   NEG       Lymph #   0.8       Lymph %   44.6       MCH   21.8       MCHC   27.5       MCV   79       Mono #   0.4       Mono %   21.7       MPV   9.2       nRBC   0       Ovalocytes   Occasional       Platelet Estimate   Appears normal       Platelets   243       Poikilocytosis   Slight       Poly   Occasional       Potassium   4.5       Product Code V3115H52  [P]          [id number]  [P]         PROTEIN TOTAL   6.0       RBC   2.66       RDW   18.6       Sodium   140       UNIT NUMBER [id number]  [P]          [id number]  [P]         WBC   1.75  Comment: WBC and Hemoglobin critical result(s) called and verbal readback   obtained from  Edith Major RN. at 15:30 on 05/17/2022 by VDM   05/17/2022 16:16                  [P] - Preliminary Result                Significant Imaging: I have reviewed all pertinent imaging results/findings within the past 24 hours.  I have reviewed and interpreted all pertinent imaging results/findings within the past 24 hours.    Assessment/Plan:     * Neutropenia  Anemia and leukopenia of unclear source  history of iron deficiency anemia, however with rapid drop in hemoglobin   no history of leukopenia/neutropenia  Presents with lightheadedness  Denies chest pain, shortness of breath, nausea/vomiting, evidence of bleeding, sick contacts  CXR: No acute cardiopulmonary abnormality  On exam: BS clear, no immediate evidence of infection  Iron 14  TIBC 540 hgb 5.8 WBC 1.75  Received iron IV in ED x1  Urine sample pending  Transfusing 2 units PRBCs now  Consult hematology/oncology    Elevated LFTs      Patient has known hepatomegaly  Levels noted to be increasing since 11/5/21  monitor      Debility  Old cardioembolic stroke with late effect  Mild right sided residual weakness noted  Fall precautions       RENÉE (generalized anxiety disorder)  Depression  Anxiety  Resume home meds    Pure hypercholesterolemia  Cardiovascular disease   Patient is chronically on statin.will not continue for now. Monitor clinically. Last LDL was   Lab Results   Component Value Date    LDLCALC 56.8 (L) 01/31/2022      Elevated LFTs   Hold statin for now  monitor      Hypothyroidism  Patient has chronic hypothyroidism. TFTs reviewed-   Lab Results   Component Value Date    TSH 2.074 11/05/2021   . Will continue chronic levothyroxine and adjust for and clinical changes.    Nicotine dependence  Dangers of cigarette smoking were reviewed with patient in detail for 3 minutes and patient was encouraged to quit. Nicotine replacement options were discussed with the patient and they decided to continue smoking her electronic cigarettes with decline of nicotine patch while inpatient.      Iron deficiency anemia  Patient sees Dr. Denton with hematology and  oncology outpatient  Last seen in February 2022  Iron deficiency is secondary to impaired absorption and chronic disease  -she has severe constipation from oral iron  -discussed receiving IV iron with Venofer with Dr. Denton at that time and she only wanted to try 2 doses of IV iron for now  -discontinued oral iron     -this admission MCV 79, ferritin pending  -Received Iron IV x1 in ED  -monitor        Primary hypertension  BP stable on admission   Denies home meds  monitor        VTE Risk Mitigation (From admission, onward)         Ordered     IP VTE HIGH RISK PATIENT  Once         05/17/22 2224     Place sequential compression device  Until discontinued         05/17/22 2224                   Sheila Vega DNP, AGASaint [facility]-BC  Hospitalist   Department of Hospital Medicine   [facility]   Office #: [phone]

## 2025-04-14 NOTE — ANESTHESIA PREPROCEDURE EVALUATION
Procedure:  Right dequervains release (Right: Hand)  Right hand intersection syndrome release (Right: Wrist)    Relevant Problems   MUSCULOSKELETAL   (+) Strain of muscle of right hip, initial encounter      NEURO/PSYCH   (+) PMDD (premenstrual dysphoric disorder)      PULMONARY   (+) Gasping for breath   (+) [place] (obstructive sleep apnea)   (+) Strep pharyngitis        Physical Exam    Airway    Mallampati score: II  TM Distance: >3 FB  Neck ROM: full     Dental       Cardiovascular  Cardiovascular exam normal    Pulmonary  Pulmonary exam normal     Other Findings  post-pubertal.      Anesthesia Plan  ASA Score- 2     Anesthesia Type- general with ASA Monitors.         Additional Monitors:     Airway Plan: LMA.           Plan Factors-    Chart reviewed. EKG reviewed.  Existing labs reviewed. Patient summary reviewed.                  Induction- intravenous.    Postoperative Plan-         Informed Consent- Anesthetic plan and risks discussed with patient.  I personally reviewed this patient with the CRNA. Discussed and agreed on the Anesthesia Plan with the CRNA..      NPO Status:  No vitals data found for the desired time range.         Minoxidil Counseling: Minoxidil is a topical medication which can increase blood flow where it is applied. It is uncertain how this medication increases hair growth. Side effects are uncommon and include stinging and allergic reactions.

## 2025-04-15 ENCOUNTER — ANESTHESIA (OUTPATIENT)
Age: 35
End: 2025-04-15
Payer: COMMERCIAL

## 2025-04-15 ENCOUNTER — HOSPITAL ENCOUNTER (OUTPATIENT)
Age: 35
Setting detail: OUTPATIENT SURGERY
Discharge: HOME | End: 2025-04-15
Attending: ORTHOPAEDIC SURGERY | Admitting: ORTHOPAEDIC SURGERY
Payer: COMMERCIAL

## 2025-04-15 VITALS
WEIGHT: 172.6 LBS | TEMPERATURE: 98.1 F | SYSTOLIC BLOOD PRESSURE: 114 MMHG | DIASTOLIC BLOOD PRESSURE: 60 MMHG | RESPIRATION RATE: 18 BRPM | BODY MASS INDEX: 28.76 KG/M2 | HEIGHT: 65 IN | OXYGEN SATURATION: 99 % | HEART RATE: 86 BPM

## 2025-04-15 DIAGNOSIS — M65.4 DE QUERVAIN'S TENOSYNOVITIS, RIGHT: ICD-10-CM

## 2025-04-15 DIAGNOSIS — M65.831 EXTENSOR INTERSECTION SYNDROME OF RIGHT WRIST: Primary | ICD-10-CM

## 2025-04-15 LAB
EXT PREGNANCY TEST URINE: NEGATIVE
EXT. CONTROL: NORMAL

## 2025-04-15 PROCEDURE — 81025 URINE PREGNANCY TEST: CPT | Performed by: ORTHOPAEDIC SURGERY

## 2025-04-15 PROCEDURE — NC001 PR NO CHARGE: Performed by: ORTHOPAEDIC SURGERY

## 2025-04-15 PROCEDURE — 25116 REMOVE WRIST/FOREARM LESION: CPT | Performed by: ORTHOPAEDIC SURGERY

## 2025-04-15 PROCEDURE — 25116 REMOVE WRIST/FOREARM LESION: CPT | Performed by: PHYSICIAN ASSISTANT

## 2025-04-15 RX ORDER — ONDANSETRON 2 MG/ML
INJECTION INTRAMUSCULAR; INTRAVENOUS AS NEEDED
Status: DISCONTINUED | OUTPATIENT
Start: 2025-04-15 | End: 2025-04-15

## 2025-04-15 RX ORDER — ONDANSETRON 2 MG/ML
4 INJECTION INTRAMUSCULAR; INTRAVENOUS EVERY 6 HOURS PRN
Status: DISCONTINUED | OUTPATIENT
Start: 2025-04-15 | End: 2025-04-15 | Stop reason: HOSPADM

## 2025-04-15 RX ORDER — SENNOSIDES 8.6 MG
650 CAPSULE ORAL EVERY 8 HOURS PRN
Qty: 30 TABLET | Refills: 0 | Status: SHIPPED | OUTPATIENT
Start: 2025-04-15

## 2025-04-15 RX ORDER — FENTANYL CITRATE/PF 50 MCG/ML
50 SYRINGE (ML) INJECTION
Status: DISCONTINUED | OUTPATIENT
Start: 2025-04-15 | End: 2025-04-15 | Stop reason: HOSPADM

## 2025-04-15 RX ORDER — FENTANYL CITRATE/PF 50 MCG/ML
25 SYRINGE (ML) INJECTION
Status: DISCONTINUED | OUTPATIENT
Start: 2025-04-15 | End: 2025-04-15 | Stop reason: HOSPADM

## 2025-04-15 RX ORDER — HYDROMORPHONE HCL/PF 1 MG/ML
0.5 SYRINGE (ML) INJECTION
Status: DISCONTINUED | OUTPATIENT
Start: 2025-04-15 | End: 2025-04-15 | Stop reason: HOSPADM

## 2025-04-15 RX ORDER — LIDOCAINE HYDROCHLORIDE 10 MG/ML
INJECTION, SOLUTION EPIDURAL; INFILTRATION; INTRACAUDAL; PERINEURAL AS NEEDED
Status: DISCONTINUED | OUTPATIENT
Start: 2025-04-15 | End: 2025-04-15

## 2025-04-15 RX ORDER — FENTANYL CITRATE 50 UG/ML
INJECTION, SOLUTION INTRAMUSCULAR; INTRAVENOUS AS NEEDED
Status: DISCONTINUED | OUTPATIENT
Start: 2025-04-15 | End: 2025-04-15

## 2025-04-15 RX ORDER — COVID-19 ANTIGEN TEST
220 KIT MISCELLANEOUS 2 TIMES DAILY
Qty: 60 CAPSULE | Refills: 0 | Status: SHIPPED | OUTPATIENT
Start: 2025-04-15 | End: 2025-05-15

## 2025-04-15 RX ORDER — ACETAMINOPHEN 325 MG/1
650 TABLET ORAL EVERY 6 HOURS PRN
Status: DISCONTINUED | OUTPATIENT
Start: 2025-04-15 | End: 2025-04-15 | Stop reason: HOSPADM

## 2025-04-15 RX ORDER — ONDANSETRON 2 MG/ML
4 INJECTION INTRAMUSCULAR; INTRAVENOUS ONCE AS NEEDED
Status: DISCONTINUED | OUTPATIENT
Start: 2025-04-15 | End: 2025-04-15 | Stop reason: HOSPADM

## 2025-04-15 RX ORDER — CEFAZOLIN SODIUM 2 G/50ML
2000 SOLUTION INTRAVENOUS ONCE
Status: COMPLETED | OUTPATIENT
Start: 2025-04-15 | End: 2025-04-15

## 2025-04-15 RX ORDER — SODIUM CHLORIDE, SODIUM LACTATE, POTASSIUM CHLORIDE, CALCIUM CHLORIDE 600; 310; 30; 20 MG/100ML; MG/100ML; MG/100ML; MG/100ML
125 INJECTION, SOLUTION INTRAVENOUS CONTINUOUS
Status: DISCONTINUED | OUTPATIENT
Start: 2025-04-15 | End: 2025-04-15 | Stop reason: HOSPADM

## 2025-04-15 RX ORDER — MIDAZOLAM HYDROCHLORIDE 2 MG/2ML
INJECTION, SOLUTION INTRAMUSCULAR; INTRAVENOUS AS NEEDED
Status: DISCONTINUED | OUTPATIENT
Start: 2025-04-15 | End: 2025-04-15

## 2025-04-15 RX ORDER — PROPOFOL 10 MG/ML
INJECTION, EMULSION INTRAVENOUS AS NEEDED
Status: DISCONTINUED | OUTPATIENT
Start: 2025-04-15 | End: 2025-04-15

## 2025-04-15 RX ORDER — KETOROLAC TROMETHAMINE 30 MG/ML
INJECTION, SOLUTION INTRAMUSCULAR; INTRAVENOUS AS NEEDED
Status: DISCONTINUED | OUTPATIENT
Start: 2025-04-15 | End: 2025-04-15

## 2025-04-15 RX ORDER — TRAMADOL HYDROCHLORIDE 50 MG/1
50 TABLET ORAL EVERY 6 HOURS PRN
Status: DISCONTINUED | OUTPATIENT
Start: 2025-04-15 | End: 2025-04-15 | Stop reason: HOSPADM

## 2025-04-15 RX ORDER — DEXAMETHASONE SODIUM PHOSPHATE 10 MG/ML
INJECTION, SOLUTION INTRAMUSCULAR; INTRAVENOUS AS NEEDED
Status: DISCONTINUED | OUTPATIENT
Start: 2025-04-15 | End: 2025-04-15

## 2025-04-15 RX ORDER — MAGNESIUM HYDROXIDE 1200 MG/15ML
LIQUID ORAL AS NEEDED
Status: DISCONTINUED | OUTPATIENT
Start: 2025-04-15 | End: 2025-04-15 | Stop reason: HOSPADM

## 2025-04-15 RX ORDER — TRAMADOL HYDROCHLORIDE 50 MG/1
50 TABLET ORAL EVERY 6 HOURS PRN
Qty: 5 TABLET | Refills: 0 | Status: SHIPPED | OUTPATIENT
Start: 2025-04-15

## 2025-04-15 RX ADMIN — KETOROLAC TROMETHAMINE 30 MG: 30 INJECTION, SOLUTION INTRAMUSCULAR at 11:04

## 2025-04-15 RX ADMIN — DEXAMETHASONE SODIUM PHOSPHATE 10 MG: 10 INJECTION INTRAMUSCULAR; INTRAVENOUS at 10:33

## 2025-04-15 RX ADMIN — TRAMADOL HYDROCHLORIDE 50 MG: 50 TABLET, COATED ORAL at 12:05

## 2025-04-15 RX ADMIN — FENTANYL CITRATE 25 MCG: 50 INJECTION INTRAMUSCULAR; INTRAVENOUS at 10:30

## 2025-04-15 RX ADMIN — PROPOFOL 250 MG: 10 INJECTION, EMULSION INTRAVENOUS at 10:30

## 2025-04-15 RX ADMIN — MIDAZOLAM 2 MG: 1 INJECTION INTRAMUSCULAR; INTRAVENOUS at 10:23

## 2025-04-15 RX ADMIN — CEFAZOLIN SODIUM 2000 MG: 2 SOLUTION INTRAVENOUS at 10:37

## 2025-04-15 RX ADMIN — FENTANYL CITRATE 25 MCG: 50 INJECTION INTRAMUSCULAR; INTRAVENOUS at 10:35

## 2025-04-15 RX ADMIN — LIDOCAINE HYDROCHLORIDE 50 MG: 10 SOLUTION INTRAVENOUS at 10:30

## 2025-04-15 RX ADMIN — SODIUM CHLORIDE, SODIUM LACTATE, POTASSIUM CHLORIDE, AND CALCIUM CHLORIDE 125 ML/HR: .6; .31; .03; .02 INJECTION, SOLUTION INTRAVENOUS at 08:53

## 2025-04-15 RX ADMIN — SODIUM CHLORIDE, SODIUM LACTATE, POTASSIUM CHLORIDE, AND CALCIUM CHLORIDE: .6; .31; .03; .02 INJECTION, SOLUTION INTRAVENOUS at 11:00

## 2025-04-15 RX ADMIN — PROPOFOL 50 MG: 10 INJECTION, EMULSION INTRAVENOUS at 10:34

## 2025-04-15 RX ADMIN — ONDANSETRON 4 MG: 2 INJECTION INTRAMUSCULAR; INTRAVENOUS at 10:33

## 2025-04-15 NOTE — ANESTHESIA POSTPROCEDURE EVALUATION
Post-Op Assessment Note    CV Status:  Stable  Pain Score: 0    Pain management: adequate       Mental Status:  Awake and sleepy   Hydration Status:  Euvolemic   PONV Controlled:  Controlled   Airway Patency:  Patent     Post Op Vitals Reviewed: Yes    No anethesia notable event occurred.    Staff: CRNA           Last Filed PACU Vitals:  Vitals Value Taken Time   Temp 97.0    Pulse 82 04/15/25 1125   /63 04/15/25 1124   Resp 15 04/15/25 1125   SpO2 100 % 04/15/25 1125   Vitals shown include unfiled device data.

## 2025-04-15 NOTE — H&P
H&P Exam - Orthopedics   Nell Baldwin 34 y.o. female MRN: [id number]  Unit/Bed#: [facility] 02    Assessment/Plan   Assessment:  Right dequervains tendonitis, right intersection syndrome    Plan:  Right hand dequervains release and right intersection syndrome release under general anesthesia     History of Present Illness   HPI:  Nell Baldwin is a 34 y.o. female who presents with right wrist pain. She has tried conservative treatment without relief of her symptoms and would like to proceed with surgery.    Historical Information  Review Of Systems:   Skin: Normal  Neuro: See HPI  Musculoskeletal: See HPI  14 point review of systems negative except as stated above     Past Medical History:   Past Medical History:   Diagnosis Date    Amenorrhea     last assessed: 2016    Anxiety     Female infertility     seen by [facility] - seen for 1 year, 5 cycles IUI acheived pregnancy via IVF    Fibromyalgia     Irregular menstrual cycle     last assessed: 2016    Osteoarthritis of right acromioclavicular joint     last assessment: 2013    Palpitations     Polycystic ovary syndrome     Separation of right acromioclavicular joint     Last assessed: 2013; this is more consistent with ostiolysis of the distal clavicle vs. acromioclavicular joint arthritis, not acute shoulder seperation    Strain of muscle of right hip, initial encounter 2023    Varicella     vaccine       Past Surgical History:   Past Surgical History:   Procedure Laterality Date     SECTION      DENTAL SURGERY      wisdom teeth    FL INJECTION RIGHT WRIST (ARTHROGRAM)  2024    OH APPLICATION LONG ARM SPLINT SHOULDER HAND Right 2024    Procedure: Application of long arm sugar tong splint;  Surgeon: Alejo Middleton MD;  [facility];  Service: Orthopedics    OH ARTHRS WRST EXC&/RPR TRIANG FIBROCART&/JOINT Right 2024    Procedure: Right wrist arthroscopy with TFCC debridement  and repair;  Surgeon: Alejo Middleton MD;  [facility];  Service: Orthopedics    PA  DELIVERY ONLY N/A 2018    Procedure:  SECTION ();  Surgeon: Ismael Marquez MD;  [facility];  Service: Obstetrics    PA  DELIVERY ONLY N/A 2020    Procedure:  SECTION ();  Surgeon: Ismael Marquez MD;  [facility];  Service: Obstetrics    PA INCISION & REMOVAL FOREIGN BODY SUBQ TISS SIMPLE Right 2024    Procedure: Right wrist removal of foreign body (stitch) and Neurolysis of ulnar nerve;  Surgeon: Alejo Middleton MD;  [facility];  Service: Orthopedics    SHOULDER SURGERY Right     SINUS SURGERY  2023    TUBAL LIGATION Bilateral 2020    Procedure: LIGATION/COAGULATION TUBAL;  Surgeon: Ismael Marquez MD;  [facility];  Service: Obstetrics       Family History:  Family history reviewed and non-contributory  Family History   Problem Relation Age of Onset    Fibromyalgia Mother     Hyperlipidemia Father     Hypertension Father     Diabetes Maternal Grandmother     Colon cancer Maternal Grandfather 48    Cancer Maternal Grandfather     Diabetes Paternal Grandfather     Breast cancer Neg Hx     Endometrial cancer Neg Hx     Ovarian cancer Neg Hx        Social History:  Social History     Socioeconomic History    Marital status: /Civil Union     Spouse name: None    Number of children: None    Years of education: None    Highest education level: None   Occupational History    None   Tobacco Use    Smoking status: Never    Smokeless tobacco: Never   Vaping Use    Vaping status: Never Used   Substance and Sexual Activity    Alcohol use: Not Currently     Alcohol/week: 1.0 standard drink of alcohol    Drug use: Never    Sexual activity: Yes     Partners: Male     Birth control/protection: Surgical     Comment: tubes tied   Other Topics Concern    None   Social History Narrative    History of  0    Caffeine use-1 cup  "of coffee/day    Has smoke detectors     Denied history of sun protection    Uses safety equipment- protective head gear     Social Drivers of Health     Financial Resource Strain: Not on file   Food Insecurity: Not on file   Transportation Needs: Not on file   Physical Activity: Not on file   Stress: Not on file   Social Connections: Not on file   Intimate Partner Violence: Not on file   Housing Stability: Not on file       Allergies:   No Known Allergies        Labs:  0   Lab Value Date/Time    HCT 36.4 03/14/2025 1411    HCT 32.1 (L) 04/18/2024 1201    HCT 38.6 01/21/2022 0707    HCT 29.0 (L) 09/18/2020 0535    HCT 34.1 (L) 09/17/2020 1127    HCT 30.6 (L) 09/14/2020 1154    HCT 42.3 04/11/2016 0927    HCT 39.0 08/29/2013 1520    HGB 12.3 03/14/2025 1411    HGB 11.1 (L) 04/18/2024 1201    HGB 13.1 01/21/2022 0707    HGB 9.5 (L) 09/18/2020 0535    HGB 11.4 (L) 09/17/2020 1127    HGB 9.8 (L) 09/14/2020 1154    HGB 13.6 04/11/2016 0927    HGB 13.4 08/29/2013 1520    WBC 7.5 03/14/2025 1411    WBC 6.2 04/18/2024 1201    WBC 5.3 01/21/2022 0707    WBC 9.30 09/18/2020 0535    WBC 8.51 09/17/2020 1127    WBC 7.73 09/14/2020 1154    WBC 6.7 04/11/2016 0927    WBC 7.65 08/29/2013 1520    ESR 17 04/18/2024 1201       Meds:    Current Facility-Administered Medications:     ceFAZolin (ANCEF) IVPB (premix in dextrose) 2,000 mg 50 mL, 2,000 mg, Intravenous, Once, Cody Howell PA-C    lactated ringers infusion, 125 mL/hr, Intravenous, Continuous, Jesus Leija MD, Last Rate: 125 mL/hr at 04/15/25 0853, 125 mL/hr at 04/15/25 0853    Blood Culture:   No results found for: \"BLOODCX\"    Wound Culture:   No results found for: \"WOUNDCULT\"    Ins and Outs:  No intake/output data recorded.            Physical Exam  /68   Pulse 87   Temp 97.6 °F (36.4 °C) (Temporal)   Resp 12   Ht 5' 5\" (1.651 m)   Wt 78.3 kg (172 lb 9.6 oz)   LMP  (LMP Unknown)   SpO2 97%   BMI 28.72 kg/m²   /68   Pulse 87   Temp 97.6 °F " "(36.4 °C) (Temporal)   Resp 12   Ht 5' 5\" (1.651 m)   Wt 78.3 kg (172 lb 9.6 oz)   LMP  (LMP Unknown)   SpO2 97%   BMI 28.72 kg/m²   Gen: No acute distress, resting comfortably in bed  HEENT: Eyes clear, moist mucus membranes, hearing intact  Respiratory: No audible wheezing or stridor  Cardiovascular: Well Perfused peripherally, 2+ distal pulse  Abdomen: nondistended, no peritoneal signs  Ortho Exam: De Quervain's Tenosynovitis Exam:  right side    Positive tender to palpation over 1st dorsal extensor compartment   Positive palpable nodule  Positive crepitus over 1st dorsal extensor compartment   Positive Finkelstein's test    Positive pain with resisted abduction of the thumb  Tenderness to palpation over the dorsal aspect of the wrist by listers tubercle.       Neuro Exam: Patient is neurovascularly intact from a median, radial and ulnar nerve distribution. Capillary refill less than 2 seconds.       Lab Results: Reviewed  Imaging: Reviewed    "

## 2025-04-15 NOTE — OP NOTE
OPERATIVE REPORT  PATIENT NAME: Nell Baldwin  :  1990  MRN: [id number]  Pt Location: [facility]    SURGERY DATE: 04/15/25    Surgeons and Role:     * Alejo Middleton MD - Primary     * Cody Howell PA-C - Assisting    Pre-Op Diagnosis:  De Quervain's tenosynovitis, right [M65.4]  Extensor intersection syndrome of right wrist [M65.831]    Post-Op Diagnosis:  De Quervain's tenosynovitis, right [M65.4]  Extensor intersection syndrome of right wrist [M65.831]    Procedure(s) (LRB):  Right dequervains release with tenosynovectomy of the abductor pollicis longus and extensor pollicis brevis tendons (Right)  Right hand intersection syndrome release with tenosynovectomy of the abductor pollicis longus, extensor pollicis brevis, extensor carpi radialis longus, and extensor carpi radialis brevis (Right)  Application short arm thumb spica splint (Right)    Specimen(s):  No specimens collected during this procedure.    Estimated Blood Loss:   Minimal      Anesthesia Type:   General    Operative Indications:  The patient has a history of de Quervain's stenosing tenosynovitis and intersection syndrome of the right wrist that was recalcitrant to conservative management.  The decision was made to bring the patient to the operating room for de Quervain's release and intersection release of the right forearm and wrist.  Risks of the procedure were explained which include, but are not limited to bleeding; infection; damage to nerves, arteries,veins, tendons; scar; pain; need for reoperation; failure to give desired result; and risks of anaesthesia.  All questions were answered to satisfaction and they were willing to proceed.         Operative Findings:  De Quervain's stenosing tenosynovitis with significant inflammation and tenosynovitis of the abductor pollicis longus and extensor pollicis brevis requiring tenosynovectomy    Significant inflammation and tight passage of the intersection area with  significant tenosynovitis affecting the abductor pollicis longus, extensor pollicis brevis, extensor carpi radialis longus, and extensor carpi radialis brevis of the distal forearm requiring tenosynovectomy    Complications:   None    Procedure and Technique:  After the patient, site, and procedure were identified, the patient was brought into the operating room in a supine position.  General anaesthesia and local medication were provided.  A well padded tourniquet was applied to the extremity, set at 250 mmHg.  The  right upper extremity was then prepped and drapped in a normal, sterile, orthopedic fashion.    After the patient, site, and procedure identified attention was turned towards the right hand.  Local injection for skin incisions were made.  Esmarch bandage was used to exsanguinate the limb and the tourniquet was inflated to 250 mmHg.  Starting at the de Quervain's area, longitudinal incision was made over the first dorsal extensor compartment at the level of the radial styloid.  We dissected down through the skin subcutaneous tissues maintaining hemostasis.  Care was taken to identify and protect the superficial sensory branches of the radial nerve.  First dorsal extensor compartment was identified and is released from distal to proximal under direct visualization.  Significant tenosynovitis of the abductor pollicis longus and extensor pollicis brevis was identified.  Tenosynovectomy was performed.  We verified release of the extensor pollicis brevis.  We did not see any subsheath.  Loose stay suture of 3-0 Vicryl was placed within the leaflets and tied over a freer elevator to prevent tendon subluxation.  We then turned our attention towards the distal forearm.  Longitudinal incision was made over the intersection area of the 1st and 2nd extensor compartments.  We dissected down through the skin and subcutaneous tissues maintaining hemostasis.  We came into the area we so I tight fascial band pushing on  the second extensor compartment.  There was also a small rent in this band where a perforating venous vessel as well as skin nerve was significantly entrapped.  This tight fascial band was released and we released the compartment proximally and distally to the 1st and 2nd compartments with care taken not to violate the extensor retinaculum.  There was significant tenosynovitis to the extensor carpi radialis longus, extensor carpi radialis brevis, abductor pollicis longus, and extensor pollicis brevis requiring tenosynovectomy's to all 4 tendons.  At the completion, the tight bands had been released and the inflammation had been resected.  Tourniquet was deflated verifying rapid restoration of blood supply and no significant bleeding.    At the completion of the procedure, hemostasis was obtained with cautery and direct pressure.  The wounds were copiously irrigated with sterile solution.  The wounds were closed with Monocryl, STRATAFIX, and Steri-strips.  Sterile dressings were applied, including Xeroform, gauze, tweeners, webril, ACE and Thumb Spica Splint.  Please note, all sponge, needle, and instrument counts were correct prior to closure.  Loupe magnification was utilized.   The patient tolerated the procedure well.     I was present for all critical portions of the procedure., A qualified resident physician was not available., and A physician assistant was required during the procedure for retraction, tissue handling, dissection and suturing.    Patient Disposition:  PACU , hemodynamically stable, and extubated and stable    SIGNATURE: Alejo Middleton MD  DATE: 04/15/25  TIME: 11:08 AM

## 2025-04-15 NOTE — ANESTHESIA POSTPROCEDURE EVALUATION
Post-Op Assessment Note    CV Status:  Stable    Pain management: adequate       Mental Status:  Alert and awake   Hydration Status:  Euvolemic   PONV Controlled:  Controlled   Airway Patency:  Patent     Post Op Vitals Reviewed: Yes    No anethesia notable event occurred.    Staff: Anesthesiologist, CRNA           Last Filed PACU Vitals:  Vitals Value Taken Time   Temp 97 °F (36.1 °C) 04/15/25 1125   Pulse 84 04/15/25 1141   /61 04/15/25 1130   Resp 13 04/15/25 1141   SpO2 98 % 04/15/25 1141   Vitals shown include unfiled device data.    Modified Kristin:     Vitals Value Taken Time   Activity 2 04/15/25 1125   Respiration 2 04/15/25 1125   Circulation 2 04/15/25 1125   Consciousness 1 04/15/25 1125   Oxygen Saturation 2 04/15/25 1125     Modified Kristin Score: 9

## 2025-04-15 NOTE — DISCHARGE INSTR - AVS FIRST PAGE
Post Operative Instructions    You have had surgery on your arm today, please read and follow the information below:  Elevate your hand above your elbow during the next 24-48 hours to help with swelling.  Place your hand and arm over your head with motion at your shoulder three times a day.  Do not apply any cream/ointment/oil to your incisions including antibiotics.  Do not soak your hands in standing water (dishwater, tubs, Jacuzzi's, pools, etc.) until given permission (typically 2-3 weeks after injury)    Call the office if you notice any:  Increased numbness or tingling of your hand or fingers that is not relieved with elevation.  Increasing pain that is not controlled with medication.  Difficulty chewing, breathing, swallowing.  Chest pains or shortness of breath.  Fever over 101.4 degrees.    Bandage: Do NOT remove bandage until follow-up appointment.    Motion: Move fingers into a fist 5 times a day, DO NOT move any splinted fingers.    Weight bearing status: Avoid heavy lifting (>5 pounds) with the extremity that was operated on until follow up appointment. Normal activities of daily living are OK.    Ice: Ice for 10 minutes every hour as needed for swelling x 24 hours.    Sling: No sling necessary.    Medications:   Naproxen 220 mg two times a day   Tylenol Extended Release 650 mg every 8 hours  Tramadol 1 tab every 6 hours AS needed for pain    After surgery, we would like you to take naproxen 220 mg one tablet by mouth every 12 hours with food  AND Tylenol 650 mg one tablet by mouth every 8 hours  (at breakfast, lunch and dinner) for 3-5 days after your surgery.  Please take these medication EVERYDAY after surgery for 3-5 days, and not just as needed. You can take these medications at the same time.  Taking these medications after surgery will limit your need for prescription pain medication.      We will also prescribe a narcotic pain medication for a limited time after surgery that you can take as  needed for moderate or severe pain.      Follow-up Appointment: 7-10 days.      Please call the office if you have any questions or concerns regarding your post-operative care.

## 2025-04-25 ENCOUNTER — OFFICE VISIT (OUTPATIENT)
Dept: OBGYN CLINIC | Facility: HOSPITAL | Age: 35
End: 2025-04-25

## 2025-04-25 VITALS — WEIGHT: 172 LBS | BODY MASS INDEX: 28.66 KG/M2 | HEIGHT: 65 IN

## 2025-04-25 DIAGNOSIS — M65.831 EXTENSOR INTERSECTION SYNDROME OF RIGHT WRIST: Primary | ICD-10-CM

## 2025-04-25 DIAGNOSIS — M65.4 DE QUERVAIN'S TENOSYNOVITIS, RIGHT: ICD-10-CM

## 2025-04-25 PROCEDURE — 99024 POSTOP FOLLOW-UP VISIT: CPT | Performed by: PHYSICIAN ASSISTANT

## 2025-04-25 NOTE — PROGRESS NOTES
ORTHOPAEDIC HAND, WRIST, AND ELBOW OFFICE  VISIT     Name: Nell Baldwin      : 1990      MRN: [id number]  Encounter Provider: Cody Howell PA-C  Encounter Date: 2025   Encounter department: [facility] [place]  :  Assessment & Plan  Extensor intersection syndrome of right wrist  S/p right de Quervain's release, right intersection syndrome release performed 4/15/2025  Patient was advised to keep the wounds clean and dry  She can allow warm soapy water to run over the wounds and then she is to pat it dry  She was advised to work on her home exercise program to work on range of motion and strengthening  She was advised that she can slowly start to lift as she feels comfortable  She will follow-up in 6 weeks for reevaluation  Orders:    Ambulatory Referral to PT/OT Hand Therapy; Future    De Quervain's tenosynovitis, right  S/p right de Quervain's release, right intersection syndrome release performed 4/15/2025  Patient was advised to keep the wounds clean and dry  She can allow warm soapy water to run over the wounds and then she is to pat it dry  She was advised to work on her home exercise program to work on range of motion and strengthening  She was advised that she can slowly start to lift as she feels comfortable  She will follow-up in 6 weeks for reevaluation  Orders:    Ambulatory Referral to PT/OT Hand Therapy; Future            History of Present Illness   HPI  Chief Complaint   Patient presents with    Right Wrist - Post-op, Suture / Staple Removal     De Quervain's release w/ Tenosynovectomy- 25       SUBJECTIVE:  Nell Baldwin is a 34 y.o. female who presents for follow up after Right dequervains release with tenosynovectomy of the abductor pollicis longus and extensor pollicis brevis tendons - Right, Right hand intersection syndrome release with tenosynovectomy of the abductor pollicis longus, extensor pollicis brevis, extensor  "carpi radialis longus, and extensor carpi radialis brevis - Right, and Application short arm thumb spica splint - Right on 4/15/2025.  Today patient has some discomfort noted over the incision sites.  She did keep your dressing clean and dry.       PHYSICAL EXAMINATION:  Vital signs: Ht 5' 5\" (1.651 m)   Wt 78 kg (172 lb)   LMP  (LMP Unknown)   BMI 28.62 kg/m²   General: well developed and well nourished, alert, oriented times 3, and appears comfortable  Psychiatric: Normal    MUSCULOSKELETAL EXAMINATION:  Incision: Clean, dry, intact  Range of Motion: As expected and Limited due to stiffness  Neurovascular status: Neuro intact, good cap refill  Activity Restrictions: No restrictions         STUDIES REVIEWED:  No Studies to review      PROCEDURES PERFORMED:  Procedures  No Procedures performed today    "

## 2025-04-25 NOTE — ASSESSMENT & PLAN NOTE
S/p right de Quervain's release, right intersection syndrome release performed 4/15/2025  Patient was advised to keep the wounds clean and dry  She can allow warm soapy water to run over the wounds and then she is to pat it dry  She was advised to work on her home exercise program to work on range of motion and strengthening  She was advised that she can slowly start to lift as she feels comfortable  She will follow-up in 6 weeks for reevaluation  Orders:    Ambulatory Referral to PT/OT Hand Therapy; Future

## 2025-05-01 ENCOUNTER — OFFICE VISIT (OUTPATIENT)
Dept: CARDIOLOGY CLINIC | Facility: CLINIC | Age: 35
End: 2025-05-01
Payer: COMMERCIAL

## 2025-05-01 VITALS
HEART RATE: 69 BPM | DIASTOLIC BLOOD PRESSURE: 70 MMHG | WEIGHT: 172 LBS | SYSTOLIC BLOOD PRESSURE: 114 MMHG | HEIGHT: 65 IN | OXYGEN SATURATION: 99 % | BODY MASS INDEX: 28.66 KG/M2

## 2025-05-01 DIAGNOSIS — R07.9 CHEST PAIN, UNSPECIFIED TYPE: ICD-10-CM

## 2025-05-01 DIAGNOSIS — R00.2 PALPITATION: Primary | ICD-10-CM

## 2025-05-01 LAB
ATRIAL RATE: 69 BPM
P AXIS: 29 DEGREES
PR INTERVAL: 144 MS
QRS AXIS: 50 DEGREES
QRSD INTERVAL: 80 MS
QT INTERVAL: 434 MS
QTC INTERVAL: 465 MS
T WAVE AXIS: 37 DEGREES
VENTRICULAR RATE: 69 BPM

## 2025-05-01 PROCEDURE — 93000 ELECTROCARDIOGRAM COMPLETE: CPT | Performed by: INTERNAL MEDICINE

## 2025-05-01 PROCEDURE — 99214 OFFICE O/P EST MOD 30 MIN: CPT | Performed by: INTERNAL MEDICINE

## 2025-05-01 NOTE — PROGRESS NOTES
Cardiology   Nell Marybeth Baldwin 34 y.o. female MRN: [id number]  Unit/Bed#:  Encounter: [id number]          Assessment:    >> PACs- 14% of all beats on Holter, echo is unremarkable  >> PCOS  >> Back pain  >> Fibromyalgia  >> Chest pain: Resolved    Plan:    -She has started having more palpitations despite taking 10 mg of propranolol twice a day  - Check Zio monitor for 2 weeks to quantify the burden of atrial ectopy and rule out arrhythmia  - Continue 10 mg of propranolol twice daily for now, will consider up titration  -Return to office in 3 months with testing        5/1/2025: Complaints of more palpitations specifically after her surgery that she had on her wrist.  She was told that she was having ectopy in the postoperative.  As well.  She denies any chest pain or shortness of breath or syncope/presyncope.  Otherwise she is doing okay.    2/29/2024: Overall is doing well, feels like her palpitations are well-controlled with the propranolol, she is drinking 16 ounces of coffee a day.  Her thyroid function was normal.  On days she does not take the propranolol she does have the sensation of palpitations.  She also complains of chest pain that started about 2 months ago it is located in the left lower chest at the costal chondral joint.  It is worse with twisting movements and tender to palpation.  Sometimes it is worse when she coughs.  It is not associated with nausea vomiting diaphoresis or shortness of breath.    CC: Palpitations    HPI  34 y.o. female with palpitations for about a year. When she was having sinus surgery over the summer they noted PACs on her tele. She went to her PCP and had a holter and echo;. Echo was ok but her holter showed 14% burden of PACs.     She denies chest pain, shortness of breath, orthopnea, PND, pedal edema, syncope, presyncope, diaphoresis, nausea/vomiting     She is otherwise healthy and exercises on a Peloton for 20-30 mins  a day    Does not drink smoke or use  "drugs    No known drug allergies    No chance of pregnancy- undergone tubal ligation     Takes metformin for PCOS and Ozempic for weight loss and PCOS.     TSH was unremarkable in June.                     Physical exam  Objective   Vitals: Blood pressure 114/70, pulse 69, height 5' 5\" (1.651 m), weight 78 kg (172 lb), SpO2 99%, not currently breastfeeding.    General:  AO x3, no acute distress  Cardiac:  S1-S2 normal  No murmurs, rubs or gallops, extra heart beats noted JVP: none, tenderness to palpation over the costochondral joint in the lower left chest  Lungs:  Clear to auscultation bilaterally, no wheezing or crackles.  Abdomen:  Soft, nontender, nondistended.  Extremities:  Warm, well perfused, pulses palpable, no ulcers or rashes. Edema:none  Neuro: Grossly nonfocal        ======================================================  TREADMILL STRESS  No results found for this or any previous visit.     ----------------------------------------------------------------------------------------------  NUCLEAR STRESS TEST: No results found for this or any previous visit.    No results found for this or any previous visit.      --------------------------------------------------------------------------------  CATH:  No results found for this or any previous visit.    --------------------------------------------------------------------------------  ECHO:   9/2023: •  Left Ventricle: Left ventricular cavity size is normal. Wall thickness is normal. The left ventricular ejection fraction is 55%. Systolic function is normal. Wall motion is normal. Diastolic function is normal.  •  Right Ventricle: Right ventricular cavity size is normal. Systolic function is normal.  •  Mitral Valve: There is trace regurgitation.    --------------------------------------------------------------------------------  HOLTER  No results found for this or any previous " visit.    --------------------------------------------------------------------------------  CAROTIDS  No results found for this or any previous visit.       Diagnoses and all orders for this visit:    Palpitation  -     POCT ECG  -     Zio Monitor    Chest pain, unspecified type  -     POCT ECG    Other orders  -     Discontinue: SEMAGLUTIDE, 1 MG/DOSE, SC; Inject 20 units on syringe (0.2ml=0.5mg) subcutaneously (under the skin) once weekly (same day each week) for 28 days. Rotate injection sites. Discard punctured vial(s) after 28 days (Patient not taking: Reported on 2025)         ======================================================          Review of Systems  ROS as noted above, otherwise 12 point review of systems was performed and is negative.     Historical Information   Past Medical History:   Diagnosis Date   • Amenorrhea     last assessed: 2016   • Anxiety    • Female infertility     seen by [facility] - seen for 1 year, 5 cycles IUI acheived pregnancy via IVF   • Fibromyalgia    • Irregular menstrual cycle     last assessed: 2016   • Osteoarthritis of right acromioclavicular joint     last assessment: 2013   • Palpitations    • Polycystic ovary syndrome    • Separation of right acromioclavicular joint     Last assessed: 2013; this is more consistent with ostiolysis of the distal clavicle vs. acromioclavicular joint arthritis, not acute shoulder seperation   • Strain of muscle of right hip, initial encounter 2023   • Varicella     vaccine     Past Surgical History:   Procedure Laterality Date   •  SECTION     • DENTAL SURGERY      wisdom teeth   • FL INJECTION RIGHT WRIST (ARTHROGRAM)  2024   • FL APPLICATION LONG ARM SPLINT SHOULDER HAND Right 2024    Procedure: Application of long arm sugar tong splint;  Surgeon: Alejo Middleton MD;  [facility];  Service: Orthopedics   • FL APPLICATION SHORT ARM SPLINT FOREARM-HAND STATIC  Right 4/15/2025    Procedure: Application short arm thumb spica splint;  Surgeon: Alejo Middleton MD;  [facility];  Service: Orthopedics   • NV ARTHRS WRST EXC&/RPR TRIANG FIBROCART&/JOINT Right 2024    Procedure: Right wrist arthroscopy with TFCC debridement and repair;  Surgeon: Alejo Middleton MD;  [facility];  Service: Orthopedics   • NV  DELIVERY ONLY N/A 2018    Procedure:  SECTION ();  Surgeon: Ismael Marquez MD;  [facility] ;  Service: Obstetrics   • NV  DELIVERY ONLY N/A 2020    Procedure:  SECTION ();  Surgeon: Ismael Marquez MD;  [facility];  Service: Obstetrics   • NV INCISION & REMOVAL FOREIGN BODY SUBQ TISS SIMPLE Right 2024    Procedure: Right wrist removal of foreign body (stitch) and Neurolysis of ulnar nerve;  Surgeon: Alejo Middleton MD;  [facility];  Service: Orthopedics   • NV INCISION EXTENSOR TENDON SHEATH WRIST Right 4/15/2025    Procedure: Right dequervains release with tenosynovectomy of the abductor pollicis longus and extensor pollicis brevis tendons;  Surgeon: Alejo Middleton MD;  [facility];  Service: Orthopedics   • NV SYNOVECTOMY EXTENSOR TENDON SHTH WRIST 1 CMPRT Right 4/15/2025    Procedure: Right dequervains release with tenosynovectomy of the abductor pollicis longus and extensor pollicis brevis tendons;  Surgeon: Alejo Middleton MD;  [facility];  Service: Orthopedics   • NV SYNOVECTOMY EXTENSOR TENDON SHTH WRIST 1 CMPRT Right 4/15/2025    Procedure: Right hand intersection syndrome release with tenosynovectomy of the abductor pollicis longus, extensor pollicis brevis, extensor carpi radialis longus, and extensor carpi radialis brevis;  Surgeon: Alejo Middleton MD;  [facility];  Service: Orthopedics   • SHOULDER SURGERY Right    • SINUS SURGERY  2023   • TUBAL LIGATION Bilateral 2020    Procedure:  "LIGATION/COAGULATION TUBAL;  Surgeon: Ismael Marquez MD;  Location[facility];  Service: Obstetrics     Social History     Substance and Sexual Activity   Alcohol Use Not Currently   • Alcohol/week: 1.0 standard drink of alcohol     Social History     Substance and Sexual Activity   Drug Use Never     Social History     Tobacco Use   Smoking Status Never   Smokeless Tobacco Never     Family History   Problem Relation Age of Onset   • Fibromyalgia Mother    • Hyperlipidemia Father    • Hypertension Father    • Diabetes Maternal Grandmother    • Colon cancer Maternal Grandfather 48   • Cancer Maternal Grandfather    • Diabetes Paternal Grandfather    • Breast cancer Neg Hx    • Endometrial cancer Neg Hx    • Ovarian cancer Neg Hx        Meds/Allergies   Hospital Medications:   No current facility-administered medications for this visit.    Home Medications: Not in a hospital admission.      No Known Allergies      Portions of the record may have been created with voice recognition software.  Occasional wrong words or \"sound a like\" substitutions may have occurred due to the inherent limitations of voice recognition software.  Read the chart carefully and recognize, using context, where substitutions have occurred.          I spent > 35 mins examining and interviewing the patient, coordinating care, reviewing the chart, reviewing testing and writing the note.                "

## 2025-05-01 NOTE — LETTER
May 1, 2025     Susan Garrido MD  [address]  [address]  [facility] [address]    Patient: Nell Baldwin   YOB: 1990   Date of Visit: 5/1/2025       Dear Dr. Susan Garrido MD:    Thank you for referring Nell Baldwin to me for evaluation. Below are my notes for this consultation.    If you have questions, please do not hesitate to call me. I look forward to following your patient along with you.         Sincerely,        Nikolay Nelson MD        CC: No Recipients    Nikolay Nelson MD  5/1/2025  3:47 PM  Sign when Signing Visit  Cardiology   Nell Baldwni 34 y.o. female MRN: [id number]  Unit/Bed#:  Encounter: [id number]          Assessment:    >> PACs- 14% of all beats on Holter, echo is unremarkable  >> PCOS  >> Back pain  >> Fibromyalgia  >> Chest pain: Resolved    Plan:    -She has started having more palpitations despite taking 10 mg of propranolol twice a day  - Check Zio monitor for 2 weeks to quantify the burden of atrial ectopy and rule out arrhythmia  - Continue 10 mg of propranolol twice daily for now, will consider up titration  -Return to office in 3 months with testing        5/1/2025: Complaints of more palpitations specifically after her surgery that she had on her wrist.  She was told that she was having ectopy in the postoperative.  As well.  She denies any chest pain or shortness of breath or syncope/presyncope.  Otherwise she is doing okay.    2/29/2024: Overall is doing well, feels like her palpitations are well-controlled with the propranolol, she is drinking 16 ounces of coffee a day.  Her thyroid function was normal.  On days she does not take the propranolol she does have the sensation of palpitations.  She also complains of chest pain that started about 2 months ago it is located in the left lower chest at the costal chondral joint.  It is worse with twisting movements and tender to palpation.  Sometimes it is worse when she  "coughs.  It is not associated with nausea vomiting diaphoresis or shortness of breath.    CC: Palpitations    HPI  34 y.o. female with palpitations for about a year. When she was having sinus surgery over the summer they noted PACs on her tele. She went to her PCP and had a holter and echo;. Echo was ok but her holter showed 14% burden of PACs.     She denies chest pain, shortness of breath, orthopnea, PND, pedal edema, syncope, presyncope, diaphoresis, nausea/vomiting     She is otherwise healthy and exercises on a Peloton for 20-30 mins  a day    Does not drink smoke or use drugs    No known drug allergies    No chance of pregnancy- undergone tubal ligation     Takes metformin for PCOS and Ozempic for weight loss and PCOS.     TSH was unremarkable in June.                     Physical exam  Objective  Vitals: Blood pressure 114/70, pulse 69, height 5' 5\" (1.651 m), weight 78 kg (172 lb), SpO2 99%, not currently breastfeeding.    General:  AO x3, no acute distress  Cardiac:  S1-S2 normal  No murmurs, rubs or gallops, extra heart beats noted JVP: none, tenderness to palpation over the costochondral joint in the lower left chest  Lungs:  Clear to auscultation bilaterally, no wheezing or crackles.  Abdomen:  Soft, nontender, nondistended.  Extremities:  Warm, well perfused, pulses palpable, no ulcers or rashes. Edema:none  Neuro: Grossly nonfocal        ======================================================  TREADMILL STRESS  No results found for this or any previous visit.     ----------------------------------------------------------------------------------------------  NUCLEAR STRESS TEST: No results found for this or any previous visit.    No results found for this or any previous visit.      --------------------------------------------------------------------------------  CATH:  No results found for this or any previous " visit.    --------------------------------------------------------------------------------  ECHO:   9/2023: •  Left Ventricle: Left ventricular cavity size is normal. Wall thickness is normal. The left ventricular ejection fraction is 55%. Systolic function is normal. Wall motion is normal. Diastolic function is normal.  •  Right Ventricle: Right ventricular cavity size is normal. Systolic function is normal.  •  Mitral Valve: There is trace regurgitation.    --------------------------------------------------------------------------------  HOLTER  No results found for this or any previous visit.    --------------------------------------------------------------------------------  CAROTIDS  No results found for this or any previous visit.       Diagnoses and all orders for this visit:    Palpitation  -     POCT ECG  -     o Monitor    Chest pain, unspecified type  -     POCT ECG    Other orders  -     Discontinue: SEMAGLUTIDE, 1 MG/DOSE, SC; Inject 20 units on syringe (0.2ml=0.5mg) subcutaneously (under the skin) once weekly (same day each week) for 28 days. Rotate injection sites. Discard punctured vial(s) after 28 days (Patient not taking: Reported on 5/1/2025)         ======================================================          Review of Systems  ROS as noted above, otherwise 12 point review of systems was performed and is negative.     Historical Information  Past Medical History:   Diagnosis Date   • Amenorrhea     last assessed: 08/22/2016   • Anxiety    • Female infertility     seen by [facility] - seen for 1 year, 5 cycles IUI acheived pregnancy via IVF   • Fibromyalgia    • Irregular menstrual cycle     last assessed: 09/26/2016   • Osteoarthritis of right acromioclavicular joint     last assessment: 01/28/2013   • Palpitations    • Polycystic ovary syndrome    • Separation of right acromioclavicular joint     Last assessed: 01/25/2013; this is more consistent with ostiolysis of the distal  clavicle vs. acromioclavicular joint arthritis, not acute shoulder seperation   • Strain of muscle of right hip, initial encounter 2023   • Varicella     vaccine     Past Surgical History:   Procedure Laterality Date   •  SECTION     • DENTAL SURGERY      wisdom teeth   • FL INJECTION RIGHT WRIST (ARTHROGRAM)  2024   • PA APPLICATION LONG ARM SPLINT SHOULDER HAND Right 2024    Procedure: Application of long arm sugar tong splint;  Surgeon: Alejo Middleton MD;  [facility];  Service: Orthopedics   • PA APPLICATION SHORT ARM SPLINT FOREARM-HAND STATIC Right 4/15/2025    Procedure: Application short arm thumb spica splint;  Surgeon: Alejo Middleton MD;  [facility];  Service: Orthopedics   • PA ARTHRS WRST EXC&/RPR TRIANG FIBROCART&/JOINT Right 2024    Procedure: Right wrist arthroscopy with TFCC debridement and repair;  Surgeon: Alejo Middleton MD;  [facility];  Service: Orthopedics   • PA  DELIVERY ONLY N/A 2018    Procedure:  SECTION ();  Surgeon: Ismael Marquez MD;  Location: [facility];  Service: Obstetrics   • PA  DELIVERY ONLY N/A 2020    Procedure:  SECTION ();  Surgeon: Ismael Marquez MD;  Location: [facility];  Service: Obstetrics   • PA INCISION & REMOVAL FOREIGN BODY SUBQ TISS SIMPLE Right 2024    Procedure: Right wrist removal of foreign body (stitch) and Neurolysis of ulnar nerve;  Surgeon: Alejo Middleton MD;  [facility];  Service: Orthopedics   • PA INCISION EXTENSOR TENDON SHEATH WRIST Right 4/15/2025    Procedure: Right dequervains release with tenosynovectomy of the abductor pollicis longus and extensor pollicis brevis tendons;  Surgeon: Alejo Middleton MD;  [facility];  Service: Orthopedics   • PA SYNOVECTOMY EXTENSOR TENDON SHTH WRIST 1 CMPRT Right 4/15/2025    Procedure: Right dequervains release with tenosynovectomy of the abductor pollicis  "longus and extensor pollicis brevis tendons;  Surgeon: Alejo Middleton MD;  Location:  [facility];  Service: Orthopedics   • FL SYNOVECTOMY EXTENSOR TENDON SHTH WRIST 1 CMPRT Right 4/15/2025    Procedure: Right hand intersection syndrome release with tenosynovectomy of the abductor pollicis longus, extensor pollicis brevis, extensor carpi radialis longus, and extensor carpi radialis brevis;  Surgeon: Alejo Middleton MD;  [facility];  Service: Orthopedics   • SHOULDER SURGERY Right    • SINUS SURGERY  07/2023   • TUBAL LIGATION Bilateral 09/17/2020    Procedure: LIGATION/COAGULATION TUBAL;  Surgeon: Ismael Marquez MD;  Location: [facility];  Service: Obstetrics     Social History     Substance and Sexual Activity   Alcohol Use Not Currently   • Alcohol/week: 1.0 standard drink of alcohol     Social History     Substance and Sexual Activity   Drug Use Never     Social History     Tobacco Use   Smoking Status Never   Smokeless Tobacco Never     Family History   Problem Relation Age of Onset   • Fibromyalgia Mother    • Hyperlipidemia Father    • Hypertension Father    • Diabetes Maternal Grandmother    • Colon cancer Maternal Grandfather 48   • Cancer Maternal Grandfather    • Diabetes Paternal Grandfather    • Breast cancer Neg Hx    • Endometrial cancer Neg Hx    • Ovarian cancer Neg Hx        Meds/Allergies  Hospital Medications:   No current facility-administered medications for this visit.    Home Medications: Not in a hospital admission.      No Known Allergies      Portions of the record may have been created with voice recognition software.  Occasional wrong words or \"sound a like\" substitutions may have occurred due to the inherent limitations of voice recognition software.  Read the chart carefully and recognize, using context, where substitutions have occurred.          I spent > 35 mins examining and interviewing the patient, coordinating care, reviewing the chart, reviewing testing and writing " the note.

## 2025-05-27 ENCOUNTER — TELEPHONE (OUTPATIENT)
Dept: ENDOCRINOLOGY | Facility: CLINIC | Age: 35
End: 2025-05-27

## 2025-05-28 LAB
CV ZIO BASELINE AVG BPM: 79 BPM
CV ZIO BASELINE BPM HIGH: 153 BPM
CV ZIO BASELINE BPM LOW: 55 BPM
CV ZIO DEVICE ANALYSIS TIME: NORMAL
CV ZIO ECT SVE COUNT: NORMAL EPISODES
CV ZIO ECT SVE CPLT COUNT: 0 EPISODES
CV ZIO ECT SVE CPLT FREQ: 0
CV ZIO ECT SVE FREQ: NORMAL
CV ZIO ECT SVE TPLT COUNT: 0 EPISODES
CV ZIO ECT SVE TPLT FREQ: 0
CV ZIO ECT VE COUNT: NORMAL EPISODES
CV ZIO ECT VE CPLT COUNT: 0 EPISODES
CV ZIO ECT VE CPLT FREQ: 0
CV ZIO ECT VE FREQ: NORMAL
CV ZIO ECT VE TPLT COUNT: 0 EPISODES
CV ZIO ECT VE TPLT FREQ: 0
CV ZIO ECTOPIC SVE COUPLET RAW PERCENT: 0 %
CV ZIO ECTOPIC SVE ISOLATED PERCENT: 12.59 %
CV ZIO ECTOPIC SVE TRIPLET RAW PERCENT: 0 %
CV ZIO ECTOPIC VE COUPLET RAW PERCENT: 0 %
CV ZIO ECTOPIC VE ISOLATED PERCENT: 0.9 %
CV ZIO ECTOPIC VE TRIPLET RAW PERCENT: 0 %
CV ZIO ENROLLMENT END: NORMAL
CV ZIO ENROLLMENT START: NORMAL
CV ZIO PATIENT EVENTS DIARIES: 10
CV ZIO PATIENT EVENTS TRIGGERS: 34
CV ZIO PAUSE COUNT: 0
CV ZIO PRESCRIPTION STATUS: NORMAL
CV ZIO SVT COUNT: 0
CV ZIO TOTAL  ENROLLMENT PERIOD: NORMAL
CV ZIO VT COUNT: 0

## 2025-05-29 ENCOUNTER — TELEPHONE (OUTPATIENT)
Age: 35
End: 2025-05-29

## 2025-05-29 DIAGNOSIS — R00.2 PALPITATION: Primary | ICD-10-CM

## 2025-05-29 RX ORDER — METOPROLOL TARTRATE 25 MG/1
25 TABLET, FILM COATED ORAL EVERY 12 HOURS SCHEDULED
Qty: 180 TABLET | Refills: 3 | Status: SHIPPED | OUTPATIENT
Start: 2025-05-29

## 2025-05-29 NOTE — TELEPHONE ENCOUNTER
Patient calling, she states she spoke with someone from cardiology regarding her Zio monitor results yesterday, who informed her that Dr. Nelson wanted her to start a new medication. No new medications sent to pharmacy, and no notes found in chart.     Please review and advise.

## 2025-06-25 ENCOUNTER — OFFICE VISIT (OUTPATIENT)
Dept: OBGYN CLINIC | Facility: HOSPITAL | Age: 35
End: 2025-06-25

## 2025-06-25 VITALS — HEIGHT: 65 IN | WEIGHT: 172 LBS | BODY MASS INDEX: 28.66 KG/M2

## 2025-06-25 DIAGNOSIS — M65.831 EXTENSOR INTERSECTION SYNDROME OF RIGHT WRIST: Primary | ICD-10-CM

## 2025-06-25 DIAGNOSIS — M65.4 DE QUERVAIN'S TENOSYNOVITIS, RIGHT: ICD-10-CM

## 2025-06-25 PROCEDURE — 99024 POSTOP FOLLOW-UP VISIT: CPT | Performed by: ORTHOPAEDIC SURGERY

## 2025-06-25 NOTE — ASSESSMENT & PLAN NOTE
S/p right De Quervain's release, right intersection syndrome release performed 4/15/25  Patient was advised to continue to work on home exercises to continue to strengthen  She may continue with normal activities of daily living  She will follow-up on an as needed basis if symptoms persist or new symptoms arise  -The patient expresses understanding and is in agreement with today's treatment plan.

## 2025-06-25 NOTE — PROGRESS NOTES
"    ORTHOPAEDIC HAND, WRIST, AND ELBOW OFFICE  VISIT     Name: Nell Baldwin      : 1990      MRN: [id number]  Encounter Provider: Alejo Middleton MD  Encounter Date: 2025   Encounter department: [facility]  :  Assessment & Plan  Extensor intersection syndrome of right wrist  De Quervain's tenosynovitis, right  S/p right De Quervain's release, right intersection syndrome release performed 4/15/25  Patient was advised to continue to work on home exercises to continue to strengthen  She may continue with normal activities of daily living  She will follow-up on an as needed basis if symptoms persist or new symptoms arise  -The patient expresses understanding and is in agreement with today's treatment plan.                  History of Present Illness   HPI  Chief Complaint   Patient presents with    Right Wrist - Post-op     Arthoscopy with TFCC debridement + repair - 24  removal of foreign body (stitch) and Neurolysis of ulnar nerve- 24  De Quervain's- 2nd injection 1/15/25       SUBJECTIVE:  Nell Baldwin is a 34 y.o. female who presents for follow up after Right dequervains release with tenosynovectomy of the abductor pollicis longus and extensor pollicis brevis tendons - Right, Right hand intersection syndrome release with tenosynovectomy of the abductor pollicis longus, extensor pollicis brevis, extensor carpi radialis longus, and extensor carpi radialis brevis - Right, and Application short arm thumb spica splint - Right on 4/15/2025.  Today patient has significant improvement since last visit on 4/15/25  She states that she has some discomfort with full extension of her right thumb however significantly improved with home exercise program.  She states that she has improvement in the numbness and tingling to her right small finger as well.      PHYSICAL EXAMINATION:  Vital signs: Ht 5' 5\" (1.651 m)   Wt 78 kg (172 lb)   BMI " 28.62 kg/m²   General: well developed and well nourished, alert, oriented times 3, and appears comfortable  Psychiatric: Normal    MUSCULOSKELETAL EXAMINATION:  Incision: Well-healed  Range of Motion: opposition intact, full composite fist possible, and WNL  Neurovascular status: Neuro intact, good cap refill  Activity Restrictions: No restrictions  Improvement in numbness and tingling to right small finger           STUDIES REVIEWED:  No Studies to review      PROCEDURES PERFORMED:  Procedures  No Procedures performed today

## 2025-08-02 DIAGNOSIS — F32.81 PMDD (PREMENSTRUAL DYSPHORIC DISORDER): ICD-10-CM

## 2025-08-04 RX ORDER — NORETHINDRONE ACETATE AND ETHINYL ESTRADIOL AND FERROUS FUMARATE 1MG-20(21)
1 KIT ORAL DAILY
Qty: 84 TABLET | Refills: 1 | Status: SHIPPED | OUTPATIENT
Start: 2025-08-04

## (undated) DEVICE — 3M™ STERI-STRIP™ REINFORCED ADHESIVE SKIN CLOSURES, R1547, 1/2 IN X 4 IN (12 MM X 100 MM), 6 STRIPS/ENVELOPE: Brand: 3M™ STERI-STRIP™

## (undated) DEVICE — GLOVE PI ULTRA TOUCH SZ.8.5

## (undated) DEVICE — NEEDLE 25G X 1 1/2

## (undated) DEVICE — SUT VICRYL 3-0 SH 27 IN J416H

## (undated) DEVICE — CUFF TOURNIQUET 18 X 4 IN QUICK CONNECT DISP 1 BLADDER

## (undated) DEVICE — INTENDED FOR TISSUE SEPARATION, AND OTHER PROCEDURES THAT REQUIRE A SHARP SURGICAL BLADE TO PUNCTURE OR CUT.: Brand: BARD-PARKER ® CARBON RIB-BACK BLADES

## (undated) DEVICE — GLOVE INDICATOR PI UNDERGLOVE SZ 8 BLUE

## (undated) DEVICE — NEEDLE 18 G X 1 1/2

## (undated) DEVICE — STERILE BETHLEHEM PLASTIC HAND: Brand: CARDINAL HEALTH

## (undated) DEVICE — MEDI-VAC YANKAUER SUCTION HANDLE W/STRAIGHT TIP & CONTROL VENT: Brand: CARDINAL HEALTH

## (undated) DEVICE — PREP PAD BNS: Brand: CONVERTORS

## (undated) DEVICE — TFCC MENDER DISPOSABLE SUTURE SYSTEM

## (undated) DEVICE — IV SET 15 DROP 3/Y GRAVITY CARESITE

## (undated) DEVICE — SUT PROLENE 4-0 PS-2 18 IN 8682H

## (undated) DEVICE — 3M™ TEGADERM™ TRANSPARENT FILM DRESSING FRAME STYLE, 1627, 4 IN X 10 IN (10 CM X 25 CM), 20/CT 4CT/CASE: Brand: 3M™ TEGADERM™

## (undated) DEVICE — TUBING SUCTION 5MM X 12 FT

## (undated) DEVICE — STRAPS, 2 OF 24", 1 OF 36": Brand: ACUMED

## (undated) DEVICE — GLOVE SRG BIOGEL 8

## (undated) DEVICE — ADHESIVE SKIN HIGH VISCOSITY EXOFIN 1ML

## (undated) DEVICE — COBAN 4 IN STERILE

## (undated) DEVICE — 3M™ TEGADERM™ CHG DRESSING 25/CARTON 4 CARTONS/CASE 1659: Brand: TEGADERM™

## (undated) DEVICE — GLOVE SRG BIOGEL 7.5

## (undated) DEVICE — GLOVE SRG BIOGEL 8.5

## (undated) DEVICE — SUT PDS II 2-0 CT-1 27 IN Z259H

## (undated) DEVICE — CHLORAPREP HI-LITE 10.5ML ORANGE

## (undated) DEVICE — WET SKIN PREP TRAY: Brand: MEDLINE INDUSTRIES, INC.

## (undated) DEVICE — GLOVE PI ULTRA TOUCH SZ.8.0

## (undated) DEVICE — ARM SLING: Brand: DEROYAL

## (undated) DEVICE — Device

## (undated) DEVICE — 3M™ STERI-STRIP™ COMPOUND BENZOIN TINCTURE 40 BAGS/CARTON 4 CARTONS/CASE C1544: Brand: 3M™ STERI-STRIP™

## (undated) DEVICE — SUT PROLENE 4-0 PS-2 18 IN 8682G